# Patient Record
Sex: FEMALE | Race: WHITE | NOT HISPANIC OR LATINO
[De-identification: names, ages, dates, MRNs, and addresses within clinical notes are randomized per-mention and may not be internally consistent; named-entity substitution may affect disease eponyms.]

---

## 2017-05-22 ENCOUNTER — TRANSCRIPTION ENCOUNTER (OUTPATIENT)
Age: 47
End: 2017-05-22

## 2017-06-01 ENCOUNTER — EMERGENCY (EMERGENCY)
Facility: HOSPITAL | Age: 47
LOS: 1 days | Discharge: HOME | End: 2017-06-01
Attending: EMERGENCY MEDICINE | Admitting: EMERGENCY MEDICINE
Payer: MEDICARE

## 2017-06-01 VITALS
HEART RATE: 84 BPM | OXYGEN SATURATION: 99 % | DIASTOLIC BLOOD PRESSURE: 78 MMHG | RESPIRATION RATE: 17 BRPM | SYSTOLIC BLOOD PRESSURE: 112 MMHG

## 2017-06-01 DIAGNOSIS — Z98.51 TUBAL LIGATION STATUS: Chronic | ICD-10-CM

## 2017-06-01 DIAGNOSIS — Z98.89 OTHER SPECIFIED POSTPROCEDURAL STATES: Chronic | ICD-10-CM

## 2017-06-01 DIAGNOSIS — K56.60 UNSPECIFIED INTESTINAL OBSTRUCTION: Chronic | ICD-10-CM

## 2017-06-01 PROCEDURE — 71010: CPT | Mod: 26

## 2017-06-01 PROCEDURE — 93010 ELECTROCARDIOGRAM REPORT: CPT

## 2017-06-01 PROCEDURE — 99285 EMERGENCY DEPT VISIT HI MDM: CPT | Mod: 25,GC

## 2017-06-01 RX ORDER — KETOROLAC TROMETHAMINE 30 MG/ML
30 SYRINGE (ML) INJECTION ONCE
Qty: 0 | Refills: 0 | Status: DISCONTINUED | OUTPATIENT
Start: 2017-06-01 | End: 2017-06-01

## 2017-06-01 RX ORDER — ACETAMINOPHEN 500 MG
975 TABLET ORAL ONCE
Qty: 0 | Refills: 0 | Status: COMPLETED | OUTPATIENT
Start: 2017-06-01 | End: 2017-06-01

## 2017-06-01 RX ADMIN — Medication 30 MILLIGRAM(S): at 06:46

## 2017-06-01 RX ADMIN — Medication 975 MILLIGRAM(S): at 06:46

## 2017-06-01 NOTE — ED PROVIDER NOTE - PMH
Abnormal uterine bleeding    Anemia    Anxiety    Chronic lower back pain    Depression    Fibromyalgia    GERD (gastroesophageal reflux disease)    IBS (irritable bowel syndrome)    Post traumatic stress disorder

## 2017-06-01 NOTE — ED PROVIDER NOTE - MEDICAL DECISION MAKING DETAILS
46F with R facial pain radiating to RUE and R side of chest. No risk factors for PE. Possible sinus issue - pt scheduled to see ENT tomorrow. Do not suspect ACS. No point tenderness or injury suggestive of musculoskeletal etiology of pain. Plan: pain control, ekg, cxr, reassess. Pt requesting pain management referral - will provide. 46F with R facial pain radiating to RUE and R side of chest. No risk factors for PE. Possible sinusitis - pt scheduled to see ENT tomorrow. Do not suspect ACS. No point tenderness or injury suggestive of musculoskeletal etiology of pain. Plan: pain control, ekg, cxr, reassess. Pt requesting pain management referral - will provide.

## 2017-06-01 NOTE — ED PROVIDER NOTE - ATTENDING CONTRIBUTION TO CARE
46F h/o fibromyalgia, gastric bypass, anxiety/depression, and GERD p/w R facial pain x 5 days. Pt notes increased stress at home. She notes that her  and 3 children don't understand her condition, and sometimes it causes her to feel very bad. She notes that this morning she was dealing with her youngest child and couldn't deal and had worsening pain. She notes the pain started in her sinus and then went down her neck and right side of her chest and into her abdomen. She is supposed to see an ENT tomorrow morning. Exam:  well appearing, nad lungs: CTAB Heart: rrr no murmur Abd: soft, NTND Ext: no pedal edema,  Plan: Pt with fibromyalgia, and vague complaints on entire right side of body, not consistent with stroke, pt tearful, and depressed, without Suicidal ideation, pt has a therapist and . She used to see a pain mangement doctor but no longer does. ECG normal, cxr normal, will tx pain, pt already has f/u for her facial symptoms, will discharge home.

## 2017-06-01 NOTE — ED PROVIDER NOTE - CARE PLAN
Instructions for follow-up, activity and diet:	-- See referral list to follow up with pain management.   -- Follow up with ENT as scheduled.  -- Return to ER immediately for new or worsening symptoms, any urgent issues, or for any concerns. Principal Discharge DX:	Diffuse pain  Instructions for follow-up, activity and diet:	-- See referral list to follow up with pain management.   -- Follow up with ENT as scheduled.  -- Return to ER immediately for new or worsening symptoms, any urgent issues, or for any concerns.  Secondary Diagnosis:	Facial pain, acute

## 2017-06-01 NOTE — ED PROVIDER NOTE - PLAN OF CARE
-- See referral list to follow up with pain management.   -- Follow up with ENT as scheduled.  -- Return to ER immediately for new or worsening symptoms, any urgent issues, or for any concerns.

## 2017-06-01 NOTE — ED PROVIDER NOTE - PSH
Bowel obstruction  2010  H/O abdominoplasty    H/O  section  X3  H/O gastric bypass    H/O tubal ligation  2014

## 2017-06-01 NOTE — ED PROVIDER NOTE - OBJECTIVE STATEMENT
46F h/o fibromyalgia, gastric bypass, anxiety/depression, and GERD p/w R facial pain x 5 days. The pain started in the R maxillary area, sharp, intermittent, radiating to the R lateral neck, RUE, and R chest wall. No focal weakness/numbness, no recent injury. Pt states she has had similar pain in the past 2/2 fibromyalgia. Pt has been blowing her nose frequently, +yellow mucous and occasional nosebleeds, scheduled to see ENT tomorrow. Pt presenting now because the pain has worsened and she has SOB because her R chest hurts on deep inspiration. No hx of DVT/PE/cancer, calf pain/swelling, hemoptysis, or recent trauma/surgery/immobilization.

## 2017-06-01 NOTE — ED ADULT TRIAGE NOTE - CHIEF COMPLAINT QUOTE
Pt with hx of fibromyalgia c/o R sided body pain X3days. States pain is worse with deep inspiration and "makes me feel SOB." PMH anxiety

## 2017-06-01 NOTE — ED ADULT NURSE NOTE - OBJECTIVE STATEMENT
Pt received to room 15. Presents alert and oriented to person, place, and time w/ c/o right sided body pain with history of fibromyalgia. Pt medicated for pain as ordered. Will continue to monitor.

## 2017-06-01 NOTE — ED PROVIDER NOTE - PROGRESS NOTE DETAILS
continued pain, requesting IV pain medications, discussed that opioids are potential addicting and provider preference against them at this time, paged patients PMD  Trae Newman to discuss. D/W Adielue of Dr. Newman, who referred us to two pain management physicians for her to f/u, and she can be seen by Dr. Newman or a partner this week. Pt appears comfortable with normal vital signs, still reporting diffuse right sided body pain, worse in right chest, will help arrange f/u with pain management and discharge home. MOHAMUD with Dr. Ledesma for follow up appt, will D/c with pain managements information.

## 2017-06-02 ENCOUNTER — APPOINTMENT (OUTPATIENT)
Dept: OTOLARYNGOLOGY | Facility: CLINIC | Age: 47
End: 2017-06-02

## 2017-06-02 VITALS
HEART RATE: 72 BPM | HEIGHT: 70 IN | DIASTOLIC BLOOD PRESSURE: 85 MMHG | WEIGHT: 287 LBS | BODY MASS INDEX: 41.09 KG/M2 | SYSTOLIC BLOOD PRESSURE: 115 MMHG

## 2017-06-02 DIAGNOSIS — J01.00 ACUTE MAXILLARY SINUSITIS, UNSPECIFIED: ICD-10-CM

## 2017-06-07 ENCOUNTER — EMERGENCY (EMERGENCY)
Age: 47
LOS: 1 days | Discharge: HOME | End: 2017-06-07
Attending: EMERGENCY MEDICINE | Admitting: EMERGENCY MEDICINE
Payer: MEDICARE

## 2017-06-07 VITALS
SYSTOLIC BLOOD PRESSURE: 123 MMHG | OXYGEN SATURATION: 100 % | TEMPERATURE: 99 F | RESPIRATION RATE: 18 BRPM | HEART RATE: 74 BPM | DIASTOLIC BLOOD PRESSURE: 85 MMHG

## 2017-06-07 VITALS
SYSTOLIC BLOOD PRESSURE: 108 MMHG | OXYGEN SATURATION: 100 % | HEART RATE: 67 BPM | DIASTOLIC BLOOD PRESSURE: 58 MMHG | RESPIRATION RATE: 18 BRPM | TEMPERATURE: 98 F

## 2017-06-07 DIAGNOSIS — Z98.89 OTHER SPECIFIED POSTPROCEDURAL STATES: Chronic | ICD-10-CM

## 2017-06-07 DIAGNOSIS — K56.60 UNSPECIFIED INTESTINAL OBSTRUCTION: Chronic | ICD-10-CM

## 2017-06-07 DIAGNOSIS — Z98.51 TUBAL LIGATION STATUS: Chronic | ICD-10-CM

## 2017-06-07 LAB
ALBUMIN SERPL ELPH-MCNC: 4.1 G/DL — SIGNIFICANT CHANGE UP (ref 3.3–5)
ALP SERPL-CCNC: 77 U/L — SIGNIFICANT CHANGE UP (ref 40–120)
ALT FLD-CCNC: 12 U/L — SIGNIFICANT CHANGE UP (ref 4–33)
APPEARANCE UR: CLEAR — SIGNIFICANT CHANGE UP
AST SERPL-CCNC: 17 U/L — SIGNIFICANT CHANGE UP (ref 4–32)
BACTERIA # UR AUTO: SIGNIFICANT CHANGE UP
BASE EXCESS BLDV CALC-SCNC: 3.5 MMOL/L — SIGNIFICANT CHANGE UP
BASOPHILS # BLD AUTO: 0.03 K/UL — SIGNIFICANT CHANGE UP (ref 0–0.2)
BASOPHILS NFR BLD AUTO: 0.3 % — SIGNIFICANT CHANGE UP (ref 0–2)
BILIRUB SERPL-MCNC: 0.5 MG/DL — SIGNIFICANT CHANGE UP (ref 0.2–1.2)
BILIRUB UR-MCNC: NEGATIVE — SIGNIFICANT CHANGE UP
BLOOD GAS VENOUS - CREATININE: 0.69 MG/DL — SIGNIFICANT CHANGE UP (ref 0.5–1.3)
BLOOD UR QL VISUAL: HIGH
BUN SERPL-MCNC: 13 MG/DL — SIGNIFICANT CHANGE UP (ref 7–23)
CALCIUM SERPL-MCNC: 9 MG/DL — SIGNIFICANT CHANGE UP (ref 8.4–10.5)
CHLORIDE BLDV-SCNC: 106 MMOL/L — SIGNIFICANT CHANGE UP (ref 96–108)
CHLORIDE SERPL-SCNC: 105 MMOL/L — SIGNIFICANT CHANGE UP (ref 98–107)
CO2 SERPL-SCNC: 25 MMOL/L — SIGNIFICANT CHANGE UP (ref 22–31)
COLOR SPEC: YELLOW — SIGNIFICANT CHANGE UP
CREAT SERPL-MCNC: 0.77 MG/DL — SIGNIFICANT CHANGE UP (ref 0.5–1.3)
EOSINOPHIL # BLD AUTO: 0.03 K/UL — SIGNIFICANT CHANGE UP (ref 0–0.5)
EOSINOPHIL NFR BLD AUTO: 0.3 % — SIGNIFICANT CHANGE UP (ref 0–6)
GAS PNL BLDV: 139 MMOL/L — SIGNIFICANT CHANGE UP (ref 136–146)
GLUCOSE BLDV-MCNC: 81 — SIGNIFICANT CHANGE UP (ref 70–99)
GLUCOSE SERPL-MCNC: 82 MG/DL — SIGNIFICANT CHANGE UP (ref 70–99)
GLUCOSE UR-MCNC: NEGATIVE — SIGNIFICANT CHANGE UP
HCO3 BLDV-SCNC: 26 MMOL/L — SIGNIFICANT CHANGE UP (ref 20–27)
HCT VFR BLD CALC: 37.6 % — SIGNIFICANT CHANGE UP (ref 34.5–45)
HCT VFR BLDV CALC: 38.7 % — SIGNIFICANT CHANGE UP (ref 34.5–45)
HGB BLD-MCNC: 12.4 G/DL — SIGNIFICANT CHANGE UP (ref 11.5–15.5)
HGB BLDV-MCNC: 12.6 G/DL — SIGNIFICANT CHANGE UP (ref 11.5–15.5)
IMM GRANULOCYTES NFR BLD AUTO: 0.8 % — SIGNIFICANT CHANGE UP (ref 0–1.5)
KETONES UR-MCNC: NEGATIVE — SIGNIFICANT CHANGE UP
LACTATE BLDV-MCNC: 0.9 MMOL/L — SIGNIFICANT CHANGE UP (ref 0.5–2)
LEUKOCYTE ESTERASE UR-ACNC: NEGATIVE — SIGNIFICANT CHANGE UP
LIDOCAIN IGE QN: 20.4 U/L — SIGNIFICANT CHANGE UP (ref 7–60)
LYMPHOCYTES # BLD AUTO: 2.45 K/UL — SIGNIFICANT CHANGE UP (ref 1–3.3)
LYMPHOCYTES # BLD AUTO: 27.2 % — SIGNIFICANT CHANGE UP (ref 13–44)
MCHC RBC-ENTMCNC: 30.6 PG — SIGNIFICANT CHANGE UP (ref 27–34)
MCHC RBC-ENTMCNC: 33 % — SIGNIFICANT CHANGE UP (ref 32–36)
MCV RBC AUTO: 92.8 FL — SIGNIFICANT CHANGE UP (ref 80–100)
MONOCYTES # BLD AUTO: 0.66 K/UL — SIGNIFICANT CHANGE UP (ref 0–0.9)
MONOCYTES NFR BLD AUTO: 7.3 % — SIGNIFICANT CHANGE UP (ref 2–14)
MUCOUS THREADS # UR AUTO: SIGNIFICANT CHANGE UP
NEUTROPHILS # BLD AUTO: 5.78 K/UL — SIGNIFICANT CHANGE UP (ref 1.8–7.4)
NEUTROPHILS NFR BLD AUTO: 64.1 % — SIGNIFICANT CHANGE UP (ref 43–77)
NITRITE UR-MCNC: NEGATIVE — SIGNIFICANT CHANGE UP
PCO2 BLDV: 48 MMHG — SIGNIFICANT CHANGE UP (ref 41–51)
PH BLDV: 7.39 PH — SIGNIFICANT CHANGE UP (ref 7.32–7.43)
PH UR: 6 — SIGNIFICANT CHANGE UP (ref 4.6–8)
PLATELET # BLD AUTO: 236 K/UL — SIGNIFICANT CHANGE UP (ref 150–400)
PMV BLD: 10.6 FL — SIGNIFICANT CHANGE UP (ref 7–13)
PO2 BLDV: < 24 MMHG — LOW (ref 35–40)
POTASSIUM BLDV-SCNC: 4.5 MMOL/L — SIGNIFICANT CHANGE UP (ref 3.4–4.5)
POTASSIUM SERPL-MCNC: 4.2 MMOL/L — SIGNIFICANT CHANGE UP (ref 3.5–5.3)
POTASSIUM SERPL-SCNC: 4.2 MMOL/L — SIGNIFICANT CHANGE UP (ref 3.5–5.3)
PROT SERPL-MCNC: 7 G/DL — SIGNIFICANT CHANGE UP (ref 6–8.3)
PROT UR-MCNC: NEGATIVE — SIGNIFICANT CHANGE UP
RBC # BLD: 4.05 M/UL — SIGNIFICANT CHANGE UP (ref 3.8–5.2)
RBC # FLD: 13 % — SIGNIFICANT CHANGE UP (ref 10.3–14.5)
RBC CASTS # UR COMP ASSIST: SIGNIFICANT CHANGE UP (ref 0–?)
SAO2 % BLDV: 33.6 % — LOW (ref 60–85)
SODIUM SERPL-SCNC: 142 MMOL/L — SIGNIFICANT CHANGE UP (ref 135–145)
SP GR SPEC: 1.01 — SIGNIFICANT CHANGE UP (ref 1–1.03)
SQUAMOUS # UR AUTO: SIGNIFICANT CHANGE UP
UROBILINOGEN FLD QL: NORMAL E.U. — SIGNIFICANT CHANGE UP (ref 0.1–0.2)
WBC # BLD: 9.02 K/UL — SIGNIFICANT CHANGE UP (ref 3.8–10.5)
WBC # FLD AUTO: 9.02 K/UL — SIGNIFICANT CHANGE UP (ref 3.8–10.5)
WBC UR QL: SIGNIFICANT CHANGE UP (ref 0–?)

## 2017-06-07 PROCEDURE — 99284 EMERGENCY DEPT VISIT MOD MDM: CPT

## 2017-06-07 PROCEDURE — 74177 CT ABD & PELVIS W/CONTRAST: CPT | Mod: 26

## 2017-06-07 RX ORDER — SODIUM CHLORIDE 9 MG/ML
1000 INJECTION INTRAMUSCULAR; INTRAVENOUS; SUBCUTANEOUS ONCE
Qty: 0 | Refills: 0 | Status: COMPLETED | OUTPATIENT
Start: 2017-06-07 | End: 2017-06-07

## 2017-06-07 RX ORDER — LIDOCAINE 4 G/100G
10 CREAM TOPICAL ONCE
Qty: 0 | Refills: 0 | Status: COMPLETED | OUTPATIENT
Start: 2017-06-07 | End: 2017-06-07

## 2017-06-07 RX ORDER — MORPHINE SULFATE 50 MG/1
6 CAPSULE, EXTENDED RELEASE ORAL ONCE
Qty: 0 | Refills: 0 | Status: DISCONTINUED | OUTPATIENT
Start: 2017-06-07 | End: 2017-06-07

## 2017-06-07 RX ORDER — MORPHINE SULFATE 50 MG/1
4 CAPSULE, EXTENDED RELEASE ORAL ONCE
Qty: 0 | Refills: 0 | Status: DISCONTINUED | OUTPATIENT
Start: 2017-06-07 | End: 2017-06-07

## 2017-06-07 RX ORDER — OXYCODONE HYDROCHLORIDE 5 MG/1
1 TABLET ORAL
Qty: 9 | Refills: 0 | OUTPATIENT
Start: 2017-06-07 | End: 2017-06-10

## 2017-06-07 RX ORDER — ONDANSETRON 8 MG/1
8 TABLET, FILM COATED ORAL ONCE
Qty: 0 | Refills: 0 | Status: COMPLETED | OUTPATIENT
Start: 2017-06-07 | End: 2017-06-07

## 2017-06-07 RX ORDER — ONDANSETRON 8 MG/1
4 TABLET, FILM COATED ORAL ONCE
Qty: 0 | Refills: 0 | Status: COMPLETED | OUTPATIENT
Start: 2017-06-07 | End: 2017-06-07

## 2017-06-07 RX ADMIN — ONDANSETRON 4 MILLIGRAM(S): 8 TABLET, FILM COATED ORAL at 19:21

## 2017-06-07 RX ADMIN — MORPHINE SULFATE 6 MILLIGRAM(S): 50 CAPSULE, EXTENDED RELEASE ORAL at 21:39

## 2017-06-07 RX ADMIN — ONDANSETRON 8 MILLIGRAM(S): 8 TABLET, FILM COATED ORAL at 22:11

## 2017-06-07 RX ADMIN — MORPHINE SULFATE 4 MILLIGRAM(S): 50 CAPSULE, EXTENDED RELEASE ORAL at 19:35

## 2017-06-07 RX ADMIN — Medication 1 MILLIGRAM(S): at 23:08

## 2017-06-07 RX ADMIN — SODIUM CHLORIDE 2000 MILLILITER(S): 9 INJECTION INTRAMUSCULAR; INTRAVENOUS; SUBCUTANEOUS at 21:33

## 2017-06-07 RX ADMIN — Medication 30 MILLILITER(S): at 23:08

## 2017-06-07 RX ADMIN — SODIUM CHLORIDE 1000 MILLILITER(S): 9 INJECTION INTRAMUSCULAR; INTRAVENOUS; SUBCUTANEOUS at 19:03

## 2017-06-07 RX ADMIN — LIDOCAINE 10 MILLILITER(S): 4 CREAM TOPICAL at 23:08

## 2017-06-07 RX ADMIN — MORPHINE SULFATE 6 MILLIGRAM(S): 50 CAPSULE, EXTENDED RELEASE ORAL at 21:17

## 2017-06-07 RX ADMIN — MORPHINE SULFATE 4 MILLIGRAM(S): 50 CAPSULE, EXTENDED RELEASE ORAL at 19:21

## 2017-06-07 NOTE — ED ADULT TRIAGE NOTE - CHIEF COMPLAINT QUOTE
Pt c/o right sided abd pain x2 weeks. C/o bloody nose and SOB on Thursday. Denies SOB, CP. + nausea, denies V/D. Hx of IBS.

## 2017-06-07 NOTE — ED SUB INTERN NOTE - OBJECTIVE STATEMENT FT
46 year old woman with extensive abdominal surgery hx presents with R abdominal pain x 2 weeks. The pain has a sharp component that comes and goes with a severity of 10/10. She has a constant dull throbbing pain that occasionally radiates to the back. It is worse when she moves and has been having difficulty picking up her child. She has been able to tolerate PO intake, and there is no change in pain associated with eating. She experiences mild nausea but no emesis and has a baseline of watery bowel movements due to a history of IBS and has not noticed a change in her BMs. She has not had any dysuria or changes in urinary habits    Was in the [facility] for her son and began to have worse abdominal pain prompting her to come to the adult ED.    She was recently in the ED for a sinus infection and finished a course of abx yesterday. PMH is significant for IBS, fibromyalgia, multiple disk herniations, multiple C.diff infections, anxiety, and depression. PSH significant for cholecystectomy and gastric bypass 2005, tummy tuck 2007, strangulated hernia repair 2009, bowel obstruction removal 2012, 3 C-sections (2003,2004,2014), tubal ligation 2014. 46 year old woman with multiple abdominal surgery hx presents with R abdominal pain x 2 weeks. The pain has a sharp component that comes and goes with a severity of 10/10. She has a constant dull throbbing pain that occasionally radiates to the back. It is worse when she moves and has been having difficulty picking up her child. She has been able to tolerate PO intake, and there is no change in pain associated with eating. She experiences mild nausea but no emesis and has a baseline of watery bowel movements due to a history of IBS and has not noticed a change in her BMs. She has not had any dysuria, hematuria, or changes in urinary habits. She has noticed some yellow, malodorous vaginal discharge x 3 years.    She was recently in the ED for a sinus infection and finished a course of abx yesterday. She had seen Dr. Valencia this morning to follow up for the previous ED visit [facility] While in the pediatric ED, she began to have worse abdominal pain prompting her to come to the adult ED. PMH is significant for IBS, fibromyalgia, multiple disk herniations, multiple C.diff infections, anxiety, and depression. PSH significant for cholecystectomy and gastric bypass 2005, tummy tuck 2007, strangulated hernia repair 2009, bowel obstruction removal 2012, 3 C-sections (2003,2004,2014), tubal ligation 2014.

## 2017-06-07 NOTE — ED PROVIDER NOTE - OBJECTIVE STATEMENT
47 y/o F with h/o fibromyalgia, anxiety/depression, GERD, IBS, gastric bypass, previous cholecystectomy, SBO here with right sided abdominal pain x 2 weeks.  Pain is spasming, intermittent, worse with any movement, nonradiating.  Pt states she was seen a week ago in the ER for right sided body pain, which was more prominent in her right chest and face at the time.  She was discharged with ENT follow-up, diagnosed with sinus infection and started on antibiotics.  Pt had follow-up appointment yesterday with her PMD (Dr Fraser [phone]) as she continued to have abdominal pain and had be gabapentin dose increased and was scheduled for an outpatient CT, which she has been unable to obtain.  She denies any associated fever, chills, cough, back pain, vomiting, constipation, dysuria, hematuria.  Pt describes associated nausea today, also with diarrhea which is chronic and consistent with known IBS.

## 2017-06-07 NOTE — ED PROVIDER NOTE - PLAN OF CARE
Take all medications as directed, for pain take Ibuprofen (Motrin, Advil) or Acetaminophen (Tylenol) as directed on packaging.  Follow up with PMD as directed.  You were given copies of all labs and imaging results from this ER visit, please take them to your appointment.  If needed call [phone] to find a primary care physician or call  [phone] to schedule an appointment with the general medicine clinic.  Return to the ER for any worsening symptoms or other concerns.

## 2017-06-07 NOTE — ED PROVIDER NOTE - PROGRESS NOTE DETAILS
Kendall PN - Asked by RN to see patient for increasing abdominal pain. Patient reports continued RUQ pain same location, increasing after initial pain improvement.   Abd: soft, RUQ tenderness to light palpation, (+) BS Kendall PN - results of labs and CT discussed with patient and sister, both express relief at results of nml study however, patient reports continued spasms of severe pain. Patient with h/o fibromyalgia and IBS, reports taking gabapentin for chronic pain, has no specific break through pain regimen. Patient was referred to ED today after PMD evaluation for CTA abd/pelvis, PMD has offered no additional pain mgmt, referred to pain mgmt which patient not yet seen. Patient due for GI/bariatric sx evaluation next week. I-STOP reviewed and patient with no recent opiate prescriptions. Kendall PN - review of complete CT report reveals finding of T10 compression fracture, not initially recognized as not noted in impression. Discussed exact pain pattern again with patient. Patient reports pain starts in mid back and radiates around to front of abdomen, patient felt pain was a rib or back issue. Patient denies any recent injury or known prior compression fracture. Patient has known multiple lumbar disc herniations. Patient denies bowel or bladder incontinence.   Back: focal T10/T11 spinal tenderness without step off, pain exacerbated with movement of R rib 10  Neuro: strength b/l LE 5/5, sensation grossly intact

## 2017-06-07 NOTE — ED ADULT NURSE REASSESSMENT NOTE - NS ED NURSE REASSESS COMMENT FT1
pt. in NAD at this time, pain partially relieved, feels nauseated, MD made aware. IVF infusing as ordered. awaits CT results. will continue to provide nsg care

## 2017-06-07 NOTE — ED ADULT NURSE REASSESSMENT NOTE - NS ED NURSE REASSESS COMMENT FT1
pt. appears in NAD, has hx of anxiety, c/o epigastric pain, denies any nausea at this time. MD Argueta aware and seen pt. meds given as ordered. PO challenged. awaits dispo. sister at bedside. will continue to monitor

## 2017-06-07 NOTE — ED SUB INTERN NOTE - MEDICAL DECISION MAKING DETAILS
46 year old woman presents with R abdominal pain x 2 weeks. Based upon history of multiple abdominal surgeries, appendicitis and bowel obstruction or perforation should be ruled out. She has, however, not had any vomiting or changes in bowel movements which makes obstruction less likely. Gynecologic issues such as ectopic pregnancy (history of tubal ligation) and PID (yellow, malodorous discharge) leading to [id number] are also on the differential. Pancreatitis and bowel ischemia is possible but lower on the differential as she does not have any changes in symptoms of food intake. She also does not have risk factors for bowel ischemia (no CAD, Afib, OCPs). Acute cystitis is also lower on the differential as it would typically present with other urinary symptoms such as dysuria or increased frequency. Neurologic causes is high on the differential as a contributing cause as she also has a history of fibromyalgia and herniated discs which may result in/exacerbate abdominal pain.    CBC, CMP, lactate, lipase, UA, hCG. CT abdomen/pelvis with IV and PO contrast. 46 year old woman presents with R abdominal pain x 2 weeks. Based upon history of multiple abdominal surgeries, appendicitis and bowel obstruction or perforation should be ruled out. She has, however, not had any vomiting or changes in bowel movements which makes obstruction less likely. Gynecologic issues such as ectopic pregnancy (history of tubal ligation) and PID (yellow, malodorous discharge) leading to [id number] are also on the differential. Pancreatitis and bowel ischemia is possible but lower on the differential as she does not have any changes in symptoms of food intake. She also does not have risk factors for bowel ischemia (no CAD, Afib, OCPs). Acute cystitis is also lower on the differential as it would typically present with other urinary symptoms such as dysuria or increased frequency. PNA could also cause abdominal pain and she has a history of sinus infection, she has, however, not had any cough or worsening SOB with normal lung sounds. Neurologic causes is high on the differential as a contributing cause as she also has a history of fibromyalgia and herniated discs which may result in/exacerbate abdominal pain.    CBC, CMP, lactate, lipase, UA, hCG. CXR, CT abdomen/pelvis with IV and PO contrast. 46 year old woman presents with R abdominal pain x 2 weeks. Based upon history of multiple abdominal surgeries, appendicitis and bowel obstruction or perforation should be ruled out. She has, however, not had any vomiting or changes in bowel movements which makes obstruction less likely. Pancreatitis and bowel ischemia is possible but lower on the differential as she does not have any changes in symptoms of food intake. She also does not have risk factors for bowel ischemia (no CAD, Afib, OCPs).  Gynecologic issues such as ectopic pregnancy (history of tubal ligation) and PID (yellow, malodorous discharge) leading to [id number] are also on the differential. Acute cystitis is also lower on the differential as it would typically present with other urinary symptoms such as dysuria or increased frequency.   PNA could also cause abdominal pain and she has a history of sinus infection, she has, however, not had any cough or worsening SOB with normal lung sounds.   Neurologic causes is high on the differential as a contributing cause as she also has a history of fibromyalgia and herniated discs which may result in/exacerbate abdominal pain.  CBC, CMP, lactate, lipase, UA, hCG. CXR, CT abdomen/pelvis with IV and PO contrast.

## 2017-06-07 NOTE — ED ADULT NURSE NOTE - OBJECTIVE STATEMENT
pt. came in c/o intermittent rt-side abd'l pain radiating to rt lower back x 2 weeks and has been getting worse since last Wednesday. pt. also c/o nausea, no vomiting, has been having loose stools. pt. states she has hx of IBS. pt. also c/o hot flashes, no fever at home. denies any dysuria, had an episode of "a little blood" in her urine today. pt. was given Morphine in Intake prior transfer to ,  pain partially relieved. noted g20 saline lock on rt ac with no ss of infiltration. awaits CT. will continue to monitor

## 2017-06-07 NOTE — ED PROVIDER NOTE - CARE PLAN
Principal Discharge DX:	Vertebral compression fracture, initial encounter  Instructions for follow-up, activity and diet:	Take all medications as directed, for pain take Ibuprofen (Motrin, Advil) or Acetaminophen (Tylenol) as directed on packaging.  Follow up with PMD as directed.  You were given copies of all labs and imaging results from this ER visit, please take them to your appointment.  If needed call [phone] to find a primary care physician or call  [phone] to schedule an appointment with the general medicine clinic.  Return to the ER for any worsening symptoms or other concerns.

## 2017-06-07 NOTE — ED SUB INTERN NOTE - PSH
Bowel obstruction    H/O abdominoplasty    H/O  section  X3 (,,)  H/O gastric bypass    H/O tubal ligation  2014

## 2017-06-07 NOTE — ED PROVIDER NOTE - MEDICAL DECISION MAKING DETAILS
47 y/o F with 2 weeks of R sided abd pain.  Abd is soft but with diffuse right sided tenderness.  Given history concern for appendicitis, sbo, ibs flare/colitis, renal colic although less likely.  Plan for labs, ua/ucg, ivfs, pain control + antiemetics, ct abd/pel, reassess.

## 2017-06-09 ENCOUNTER — APPOINTMENT (OUTPATIENT)
Dept: ORTHOPEDIC SURGERY | Facility: CLINIC | Age: 47
End: 2017-06-09

## 2017-06-09 VITALS
HEIGHT: 70 IN | HEART RATE: 72 BPM | DIASTOLIC BLOOD PRESSURE: 79 MMHG | BODY MASS INDEX: 40.09 KG/M2 | WEIGHT: 280 LBS | SYSTOLIC BLOOD PRESSURE: 121 MMHG

## 2017-06-09 VITALS — BODY MASS INDEX: 35.07 KG/M2 | WEIGHT: 245 LBS | HEIGHT: 70 IN

## 2017-06-09 DIAGNOSIS — M48.50XA COLLAPSED VERTEBRA, NOT ELSEWHERE CLASSIFIED, SITE UNSPECIFIED, INITIAL ENCOUNTER FOR FRACTURE: ICD-10-CM

## 2017-06-09 LAB
BACTERIA UR CULT: SIGNIFICANT CHANGE UP
SPECIMEN SOURCE: SIGNIFICANT CHANGE UP

## 2017-06-11 ENCOUNTER — INPATIENT (INPATIENT)
Facility: HOSPITAL | Age: 47
LOS: 7 days | Discharge: HOME | End: 2017-06-19
Attending: HOSPITALIST | Admitting: HOSPITALIST
Payer: MEDICARE

## 2017-06-11 VITALS
HEART RATE: 69 BPM | SYSTOLIC BLOOD PRESSURE: 110 MMHG | TEMPERATURE: 98 F | DIASTOLIC BLOOD PRESSURE: 52 MMHG | OXYGEN SATURATION: 98 % | RESPIRATION RATE: 16 BRPM

## 2017-06-11 DIAGNOSIS — K56.60 UNSPECIFIED INTESTINAL OBSTRUCTION: Chronic | ICD-10-CM

## 2017-06-11 DIAGNOSIS — Z98.89 OTHER SPECIFIED POSTPROCEDURAL STATES: Chronic | ICD-10-CM

## 2017-06-11 DIAGNOSIS — Z98.51 TUBAL LIGATION STATUS: Chronic | ICD-10-CM

## 2017-06-11 DIAGNOSIS — M54.9 DORSALGIA, UNSPECIFIED: ICD-10-CM

## 2017-06-11 LAB
ALBUMIN SERPL ELPH-MCNC: 3.9 G/DL — SIGNIFICANT CHANGE UP (ref 3.3–5)
ALP SERPL-CCNC: 72 U/L — SIGNIFICANT CHANGE UP (ref 40–120)
ALT FLD-CCNC: 12 U/L — SIGNIFICANT CHANGE UP (ref 4–33)
AST SERPL-CCNC: 16 U/L — SIGNIFICANT CHANGE UP (ref 4–32)
BASOPHILS # BLD AUTO: 0.02 K/UL — SIGNIFICANT CHANGE UP (ref 0–0.2)
BASOPHILS NFR BLD AUTO: 0.2 % — SIGNIFICANT CHANGE UP (ref 0–2)
BILIRUB SERPL-MCNC: 0.5 MG/DL — SIGNIFICANT CHANGE UP (ref 0.2–1.2)
BUN SERPL-MCNC: 15 MG/DL — SIGNIFICANT CHANGE UP (ref 7–23)
CALCIUM SERPL-MCNC: 8.7 MG/DL — SIGNIFICANT CHANGE UP (ref 8.4–10.5)
CHLORIDE SERPL-SCNC: 105 MMOL/L — SIGNIFICANT CHANGE UP (ref 98–107)
CO2 SERPL-SCNC: 25 MMOL/L — SIGNIFICANT CHANGE UP (ref 22–31)
CREAT SERPL-MCNC: 0.67 MG/DL — SIGNIFICANT CHANGE UP (ref 0.5–1.3)
EOSINOPHIL # BLD AUTO: 0.13 K/UL — SIGNIFICANT CHANGE UP (ref 0–0.5)
EOSINOPHIL NFR BLD AUTO: 1.6 % — SIGNIFICANT CHANGE UP (ref 0–6)
GLUCOSE SERPL-MCNC: 80 MG/DL — SIGNIFICANT CHANGE UP (ref 70–99)
HCT VFR BLD CALC: 36.2 % — SIGNIFICANT CHANGE UP (ref 34.5–45)
HGB BLD-MCNC: 11.4 G/DL — LOW (ref 11.5–15.5)
IMM GRANULOCYTES NFR BLD AUTO: 0.9 % — SIGNIFICANT CHANGE UP (ref 0–1.5)
LYMPHOCYTES # BLD AUTO: 2.9 K/UL — SIGNIFICANT CHANGE UP (ref 1–3.3)
LYMPHOCYTES # BLD AUTO: 35.5 % — SIGNIFICANT CHANGE UP (ref 13–44)
MCHC RBC-ENTMCNC: 29.5 PG — SIGNIFICANT CHANGE UP (ref 27–34)
MCHC RBC-ENTMCNC: 31.5 % — LOW (ref 32–36)
MCV RBC AUTO: 93.8 FL — SIGNIFICANT CHANGE UP (ref 80–100)
MONOCYTES # BLD AUTO: 0.65 K/UL — SIGNIFICANT CHANGE UP (ref 0–0.9)
MONOCYTES NFR BLD AUTO: 8 % — SIGNIFICANT CHANGE UP (ref 2–14)
NEUTROPHILS # BLD AUTO: 4.4 K/UL — SIGNIFICANT CHANGE UP (ref 1.8–7.4)
NEUTROPHILS NFR BLD AUTO: 53.8 % — SIGNIFICANT CHANGE UP (ref 43–77)
PLATELET # BLD AUTO: 218 K/UL — SIGNIFICANT CHANGE UP (ref 150–400)
PMV BLD: 10.3 FL — SIGNIFICANT CHANGE UP (ref 7–13)
POTASSIUM SERPL-MCNC: 4.1 MMOL/L — SIGNIFICANT CHANGE UP (ref 3.5–5.3)
POTASSIUM SERPL-SCNC: 4.1 MMOL/L — SIGNIFICANT CHANGE UP (ref 3.5–5.3)
PROT SERPL-MCNC: 6.5 G/DL — SIGNIFICANT CHANGE UP (ref 6–8.3)
RBC # BLD: 3.86 M/UL — SIGNIFICANT CHANGE UP (ref 3.8–5.2)
RBC # FLD: 13.1 % — SIGNIFICANT CHANGE UP (ref 10.3–14.5)
SODIUM SERPL-SCNC: 142 MMOL/L — SIGNIFICANT CHANGE UP (ref 135–145)
WBC # BLD: 8.17 K/UL — SIGNIFICANT CHANGE UP (ref 3.8–10.5)
WBC # FLD AUTO: 8.17 K/UL — SIGNIFICANT CHANGE UP (ref 3.8–10.5)

## 2017-06-11 PROCEDURE — 72125 CT NECK SPINE W/O DYE: CPT | Mod: 26

## 2017-06-11 PROCEDURE — 72128 CT CHEST SPINE W/O DYE: CPT | Mod: 26

## 2017-06-11 PROCEDURE — 72131 CT LUMBAR SPINE W/O DYE: CPT | Mod: 26

## 2017-06-11 RX ORDER — HYDROMORPHONE HYDROCHLORIDE 2 MG/ML
0.5 INJECTION INTRAMUSCULAR; INTRAVENOUS; SUBCUTANEOUS ONCE
Qty: 0 | Refills: 0 | Status: DISCONTINUED | OUTPATIENT
Start: 2017-06-11 | End: 2017-06-11

## 2017-06-11 RX ORDER — LIDOCAINE 4 G/100G
1 CREAM TOPICAL ONCE
Qty: 0 | Refills: 0 | Status: COMPLETED | OUTPATIENT
Start: 2017-06-11 | End: 2017-06-11

## 2017-06-11 RX ORDER — HYDROMORPHONE HYDROCHLORIDE 2 MG/ML
1 INJECTION INTRAMUSCULAR; INTRAVENOUS; SUBCUTANEOUS ONCE
Qty: 0 | Refills: 0 | Status: DISCONTINUED | OUTPATIENT
Start: 2017-06-11 | End: 2017-06-11

## 2017-06-11 RX ADMIN — HYDROMORPHONE HYDROCHLORIDE 1 MILLIGRAM(S): 2 INJECTION INTRAMUSCULAR; INTRAVENOUS; SUBCUTANEOUS at 18:24

## 2017-06-11 RX ADMIN — HYDROMORPHONE HYDROCHLORIDE 1 MILLIGRAM(S): 2 INJECTION INTRAMUSCULAR; INTRAVENOUS; SUBCUTANEOUS at 16:48

## 2017-06-11 RX ADMIN — LIDOCAINE 1 PATCH: 4 CREAM TOPICAL at 16:10

## 2017-06-11 RX ADMIN — HYDROMORPHONE HYDROCHLORIDE 0.5 MILLIGRAM(S): 2 INJECTION INTRAMUSCULAR; INTRAVENOUS; SUBCUTANEOUS at 23:05

## 2017-06-11 RX ADMIN — HYDROMORPHONE HYDROCHLORIDE 1 MILLIGRAM(S): 2 INJECTION INTRAMUSCULAR; INTRAVENOUS; SUBCUTANEOUS at 18:40

## 2017-06-11 RX ADMIN — HYDROMORPHONE HYDROCHLORIDE 1 MILLIGRAM(S): 2 INJECTION INTRAMUSCULAR; INTRAVENOUS; SUBCUTANEOUS at 20:15

## 2017-06-11 NOTE — ED PROVIDER NOTE - PROGRESS NOTE DETAILS
PRINCE Chavez: NAD, VSS, symptoms have improved after medication. Pt still complaining of numbness bilaterally. Ortho consulted will see pt in the ED. PRINCE Chavez: pt still complaining of pain after 2 doses of dilaudid - admitted to Dr. Andujar for pain management

## 2017-06-11 NOTE — ED PROVIDER NOTE - OBJECTIVE STATEMENT
46 year old female with a PMhx of fibromyalgia, GERD, anxiety, depression, IBS, gastric bypass, cholecystectomy, SBO pw worsening lower back pain that radiates to the right hip and leg for the past 2 weeks. Pain is 46 year old female with a PMhx of fibromyalgia, GERD, anxiety, depression, IBS, gastric bypass, cholecystectomy, SBO pw worsening lower back pain that radiates to the right hip and leg for the past 2 weeks. Pain is sharp in nature, 8/10 in severity, located midline of thoracic spine, worse with movement, radiates down the right hip and thigh  associated with subjective numbness in the right foot. Pt was seen here on 6/7/2017 and she was diagnosed incidentally with t10 wedge - seen by orthopedist Dr. Braulio Young - given back brace and scheduled MRI for Saturday. Today patient had increased pain after sitting and standing multiple times. Pt able to ambulate with no assistance. Endorses that she has some urinary incontinence when coughing for the past 3 months. Denies recent trauma to the back, n/v/f/c, CP, SOB, abdominal pain.

## 2017-06-11 NOTE — ED ADULT TRIAGE NOTE - CHIEF COMPLAINT QUOTE
pt diagnosed with vertebral fracture 4 days ago in ED c/o mid back pain radiating down right leg with tingling to toes.  no pain relief with prescribed pain meds. states she was instructed by spinal doc to come to the ED.

## 2017-06-11 NOTE — ED PROVIDER NOTE - MEDICAL DECISION MAKING DETAILS
46 year old female with a PMhx of fibromyalgia, GERD, anxiety, depression, IBS, gastric bypass, cholecystectomy, SBO pw worsening lower back pain that radiates to the right hip and leg for the past 2 weeks.   Plan: pain management, ct spine

## 2017-06-11 NOTE — ED ADULT NURSE NOTE - OBJECTIVE STATEMENT
Patient is a 47 y/o female, awake, A&O x 3, presents to ED complaining of back pain that radiates down right leg to toes.  Patient was seen at [facility] ED on 06/07/17 and saw spine specialist on 06/08/17 and scheduled for MRI on Friday.  Patient taking home pain meds without relief.  IV placed, labs drawn and sent and will continue to monitor patient.  Patient admitted to MED and awaiting transport to the unit.

## 2017-06-11 NOTE — ED PROVIDER NOTE - ATTENDING CONTRIBUTION TO CARE
Dr. Avery:  I performed a face to face bedside interview with patient regarding history of present illness, review of symptoms and past medical history. I completed an independent physical exam.  I have discussed patient's plan of care with PA.   I agree with note as stated above, having amended the EMR as needed to reflect my findings.   This includes HISTORY OF PRESENT ILLNESS, HIV, PAST MEDICAL/SURGICAL/FAMILY/SOCIAL HISTORY, ALLERGIES AND HOME MEDICATIONS, REVIEW OF SYSTEMS, PHYSICAL EXAM, and any PROGRESS NOTES during the time I functioned as the attending physician for this patient.    46F h/o fibromyalgia, IBS, anxiety/depression, s/p gastric bypass, cholecystectomy, SOB, presents with worsening back pain that radiates to right hip and leg with numbness.  Pt also endorses stress incontinence (during coughing) over past month.  On 6/7/17, patient was seen in ED for abdominal pain, CT showed incidental finding of wedge-shaped T10 vertebrae, followed by Dr. Young (ortho spine), given back brace and scheduled for MRI.   Pt presents with increased back pain today after repeated sitting/standing in [place].      Exam:  - nad  - rrr  - ctab  - abd soft, ntnd  - pt endorses diffuse TTP along spine  - poor effort but equal strength bilateral lower extremities    A/P  - back pain, unclear etiology  - CT spine  - will attempt to touch base with orthopedics  - pain control, reassess

## 2017-06-12 DIAGNOSIS — F32.9 MAJOR DEPRESSIVE DISORDER, SINGLE EPISODE, UNSPECIFIED: ICD-10-CM

## 2017-06-12 DIAGNOSIS — M79.7 FIBROMYALGIA: ICD-10-CM

## 2017-06-12 DIAGNOSIS — F41.9 ANXIETY DISORDER, UNSPECIFIED: ICD-10-CM

## 2017-06-12 DIAGNOSIS — Z29.9 ENCOUNTER FOR PROPHYLACTIC MEASURES, UNSPECIFIED: ICD-10-CM

## 2017-06-12 DIAGNOSIS — M54.6 PAIN IN THORACIC SPINE: ICD-10-CM

## 2017-06-12 DIAGNOSIS — R20.0 ANESTHESIA OF SKIN: ICD-10-CM

## 2017-06-12 DIAGNOSIS — K21.9 GASTRO-ESOPHAGEAL REFLUX DISEASE WITHOUT ESOPHAGITIS: ICD-10-CM

## 2017-06-12 DIAGNOSIS — K58.9 IRRITABLE BOWEL SYNDROME WITHOUT DIARRHEA: ICD-10-CM

## 2017-06-12 DIAGNOSIS — N39.3 STRESS INCONTINENCE (FEMALE) (MALE): ICD-10-CM

## 2017-06-12 LAB
24R-OH-CALCIDIOL SERPL-MCNC: 19.4 NG/ML — LOW (ref 30–100)
BUN SERPL-MCNC: 16 MG/DL — SIGNIFICANT CHANGE UP (ref 7–23)
CALCIUM SERPL-MCNC: 8.7 MG/DL — SIGNIFICANT CHANGE UP (ref 8.4–10.5)
CHLORIDE SERPL-SCNC: 105 MMOL/L — SIGNIFICANT CHANGE UP (ref 98–107)
CO2 SERPL-SCNC: 25 MMOL/L — SIGNIFICANT CHANGE UP (ref 22–31)
CREAT SERPL-MCNC: 0.62 MG/DL — SIGNIFICANT CHANGE UP (ref 0.5–1.3)
GLUCOSE SERPL-MCNC: 81 MG/DL — SIGNIFICANT CHANGE UP (ref 70–99)
HCT VFR BLD CALC: 35.2 % — SIGNIFICANT CHANGE UP (ref 34.5–45)
HGB BLD-MCNC: 11.3 G/DL — LOW (ref 11.5–15.5)
MAGNESIUM SERPL-MCNC: 2.1 MG/DL — SIGNIFICANT CHANGE UP (ref 1.6–2.6)
MCHC RBC-ENTMCNC: 30.1 PG — SIGNIFICANT CHANGE UP (ref 27–34)
MCHC RBC-ENTMCNC: 32.1 % — SIGNIFICANT CHANGE UP (ref 32–36)
MCV RBC AUTO: 93.6 FL — SIGNIFICANT CHANGE UP (ref 80–100)
PLATELET # BLD AUTO: 218 K/UL — SIGNIFICANT CHANGE UP (ref 150–400)
PMV BLD: 10.4 FL — SIGNIFICANT CHANGE UP (ref 7–13)
POTASSIUM SERPL-MCNC: 3.9 MMOL/L — SIGNIFICANT CHANGE UP (ref 3.5–5.3)
POTASSIUM SERPL-SCNC: 3.9 MMOL/L — SIGNIFICANT CHANGE UP (ref 3.5–5.3)
RBC # BLD: 3.76 M/UL — LOW (ref 3.8–5.2)
RBC # FLD: 13.1 % — SIGNIFICANT CHANGE UP (ref 10.3–14.5)
SODIUM SERPL-SCNC: 141 MMOL/L — SIGNIFICANT CHANGE UP (ref 135–145)
WBC # BLD: 6.47 K/UL — SIGNIFICANT CHANGE UP (ref 3.8–10.5)
WBC # FLD AUTO: 6.47 K/UL — SIGNIFICANT CHANGE UP (ref 3.8–10.5)

## 2017-06-12 PROCEDURE — 72149 MRI LUMBAR SPINE W/DYE: CPT | Mod: 26

## 2017-06-12 PROCEDURE — 72142 MRI NECK SPINE W/DYE: CPT | Mod: 26

## 2017-06-12 PROCEDURE — 72147 MRI CHEST SPINE W/DYE: CPT | Mod: 26

## 2017-06-12 PROCEDURE — 99223 1ST HOSP IP/OBS HIGH 75: CPT

## 2017-06-12 RX ORDER — ESCITALOPRAM OXALATE 10 MG/1
20 TABLET, FILM COATED ORAL DAILY
Qty: 0 | Refills: 0 | Status: DISCONTINUED | OUTPATIENT
Start: 2017-06-12 | End: 2017-06-19

## 2017-06-12 RX ORDER — HYOSCYAMINE SULFATE 0.13 MG
0.12 TABLET ORAL THREE TIMES A DAY
Qty: 0 | Refills: 0 | Status: DISCONTINUED | OUTPATIENT
Start: 2017-06-12 | End: 2017-06-19

## 2017-06-12 RX ORDER — SENNA PLUS 8.6 MG/1
2 TABLET ORAL AT BEDTIME
Qty: 0 | Refills: 0 | Status: DISCONTINUED | OUTPATIENT
Start: 2017-06-12 | End: 2017-06-19

## 2017-06-12 RX ORDER — CLONAZEPAM 1 MG
0.5 TABLET ORAL THREE TIMES A DAY
Qty: 0 | Refills: 0 | Status: DISCONTINUED | OUTPATIENT
Start: 2017-06-12 | End: 2017-06-12

## 2017-06-12 RX ORDER — HYDROMORPHONE HYDROCHLORIDE 2 MG/ML
1 INJECTION INTRAMUSCULAR; INTRAVENOUS; SUBCUTANEOUS EVERY 4 HOURS
Qty: 0 | Refills: 0 | Status: DISCONTINUED | OUTPATIENT
Start: 2017-06-12 | End: 2017-06-15

## 2017-06-12 RX ORDER — ONDANSETRON 8 MG/1
4 TABLET, FILM COATED ORAL ONCE
Qty: 0 | Refills: 0 | Status: COMPLETED | OUTPATIENT
Start: 2017-06-12 | End: 2017-06-12

## 2017-06-12 RX ORDER — FAMOTIDINE 10 MG/ML
40 INJECTION INTRAVENOUS AT BEDTIME
Qty: 0 | Refills: 0 | Status: DISCONTINUED | OUTPATIENT
Start: 2017-06-12 | End: 2017-06-19

## 2017-06-12 RX ORDER — HYDROMORPHONE HYDROCHLORIDE 2 MG/ML
0.5 INJECTION INTRAMUSCULAR; INTRAVENOUS; SUBCUTANEOUS ONCE
Qty: 0 | Refills: 0 | Status: DISCONTINUED | OUTPATIENT
Start: 2017-06-12 | End: 2017-06-12

## 2017-06-12 RX ORDER — OXYCODONE HYDROCHLORIDE 5 MG/1
5 TABLET ORAL ONCE
Qty: 0 | Refills: 0 | Status: DISCONTINUED | OUTPATIENT
Start: 2017-06-12 | End: 2017-06-12

## 2017-06-12 RX ORDER — ERGOCALCIFEROL 1.25 MG/1
50000 CAPSULE ORAL
Qty: 0 | Refills: 0 | Status: DISCONTINUED | OUTPATIENT
Start: 2017-06-13 | End: 2017-06-19

## 2017-06-12 RX ORDER — HYDROMORPHONE HYDROCHLORIDE 2 MG/ML
0.5 INJECTION INTRAMUSCULAR; INTRAVENOUS; SUBCUTANEOUS EVERY 4 HOURS
Qty: 0 | Refills: 0 | Status: DISCONTINUED | OUTPATIENT
Start: 2017-06-12 | End: 2017-06-15

## 2017-06-12 RX ORDER — CLONAZEPAM 1 MG
0.5 TABLET ORAL THREE TIMES A DAY
Qty: 0 | Refills: 0 | Status: DISCONTINUED | OUTPATIENT
Start: 2017-06-12 | End: 2017-06-19

## 2017-06-12 RX ORDER — HEPARIN SODIUM 5000 [USP'U]/ML
5000 INJECTION INTRAVENOUS; SUBCUTANEOUS EVERY 8 HOURS
Qty: 0 | Refills: 0 | Status: DISCONTINUED | OUTPATIENT
Start: 2017-06-12 | End: 2017-06-19

## 2017-06-12 RX ORDER — DOCUSATE SODIUM 100 MG
100 CAPSULE ORAL THREE TIMES A DAY
Qty: 0 | Refills: 0 | Status: DISCONTINUED | OUTPATIENT
Start: 2017-06-12 | End: 2017-06-19

## 2017-06-12 RX ORDER — GABAPENTIN 400 MG/1
300 CAPSULE ORAL THREE TIMES A DAY
Qty: 0 | Refills: 0 | Status: DISCONTINUED | OUTPATIENT
Start: 2017-06-12 | End: 2017-06-19

## 2017-06-12 RX ORDER — ACETAMINOPHEN 500 MG
650 TABLET ORAL EVERY 6 HOURS
Qty: 0 | Refills: 0 | Status: DISCONTINUED | OUTPATIENT
Start: 2017-06-12 | End: 2017-06-19

## 2017-06-12 RX ADMIN — HYDROMORPHONE HYDROCHLORIDE 1 MILLIGRAM(S): 2 INJECTION INTRAMUSCULAR; INTRAVENOUS; SUBCUTANEOUS at 05:21

## 2017-06-12 RX ADMIN — Medication 650 MILLIGRAM(S): at 17:24

## 2017-06-12 RX ADMIN — Medication 0.5 MILLIGRAM(S): at 01:53

## 2017-06-12 RX ADMIN — HEPARIN SODIUM 5000 UNIT(S): 5000 INJECTION INTRAVENOUS; SUBCUTANEOUS at 05:06

## 2017-06-12 RX ADMIN — ESCITALOPRAM OXALATE 20 MILLIGRAM(S): 10 TABLET, FILM COATED ORAL at 13:21

## 2017-06-12 RX ADMIN — HEPARIN SODIUM 5000 UNIT(S): 5000 INJECTION INTRAVENOUS; SUBCUTANEOUS at 21:52

## 2017-06-12 RX ADMIN — HYDROMORPHONE HYDROCHLORIDE 0.5 MILLIGRAM(S): 2 INJECTION INTRAMUSCULAR; INTRAVENOUS; SUBCUTANEOUS at 00:52

## 2017-06-12 RX ADMIN — HYDROMORPHONE HYDROCHLORIDE 1 MILLIGRAM(S): 2 INJECTION INTRAMUSCULAR; INTRAVENOUS; SUBCUTANEOUS at 22:07

## 2017-06-12 RX ADMIN — ONDANSETRON 4 MILLIGRAM(S): 8 TABLET, FILM COATED ORAL at 20:32

## 2017-06-12 RX ADMIN — Medication 0.12 MILLIGRAM(S): at 21:52

## 2017-06-12 RX ADMIN — HYDROMORPHONE HYDROCHLORIDE 1 MILLIGRAM(S): 2 INJECTION INTRAMUSCULAR; INTRAVENOUS; SUBCUTANEOUS at 21:52

## 2017-06-12 RX ADMIN — Medication 0.12 MILLIGRAM(S): at 05:06

## 2017-06-12 RX ADMIN — OXYCODONE HYDROCHLORIDE 5 MILLIGRAM(S): 5 TABLET ORAL at 19:39

## 2017-06-12 RX ADMIN — Medication 100 MILLIGRAM(S): at 21:52

## 2017-06-12 RX ADMIN — GABAPENTIN 300 MILLIGRAM(S): 400 CAPSULE ORAL at 21:52

## 2017-06-12 RX ADMIN — FAMOTIDINE 40 MILLIGRAM(S): 10 INJECTION INTRAVENOUS at 21:52

## 2017-06-12 RX ADMIN — Medication 0.12 MILLIGRAM(S): at 13:21

## 2017-06-12 RX ADMIN — Medication 100 MILLIGRAM(S): at 13:21

## 2017-06-12 RX ADMIN — Medication 0.5 MILLIGRAM(S): at 14:45

## 2017-06-12 RX ADMIN — Medication 100 MILLIGRAM(S): at 05:06

## 2017-06-12 RX ADMIN — GABAPENTIN 300 MILLIGRAM(S): 400 CAPSULE ORAL at 05:06

## 2017-06-12 RX ADMIN — SENNA PLUS 2 TABLET(S): 8.6 TABLET ORAL at 21:52

## 2017-06-12 RX ADMIN — HYDROMORPHONE HYDROCHLORIDE 1 MILLIGRAM(S): 2 INJECTION INTRAMUSCULAR; INTRAVENOUS; SUBCUTANEOUS at 09:47

## 2017-06-12 RX ADMIN — HYDROMORPHONE HYDROCHLORIDE 1 MILLIGRAM(S): 2 INJECTION INTRAMUSCULAR; INTRAVENOUS; SUBCUTANEOUS at 13:24

## 2017-06-12 RX ADMIN — GABAPENTIN 300 MILLIGRAM(S): 400 CAPSULE ORAL at 13:22

## 2017-06-12 RX ADMIN — HYDROMORPHONE HYDROCHLORIDE 1 MILLIGRAM(S): 2 INJECTION INTRAMUSCULAR; INTRAVENOUS; SUBCUTANEOUS at 05:06

## 2017-06-12 RX ADMIN — OXYCODONE HYDROCHLORIDE 5 MILLIGRAM(S): 5 TABLET ORAL at 20:40

## 2017-06-12 RX ADMIN — Medication 650 MILLIGRAM(S): at 17:55

## 2017-06-12 RX ADMIN — HYDROMORPHONE HYDROCHLORIDE 1 MILLIGRAM(S): 2 INJECTION INTRAMUSCULAR; INTRAVENOUS; SUBCUTANEOUS at 09:33

## 2017-06-12 RX ADMIN — HEPARIN SODIUM 5000 UNIT(S): 5000 INJECTION INTRAVENOUS; SUBCUTANEOUS at 13:22

## 2017-06-12 NOTE — H&P ADULT - PROBLEM SELECTOR PLAN 2
- Concern for possible sciatica causing patient's R leg numbness, will obtain MRI for further evaluation

## 2017-06-12 NOTE — H&P ADULT - PSH
Bowel obstruction    H/O abdominoplasty    H/O  section  X3 (,,)  H/O gastric bypass    H/O tubal ligation  , s/p ovarian ablation

## 2017-06-12 NOTE — H&P ADULT - NSHPPHYSICALEXAM_GEN_ALL_CORE
Vital Signs Last 24 Hrs  T(C): 36.6, Max: 36.7 (06-11 @ 14:34)  T(F): 97.9, Max: 98.1 (06-11 @ 19:36)  HR: 66 (62 - 72)  BP: 116/68 (104/59 - 116/68)  BP(mean): --  RR: 18 (15 - 20)  SpO2: 100% (98% - 100%)    GENERAL: No acute distress, well-developed  HEAD:  Atraumatic, Normocephalic  ENT: EOMI, PERRLA, conjunctiva and sclera clear, Neck supple, No JVD, moist mucosa  CHEST/LUNG: Clear to auscultation bilaterally; No wheeze, equal breath sounds bilaterally   BACK: +spinal tenderness from thoracic spine down to lumbar spine, worse in the mid-thoracic region  HEART: Regular rate and rhythm; No murmurs, rubs, or gallops  ABDOMEN: Soft, Nontender, Nondistended; Bowel sounds present  EXTREMITIES:  No clubbing, cyanosis, or edema  MSK: upper extremities strength intact, 5/5 strength on LLE, 4+/5 strength of RLE at hip, knee and ankle  PSYCH: Nl behavior, nl affect  NEUROLOGY: AAOx3, +Numbness of R leg from hip to toe otherwise non-focal, cranial nerves intact  SKIN: Normal color, No rashes or lesions

## 2017-06-12 NOTE — H&P ADULT - PROBLEM SELECTOR PLAN 3
- Patient with symptoms of urinary incontinence with laughing/giggling and coughing for the past ~3 months, onset makes it less likely to be related to her back pain and more likely to be stress incontinence  - Would recommend outpatient work up

## 2017-06-12 NOTE — H&P ADULT - PROBLEM SELECTOR PLAN 1
- Unclear etiology for patient's sudden onset acute thoracic back pain, CT A/P initially showed a wedge shaped T10 concerning for possible compression fracture but it was not seen - Unclear etiology for patient's sudden onset acute thoracic back pain, CT A/P initially showed a wedge shaped T10 concerning for possible compression fracture but it was not seen on CT spine currently, did reveal abnormal lucency of L iliac bone of unclear significance  - Ortho eval appreciated  - Will obtain MRI cervical/thoracic/lumbar spine and MRI pelvic bones to better evaluate the abnormal lucency  - c/w pain control, bowel regimen while on opioid therapy  - Consider PT eval in AM

## 2017-06-12 NOTE — CONSULT NOTE ADULT - SUBJECTIVE AND OBJECTIVE BOX
46 year old female presented to the ED with worsening lower back pain. Patient was recently seen by Dr. Young on 17 for 2 weeks of lower back pain radiating down the right leg. Dr. Young recommended TLSO, MRI and pain control. Since her appointment, she reports worsening back pain and presented to the ED for pain control. No recent trauma, fevers or chills. Patient has a history of multiple disc herniations. No bowel or bladder symptoms. No perineal numbness.    PAST MEDICAL & SURGICAL HISTORY:  Chronic lower back pain  Abnormal uterine bleeding  Anxiety  GERD (gastroesophageal reflux disease)  Fibromyalgia  Anemia  IBS (irritable bowel syndrome)  Post traumatic stress disorder  Depression  Anxiety  H/O tubal ligation: , s/p ovarian ablation  Bowel obstruction:   H/O  section: X3 (,,)  H/O abdominoplasty  H/O gastric bypass  Depression  ALL: Sulfa    Vital Signs Last 24 Hrs  T(C): 36.6, Max: 36.7 (06-11 @ 14:34)  T(F): 97.9, Max: 98.1 (06-11 @ 19:36)  HR: 66 (62 - 72)  BP: 116/68 (104/59 - 116/68)  BP(mean): --  RR: 18 (15 - 20)  SpO2: 100% (98% - 100%)    CT: Degenerative changes involving the cervical thoracic and lumbar spine. Abnormal lucency involving the left iliac bone.    Exam:  Gen: NAD, skin intact, Positive SLR. TTP over entire spine, worst at lower thoracic and upper lumbar spine  Motor: 4/5 EHL/FHL/TA/Gastrocnemius/Quadriceps/Hamstrings. Limited due to pain  Sensory: SILT DP/SP/S/S/T Nerve Distributions    A/P: 46 year old female with lower back pain  - Pain Control  - PT/OT  - TLSO for comfort  - Follow Up MRI

## 2017-06-12 NOTE — PHYSICAL THERAPY INITIAL EVALUATION ADULT - MANUAL MUSCLE TESTING RESULTS, REHAB EVAL
no strength deficits were identified/Except B Shoulder Flexion and R Hip Flexion: less than or equal to 3/5

## 2017-06-12 NOTE — H&P ADULT - NSHPLABSRESULTS_GEN_ALL_CORE
LABS( 11 Jun 2017 22:45 ):                          11.4   8.17  )-----------( 218                   36.2     142  |  105  |  15  ----------------------------<  80  4.1   |  25  |  0.67    Ca    8.7         TPro  6.5  /  Alb  3.9  /  TBili  0.5  /  DBili  x   /  AST  16  /  ALT  12  /  AlkPhos  72  06-11    LIVER FUNCTIONS - ( 11 Jun 2017 22:45 )  Alb: 3.9 g/dL / Pro: 6.5 g/dL / ALK PHOS: 72 u/L / ALT: 12 u/L / AST: 16 u/L / GGT: x

## 2017-06-12 NOTE — H&P ADULT - HISTORY OF PRESENT ILLNESS
This is a 46F with history of Fibromyalgia, GERD, Anxiety, Depression, and IBS presents to the hospital with complaints of worsening lower back pain with radiation down to her R leg. Said that the pain initially started about 2 weeks ago, cannot identify any trauma or exacerbating factors to the onset of the pain. Said that the pain was radiating to her R leg and R abdomen causing her immense discomfort with movement and caused her to come into the ED for evaluation (6/7/17). Her work up there showed her to have a wedge-shaped T10 concerning for fracture and she was recommended to see ortho. She saw Dr. Young on 6/11 and was recommended to have a MRI which was scheduled. Said that she was able to control her pain at home with pain medications but today the pain worsened and she was unable to find any relief causing her to come into the hospital again. She said that she might have exacerbated her pain today while she was caring for her children. Said that currently, she is having severe, sharp mid-back pain that radiates down her R leg and R abdomen. Said that she is having associated numbness of the R leg also due to this. Initially was noted as saying she was having urinary incontinence but on questioning states that her urinary incontinence has been ongoing for ~3 months and occurs when laughing/coughing. Denies any other complaints at this time.    In the ED, her vitals were T 98, P 69, /52, R 16, O2 sat 98% RA. Her lab work did not she any significant abnormalities. She had a CT of the cervical/thoracic/lumbar spines that showed degenerative disease and an abnormal lucency of the L iliac spine. She was seen by orthopedics in ED. She was given hydromorphone 1mg IV x2 and 0.5mg IV x2, percocet 1 tab po x1, and lidocaine patch x1. She was admitted to medicine.

## 2017-06-12 NOTE — H&P ADULT - NSHPSOCIALHISTORY_GEN_ALL_CORE
Social History:    Marital Status:  ( X )    (   ) Single    (   )    (  )   Occupation: Home-maker  Lives with: (  ) alone  ( X ) children   ( X ) spouse   (  ) parents  (  ) other; has 3 children    Substance Use (street drugs): ( X ) never used  (  ) other:  Tobacco Usage:  ( X ) never smoked   (   ) former smoker   (   ) current smoker  (     ) pack year  (        ) last cigarette date  Alcohol Usage: occ social use, denies chronic use

## 2017-06-12 NOTE — H&P ADULT - NSHPREVIEWOFSYSTEMS_GEN_ALL_CORE
REVIEW OF SYSTEMS:    CONSTITUTIONAL: No generalized weakness, fevers or chills  EYES/ENT: No visual changes;  No dysphagia  NECK: No pain or stiffness  RESPIRATORY: No cough, wheezing, hemoptysis; No shortness of breath  CARDIOVASCULAR: No chest pain or palpitations; No lower extremity edema  GASTROINTESTINAL: No abdominal or epigastric pain. No nausea, vomiting, or hematemesis; No diarrhea or constipation. No melena or hematochezia.  BACK: +Mid-back pain with radiation to R leg and R abdomen  GENITOURINARY: No dysuria, frequency or hematuria; +Stress incontinence   NEUROLOGICAL: +Numbness of R leg up to R hip  SKIN: No itching, burning, rashes, or lesions   All other review of systems is negative unless indicated above.

## 2017-06-12 NOTE — CHART NOTE - NSCHARTNOTEFT_GEN_A_CORE
Pt seen and examined at bedside. Please refer to the H&P done today for details.    Have right leg numbness, been following up with Dr. Silva (ortho). Pending outpt MRI.  Also report intermittent urinary incontinence that has been going on for 1 month.   Awaiting MRI with contrast. c/w pain meds. ortho f/u appreciated.     - Dr. CONRAD Andujar ([facility])  - [phone]

## 2017-06-12 NOTE — H&P ADULT - ASSESSMENT
This is a 46F with history as above who presents to the hospital with complaints of worsening back pain.

## 2017-06-12 NOTE — PATIENT PROFILE ADULT. - NS TRANSFER PATIENT BELONGINGS
Jewelry/Money (specify)/Clothing/Cell Phone/PDA (specify)/purse, wallet- credit card,necklace, bracelet, ring, shoes, back brace

## 2017-06-13 ENCOUNTER — TRANSCRIPTION ENCOUNTER (OUTPATIENT)
Age: 47
End: 2017-06-13

## 2017-06-13 LAB
ALBUMIN SERPL ELPH-MCNC: 3.6 G/DL — SIGNIFICANT CHANGE UP (ref 3.3–5)
ALP SERPL-CCNC: 67 U/L — SIGNIFICANT CHANGE UP (ref 40–120)
ALT FLD-CCNC: 10 U/L — SIGNIFICANT CHANGE UP (ref 4–33)
AST SERPL-CCNC: 16 U/L — SIGNIFICANT CHANGE UP (ref 4–32)
BASOPHILS # BLD AUTO: 0.02 K/UL — SIGNIFICANT CHANGE UP (ref 0–0.2)
BASOPHILS NFR BLD AUTO: 0.3 % — SIGNIFICANT CHANGE UP (ref 0–2)
BILIRUB SERPL-MCNC: 0.5 MG/DL — SIGNIFICANT CHANGE UP (ref 0.2–1.2)
BUN SERPL-MCNC: 15 MG/DL — SIGNIFICANT CHANGE UP (ref 7–23)
CALCIUM SERPL-MCNC: 8.8 MG/DL — SIGNIFICANT CHANGE UP (ref 8.4–10.5)
CHLORIDE SERPL-SCNC: 104 MMOL/L — SIGNIFICANT CHANGE UP (ref 98–107)
CO2 SERPL-SCNC: 26 MMOL/L — SIGNIFICANT CHANGE UP (ref 22–31)
CREAT SERPL-MCNC: 0.74 MG/DL — SIGNIFICANT CHANGE UP (ref 0.5–1.3)
CRP SERPL-MCNC: 2.5 MG/L — SIGNIFICANT CHANGE UP (ref 0.3–5)
EOSINOPHIL # BLD AUTO: 0.11 K/UL — SIGNIFICANT CHANGE UP (ref 0–0.5)
EOSINOPHIL NFR BLD AUTO: 1.8 % — SIGNIFICANT CHANGE UP (ref 0–6)
ERYTHROCYTE [SEDIMENTATION RATE] IN BLOOD: 12 MM/HR — SIGNIFICANT CHANGE UP (ref 4–25)
GLUCOSE SERPL-MCNC: 82 MG/DL — SIGNIFICANT CHANGE UP (ref 70–99)
HCT VFR BLD CALC: 37.4 % — SIGNIFICANT CHANGE UP (ref 34.5–45)
HGB BLD-MCNC: 11.8 G/DL — SIGNIFICANT CHANGE UP (ref 11.5–15.5)
IMM GRANULOCYTES NFR BLD AUTO: 1.5 % — SIGNIFICANT CHANGE UP (ref 0–1.5)
LYMPHOCYTES # BLD AUTO: 2.55 K/UL — SIGNIFICANT CHANGE UP (ref 1–3.3)
LYMPHOCYTES # BLD AUTO: 41.1 % — SIGNIFICANT CHANGE UP (ref 13–44)
MCHC RBC-ENTMCNC: 29.8 PG — SIGNIFICANT CHANGE UP (ref 27–34)
MCHC RBC-ENTMCNC: 31.6 % — LOW (ref 32–36)
MCV RBC AUTO: 94.4 FL — SIGNIFICANT CHANGE UP (ref 80–100)
MONOCYTES # BLD AUTO: 0.53 K/UL — SIGNIFICANT CHANGE UP (ref 0–0.9)
MONOCYTES NFR BLD AUTO: 8.5 % — SIGNIFICANT CHANGE UP (ref 2–14)
NEUTROPHILS # BLD AUTO: 2.9 K/UL — SIGNIFICANT CHANGE UP (ref 1.8–7.4)
NEUTROPHILS NFR BLD AUTO: 46.8 % — SIGNIFICANT CHANGE UP (ref 43–77)
PLATELET # BLD AUTO: 199 K/UL — SIGNIFICANT CHANGE UP (ref 150–400)
PMV BLD: 10.5 FL — SIGNIFICANT CHANGE UP (ref 7–13)
POTASSIUM SERPL-MCNC: 4.2 MMOL/L — SIGNIFICANT CHANGE UP (ref 3.5–5.3)
POTASSIUM SERPL-SCNC: 4.2 MMOL/L — SIGNIFICANT CHANGE UP (ref 3.5–5.3)
PROT SERPL-MCNC: 6.1 G/DL — SIGNIFICANT CHANGE UP (ref 6–8.3)
RBC # BLD: 3.96 M/UL — SIGNIFICANT CHANGE UP (ref 3.8–5.2)
RBC # FLD: 12.8 % — SIGNIFICANT CHANGE UP (ref 10.3–14.5)
SODIUM SERPL-SCNC: 141 MMOL/L — SIGNIFICANT CHANGE UP (ref 135–145)
WBC # BLD: 6.2 K/UL — SIGNIFICANT CHANGE UP (ref 3.8–10.5)
WBC # FLD AUTO: 6.2 K/UL — SIGNIFICANT CHANGE UP (ref 3.8–10.5)

## 2017-06-13 PROCEDURE — 72196 MRI PELVIS W/DYE: CPT | Mod: 26

## 2017-06-13 RX ADMIN — Medication 0.5 MILLIGRAM(S): at 12:35

## 2017-06-13 RX ADMIN — Medication 100 MILLIGRAM(S): at 21:43

## 2017-06-13 RX ADMIN — GABAPENTIN 300 MILLIGRAM(S): 400 CAPSULE ORAL at 14:37

## 2017-06-13 RX ADMIN — HEPARIN SODIUM 5000 UNIT(S): 5000 INJECTION INTRAVENOUS; SUBCUTANEOUS at 14:37

## 2017-06-13 RX ADMIN — Medication 0.12 MILLIGRAM(S): at 05:37

## 2017-06-13 RX ADMIN — GABAPENTIN 300 MILLIGRAM(S): 400 CAPSULE ORAL at 21:44

## 2017-06-13 RX ADMIN — HYDROMORPHONE HYDROCHLORIDE 1 MILLIGRAM(S): 2 INJECTION INTRAMUSCULAR; INTRAVENOUS; SUBCUTANEOUS at 14:15

## 2017-06-13 RX ADMIN — GABAPENTIN 300 MILLIGRAM(S): 400 CAPSULE ORAL at 05:37

## 2017-06-13 RX ADMIN — HYDROMORPHONE HYDROCHLORIDE 1 MILLIGRAM(S): 2 INJECTION INTRAMUSCULAR; INTRAVENOUS; SUBCUTANEOUS at 09:56

## 2017-06-13 RX ADMIN — FAMOTIDINE 40 MILLIGRAM(S): 10 INJECTION INTRAVENOUS at 21:43

## 2017-06-13 RX ADMIN — SENNA PLUS 2 TABLET(S): 8.6 TABLET ORAL at 21:44

## 2017-06-13 RX ADMIN — HEPARIN SODIUM 5000 UNIT(S): 5000 INJECTION INTRAVENOUS; SUBCUTANEOUS at 21:44

## 2017-06-13 RX ADMIN — ESCITALOPRAM OXALATE 20 MILLIGRAM(S): 10 TABLET, FILM COATED ORAL at 12:35

## 2017-06-13 RX ADMIN — HYDROMORPHONE HYDROCHLORIDE 1 MILLIGRAM(S): 2 INJECTION INTRAMUSCULAR; INTRAVENOUS; SUBCUTANEOUS at 18:49

## 2017-06-13 RX ADMIN — HEPARIN SODIUM 5000 UNIT(S): 5000 INJECTION INTRAVENOUS; SUBCUTANEOUS at 05:37

## 2017-06-13 RX ADMIN — HYDROMORPHONE HYDROCHLORIDE 1 MILLIGRAM(S): 2 INJECTION INTRAMUSCULAR; INTRAVENOUS; SUBCUTANEOUS at 19:04

## 2017-06-13 RX ADMIN — Medication 0.12 MILLIGRAM(S): at 14:37

## 2017-06-13 RX ADMIN — ERGOCALCIFEROL 50000 UNIT(S): 1.25 CAPSULE ORAL at 12:35

## 2017-06-13 RX ADMIN — Medication 100 MILLIGRAM(S): at 05:37

## 2017-06-13 RX ADMIN — HYDROMORPHONE HYDROCHLORIDE 1 MILLIGRAM(S): 2 INJECTION INTRAMUSCULAR; INTRAVENOUS; SUBCUTANEOUS at 05:36

## 2017-06-13 RX ADMIN — Medication 100 MILLIGRAM(S): at 14:37

## 2017-06-13 RX ADMIN — HYDROMORPHONE HYDROCHLORIDE 0.5 MILLIGRAM(S): 2 INJECTION INTRAMUSCULAR; INTRAVENOUS; SUBCUTANEOUS at 21:44

## 2017-06-13 RX ADMIN — HYDROMORPHONE HYDROCHLORIDE 0.5 MILLIGRAM(S): 2 INJECTION INTRAMUSCULAR; INTRAVENOUS; SUBCUTANEOUS at 22:14

## 2017-06-13 RX ADMIN — Medication 0.12 MILLIGRAM(S): at 21:44

## 2017-06-13 RX ADMIN — HYDROMORPHONE HYDROCHLORIDE 1 MILLIGRAM(S): 2 INJECTION INTRAMUSCULAR; INTRAVENOUS; SUBCUTANEOUS at 09:41

## 2017-06-13 RX ADMIN — HYDROMORPHONE HYDROCHLORIDE 1 MILLIGRAM(S): 2 INJECTION INTRAMUSCULAR; INTRAVENOUS; SUBCUTANEOUS at 14:00

## 2017-06-13 RX ADMIN — HYDROMORPHONE HYDROCHLORIDE 1 MILLIGRAM(S): 2 INJECTION INTRAMUSCULAR; INTRAVENOUS; SUBCUTANEOUS at 05:51

## 2017-06-13 NOTE — DISCHARGE NOTE ADULT - MEDICATION SUMMARY - MEDICATIONS TO TAKE
I will START or STAY ON the medications listed below when I get home from the hospital:    Outpatient PT  -- Outpatient   PT x 3 days    -- Indication: For Physical Therapy    HYDROmorphone 4 mg oral tablet  -- 1 tab(s) by mouth every 4 hours, As needed, Severe Pain (7 - 10) MDD:6 tabs  -- Indication: For Pain    clonazePAM 0.5 mg oral tablet  -- 1 tab(s) by mouth 3 times a day  -- Indication: For Anxiety    gabapentin 300 mg oral tablet  -- 1 tab(s) by mouth 3 times a day  -- Indication: For Pain    Lexapro 20 mg oral tablet  -- 1 tab(s) by mouth once a day  -- Indication: For Depression    hyoscyamine 0.125 mg oral tablet  -- 1 tab(s) by mouth 3 times a day  -- Indication: For Irritable bowel syndrome, unspecified type    famotidine 40 mg oral tablet  -- 1 tab(s) by mouth once a day (at bedtime)  -- Indication: For GERD (gastroesophageal reflux disease)    cyclobenzaprine 10 mg oral tablet  -- 1 tab(s) by mouth 3 times a day, As needed, Muscle Spasm MDD:3 tabs  -- Indication: For Dorsalgia    multivitamin  -- 1 tab(s) by mouth once a day  -- Indication: For Vitamin    cyanocobalamin 100 mcg/mL injectable solution  -- 1 dose(s) injectable every 30 days  -- Indication: For Irritable bowel syndrome, unspecified type

## 2017-06-13 NOTE — CONSULT NOTE ADULT - SUBJECTIVE AND OBJECTIVE BOX
HPI:   Patient is a 46y female with hx IBS, fibromyalgia, chronic LBP, sinus infections, recent Zithromax for sinus infection about 3 weeks ago. Pt now presents with back pain, had episode of chills. She was seen by me first time in 3/9/2017 at [facility]. 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 As per chart, she had multiple prior visits at [facility] and [facility] for pain control. Pt now at [facility] with back pain. She had no recent procedures or surgeries on her back, afebrile.    REVIEW OF SYSTEMS:  All other review of systems negative (Comprehensive ROS)    PAST MEDICAL & SURGICAL HISTORY:  Chronic lower back pain  Abnormal uterine bleeding  Anxiety  GERD (gastroesophageal reflux disease)  Fibromyalgia  Anemia  IBS (irritable bowel syndrome)  Post traumatic stress disorder  Depression  Anxiety  H/O tubal ligation: , s/p ovarian ablation  Bowel obstruction:   H/O  section: X3 (,,)  H/O abdominoplasty  H/O gastric bypass  Depression      Allergies    sulfa drugs (Anaphylaxis)    Intolerances        Antimicrobials Day #  none    Other Medications:  heparin  Injectable 5000Unit(s) SubCutaneous every 8 hours  HYDROmorphone  Injectable 1milliGRAM(s) IV Push every 4 hours PRN  HYDROmorphone  Injectable 0.5milliGRAM(s) IV Push every 4 hours PRN  acetaminophen   Tablet. 650milliGRAM(s) Oral every 6 hours PRN  senna 2Tablet(s) Oral at bedtime  docusate sodium 100milliGRAM(s) Oral three times a day  gabapentin 300milliGRAM(s) Oral three times a day  escitalopram 20milliGRAM(s) Oral daily  hyoscyamine 0.125milliGRAM(s) Oral three times a day  famotidine    Tablet 40milliGRAM(s) Oral at bedtime  clonazePAM Tablet 0.5milliGRAM(s) Oral three times a day PRN  ergocalciferol 45357Aczi(s) Oral <User Schedule>      FAMILY HISTORY:  No pertinent family history in first degree relatives      SOCIAL HISTORY:  Smoking:ex-smoker:         T(F): 98.1, Max: 98.1 (06-12 @ 21:51)  HR: 66  BP: 117/72  RR: 18  SpO2: 100%  Wt(kg): --    PHYSICAL EXAM:  General: alert, no acute distress  Eyes:  anicteric, no conjunctival injection, no discharge  Oropharynx: no lesions or injection 	  Neck: supple, without adenopathy  Lungs: clear to auscultation  Heart: regular rate and rhythm; no murmur, rubs or gallops  Abdomen: soft, nondistended, nontender, without mass or organomegaly  Skin: no lesions  Extremities: no clubbing, cyanosis, or edema  Neurologic: alert, oriented, moves all extremities    LAB RESULTS:                        11.8   6.20  )-----------( 199      ( 2017 05:11 )             37.4     06-13    141  |  104  |  15  ----------------------------<  82  4.2   |  26  |  0.74    Ca    8.8      2017 05:11  Mg     2.1     06-12    TPro  6.1  /  Alb  3.6  /  TBili  0.5  /  DBili  x   /  AST  16  /  ALT  10  /  AlkPhos  67  06-13    LIVER FUNCTIONS - ( 2017 05:11 )  Alb: 3.6 g/dL / Pro: 6.1 g/dL / ALK PHOS: 67 u/L / ALT: 10 u/L / AST: 16 u/L / GGT: x           Sedimentation Rate, Erythrocyte: 12 mm/hr (17 @ 05:11)        MICROBIOLOGY REVIEWED:  Culture - Urine:   NGUF^Normal genitourinary alvarez  COLONY COUNT: 10,000-49,000 CFU/mL (17 @ 19:50)        RADIOLOGY REVIEWED:  MRI: Bone marrow edema and enhancement is noted about the T8-T9 levels as   described. Some considerations include osteomyelitis, underlying tumor,   or posttraumatic change (although no fracture is visualized in this   location on the prior CT).    Irregularity involves both sacroiliac joints, partially visualized.   Considerations include a sacroiliitis versus degenerative change. The   cystic lesion in the left iliac bone noted on the prior CT is not fully   covered on this lumbar spine MRI protocol. Please correlate with results   of pending pelvic bone MRI study.

## 2017-06-13 NOTE — DISCHARGE NOTE ADULT - CARE PLAN
Principal Discharge DX:	Chronic right-sided low back pain with right-sided sciatica  Secondary Diagnosis:	Fibromyalgia  Goal:	pain control  Instructions for follow-up, activity and diet:	Please continue with medications as directed. Principal Discharge DX:	Chronic right-sided low back pain with right-sided sciatica  Goal:	pain management.  Instructions for follow-up, activity and diet:	Galium scan done showed no acute findings. Please make an appointment and follow up outpatient with Neurologist Dr. Garduno [phone] and Orthopedic Surgeon Dr. Young [phone] within a week for further workup.  Secondary Diagnosis:	Fibromyalgia  Goal:	pain control  Instructions for follow-up, activity and diet:	Please continue with medications as directed.  Secondary Diagnosis:	Anxiety  Instructions for follow-up, activity and diet:	Please continue with clonazepam as needed.  Secondary Diagnosis:	GERD (gastroesophageal reflux disease)  Instructions for follow-up, activity and diet:	Continue with famotidine as prescribed. Follow up with Dr. Gutierrez [phone] for future workup.  Secondary Diagnosis:	IBS (irritable bowel syndrome)  Instructions for follow-up, activity and diet:	Continue taking hypscamine. Follow up with Dr. Gutierrez [phone] for future workup.  Secondary Diagnosis:	Depression  Instructions for follow-up, activity and diet:	Continue taking escitalopram.  Secondary Diagnosis:	Stress incontinence in female  Instructions for follow-up, activity and diet:	Please follow up with _____ for further work up. Principal Discharge DX:	Chronic right-sided low back pain with right-sided sciatica  Goal:	pain management.  Instructions for follow-up, activity and diet:	Galium scan done showed no acute findings. Please make an appointment and follow up outpatient with Neurologist Dr. Garduno [phone] and Orthopedic Surgeon Dr. Young [phone] within a week for further workup.  Secondary Diagnosis:	Fibromyalgia  Goal:	pain control  Instructions for follow-up, activity and diet:	Please continue with medications as directed.  Secondary Diagnosis:	Anxiety  Instructions for follow-up, activity and diet:	Please continue with clonazepam as needed.  Secondary Diagnosis:	GERD (gastroesophageal reflux disease)  Instructions for follow-up, activity and diet:	Continue with famotidine as prescribed. Follow up with Dr. Gutierrez [phone] for future workup.  Secondary Diagnosis:	IBS (irritable bowel syndrome)  Instructions for follow-up, activity and diet:	Continue taking Hysocyamine. Follow up with Dr. Gutierrez [phone] for future workup.  Secondary Diagnosis:	Depression  Instructions for follow-up, activity and diet:	Continue taking escitalopram.  Secondary Diagnosis:	Stress incontinence in female  Instructions for follow-up, activity and diet:	Please follow up with PCP and Urology for further work up. Principal Discharge DX:	Chronic right-sided low back pain with right-sided sciatica  Goal:	pain management.  Instructions for follow-up, activity and diet:	Galium scan done showed no acute findings. Please make an appointment and follow up outpatient with Neurologist Dr. Garduno [phone] and Orthopedic Surgeon Dr. Young [phone] within a week for further workup. Follow up with outpatient pain Dr Celeste. Take pain medication as needed. Go to outpatient PT.  Secondary Diagnosis:	Fibromyalgia  Goal:	pain control  Instructions for follow-up, activity and diet:	Please continue with medications as directed.  Secondary Diagnosis:	Anxiety  Instructions for follow-up, activity and diet:	Please continue with clonazepam as needed.  Secondary Diagnosis:	GERD (gastroesophageal reflux disease)  Instructions for follow-up, activity and diet:	Continue with famotidine as prescribed. Follow up with Dr. Gutierrez [phone] for future workup.  Secondary Diagnosis:	IBS (irritable bowel syndrome)  Instructions for follow-up, activity and diet:	Continue taking Hysocyamine. Follow up with Dr. Gutierrez [phone] for future workup.  Secondary Diagnosis:	Depression  Instructions for follow-up, activity and diet:	Continue taking escitalopram.  Secondary Diagnosis:	Stress incontinence in female  Instructions for follow-up, activity and diet:	Please follow up with PCP and Urology for further work up. Principal Discharge DX:	Chronic right-sided low back pain with right-sided sciatica  Goal:	pain management.  Instructions for follow-up, activity and diet:	Galium scan done showed no acute findings. Please make an appointment and follow up outpatient with Neurologist Dr. Garduno [phone] and Orthopedic Surgeon Dr. Young [phone] within a week for further workup. Follow up with outpatient pain Dr Celeste. Take pain medication as needed. Go to outpatient PT.  Secondary Diagnosis:	Fibromyalgia  Goal:	pain control  Instructions for follow-up, activity and diet:	Please continue with medications as directed.  Secondary Diagnosis:	Anxiety  Instructions for follow-up, activity and diet:	Please continue with clonazepam as needed.  Secondary Diagnosis:	GERD (gastroesophageal reflux disease)  Instructions for follow-up, activity and diet:	Continue with famotidine as prescribed. Follow up with Dr. Gutierrez [phone] for future workup.  Secondary Diagnosis:	IBS (irritable bowel syndrome)  Instructions for follow-up, activity and diet:	Continue taking Hysocyamine. Follow up with Dr. Gutierrez [phone] for future workup.  Secondary Diagnosis:	Depression  Instructions for follow-up, activity and diet:	Continue taking escitalopram.  Secondary Diagnosis:	Stress incontinence in female  Instructions for follow-up, activity and diet:	Please follow up with PCP and Urology for further work up. Principal Discharge DX:	Chronic right-sided low back pain with right-sided sciatica  Goal:	pain management.  Instructions for follow-up, activity and diet:	Galium scan done showed no acute findings. Please make an appointment and follow up outpatient with Neurologist Dr. Garduno [phone] and Orthopedic Surgeon Dr. Young [phone] within a week for further workup. Follow up with outpatient pain Dr Celeste. Take pain medication as needed. Go to outpatient PT.  Secondary Diagnosis:	Fibromyalgia  Goal:	pain control  Instructions for follow-up, activity and diet:	Please continue with medications as directed.  Secondary Diagnosis:	Anxiety  Instructions for follow-up, activity and diet:	Please continue with clonazepam as needed.  Secondary Diagnosis:	GERD (gastroesophageal reflux disease)  Instructions for follow-up, activity and diet:	Continue with famotidine as prescribed. Follow up with Dr. Gutierrez [phone] for future workup.  Secondary Diagnosis:	IBS (irritable bowel syndrome)  Instructions for follow-up, activity and diet:	Continue taking Hysocyamine. Follow up with Dr. Gutierrez [phone] for future workup.  Secondary Diagnosis:	Depression  Instructions for follow-up, activity and diet:	Continue taking escitalopram.  Secondary Diagnosis:	Stress incontinence in female  Instructions for follow-up, activity and diet:	Please follow up with PCP and Urology for further work up. Principal Discharge DX:	Chronic right-sided low back pain with right-sided sciatica  Goal:	pain management.  Instructions for follow-up, activity and diet:	Galium scan done showed no acute findings. Please make an appointment and follow up outpatient with Neurologist Dr. Garduno [phone] and Orthopedic Surgeon Dr. Young [phone] within a week for further workup. Follow up with outpatient pain Dr Celeste. Take pain medication as needed. Go to outpatient PT.  Secondary Diagnosis:	Fibromyalgia  Goal:	pain control  Instructions for follow-up, activity and diet:	Please continue with medications as directed.  Secondary Diagnosis:	Anxiety  Instructions for follow-up, activity and diet:	Please continue with clonazepam as needed.  Secondary Diagnosis:	GERD (gastroesophageal reflux disease)  Instructions for follow-up, activity and diet:	Continue with famotidine as prescribed. Follow up with Dr. Gutierrez [phone] for future workup.  Secondary Diagnosis:	IBS (irritable bowel syndrome)  Instructions for follow-up, activity and diet:	Continue taking Hysocyamine. Follow up with Dr. Gutierrez [phone] for future workup.  Secondary Diagnosis:	Depression  Instructions for follow-up, activity and diet:	Continue taking escitalopram.  Secondary Diagnosis:	Stress incontinence in female  Instructions for follow-up, activity and diet:	Please follow up with PCP and Urology for further work up.

## 2017-06-13 NOTE — DISCHARGE NOTE ADULT - CARE PROVIDER_API CALL
Trae Valencia (MD), Internal Medicine  [address] [facility] [address]  [place] [address]  Phone: [phone]  Fax: [phone]    Danna Garduno), Neurology  [address] [place] [address]  Phone: [phone]  Fax: [phone]    Braulio Young; DC), Orthopaedic Surgery  [address] [place] [address]  Phone: [phone]  Fax: [phone]

## 2017-06-13 NOTE — DISCHARGE NOTE ADULT - HOSPITAL COURSE
46 F w/ Fibromyalgia, GERD, IBS, PTSD, p/w worsening back pain radiating down her R leg.  Pain control.  Ortho consulted - no acute intervention at this time.  Neuro consulted  ID consulted 46 F w/ Fibromyalgia, GERD, IBS, PTSD, p/w worsening back pain radiating down her Right leg. ID, Ortho and neurology consulted and made recommendations. CT A/P initially showed a wedge shaped T10 concerning for possible compression fracture but it was not seen on CT spine currently, did reveal abnormal lucency of L iliac bone of unclear significance. No ortho intervention at this time. MRI of cervical/thoracic/lumbar spine shows Bone marrow edema and enhancement is noted about the T8-T9 levels as described. ? osteomyelitis, underlying tumor, or posttraumatic change. (have recent fall). ID recommended no abx unless tissue diagnosis available. Neurology rec Galium scan. Normal gallium scan. Patient will need outpt followup for repeat studies.    Stress incontinence in female.  Patient with symptoms of urinary incontinence with laughing/giggling and coughing for the past ~3 months, onset makes it less likely to be related to her back pain and more likely to be stress incontinence     Patient is medically stable for discharge. 46 F w/ Fibromyalgia, GERD, IBS, PTSD, p/w worsening back pain radiating down her Right leg. ID, Ortho and neurology consulted and made recommendations. CT A/P initially showed a wedge shaped T10 concerning for possible compression fracture but it was not seen on CT spine currently, did reveal abnormal lucency of L iliac bone of unclear significance. No ortho intervention at this time. MRI of cervical/thoracic/lumbar spine shows Bone marrow edema and enhancement is noted about the T8-T9 levels as described. ? osteomyelitis, underlying tumor, or posttraumatic change. (have recent fall). ID recommended no abx unless tissue diagnosis available. Neurology rec Galium scan. Normal gallium scan. Patient will need outpt followup for repeat studies.    Stress incontinence in female.  Patient with symptoms of urinary incontinence with laughing/giggling and coughing for the past ~3 months, onset makes it less likely to be related to her back pain and more likely to be stress incontinence. Instructed to follow up with outpatient PCP.     Patient is medically stable for discharge. 46 F w/ Fibromyalgia, GERD, IBS, PTSD, p/w worsening back pain radiating down her Right leg. ID, Ortho and neurology consulted and made recommendations. CT A/P initially showed a wedge shaped T10 concerning for possible compression fracture but it was not seen on CT spine currently, did reveal abnormal lucency of L iliac bone of unclear significance. No ortho intervention at this time. MRI of cervical/thoracic/lumbar spine shows Bone marrow edema and enhancement is noted about the T8-T9 levels as described. ? osteomyelitis, underlying tumor, or posttraumatic change. (have recent fall). ID recommended no abx unless tissue diagnosis available. Neurology rec Galium scan. Normal gallium scan. Patient will need outpt followup for repeat studies.    Stress incontinence in female.  Patient with symptoms of urinary incontinence with laughing/giggling and coughing for the past ~3 months, onset makes it less likely to be related to her back pain and more likely to be stress incontinence. Instructed to follow up with outpatient PCP.     [id number]  Patient is medically stable for discharge. 46 F w/ Fibromyalgia, GERD, IBS, PTSD, p/w worsening back pain radiating down her Right leg. ID, Ortho and neurology consulted and made recommendations. CT A/P initially showed a wedge shaped T10 concerning for possible compression fracture but it was not seen on CT spine currently, did reveal abnormal lucency of L iliac bone of unclear significance. No ortho intervention at this time. MRI of cervical/thoracic/lumbar spine shows Bone marrow edema and enhancement is noted about the T8-T9 levels as described. ? osteomyelitis, underlying tumor, or posttraumatic change. (have recent fall). ID recommended no abx unless tissue diagnosis available. Neurology rec Galium scan. Normal gallium scan. Patient will need outpt followup for repeat studies.    Stress incontinence in female.  Patient with symptoms of urinary incontinence with laughing/giggling and coughing for the past ~3 months, onset makes it less likely to be related to her back pain and more likely to be stress incontinence. Instructed to follow up with outpatient PCP.     [facility]- [id number]  Patient is medically stable for discharge.    Med attn addendum  spent 35 min formulating dc plan of care

## 2017-06-13 NOTE — CONSULT NOTE ADULT - ASSESSMENT
47 yo F with multiple medical problems including hx fibromyalgia, IBS, DJD  hx chronic lower back pain, now a/w increased back pain  no fevers or leukocytosis, ESR=12  MRI findings as above, T8/9 enhancement  Prior blood cult at [facility] in march 2017 was a contaminant with single isolate of Micrococcus and Staph hominis from the same bottle  copies of culture results in a chart  PLAN:   No evidence at present to suggest infectious etiology and no indications for empiric antibiotics  consider CT guided needle biopsy for culture if suspicion for infection still in differential

## 2017-06-13 NOTE — DISCHARGE NOTE ADULT - SECONDARY DIAGNOSIS.
Fibromyalgia Anxiety GERD (gastroesophageal reflux disease) IBS (irritable bowel syndrome) Depression Stress incontinence in female

## 2017-06-13 NOTE — PROGRESS NOTE ADULT - SUBJECTIVE AND OBJECTIVE BOX
Patient is a 46y old  Female who presents with a chief complaint of Back pain (13 Jun 2017 11:28)      SUBJECTIVE / OVERNIGHT EVENTS:  still having a lot of back pain.  mainly thoracic and lumbar area.  no cp, no sob.  no n/v/d. no incontinence.       Vital Signs Last 24 Hrs  T(C): 36.6, Max: 36.7 (06-12 @ 21:51)  T(F): 97.8, Max: 98.1 (06-12 @ 21:51)  HR: 74 (63 - 74)  BP: 118/70 (105/68 - 118/70)  BP(mean): --  RR: 18 (18 - 18)  SpO2: 100% (97% - 100%)  I&O's Summary      PHYSICAL EXAM:  GENERAL: NAD, well-developed, AAOx3  HEAD:  Atraumatic, Normocephalic  EYES: EOMI, PERRLA, conjunctiva and sclera clear  NECK: Supple, No JVD  CHEST/LUNG: Clear to auscultation bilaterally; No wheeze  HEART: Regular rate and rhythm; No murmurs, rubs, or gallops  ABDOMEN: Soft, Nontender, Nondistended; Bowel sounds present  EXTREMITIES:  2+ Peripheral Pulses, No clubbing, cyanosis, or edema  SKIN: No rashes or lesions  Back: tender along the spine, thorac to lumbar. no bruise. no edema.     LABS:                        11.8   6.20  )-----------( 199      ( 13 Jun 2017 05:11 )             37.4     06-13    141  |  104  |  15  ----------------------------<  82  4.2   |  26  |  0.74    Ca    8.8      13 Jun 2017 05:11  Mg     2.1     06-12    TPro  6.1  /  Alb  3.6  /  TBili  0.5  /  DBili  x   /  AST  16  /  ALT  10  /  AlkPhos  67  06-13      CAPILLARY BLOOD GLUCOSE            RADIOLOGY & ADDITIONAL TESTS:    Imaging Personally Reviewed:  [x] YES  [ ] NO    Consultant(s) Notes Reviewed:  [x] YES  [ ] NO      MEDICATIONS  (STANDING):  heparin  Injectable 5000Unit(s) SubCutaneous every 8 hours  senna 2Tablet(s) Oral at bedtime  docusate sodium 100milliGRAM(s) Oral three times a day  gabapentin 300milliGRAM(s) Oral three times a day  escitalopram 20milliGRAM(s) Oral daily  hyoscyamine 0.125milliGRAM(s) Oral three times a day  famotidine    Tablet 40milliGRAM(s) Oral at bedtime  ergocalciferol 22824Mjce(s) Oral <User Schedule>    MEDICATIONS  (PRN):  HYDROmorphone  Injectable 1milliGRAM(s) IV Push every 4 hours PRN Severe Pain (7 - 10)  HYDROmorphone  Injectable 0.5milliGRAM(s) IV Push every 4 hours PRN Moderate Pain (4 - 6)  acetaminophen   Tablet. 650milliGRAM(s) Oral every 6 hours PRN Mild Pain (1 - 3)  clonazePAM Tablet 0.5milliGRAM(s) Oral three times a day PRN Anxiety      Care Discussed with Consultants/Other Providers [x] YES  [ ] NO    HEALTH ISSUES - PROBLEM Dx:  Need for prophylactic measure: Need for prophylactic measure  Irritable bowel syndrome, unspecified type: Irritable bowel syndrome, unspecified type  Gastroesophageal reflux disease without esophagitis: Gastroesophageal reflux disease without esophagitis  Fibromyalgia: Fibromyalgia  Depression, unspecified depression type: Depression, unspecified depression type  Anxiety: Anxiety  Stress incontinence in female: Stress incontinence in female  Numbness and tingling of right leg: Numbness and tingling of right leg  Acute midline thoracic back pain: Acute midline thoracic back pain

## 2017-06-13 NOTE — PROGRESS NOTE ADULT - SUBJECTIVE AND OBJECTIVE BOX
addendum:    reviewed film with neurorads- infection not entirely excluded. Recommend gallium bone scan and ID/onc eval

## 2017-06-13 NOTE — PROGRESS NOTE ADULT - SUBJECTIVE AND OBJECTIVE BOX
Patient has no change in low back and leg pain. Still having some subjective weakness. No bowel or bladder symptoms. No perineal numbness.    vitals: ICU Vital Signs Last 24 Hrs  T(C): 36.7, Max: 36.9 (06-12 @ 09:45)  T(F): 98.1, Max: 98.5 (06-12 @ 09:45)  HR: 71 (60 - 71)  BP: 109/68 (105/68 - 110/68)  BP(mean): --  ABP: --  ABP(mean): --  RR: 18 (18 - 18)  SpO2: 100% (97% - 100%)    06-13    141  |  104  |  15  ----------------------------<  82  4.2   |  26  |  0.74    Ca    8.8      13 Jun 2017 05:11  Mg     2.1     06-12    TPro  6.1  /  Alb  3.6  /  TBili  0.5  /  DBili  x   /  AST  16  /  ALT  10  /  AlkPhos  67  06-13                          11.8   6.20  )-----------( 199      ( 13 Jun 2017 05:11 )             37.4     Exam:  Gen: NAD  TTP over entire spine, worst at lower thoracic and upper lumbar spine  Motor: 4/5 EHL/FHL/TA/GSC/Q/H/IP. exam limited 2/2 pain  Sensory: SILT L4-S1  neg babinski, neg clonus, +SLR    MRI:    Bone marrow edema and enhancement is noted about the T8-T9 levels as   described. Some considerations include osteomyelitis, underlying tumor,   or posttraumatic change (although no fracture is visualized in this   location on the prior CT).    Irregularity involves both sacroiliac joints, partially visualized.   Considerations include a sacroiliitis versus degenerative change. The   cystic lesion in the left iliac bone noted on the prior CT is not fully   covered on this lumbar spine MRI protocol. Please correlate with results   of pending pelvic bone MRI study.    A/P: 45 y/o female with LBP and radiculopathy  -no acute orthopaedic intervention at this time  -recommend infectious disease and oncology consult for MRI finding work up  -WBAT  -PT/OT  -Pain Control  -Please continue to follow up with Dr. Young as an outpatient. [phone]  -ortho stable, please call back for any questions. thank you

## 2017-06-13 NOTE — DISCHARGE NOTE ADULT - PLAN OF CARE
pain control Please continue with medications as directed. Galium scan done showed no acute findings. Please make an appointment and follow up outpatient with Neurologist Dr. Garduno [phone] and Orthopedic Surgeon Dr. Young [phone] within a week for further workup. Please continue with clonazepam as needed. Continue with famotidine as prescribed. Follow up with Dr. Gutierrez [phone] for future workup. Continue taking hypscamine. Follow up with Dr. Gutierrez [phone] for future workup. Continue taking escitalopram. pain management. Please follow up with _____ for further work up. Continue taking Hysocyamine. Follow up with Dr. Gutierrez [phone] for future workup. Please follow up with PCP and Urology for further work up. Galium scan done showed no acute findings. Please make an appointment and follow up outpatient with Neurologist Dr. Garduno [phone] and Orthopedic Surgeon Dr. Young [phone] within a week for further workup. Follow up with outpatient pain Dr Celeste. Take pain medication as needed. Go to outpatient PT.

## 2017-06-13 NOTE — DISCHARGE NOTE ADULT - PATIENT PORTAL LINK FT
“You can access the [employer] Patient Portal, offered by [facility], by registering with the following website: [email or web address]”

## 2017-06-13 NOTE — DISCHARGE NOTE ADULT - NS TRANSFER PATIENT BELONGINGS
wallet, credit cards, purse, cane, bracelet, ring, back brace, necklace/Jewelry/Money (specify)/Cell Phone/PDA (specify)/Clothing

## 2017-06-13 NOTE — CHART NOTE - NSCHARTNOTEFT_GEN_A_CORE
Asked to evaluate patient regarding back pain.  Patient is previously known to Dr. Ball's group from a recent [facility] admission.  I have left a message with Dr. Briscoe to assess the patient.  I will not follow.  Thank you.    Saqib Santiago MD  [phone]

## 2017-06-13 NOTE — PROGRESS NOTE ADULT - SUBJECTIVE AND OBJECTIVE BOX
consult to be dictated, pt with 3weeks thoracic pain, found to have lesion, ?infectious vs neoplastic  Plan.  1. ID fu  2. recommend heme-onc eval to rule out underlying malignancy  3. May need biopsy of lesion if no etiology found

## 2017-06-14 LAB
ALBUMIN SERPL ELPH-MCNC: 3.8 G/DL — SIGNIFICANT CHANGE UP (ref 3.3–5)
ALP SERPL-CCNC: 69 U/L — SIGNIFICANT CHANGE UP (ref 40–120)
ALT FLD-CCNC: 11 U/L — SIGNIFICANT CHANGE UP (ref 4–33)
AST SERPL-CCNC: 16 U/L — SIGNIFICANT CHANGE UP (ref 4–32)
BILIRUB SERPL-MCNC: 0.5 MG/DL — SIGNIFICANT CHANGE UP (ref 0.2–1.2)
BUN SERPL-MCNC: 14 MG/DL — SIGNIFICANT CHANGE UP (ref 7–23)
CALCIUM SERPL-MCNC: 8.9 MG/DL — SIGNIFICANT CHANGE UP (ref 8.4–10.5)
CHLORIDE SERPL-SCNC: 103 MMOL/L — SIGNIFICANT CHANGE UP (ref 98–107)
CO2 SERPL-SCNC: 25 MMOL/L — SIGNIFICANT CHANGE UP (ref 22–31)
CREAT SERPL-MCNC: 0.7 MG/DL — SIGNIFICANT CHANGE UP (ref 0.5–1.3)
GLUCOSE SERPL-MCNC: 83 MG/DL — SIGNIFICANT CHANGE UP (ref 70–99)
HCT VFR BLD CALC: 38.5 % — SIGNIFICANT CHANGE UP (ref 34.5–45)
HGB BLD-MCNC: 12.1 G/DL — SIGNIFICANT CHANGE UP (ref 11.5–15.5)
MCHC RBC-ENTMCNC: 29.8 PG — SIGNIFICANT CHANGE UP (ref 27–34)
MCHC RBC-ENTMCNC: 31.4 % — LOW (ref 32–36)
MCV RBC AUTO: 94.8 FL — SIGNIFICANT CHANGE UP (ref 80–100)
PLATELET # BLD AUTO: 215 K/UL — SIGNIFICANT CHANGE UP (ref 150–400)
PMV BLD: 10.8 FL — SIGNIFICANT CHANGE UP (ref 7–13)
POTASSIUM SERPL-MCNC: 4 MMOL/L — SIGNIFICANT CHANGE UP (ref 3.5–5.3)
POTASSIUM SERPL-SCNC: 4 MMOL/L — SIGNIFICANT CHANGE UP (ref 3.5–5.3)
PROT SERPL-MCNC: 6.5 G/DL — SIGNIFICANT CHANGE UP (ref 6–8.3)
RBC # BLD: 4.06 M/UL — SIGNIFICANT CHANGE UP (ref 3.8–5.2)
RBC # FLD: 12.8 % — SIGNIFICANT CHANGE UP (ref 10.3–14.5)
SODIUM SERPL-SCNC: 140 MMOL/L — SIGNIFICANT CHANGE UP (ref 135–145)
WBC # BLD: 6.7 K/UL — SIGNIFICANT CHANGE UP (ref 3.8–10.5)
WBC # FLD AUTO: 6.7 K/UL — SIGNIFICANT CHANGE UP (ref 3.8–10.5)

## 2017-06-14 PROCEDURE — 99223 1ST HOSP IP/OBS HIGH 75: CPT | Mod: GC

## 2017-06-14 PROCEDURE — 93010 ELECTROCARDIOGRAM REPORT: CPT

## 2017-06-14 RX ORDER — LACTULOSE 10 G/15ML
10 SOLUTION ORAL ONCE
Qty: 0 | Refills: 0 | Status: COMPLETED | OUTPATIENT
Start: 2017-06-14 | End: 2017-06-14

## 2017-06-14 RX ADMIN — SENNA PLUS 2 TABLET(S): 8.6 TABLET ORAL at 22:13

## 2017-06-14 RX ADMIN — Medication 0.5 MILLIGRAM(S): at 23:06

## 2017-06-14 RX ADMIN — HYDROMORPHONE HYDROCHLORIDE 1 MILLIGRAM(S): 2 INJECTION INTRAMUSCULAR; INTRAVENOUS; SUBCUTANEOUS at 20:23

## 2017-06-14 RX ADMIN — HYDROMORPHONE HYDROCHLORIDE 1 MILLIGRAM(S): 2 INJECTION INTRAMUSCULAR; INTRAVENOUS; SUBCUTANEOUS at 12:24

## 2017-06-14 RX ADMIN — HYDROMORPHONE HYDROCHLORIDE 1 MILLIGRAM(S): 2 INJECTION INTRAMUSCULAR; INTRAVENOUS; SUBCUTANEOUS at 16:23

## 2017-06-14 RX ADMIN — HEPARIN SODIUM 5000 UNIT(S): 5000 INJECTION INTRAVENOUS; SUBCUTANEOUS at 05:57

## 2017-06-14 RX ADMIN — Medication 0.12 MILLIGRAM(S): at 05:57

## 2017-06-14 RX ADMIN — GABAPENTIN 300 MILLIGRAM(S): 400 CAPSULE ORAL at 05:57

## 2017-06-14 RX ADMIN — Medication 100 MILLIGRAM(S): at 13:16

## 2017-06-14 RX ADMIN — GABAPENTIN 300 MILLIGRAM(S): 400 CAPSULE ORAL at 22:13

## 2017-06-14 RX ADMIN — Medication 0.12 MILLIGRAM(S): at 13:16

## 2017-06-14 RX ADMIN — Medication 100 MILLIGRAM(S): at 05:57

## 2017-06-14 RX ADMIN — HYDROMORPHONE HYDROCHLORIDE 1 MILLIGRAM(S): 2 INJECTION INTRAMUSCULAR; INTRAVENOUS; SUBCUTANEOUS at 20:40

## 2017-06-14 RX ADMIN — HEPARIN SODIUM 5000 UNIT(S): 5000 INJECTION INTRAVENOUS; SUBCUTANEOUS at 22:13

## 2017-06-14 RX ADMIN — HYDROMORPHONE HYDROCHLORIDE 0.5 MILLIGRAM(S): 2 INJECTION INTRAMUSCULAR; INTRAVENOUS; SUBCUTANEOUS at 07:00

## 2017-06-14 RX ADMIN — Medication 100 MILLIGRAM(S): at 22:14

## 2017-06-14 RX ADMIN — HEPARIN SODIUM 5000 UNIT(S): 5000 INJECTION INTRAVENOUS; SUBCUTANEOUS at 13:16

## 2017-06-14 RX ADMIN — HYDROMORPHONE HYDROCHLORIDE 1 MILLIGRAM(S): 2 INJECTION INTRAMUSCULAR; INTRAVENOUS; SUBCUTANEOUS at 16:55

## 2017-06-14 RX ADMIN — HYDROMORPHONE HYDROCHLORIDE 0.5 MILLIGRAM(S): 2 INJECTION INTRAMUSCULAR; INTRAVENOUS; SUBCUTANEOUS at 06:10

## 2017-06-14 RX ADMIN — LACTULOSE 10 GRAM(S): 10 SOLUTION ORAL at 23:05

## 2017-06-14 RX ADMIN — ESCITALOPRAM OXALATE 20 MILLIGRAM(S): 10 TABLET, FILM COATED ORAL at 12:03

## 2017-06-14 RX ADMIN — Medication 0.12 MILLIGRAM(S): at 22:13

## 2017-06-14 RX ADMIN — HYDROMORPHONE HYDROCHLORIDE 1 MILLIGRAM(S): 2 INJECTION INTRAMUSCULAR; INTRAVENOUS; SUBCUTANEOUS at 12:02

## 2017-06-14 RX ADMIN — GABAPENTIN 300 MILLIGRAM(S): 400 CAPSULE ORAL at 13:16

## 2017-06-14 RX ADMIN — FAMOTIDINE 40 MILLIGRAM(S): 10 INJECTION INTRAVENOUS at 22:14

## 2017-06-14 NOTE — PROGRESS NOTE ADULT - SUBJECTIVE AND OBJECTIVE BOX
Patient is a 46y old  Female who presents with a chief complaint of Back pain (13 Jun 2017 11:28)      HPI:  pt seen, complains of LBP, no new wesakness, numbness. She does admit to fall 1 mois ago, though thoracic pain started 2 weeks ago    Vital Signs Last 24 Hrs  T(C): 36.7, Max: 36.9 (06-13 @ 21:49)  T(F): 98.1, Max: 98.4 (06-13 @ 21:49)  HR: 64 (60 - 66)  BP: 105/68 (100/50 - 117/72)  BP(mean): --  RR: 18 (16 - 19)  SpO2: 99% (97% - 100%)    Physical Exam    Mental Status- AAO x 3, speech fluent without dysarthria, memory and fund of knowledge intact  CN- 2-12 intact  Motor- 5-/5 nl LE, pain limited  Sensory- intact Lt/PP/propriception  Coordination- No dysmetria UE/LE  Gait- deferred

## 2017-06-14 NOTE — CONSULT NOTE ADULT - SUBJECTIVE AND OBJECTIVE BOX
HPI:  This is a 46F with history of Fibromyalgia, GERD, Anxiety, Depression, and IBS presents to the hospital with complaints of worsening lower back pain with radiation down to her R leg. Said that the pain initially started about 2 weeks ago, cannot identify any trauma or exacerbating factors to the onset of the pain. Said that the pain was radiating to her R leg and R abdomen causing her immense discomfort with movement and caused her to come into the ED for evaluation (17). Her work up there showed her to have a wedge-shaped T10 concerning for fracture and she was recommended to see ortho. She saw Dr. Young on  and was recommended to have a MRI which was scheduled. Said that she was able to control her pain at home with pain medications but today the pain worsened and she was unable to find any relief causing her to come into the hospital again. She said that she might have exacerbated her pain today while she was caring for her children. Said that currently, she is having severe, sharp mid-back pain that radiates down her R leg and R abdomen. Said that she is having associated numbness of the R leg also due to this. Initially was noted as saying she was having urinary incontinence but on questioning states that her urinary incontinence has been ongoing for ~3 months and occurs when laughing/coughing. Denies any other complaints at this time. She had a CT of the cervical/thoracic/lumbar spines that showed degenerative disease and an abnormal lucency of the L iliac spine. She was seen by orthopedics and MRI was obtained which showed T8-9 bone marrow edema and enhancement, with some diagnostic considerations include osteomyelitis, underlying tumor, or posttraumatic change. Consulted for consideration of underlying malignancy.      PAST MEDICAL & SURGICAL HISTORY:  Chronic lower back pain  Abnormal uterine bleeding  Anxiety  GERD (gastroesophageal reflux disease)  Fibromyalgia  Anemia  IBS (irritable bowel syndrome)  Post traumatic stress disorder  Depression  Anxiety  H/O tubal ligation: 2014, s/p ovarian ablation  Bowel obstruction:   H/O  section: X3 (,,)  H/O abdominoplasty  H/O gastric bypass  Depression      Allergies    sulfa drugs (Anaphylaxis)    Intolerances        MEDICATIONS  (STANDING):  heparin  Injectable 5000Unit(s) SubCutaneous every 8 hours  senna 2Tablet(s) Oral at bedtime  docusate sodium 100milliGRAM(s) Oral three times a day  gabapentin 300milliGRAM(s) Oral three times a day  escitalopram 20milliGRAM(s) Oral daily  hyoscyamine 0.125milliGRAM(s) Oral three times a day  famotidine    Tablet 40milliGRAM(s) Oral at bedtime  ergocalciferol [id number](s) Oral <User Schedule>    MEDICATIONS  (PRN):  HYDROmorphone  Injectable 1milliGRAM(s) IV Push every 4 hours PRN Severe Pain (7 - 10)  HYDROmorphone  Injectable 0.5milliGRAM(s) IV Push every 4 hours PRN Moderate Pain (4 - 6)  acetaminophen   Tablet. 650milliGRAM(s) Oral every 6 hours PRN Mild Pain (1 - 3)  clonazePAM Tablet 0.5milliGRAM(s) Oral three times a day PRN Anxiety      FAMILY HISTORY:  No pertinent family history in first degree relatives      SOCIAL HISTORY: No EtOH, no tobacco    REVIEW OF SYSTEMS:    CONSTITUTIONAL: No weakness, fevers or chills  EYES/ENT: No visual changes;  No vertigo or throat pain   NECK: No pain or stiffness  RESPIRATORY: No cough, wheezing, hemoptysis; No shortness of breath  CARDIOVASCULAR: No chest pain or palpitations  GASTROINTESTINAL: No abdominal or epigastric pain. No nausea, vomiting, or hematemesis; No diarrhea or constipation. No melena or hematochezia.  GENITOURINARY: No dysuria, frequency or hematuria  NEUROLOGICAL: No numbness or weakness  SKIN: No itching, burning, rashes, or lesions   All other review of systems is negative unless indicated above.        T(F): 98, Max: 98.4 (06-13 @ 21:49)  HR: 60  BP: 100/56  RR: 17  SpO2: 98%  Wt(kg): --    GENERAL: NAD, well-developed  HEAD:  Atraumatic, Normocephalic  EYES: EOMI, PERRLA, conjunctiva and sclera clear  NECK: Supple, No JVD  CHEST/LUNG: Clear to auscultation bilaterally; No wheeze  HEART: Regular rate and rhythm; No murmurs, rubs, or gallops  ABDOMEN: Soft, Nontender, Nondistended; Bowel sounds present  EXTREMITIES:  2+ Peripheral Pulses, No clubbing, cyanosis, or edema  NEUROLOGY: non-focal  SKIN: No rashes or lesions                          12.1   6.70  )-----------( 215      ( 2017 05:10 )             38.5       06-14    140  |  103  |  14  ----------------------------<  83  4.0   |  25  |  0.70    Ca    8.9      2017 05:10    TPro  6.5  /  Alb  3.8  /  TBili  0.5  /  DBili  x   /  AST  16  /  ALT  11  /  AlkPhos  69  -      EXAM:  CT LUMBAR SPINE    EXAM:  CT CERVICAL SPINE    EXAM:  CT THORACIC SPINE    PROCEDURE DATE:  2017     Impression: Degenerative changes involving the cervical thoracic and   lumbar spine as described above.  Abnormal lucency involving the left iliac bone as described above.  If symptoms continue MRI of the cervical thoracic and lumbar spine can be   done as clinically indicated.        EXAM:  MRI LUMBAR SPINE W C    EXAM:  MRI CERVICAL SPINE W C    EXAM:  MRI THORACIC SPINE W C    PROCEDURE DATE:  2017     IMPRESSION: Bone marrow edema and enhancement is noted about the T8-T9 levels as   described. Some considerations include osteomyelitis, underlying tumor,   or posttraumatic change (although no fracture is visualized in this   location on the prior CT).    Irregularity involves both sacroiliac joints, partially visualized.   Considerations include a sacroiliitis versus degenerative change. The   cystic lesion in the left iliac bone noted on the prior CT is not fully   covered on this lumbar spine MRI protocol. Please correlate with results   of pending pelvic bone MRI study. HPI:  This is a 46F with history of Fibromyalgia, GERD, Anxiety, Depression, and IBS presents to the hospital with complaints of worsening lower back pain with radiation down to her R leg. Said that the pain initially started about 2 weeks ago, cannot identify any trauma or exacerbating factors to the onset of the pain. Said that the pain was radiating to her R leg and R abdomen causing her immense discomfort with movement and caused her to come into the ED for evaluation (17). Her work up there showed her to have a wedge-shaped T10 concerning for fracture and she was recommended to see ortho. She saw Dr. Young on  and was recommended to have a MRI which was scheduled. Said that she was able to control her pain at home with pain medications but today the pain worsened and she was unable to find any relief causing her to come into the hospital again. She said that she might have exacerbated her pain today while she was caring for her children. Said that currently, she is having severe, sharp mid-back pain that radiates down her R leg and R abdomen. Said that she is having associated numbness of the R leg also due to this. Initially was noted as saying she was having urinary incontinence but on questioning states that her urinary incontinence has been ongoing for ~3 months and occurs when laughing/coughing. Denies any other complaints at this time. She had a CT of the cervical/thoracic/lumbar spines that showed degenerative disease and an abnormal lucency of the L iliac spine. She was seen by orthopedics and MRI was obtained which showed T8-9 bone marrow edema and enhancement, with some diagnostic considerations include osteomyelitis, underlying tumor, or posttraumatic change. Consulted for consideration of underlying malignancy.      PAST MEDICAL & SURGICAL HISTORY:  Chronic lower back pain  Abnormal uterine bleeding  Anxiety  GERD (gastroesophageal reflux disease)  Fibromyalgia  Anemia  IBS (irritable bowel syndrome)  Post traumatic stress disorder  Depression  Anxiety  H/O tubal ligation: 2014, s/p ovarian ablation  Bowel obstruction:   H/O  section: X3 (,,)  H/O abdominoplasty  H/O gastric bypass  Depression      Allergies    sulfa drugs (Anaphylaxis)    Intolerances        MEDICATIONS  (STANDING):  heparin  Injectable 5000Unit(s) SubCutaneous every 8 hours  senna 2Tablet(s) Oral at bedtime  docusate sodium 100milliGRAM(s) Oral three times a day  gabapentin 300milliGRAM(s) Oral three times a day  escitalopram 20milliGRAM(s) Oral daily  hyoscyamine 0.125milliGRAM(s) Oral three times a day  famotidine    Tablet 40milliGRAM(s) Oral at bedtime  ergocalciferol [id number](s) Oral <User Schedule>    MEDICATIONS  (PRN):  HYDROmorphone  Injectable 1milliGRAM(s) IV Push every 4 hours PRN Severe Pain (7 - 10)  HYDROmorphone  Injectable 0.5milliGRAM(s) IV Push every 4 hours PRN Moderate Pain (4 - 6)  acetaminophen   Tablet. 650milliGRAM(s) Oral every 6 hours PRN Mild Pain (1 - 3)  clonazePAM Tablet 0.5milliGRAM(s) Oral three times a day PRN Anxiety      FAMILY HISTORY:  No pertinent family history in first degree relatives      SOCIAL HISTORY: No EtOH, no tobacco    REVIEW OF SYSTEMS:    CONSTITUTIONAL: No weakness, fevers or chills  EYES/ENT: No visual changes;  No vertigo or throat pain   NECK: No pain or stiffness  RESPIRATORY: No cough, wheezing, hemoptysis; No shortness of breath  CARDIOVASCULAR: No chest pain or palpitations  GASTROINTESTINAL: No abdominal or epigastric pain. No nausea, vomiting, or hematemesis; No diarrhea or constipation. No melena or hematochezia.  BACK: +Back pain  GENITOURINARY: No dysuria, frequency or hematuria  NEUROLOGICAL: Numbness/tingling in RLE  SKIN: No itching, burning, rashes, or lesions   All other review of systems is negative unless indicated above.        T(F): 98, Max: 98.4 (06-13 @ 21:49)  HR: 60  BP: 100/56  RR: 17  SpO2: 98%  Wt(kg): --    GENERAL: NAD, well-developed  HEAD:  Atraumatic, Normocephalic  EYES: EOMI, PERRLA, conjunctiva and sclera clear  NECK: Supple, No JVD  CHEST/LUNG: Clear to auscultation bilaterally; No wheeze  HEART: Regular rate and rhythm; No murmurs, rubs, or gallops  ABDOMEN: Soft, Nontender, Nondistended; Bowel sounds present  EXTREMITIES:  2+ Peripheral Pulses, No clubbing, cyanosis, or edema  NEUROLOGY: full strength b/l lower extremities, sensation intact  SKIN: No rashes or lesions                          12.1   6.70  )-----------( 215      ( 2017 05:10 )             38.5       06-14    140  |  103  |  14  ----------------------------<  83  4.0   |  25  |  0.70    Ca    8.9      2017 05:10    TPro  6.5  /  Alb  3.8  /  TBili  0.5  /  DBili  x   /  AST  16  /  ALT  11  /  AlkPhos  69  -      EXAM:  CT LUMBAR SPINE    EXAM:  CT CERVICAL SPINE    EXAM:  CT THORACIC SPINE    PROCEDURE DATE:  2017     Impression: Degenerative changes involving the cervical thoracic and   lumbar spine as described above.  Abnormal lucency involving the left iliac bone as described above.  If symptoms continue MRI of the cervical thoracic and lumbar spine can be   done as clinically indicated.        EXAM:  MRI LUMBAR SPINE W C    EXAM:  MRI CERVICAL SPINE W C    EXAM:  MRI THORACIC SPINE W C    PROCEDURE DATE:  2017     IMPRESSION: Bone marrow edema and enhancement is noted about the T8-T9 levels as   described. Some considerations include osteomyelitis, underlying tumor,   or posttraumatic change (although no fracture is visualized in this   location on the prior CT).    Irregularity involves both sacroiliac joints, partially visualized.   Considerations include a sacroiliitis versus degenerative change. The   cystic lesion in the left iliac bone noted on the prior CT is not fully   covered on this lumbar spine MRI protocol. Please correlate with results   of pending pelvic bone MRI study.

## 2017-06-14 NOTE — PROVIDER CONTACT NOTE (OTHER) - ASSESSMENT
patient complaining of 7 out of 10 pain after pain medication given.  Vital signs stable, pt now complaining of tightness of chest and legs.

## 2017-06-14 NOTE — CONSULT NOTE ADULT - ATTENDING COMMENTS
imaging reviewed with radiology and seems atypical for malignancy.  no evidence of primary site on chest/abd/pelvis imaging.  if ID workup is negative for infection then would consider biopsy vs short interval follow up

## 2017-06-14 NOTE — CONSULT NOTE ADULT - PROBLEM SELECTOR RECOMMENDATION 9
MRI finding concerning for inflammatory vs. infectious vs. trauma vs. malignancy.   Given the patient's extensive history of disc trauma and severe obesity, would suspect a mechanical trauma/inflammatory etiology to current problem rather than malignant.   Follow up ortho-spine recommendations for follow up.   A needle biopsy of vertebra may be informative, however unclear at this point exactly what they would biopsy. Perhaps at this point at least an LP with cytology would be a good step.

## 2017-06-14 NOTE — PROGRESS NOTE ADULT - SUBJECTIVE AND OBJECTIVE BOX
CC: f/u for    Patient reports    REVIEW OF SYSTEMS: no fevers, no chills, no nausea, no vomiting, no abd pain, no resp symptoms  All other review of systems negative (Comprehensive ROS)    Antimicrobials Day #      Other Medications Reviewed    T(F): 98.8, Max: 98.8 (06-14 @ 13:31)  HR: 68  BP: 128/78  RR: 17  SpO2: 96%  Wt(kg): --    PHYSICAL EXAM:  General: alert, no acute distress  Eyes:  anicteric, no conjunctival injection, no discharge  Oropharynx: no lesions or injection 	  Neck: supple, without adenopathy  Lungs: clear to auscultation  Heart: regular rate and rhythm; no murmur, rubs or gallops  Abdomen: soft, nondistended, nontender, without mass or organomegaly  Skin: no lesions  Extremities: no clubbing, cyanosis, or edema  Neurologic: alert, oriented, moves all extremities    LAB RESULTS:                        12.1   6.70  )-----------( 215      ( 14 Jun 2017 05:10 )             38.5     06-14    140  |  103  |  14  ----------------------------<  83  4.0   |  25  |  0.70    Ca    8.9      14 Jun 2017 05:10    TPro  6.5  /  Alb  3.8  /  TBili  0.5  /  DBili  x   /  AST  16  /  ALT  11  /  AlkPhos  69  06-14    LIVER FUNCTIONS - ( 14 Jun 2017 05:10 )  Alb: 3.8 g/dL / Pro: 6.5 g/dL / ALK PHOS: 69 u/L / ALT: 11 u/L / AST: 16 u/L / GGT: x               MICROBIOLOGY REVIEWED:      RADIOLOGY REVIEWED:  MRI:Bone marrow edema and enhancement is noted about the T8-T9 levels CC: f/u for possible discitis    Patient reports no new c/o, still with back pain, numbness arms and legs    REVIEW OF SYSTEMS: no fevers, no chills, no nausea, no vomiting, no abd pain, no resp symptoms  All other review of systems negative (Comprehensive ROS)    Antimicrobials Day #  none    Other Medications Reviewed    T(F): 98.8, Max: 98.8 (06-14 @ 13:31)  HR: 68  BP: 128/78  RR: 17  SpO2: 96%  Wt(kg): --    PHYSICAL EXAM:  General: alert, no acute distress  Eyes:  anicteric, no conjunctival injection, no discharge  Oropharynx: no lesions or injection 	  Neck: supple, without adenopathy  Lungs: clear to auscultation  Heart: regular rate and rhythm; no murmur, rubs or gallops  Abdomen: soft, nondistended, nontender, without mass or organomegaly  Skin: no lesions  Extremities: no clubbing, cyanosis, or edema  Neurologic: alert, oriented, moves all extremities    LAB RESULTS:                        12.1   6.70  )-----------( 215      ( 14 Jun 2017 05:10 )             38.5     06-14    140  |  103  |  14  ----------------------------<  83  4.0   |  25  |  0.70    Ca    8.9      14 Jun 2017 05:10    TPro  6.5  /  Alb  3.8  /  TBili  0.5  /  DBili  x   /  AST  16  /  ALT  11  /  AlkPhos  69  06-14    LIVER FUNCTIONS - ( 14 Jun 2017 05:10 )  Alb: 3.8 g/dL / Pro: 6.5 g/dL / ALK PHOS: 69 u/L / ALT: 11 u/L / AST: 16 u/L / GGT: x               MICROBIOLOGY REVIEWED:      RADIOLOGY REVIEWED:  MRI:Bone marrow edema and enhancement is noted about the T8-T9 levels

## 2017-06-14 NOTE — PROGRESS NOTE ADULT - SUBJECTIVE AND OBJECTIVE BOX
Patient is a 46y old  Female who presents with a chief complaint of Back pain (13 Jun 2017 11:28)      SUBJECTIVE / OVERNIGHT EVENTS:no events/complaints    MEDICATIONS  (STANDING):  heparin  Injectable 5000Unit(s) SubCutaneous every 8 hours  senna 2Tablet(s) Oral at bedtime  docusate sodium 100milliGRAM(s) Oral three times a day  gabapentin 300milliGRAM(s) Oral three times a day  escitalopram 20milliGRAM(s) Oral daily  hyoscyamine 0.125milliGRAM(s) Oral three times a day  famotidine    Tablet 40milliGRAM(s) Oral at bedtime  ergocalciferol [id number](s) Oral <User Schedule>    MEDICATIONS  (PRN):  HYDROmorphone  Injectable 1milliGRAM(s) IV Push every 4 hours PRN Severe Pain (7 - 10)  HYDROmorphone  Injectable 0.5milliGRAM(s) IV Push every 4 hours PRN Moderate Pain (4 - 6)  acetaminophen   Tablet. 650milliGRAM(s) Oral every 6 hours PRN Mild Pain (1 - 3)  clonazePAM Tablet 0.5milliGRAM(s) Oral three times a day PRN Anxiety      Vital Signs Last 24 Hrs  T(C): 37.1, Max: 37.1 (06-14 @ 13:31)  HR: 68 (60 - 68)  BP: 128/78 (100/50 - 128/78)  RR: 17 (16 - 19)  SpO2: 96% (96% - 100%)  Wt(kg): --  CAPILLARY BLOOD GLUCOSE    I&O's Summary      PHYSICAL EXAM:  GENERAL: NAD, well-developed  HEAD:  Atraumatic, Normocephalic  EYES: EOMI, PERRLA, conjunctiva and sclera clear  NECK: Supple, No JVD  CHEST/LUNG: Clear to auscultation bilaterally; No wheeze  HEART: Regular rate and rhythm; No murmurs, rubs, or gallops  ABDOMEN: Soft, Nontender, Nondistended; Bowel sounds present  EXTREMITIES:  2+ Peripheral Pulses, No clubbing, cyanosis, or edema  PSYCH: AAOx3  NEUROLOGY: non-focal  SKIN: No rashes or lesions    LABS:                        12.1   6.70  )-----------( 215      ( 14 Jun 2017 05:10 )             38.5     06-14    140  |  103  |  14  ----------------------------<  83  4.0   |  25  |  0.70    Ca    8.9      14 Jun 2017 05:10    TPro  6.5  /  Alb  3.8  /  TBili  0.5  /  DBili  x   /  AST  16  /  ALT  11  /  AlkPhos  69  06-14              RADIOLOGY & ADDITIONAL TESTS:    Imaging Personally Reviewed:y    Consultant(s) Notes Reviewed:  y    Care Discussed with Consultants/Other Providers:joseph

## 2017-06-15 DIAGNOSIS — M54.9 DORSALGIA, UNSPECIFIED: ICD-10-CM

## 2017-06-15 LAB — GASTRIN SERPL-MCNC: < 10 PG/ML — SIGNIFICANT CHANGE UP

## 2017-06-15 RX ORDER — HYDROMORPHONE HYDROCHLORIDE 2 MG/ML
1 INJECTION INTRAMUSCULAR; INTRAVENOUS; SUBCUTANEOUS ONCE
Qty: 0 | Refills: 0 | Status: DISCONTINUED | OUTPATIENT
Start: 2017-06-15 | End: 2017-06-15

## 2017-06-15 RX ORDER — POLYETHYLENE GLYCOL 3350 17 G/17G
17 POWDER, FOR SOLUTION ORAL ONCE
Qty: 0 | Refills: 0 | Status: COMPLETED | OUTPATIENT
Start: 2017-06-15 | End: 2017-06-15

## 2017-06-15 RX ORDER — LANOLIN ALCOHOL/MO/W.PET/CERES
3 CREAM (GRAM) TOPICAL ONCE
Qty: 0 | Refills: 0 | Status: COMPLETED | OUTPATIENT
Start: 2017-06-15 | End: 2017-06-15

## 2017-06-15 RX ORDER — CYCLOBENZAPRINE HYDROCHLORIDE 10 MG/1
10 TABLET, FILM COATED ORAL THREE TIMES A DAY
Qty: 0 | Refills: 0 | Status: DISCONTINUED | OUTPATIENT
Start: 2017-06-15 | End: 2017-06-19

## 2017-06-15 RX ORDER — HYDROMORPHONE HYDROCHLORIDE 2 MG/ML
1 INJECTION INTRAMUSCULAR; INTRAVENOUS; SUBCUTANEOUS EVERY 4 HOURS
Qty: 0 | Refills: 0 | Status: DISCONTINUED | OUTPATIENT
Start: 2017-06-15 | End: 2017-06-18

## 2017-06-15 RX ORDER — HYDROMORPHONE HYDROCHLORIDE 2 MG/ML
0.5 INJECTION INTRAMUSCULAR; INTRAVENOUS; SUBCUTANEOUS EVERY 4 HOURS
Qty: 0 | Refills: 0 | Status: DISCONTINUED | OUTPATIENT
Start: 2017-06-15 | End: 2017-06-18

## 2017-06-15 RX ADMIN — HYDROMORPHONE HYDROCHLORIDE 1 MILLIGRAM(S): 2 INJECTION INTRAMUSCULAR; INTRAVENOUS; SUBCUTANEOUS at 22:01

## 2017-06-15 RX ADMIN — Medication 0.12 MILLIGRAM(S): at 21:45

## 2017-06-15 RX ADMIN — Medication 0.12 MILLIGRAM(S): at 15:55

## 2017-06-15 RX ADMIN — HYDROMORPHONE HYDROCHLORIDE 1 MILLIGRAM(S): 2 INJECTION INTRAMUSCULAR; INTRAVENOUS; SUBCUTANEOUS at 15:55

## 2017-06-15 RX ADMIN — GABAPENTIN 300 MILLIGRAM(S): 400 CAPSULE ORAL at 21:45

## 2017-06-15 RX ADMIN — SENNA PLUS 2 TABLET(S): 8.6 TABLET ORAL at 21:45

## 2017-06-15 RX ADMIN — GABAPENTIN 300 MILLIGRAM(S): 400 CAPSULE ORAL at 05:06

## 2017-06-15 RX ADMIN — HYDROMORPHONE HYDROCHLORIDE 1 MILLIGRAM(S): 2 INJECTION INTRAMUSCULAR; INTRAVENOUS; SUBCUTANEOUS at 08:42

## 2017-06-15 RX ADMIN — HYDROMORPHONE HYDROCHLORIDE 1 MILLIGRAM(S): 2 INJECTION INTRAMUSCULAR; INTRAVENOUS; SUBCUTANEOUS at 01:19

## 2017-06-15 RX ADMIN — HYDROMORPHONE HYDROCHLORIDE 1 MILLIGRAM(S): 2 INJECTION INTRAMUSCULAR; INTRAVENOUS; SUBCUTANEOUS at 16:41

## 2017-06-15 RX ADMIN — Medication 3 MILLIGRAM(S): at 01:54

## 2017-06-15 RX ADMIN — POLYETHYLENE GLYCOL 3350 17 GRAM(S): 17 POWDER, FOR SOLUTION ORAL at 21:45

## 2017-06-15 RX ADMIN — HYDROMORPHONE HYDROCHLORIDE 1 MILLIGRAM(S): 2 INJECTION INTRAMUSCULAR; INTRAVENOUS; SUBCUTANEOUS at 14:15

## 2017-06-15 RX ADMIN — HYDROMORPHONE HYDROCHLORIDE 1 MILLIGRAM(S): 2 INJECTION INTRAMUSCULAR; INTRAVENOUS; SUBCUTANEOUS at 11:12

## 2017-06-15 RX ADMIN — Medication 100 MILLIGRAM(S): at 21:45

## 2017-06-15 RX ADMIN — Medication 100 MILLIGRAM(S): at 05:06

## 2017-06-15 RX ADMIN — FAMOTIDINE 40 MILLIGRAM(S): 10 INJECTION INTRAVENOUS at 21:45

## 2017-06-15 RX ADMIN — HYDROMORPHONE HYDROCHLORIDE 1 MILLIGRAM(S): 2 INJECTION INTRAMUSCULAR; INTRAVENOUS; SUBCUTANEOUS at 23:40

## 2017-06-15 RX ADMIN — HYDROMORPHONE HYDROCHLORIDE 1 MILLIGRAM(S): 2 INJECTION INTRAMUSCULAR; INTRAVENOUS; SUBCUTANEOUS at 00:49

## 2017-06-15 RX ADMIN — Medication 0.12 MILLIGRAM(S): at 05:06

## 2017-06-15 RX ADMIN — HYDROMORPHONE HYDROCHLORIDE 1 MILLIGRAM(S): 2 INJECTION INTRAMUSCULAR; INTRAVENOUS; SUBCUTANEOUS at 21:46

## 2017-06-15 RX ADMIN — Medication 100 MILLIGRAM(S): at 15:55

## 2017-06-15 RX ADMIN — HYDROMORPHONE HYDROCHLORIDE 1 MILLIGRAM(S): 2 INJECTION INTRAMUSCULAR; INTRAVENOUS; SUBCUTANEOUS at 23:25

## 2017-06-15 RX ADMIN — HEPARIN SODIUM 5000 UNIT(S): 5000 INJECTION INTRAVENOUS; SUBCUTANEOUS at 15:56

## 2017-06-15 RX ADMIN — HYDROMORPHONE HYDROCHLORIDE 1 MILLIGRAM(S): 2 INJECTION INTRAMUSCULAR; INTRAVENOUS; SUBCUTANEOUS at 12:18

## 2017-06-15 RX ADMIN — GABAPENTIN 300 MILLIGRAM(S): 400 CAPSULE ORAL at 15:55

## 2017-06-15 RX ADMIN — HYDROMORPHONE HYDROCHLORIDE 0.5 MILLIGRAM(S): 2 INJECTION INTRAMUSCULAR; INTRAVENOUS; SUBCUTANEOUS at 04:26

## 2017-06-15 RX ADMIN — Medication 0.5 MILLIGRAM(S): at 20:48

## 2017-06-15 RX ADMIN — HEPARIN SODIUM 5000 UNIT(S): 5000 INJECTION INTRAVENOUS; SUBCUTANEOUS at 21:45

## 2017-06-15 RX ADMIN — HEPARIN SODIUM 5000 UNIT(S): 5000 INJECTION INTRAVENOUS; SUBCUTANEOUS at 05:06

## 2017-06-15 RX ADMIN — ESCITALOPRAM OXALATE 20 MILLIGRAM(S): 10 TABLET, FILM COATED ORAL at 12:18

## 2017-06-15 RX ADMIN — HYDROMORPHONE HYDROCHLORIDE 0.5 MILLIGRAM(S): 2 INJECTION INTRAMUSCULAR; INTRAVENOUS; SUBCUTANEOUS at 04:56

## 2017-06-15 NOTE — CHART NOTE - NSCHARTNOTEFT_GEN_A_CORE
Notified by RN that pt is c/o "chest tightness" and leg pain 7/10. VSS. EKG ordered. Pt seen and examined at bedside.     Patient is a 46y old  Female who presents with a chief complaint of Back pain (2017 11:28)    Vital Signs Last 24 Hrs  T(C): 36.8, Max: 37.1 ( @ 13:31)  T(F): 98.2, Max: 98.8 ( @ 13:31)  HR: 62 (58 - 68)  BP: 98/58 (98/58 - 128/78)  BP(mean): --  RR: 17 (15 - 18)  SpO2: 97% (96% - 99%)                              12.1   6.70  )-----------( 215      ( 2017 05:10 )             38.5     06-14    140  |  103  |  14  ----------------------------<  83  4.0   |  25  |  0.70    Ca    8.9      2017 05:10    TPro  6.5  /  Alb  3.8  /  TBili  0.5  /  DBili  x   /  AST  16  /  ALT  11  /  AlkPhos  69  -    LIVER FUNCTIONS - ( 2017 05:10 )  Alb: 3.8 g/dL / Pro: 6.5 g/dL / ALK PHOS: 69 u/L / ALT: 11 u/L / AST: 16 u/L / GGT: x                                       REVIEW OF SYSTEMS:    RESPIRATORY: No cough, wheezing, chills or hemoptysis; No shortness of breath  CARDIOVASCULAR: + "chest tightness", No palpitations, dizziness, or leg swelling  GASTROINTESTINAL: + constipation. No abdominal or epigastric pain. No nausea, vomiting, or hematemesis; No diarrhea. No melena or hematochezia.  GENITOURINARY: No dysuria, frequency, hematuria, or incontinence  NEUROLOGICAL: No headaches, memory loss, loss of strength, numbness, or tremors  EXTREMITIES: + LE pain 2/2 fibromyalgia      PHYSICAL EXAM:    GENERAL: NAD, well-groomed, well-developed. Slightly anxious (concerned about her children at home, misses them)  NERVOUS SYSTEM:  Alert & Oriented X3, Good concentration; Motor Strength 5/5 B/L upper extremities, 5/5 nl LE (pain limited)  CHEST/LUNG: Clear to percussion bilaterally; No rales, rhonchi, wheezing, or rubs. Chest pain is reproducible.  HEART: Regular rate and rhythm; No murmurs, rubs, or gallops  ABDOMEN: Soft, Nontender, Nondistended; Bowel sounds present  EXTREMITIES:  2+ Peripheral Pulses, No clubbing, cyanosis, or edema    Assessment: Patient 46y with Dorsalgia  Chronic lower back pain  Abnormal uterine bleeding  Anxiety  GERD (gastroesophageal reflux disease)  Fibromyalgia  Anemia  IBS (irritable bowel syndrome)  Post traumatic stress disorder  Depression  Anxiety  H/O tubal ligation  Bowel obstruction  H/O  section  H/O abdominoplasty  H/O gastric bypass  Depression      Plan:  - EKG: normal sinus rhythm.  - VSS (chest tightness is reproducible, likely 2/2 fibromyalgia ?)  - Constipation: lactulose  - Continue current treatment (pt accepted her clonazepam prn order, continue standing pain meds)  - Will continue to follow up

## 2017-06-15 NOTE — PROGRESS NOTE ADULT - SUBJECTIVE AND OBJECTIVE BOX
Patient is a 46y old  Female who presents with a chief complaint of Back pain (13 Jun 2017 11:28)      SUBJECTIVE / OVERNIGHT EVENTS: no events    MEDICATIONS  (STANDING):  heparin  Injectable 5000Unit(s) SubCutaneous every 8 hours  senna 2Tablet(s) Oral at bedtime  docusate sodium 100milliGRAM(s) Oral three times a day  gabapentin 300milliGRAM(s) Oral three times a day  escitalopram 20milliGRAM(s) Oral daily  hyoscyamine 0.125milliGRAM(s) Oral three times a day  famotidine    Tablet 40milliGRAM(s) Oral at bedtime  ergocalciferol [id number](s) Oral <User Schedule>    MEDICATIONS  (PRN):  acetaminophen   Tablet. 650milliGRAM(s) Oral every 6 hours PRN Mild Pain (1 - 3)  clonazePAM Tablet 0.5milliGRAM(s) Oral three times a day PRN Anxiety  HYDROmorphone  Injectable 1milliGRAM(s) IV Push every 4 hours PRN Severe Pain (7 - 10)  HYDROmorphone  Injectable 0.5milliGRAM(s) IV Push every 4 hours PRN Moderate Pain (4 - 6)      Vital Signs Last 24 Hrs  T(C): 36.6, Max: 36.8 (06-15 @ 00:48)  HR: 65 (58 - 65)  BP: 98/57 (93/50 - 129/74)  RR: 17 (15 - 17)  SpO2: 96% (95% - 99%)  Wt(kg): --  CAPILLARY BLOOD GLUCOSE  166 (15 Marco 2017 12:08)    I&O's Summary      PHYSICAL EXAM:  GENERAL: NAD, well-developed  HEAD:  Atraumatic, Normocephalic  EYES: EOMI, PERRLA, conjunctiva and sclera clear  NECK: Supple, No JVD  CHEST/LUNG: Clear to auscultation bilaterally; No wheeze  HEART: Regular rate and rhythm; No murmurs, rubs, or gallops  ABDOMEN: Soft, Nontender, Nondistended; Bowel sounds present  EXTREMITIES:  2+ Peripheral Pulses, No clubbing, cyanosis, or edema  PSYCH: AAOx3  NEUROLOGY: non-focal  SKIN: No rashes or lesions    LABS:                        12.1   6.70  )-----------( 215      ( 14 Jun 2017 05:10 )             38.5     06-14    140  |  103  |  14  ----------------------------<  83  4.0   |  25  |  0.70    Ca    8.9      14 Jun 2017 05:10    TPro  6.5  /  Alb  3.8  /  TBili  0.5  /  DBili  x   /  AST  16  /  ALT  11  /  AlkPhos  69  06-14              RADIOLOGY & ADDITIONAL TESTS:    Imaging Personally Reviewed:y    Consultant(s) Notes Reviewed:  y    Care Discussed with Consultants/Other Providers:joseph

## 2017-06-15 NOTE — PROGRESS NOTE ADULT - SUBJECTIVE AND OBJECTIVE BOX
Patient is a 46y old  Female who presents with a chief complaint of Back pain (13 Jun 2017 11:28)      NEUROLOGIC EXAM  Mental Status:     Oriented to time/place/person; Good eye contact ; follow simple commands ;  Age appropriate language  and fund of  knowledge.  Cranial Nerves:   PERRL, EOMI, no facial asymmetry , V1-V3 intact , symmetric palate, tongue midline.   Muscle Strength:	5/5 in all extremities  Muscle Tone:	Normal tone, tenderness in mid back  Deep Tendon Reflexes:         2+/4  : Biceps, Brachioradialis, Triceps Bilateral;  2+/4 : Pattelar, Ankle bilateral. No clonus.  Plantar Response:	Plantar reflexes flexion bilaterally  Sensation:		Intact to pain, light touch, temperature and vibration throughout.  Coordination/	No dysmetria in finger to nose test bilaterally  Cerebellum	  Tandem Gait/Romberg	slightly antalgic, walks with a cane.      Vital Signs Last 24 Hrs  T(C): 36.6, Max: 36.8 (06-15 @ 00:48)  T(F): 97.9, Max: 98.2 (06-15 @ 00:48)  HR: 66 (58 - 66)  BP: 138/76 (93/50 - 138/76)  BP(mean): --  RR: 17 (15 - 17)  SpO2: 98% (95% - 99%)  MEDICATIONS  (STANDING):  heparin  Injectable 5000Unit(s) SubCutaneous every 8 hours  senna 2Tablet(s) Oral at bedtime  docusate sodium 100milliGRAM(s) Oral three times a day  gabapentin 300milliGRAM(s) Oral three times a day  escitalopram 20milliGRAM(s) Oral daily  hyoscyamine 0.125milliGRAM(s) Oral three times a day  famotidine    Tablet 40milliGRAM(s) Oral at bedtime  ergocalciferol [id number](s) Oral <User Schedule>    MEDICATIONS  (PRN):  acetaminophen   Tablet. 650milliGRAM(s) Oral every 6 hours PRN Mild Pain (1 - 3)  clonazePAM Tablet 0.5milliGRAM(s) Oral three times a day PRN Anxiety  HYDROmorphone  Injectable 1milliGRAM(s) IV Push every 4 hours PRN Severe Pain (7 - 10)  HYDROmorphone  Injectable 0.5milliGRAM(s) IV Push every 4 hours PRN Moderate Pain (4 - 6)  cyclobenzaprine 10milliGRAM(s) Oral three times a day PRN Muscle Spasm

## 2017-06-16 RX ORDER — POLYETHYLENE GLYCOL 3350 17 G/17G
17 POWDER, FOR SOLUTION ORAL ONCE
Qty: 0 | Refills: 0 | Status: COMPLETED | OUTPATIENT
Start: 2017-06-16 | End: 2017-06-16

## 2017-06-16 RX ORDER — DIPHENHYDRAMINE HCL 50 MG
25 CAPSULE ORAL ONCE
Qty: 0 | Refills: 0 | Status: DISCONTINUED | OUTPATIENT
Start: 2017-06-16 | End: 2017-06-19

## 2017-06-16 RX ADMIN — CYCLOBENZAPRINE HYDROCHLORIDE 10 MILLIGRAM(S): 10 TABLET, FILM COATED ORAL at 14:43

## 2017-06-16 RX ADMIN — HYDROMORPHONE HYDROCHLORIDE 1 MILLIGRAM(S): 2 INJECTION INTRAMUSCULAR; INTRAVENOUS; SUBCUTANEOUS at 11:49

## 2017-06-16 RX ADMIN — HYDROMORPHONE HYDROCHLORIDE 1 MILLIGRAM(S): 2 INJECTION INTRAMUSCULAR; INTRAVENOUS; SUBCUTANEOUS at 23:05

## 2017-06-16 RX ADMIN — Medication 100 MILLIGRAM(S): at 14:37

## 2017-06-16 RX ADMIN — HEPARIN SODIUM 5000 UNIT(S): 5000 INJECTION INTRAVENOUS; SUBCUTANEOUS at 21:17

## 2017-06-16 RX ADMIN — GABAPENTIN 300 MILLIGRAM(S): 400 CAPSULE ORAL at 14:37

## 2017-06-16 RX ADMIN — CYCLOBENZAPRINE HYDROCHLORIDE 10 MILLIGRAM(S): 10 TABLET, FILM COATED ORAL at 21:17

## 2017-06-16 RX ADMIN — HEPARIN SODIUM 5000 UNIT(S): 5000 INJECTION INTRAVENOUS; SUBCUTANEOUS at 05:53

## 2017-06-16 RX ADMIN — HYDROMORPHONE HYDROCHLORIDE 1 MILLIGRAM(S): 2 INJECTION INTRAMUSCULAR; INTRAVENOUS; SUBCUTANEOUS at 07:07

## 2017-06-16 RX ADMIN — Medication 0.12 MILLIGRAM(S): at 14:37

## 2017-06-16 RX ADMIN — Medication 100 MILLIGRAM(S): at 05:53

## 2017-06-16 RX ADMIN — HYDROMORPHONE HYDROCHLORIDE 1 MILLIGRAM(S): 2 INJECTION INTRAMUSCULAR; INTRAVENOUS; SUBCUTANEOUS at 22:43

## 2017-06-16 RX ADMIN — Medication 0.12 MILLIGRAM(S): at 05:53

## 2017-06-16 RX ADMIN — HYDROMORPHONE HYDROCHLORIDE 1 MILLIGRAM(S): 2 INJECTION INTRAMUSCULAR; INTRAVENOUS; SUBCUTANEOUS at 17:55

## 2017-06-16 RX ADMIN — HYDROMORPHONE HYDROCHLORIDE 1 MILLIGRAM(S): 2 INJECTION INTRAMUSCULAR; INTRAVENOUS; SUBCUTANEOUS at 15:58

## 2017-06-16 RX ADMIN — HEPARIN SODIUM 5000 UNIT(S): 5000 INJECTION INTRAVENOUS; SUBCUTANEOUS at 14:37

## 2017-06-16 RX ADMIN — FAMOTIDINE 40 MILLIGRAM(S): 10 INJECTION INTRAVENOUS at 21:17

## 2017-06-16 RX ADMIN — GABAPENTIN 300 MILLIGRAM(S): 400 CAPSULE ORAL at 21:17

## 2017-06-16 RX ADMIN — Medication 0.12 MILLIGRAM(S): at 21:17

## 2017-06-16 RX ADMIN — POLYETHYLENE GLYCOL 3350 17 GRAM(S): 17 POWDER, FOR SOLUTION ORAL at 22:49

## 2017-06-16 RX ADMIN — Medication 0.5 MILLIGRAM(S): at 21:17

## 2017-06-16 RX ADMIN — HYDROMORPHONE HYDROCHLORIDE 1 MILLIGRAM(S): 2 INJECTION INTRAMUSCULAR; INTRAVENOUS; SUBCUTANEOUS at 18:55

## 2017-06-16 RX ADMIN — HYDROMORPHONE HYDROCHLORIDE 1 MILLIGRAM(S): 2 INJECTION INTRAMUSCULAR; INTRAVENOUS; SUBCUTANEOUS at 08:46

## 2017-06-16 RX ADMIN — GABAPENTIN 300 MILLIGRAM(S): 400 CAPSULE ORAL at 05:53

## 2017-06-16 RX ADMIN — ESCITALOPRAM OXALATE 20 MILLIGRAM(S): 10 TABLET, FILM COATED ORAL at 11:17

## 2017-06-16 RX ADMIN — Medication 100 MILLIGRAM(S): at 21:17

## 2017-06-16 RX ADMIN — HYDROMORPHONE HYDROCHLORIDE 1 MILLIGRAM(S): 2 INJECTION INTRAMUSCULAR; INTRAVENOUS; SUBCUTANEOUS at 11:16

## 2017-06-16 RX ADMIN — HYDROMORPHONE HYDROCHLORIDE 1 MILLIGRAM(S): 2 INJECTION INTRAMUSCULAR; INTRAVENOUS; SUBCUTANEOUS at 14:37

## 2017-06-16 RX ADMIN — Medication 0.5 MILLIGRAM(S): at 06:31

## 2017-06-16 NOTE — PROGRESS NOTE ADULT - SUBJECTIVE AND OBJECTIVE BOX
Patient is a 46y old  Female who presents with a chief complaint of Back pain (13 Jun 2017 11:28)      HPI:  pt seen, no new events, complains of LBP/thoracic pain    Vital Signs Last 24 Hrs  T(C): 37.1, Max: 37.1 (06-16 @ 13:56)  T(F): 98.7, Max: 98.7 (06-16 @ 13:56)  HR: 65 (62 - 68)  BP: 104/57 (97/64 - 138/76)  BP(mean): --  RR: 16 (15 - 17)  SpO2: 96% (96% - 100%)    Physical Exam    Mental Status- AAO x 3, speech fluent without dysarthria, memory and fund of knowledge intact  CN- 2-12 intact  Motor- 5/5 x 4 ext, nl bulk and tone, mild limitation to pain  Sensory- intact Lt/PP/propriception  Coordination- No dysmetria UE/LE  Gait- deferred

## 2017-06-16 NOTE — PROGRESS NOTE ADULT - SUBJECTIVE AND OBJECTIVE BOX
Patient is a 46y old  Female who presents with a chief complaint of Back pain (13 Jun 2017 11:28)      SUBJECTIVE / OVERNIGHT EVENTS: no events, no complaints    MEDICATIONS  (STANDING):  heparin  Injectable 5000Unit(s) SubCutaneous every 8 hours  senna 2Tablet(s) Oral at bedtime  docusate sodium 100milliGRAM(s) Oral three times a day  gabapentin 300milliGRAM(s) Oral three times a day  escitalopram 20milliGRAM(s) Oral daily  hyoscyamine 0.125milliGRAM(s) Oral three times a day  famotidine    Tablet 40milliGRAM(s) Oral at bedtime  ergocalciferol 47416Xosl(s) Oral <User Schedule>  diphenhydrAMINE   Capsule 25milliGRAM(s) Oral once    MEDICATIONS  (PRN):  acetaminophen   Tablet. 650milliGRAM(s) Oral every 6 hours PRN Mild Pain (1 - 3)  clonazePAM Tablet 0.5milliGRAM(s) Oral three times a day PRN Anxiety  HYDROmorphone  Injectable 1milliGRAM(s) IV Push every 4 hours PRN Severe Pain (7 - 10)  HYDROmorphone  Injectable 0.5milliGRAM(s) IV Push every 4 hours PRN Moderate Pain (4 - 6)  cyclobenzaprine 10milliGRAM(s) Oral three times a day PRN Muscle Spasm      Vital Signs Last 24 Hrs  T(C): 37.1, Max: 37.1 (06-16 @ 13:56)  HR: 65 (62 - 68)  BP: 104/57 (97/64 - 138/76)  RR: 16 (15 - 17)  SpO2: 96% (96% - 100%)  Wt(kg): --  CAPILLARY BLOOD GLUCOSE    I&O's Summary      PHYSICAL EXAM:  GENERAL: NAD, well-developed  HEAD:  Atraumatic, Normocephalic  EYES: EOMI, PERRLA, conjunctiva and sclera clear  NECK: Supple, No JVD  CHEST/LUNG: Clear to auscultation bilaterally; No wheeze  HEART: Regular rate and rhythm; No murmurs, rubs, or gallops  ABDOMEN: Soft, Nontender, Nondistended; Bowel sounds present  EXTREMITIES:  2+ Peripheral Pulses, No clubbing, cyanosis, or edema  PSYCH: AAOx3  NEUROLOGY: non-focal  SKIN: No rashes or lesions    LABS:none                    RADIOLOGY & ADDITIONAL TESTS:    Imaging Personally Reviewed:y    Consultant(s) Notes Reviewed:  y    Care Discussed with Consultants/Other Providers:joseph
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
## 2017-06-17 ENCOUNTER — APPOINTMENT (OUTPATIENT)
Dept: MRI IMAGING | Facility: CLINIC | Age: 47
End: 2017-06-17

## 2017-06-17 LAB — SPECIMEN SOURCE: SIGNIFICANT CHANGE UP

## 2017-06-17 PROCEDURE — 78806: CPT | Mod: 26

## 2017-06-17 PROCEDURE — 78807: CPT | Mod: 26

## 2017-06-17 RX ADMIN — HEPARIN SODIUM 5000 UNIT(S): 5000 INJECTION INTRAVENOUS; SUBCUTANEOUS at 15:32

## 2017-06-17 RX ADMIN — GABAPENTIN 300 MILLIGRAM(S): 400 CAPSULE ORAL at 15:32

## 2017-06-17 RX ADMIN — HYDROMORPHONE HYDROCHLORIDE 1 MILLIGRAM(S): 2 INJECTION INTRAMUSCULAR; INTRAVENOUS; SUBCUTANEOUS at 19:54

## 2017-06-17 RX ADMIN — HYDROMORPHONE HYDROCHLORIDE 0.5 MILLIGRAM(S): 2 INJECTION INTRAMUSCULAR; INTRAVENOUS; SUBCUTANEOUS at 12:00

## 2017-06-17 RX ADMIN — Medication 0.5 MILLIGRAM(S): at 21:23

## 2017-06-17 RX ADMIN — CYCLOBENZAPRINE HYDROCHLORIDE 10 MILLIGRAM(S): 10 TABLET, FILM COATED ORAL at 21:23

## 2017-06-17 RX ADMIN — ESCITALOPRAM OXALATE 20 MILLIGRAM(S): 10 TABLET, FILM COATED ORAL at 15:32

## 2017-06-17 RX ADMIN — HYDROMORPHONE HYDROCHLORIDE 0.5 MILLIGRAM(S): 2 INJECTION INTRAMUSCULAR; INTRAVENOUS; SUBCUTANEOUS at 15:28

## 2017-06-17 RX ADMIN — Medication 100 MILLIGRAM(S): at 05:56

## 2017-06-17 RX ADMIN — HYDROMORPHONE HYDROCHLORIDE 1 MILLIGRAM(S): 2 INJECTION INTRAMUSCULAR; INTRAVENOUS; SUBCUTANEOUS at 20:10

## 2017-06-17 RX ADMIN — HEPARIN SODIUM 5000 UNIT(S): 5000 INJECTION INTRAVENOUS; SUBCUTANEOUS at 21:23

## 2017-06-17 RX ADMIN — HYDROMORPHONE HYDROCHLORIDE 1 MILLIGRAM(S): 2 INJECTION INTRAMUSCULAR; INTRAVENOUS; SUBCUTANEOUS at 09:52

## 2017-06-17 RX ADMIN — GABAPENTIN 300 MILLIGRAM(S): 400 CAPSULE ORAL at 21:23

## 2017-06-17 RX ADMIN — Medication 100 MILLIGRAM(S): at 15:33

## 2017-06-17 RX ADMIN — Medication 0.12 MILLIGRAM(S): at 21:23

## 2017-06-17 RX ADMIN — HYDROMORPHONE HYDROCHLORIDE 1 MILLIGRAM(S): 2 INJECTION INTRAMUSCULAR; INTRAVENOUS; SUBCUTANEOUS at 15:40

## 2017-06-17 RX ADMIN — Medication 0.12 MILLIGRAM(S): at 15:32

## 2017-06-17 RX ADMIN — FAMOTIDINE 40 MILLIGRAM(S): 10 INJECTION INTRAVENOUS at 21:22

## 2017-06-17 RX ADMIN — Medication 100 MILLIGRAM(S): at 21:22

## 2017-06-17 RX ADMIN — HYDROMORPHONE HYDROCHLORIDE 1 MILLIGRAM(S): 2 INJECTION INTRAMUSCULAR; INTRAVENOUS; SUBCUTANEOUS at 10:05

## 2017-06-17 RX ADMIN — HYDROMORPHONE HYDROCHLORIDE 1 MILLIGRAM(S): 2 INJECTION INTRAMUSCULAR; INTRAVENOUS; SUBCUTANEOUS at 15:55

## 2017-06-17 RX ADMIN — Medication 0.5 MILLIGRAM(S): at 10:09

## 2017-06-17 RX ADMIN — SENNA PLUS 2 TABLET(S): 8.6 TABLET ORAL at 21:22

## 2017-06-17 NOTE — PROGRESS NOTE ADULT - SUBJECTIVE AND OBJECTIVE BOX
Patient is a 46y old  Female who presents with a chief complaint of Back pain (13 Jun 2017 11:28)      SUBJECTIVE / OVERNIGHT EVENTS:no events/complaints    MEDICATIONS  (STANDING):  heparin  Injectable 5000Unit(s) SubCutaneous every 8 hours  senna 2Tablet(s) Oral at bedtime  docusate sodium 100milliGRAM(s) Oral three times a day  gabapentin 300milliGRAM(s) Oral three times a day  escitalopram 20milliGRAM(s) Oral daily  hyoscyamine 0.125milliGRAM(s) Oral three times a day  famotidine    Tablet 40milliGRAM(s) Oral at bedtime  ergocalciferol [id number](s) Oral <User Schedule>  diphenhydrAMINE   Capsule 25milliGRAM(s) Oral once    MEDICATIONS  (PRN):  acetaminophen   Tablet. 650milliGRAM(s) Oral every 6 hours PRN Mild Pain (1 - 3)  clonazePAM Tablet 0.5milliGRAM(s) Oral three times a day PRN Anxiety  HYDROmorphone  Injectable 1milliGRAM(s) IV Push every 4 hours PRN Severe Pain (7 - 10)  HYDROmorphone  Injectable 0.5milliGRAM(s) IV Push every 4 hours PRN Moderate Pain (4 - 6)  cyclobenzaprine 10milliGRAM(s) Oral three times a day PRN Muscle Spasm      Vital Signs Last 24 Hrs  T(C): 36.7, Max: 36.9 (06-16 @ 21:16)  HR: 71 (62 - 90)  BP: 124/70 (97/58 - 124/70)  RR: 17 (16 - 17)  SpO2: 99% (99% - 99%)  Wt(kg): --  CAPILLARY BLOOD GLUCOSE    I&O's Summary      PHYSICAL EXAM:  GENERAL: NAD, well-developed  HEAD:  Atraumatic, Normocephalic  EYES: EOMI, PERRLA, conjunctiva and sclera clear  NECK: Supple, No JVD  CHEST/LUNG: Clear to auscultation bilaterally; No wheeze  HEART: Regular rate and rhythm; No murmurs, rubs, or gallops  ABDOMEN: Soft, Nontender, Nondistended; Bowel sounds present  EXTREMITIES:  2+ Peripheral Pulses, No clubbing, cyanosis, or edema  PSYCH: AAOx3  NEUROLOGY: non-focal  SKIN: No rashes or lesions    LABS:none                    RADIOLOGY & ADDITIONAL TESTS:    Imaging Personally Reviewed:y    Consultant(s) Notes Reviewed:  y    Care Discussed with Consultants/Other Providers:joseph

## 2017-06-18 LAB
BUN SERPL-MCNC: 18 MG/DL — SIGNIFICANT CHANGE UP (ref 7–23)
CALCIUM SERPL-MCNC: 9.3 MG/DL — SIGNIFICANT CHANGE UP (ref 8.4–10.5)
CHLORIDE SERPL-SCNC: 103 MMOL/L — SIGNIFICANT CHANGE UP (ref 98–107)
CO2 SERPL-SCNC: 25 MMOL/L — SIGNIFICANT CHANGE UP (ref 22–31)
CREAT SERPL-MCNC: 0.8 MG/DL — SIGNIFICANT CHANGE UP (ref 0.5–1.3)
GLUCOSE SERPL-MCNC: 83 MG/DL — SIGNIFICANT CHANGE UP (ref 70–99)
HCT VFR BLD CALC: 39.4 % — SIGNIFICANT CHANGE UP (ref 34.5–45)
HGB BLD-MCNC: 12.4 G/DL — SIGNIFICANT CHANGE UP (ref 11.5–15.5)
MCHC RBC-ENTMCNC: 29.8 PG — SIGNIFICANT CHANGE UP (ref 27–34)
MCHC RBC-ENTMCNC: 31.5 % — LOW (ref 32–36)
MCV RBC AUTO: 94.7 FL — SIGNIFICANT CHANGE UP (ref 80–100)
PLATELET # BLD AUTO: 191 K/UL — SIGNIFICANT CHANGE UP (ref 150–400)
PMV BLD: 10.4 FL — SIGNIFICANT CHANGE UP (ref 7–13)
POTASSIUM SERPL-MCNC: 4.4 MMOL/L — SIGNIFICANT CHANGE UP (ref 3.5–5.3)
POTASSIUM SERPL-SCNC: 4.4 MMOL/L — SIGNIFICANT CHANGE UP (ref 3.5–5.3)
RBC # BLD: 4.16 M/UL — SIGNIFICANT CHANGE UP (ref 3.8–5.2)
RBC # FLD: 12.9 % — SIGNIFICANT CHANGE UP (ref 10.3–14.5)
SODIUM SERPL-SCNC: 142 MMOL/L — SIGNIFICANT CHANGE UP (ref 135–145)
SPECIMEN SOURCE: SIGNIFICANT CHANGE UP
WBC # BLD: 6.98 K/UL — SIGNIFICANT CHANGE UP (ref 3.8–10.5)
WBC # FLD AUTO: 6.98 K/UL — SIGNIFICANT CHANGE UP (ref 3.8–10.5)

## 2017-06-18 RX ORDER — LACTULOSE 10 G/15ML
10 SOLUTION ORAL
Qty: 0 | Refills: 0 | Status: DISCONTINUED | OUTPATIENT
Start: 2017-06-18 | End: 2017-06-19

## 2017-06-18 RX ORDER — HYDROMORPHONE HYDROCHLORIDE 2 MG/ML
4 INJECTION INTRAMUSCULAR; INTRAVENOUS; SUBCUTANEOUS EVERY 4 HOURS
Qty: 0 | Refills: 0 | Status: DISCONTINUED | OUTPATIENT
Start: 2017-06-18 | End: 2017-06-19

## 2017-06-18 RX ADMIN — Medication 0.5 MILLIGRAM(S): at 21:08

## 2017-06-18 RX ADMIN — HEPARIN SODIUM 5000 UNIT(S): 5000 INJECTION INTRAVENOUS; SUBCUTANEOUS at 05:19

## 2017-06-18 RX ADMIN — HYDROMORPHONE HYDROCHLORIDE 1 MILLIGRAM(S): 2 INJECTION INTRAMUSCULAR; INTRAVENOUS; SUBCUTANEOUS at 11:54

## 2017-06-18 RX ADMIN — ESCITALOPRAM OXALATE 20 MILLIGRAM(S): 10 TABLET, FILM COATED ORAL at 11:39

## 2017-06-18 RX ADMIN — Medication 0.12 MILLIGRAM(S): at 21:08

## 2017-06-18 RX ADMIN — HYDROMORPHONE HYDROCHLORIDE 1 MILLIGRAM(S): 2 INJECTION INTRAMUSCULAR; INTRAVENOUS; SUBCUTANEOUS at 00:20

## 2017-06-18 RX ADMIN — GABAPENTIN 300 MILLIGRAM(S): 400 CAPSULE ORAL at 05:19

## 2017-06-18 RX ADMIN — GABAPENTIN 300 MILLIGRAM(S): 400 CAPSULE ORAL at 14:33

## 2017-06-18 RX ADMIN — Medication 0.12 MILLIGRAM(S): at 16:26

## 2017-06-18 RX ADMIN — HYDROMORPHONE HYDROCHLORIDE 1 MILLIGRAM(S): 2 INJECTION INTRAMUSCULAR; INTRAVENOUS; SUBCUTANEOUS at 00:04

## 2017-06-18 RX ADMIN — HYDROMORPHONE HYDROCHLORIDE 4 MILLIGRAM(S): 2 INJECTION INTRAMUSCULAR; INTRAVENOUS; SUBCUTANEOUS at 19:17

## 2017-06-18 RX ADMIN — Medication 0.12 MILLIGRAM(S): at 05:19

## 2017-06-18 RX ADMIN — HYDROMORPHONE HYDROCHLORIDE 4 MILLIGRAM(S): 2 INJECTION INTRAMUSCULAR; INTRAVENOUS; SUBCUTANEOUS at 22:37

## 2017-06-18 RX ADMIN — HYDROMORPHONE HYDROCHLORIDE 4 MILLIGRAM(S): 2 INJECTION INTRAMUSCULAR; INTRAVENOUS; SUBCUTANEOUS at 14:33

## 2017-06-18 RX ADMIN — GABAPENTIN 300 MILLIGRAM(S): 400 CAPSULE ORAL at 21:08

## 2017-06-18 RX ADMIN — HYDROMORPHONE HYDROCHLORIDE 0.5 MILLIGRAM(S): 2 INJECTION INTRAMUSCULAR; INTRAVENOUS; SUBCUTANEOUS at 04:20

## 2017-06-18 RX ADMIN — LACTULOSE 10 GRAM(S): 10 SOLUTION ORAL at 17:30

## 2017-06-18 RX ADMIN — Medication 100 MILLIGRAM(S): at 05:19

## 2017-06-18 RX ADMIN — HYDROMORPHONE HYDROCHLORIDE 4 MILLIGRAM(S): 2 INJECTION INTRAMUSCULAR; INTRAVENOUS; SUBCUTANEOUS at 15:13

## 2017-06-18 RX ADMIN — HYDROMORPHONE HYDROCHLORIDE 1 MILLIGRAM(S): 2 INJECTION INTRAMUSCULAR; INTRAVENOUS; SUBCUTANEOUS at 11:39

## 2017-06-18 RX ADMIN — CYCLOBENZAPRINE HYDROCHLORIDE 10 MILLIGRAM(S): 10 TABLET, FILM COATED ORAL at 20:01

## 2017-06-18 RX ADMIN — HYDROMORPHONE HYDROCHLORIDE 4 MILLIGRAM(S): 2 INJECTION INTRAMUSCULAR; INTRAVENOUS; SUBCUTANEOUS at 18:37

## 2017-06-18 RX ADMIN — FAMOTIDINE 40 MILLIGRAM(S): 10 INJECTION INTRAVENOUS at 21:08

## 2017-06-18 RX ADMIN — Medication 100 MILLIGRAM(S): at 21:08

## 2017-06-18 RX ADMIN — Medication 100 MILLIGRAM(S): at 14:33

## 2017-06-18 RX ADMIN — HYDROMORPHONE HYDROCHLORIDE 4 MILLIGRAM(S): 2 INJECTION INTRAMUSCULAR; INTRAVENOUS; SUBCUTANEOUS at 23:30

## 2017-06-18 RX ADMIN — HYDROMORPHONE HYDROCHLORIDE 0.5 MILLIGRAM(S): 2 INJECTION INTRAMUSCULAR; INTRAVENOUS; SUBCUTANEOUS at 04:35

## 2017-06-18 NOTE — PROGRESS NOTE ADULT - PROBLEM SELECTOR PLAN 1
- Unclear etiology for patient's sudden onset acute thoracic back pain,   CT A/P initially showed a wedge shaped T10 concerning for possible compression fracture but it was not seen on CT spine currently, did reveal abnormal lucency of L iliac bone of unclear significance  - Ortho eval appreciated  - MRI of cervical/thoracic/lumbar spine shows Bone marrow edema and enhancement is noted about the T8-T9 levels as described. ? osteomyelitis, underlying tumor,   or posttraumatic change. (have recent fall)  - neurology Dr. Troy.    - c/w pain control, bowel regimen while on opioid therapy  - physical therapy.
- Unclear etiology for patient's sudden onset acute thoracic back pain,   CT A/P initially showed a wedge shaped T10 concerning for possible compression fracture but it was not seen on CT spine currently, did reveal abnormal lucency of L iliac bone of unclear significance  - Ortho eval appreciated  - MRI of cervical/thoracic/lumbar spine shows Bone marrow edema and enhancement is noted about the T8-T9 levels as described. ? osteomyelitis, underlying tumor,   or posttraumatic change. (have recent fall)  - neurology Dr. Troy.c/s appreciated: Galium scan f/u results  ID c/s appreciated: no abx unless tissue diagnosis avialable  - c/w pain control, bowel regimen while on opioid therapy  - physical therapy.  dc planing home with outpt neuro/pcp f/u if no acute findings on galium scan
- Unclear etiology for patient's sudden onset acute thoracic back pain,   CT A/P initially showed a wedge shaped T10 concerning for possible compression fracture but it was not seen on CT spine currently, did reveal abnormal lucency of L iliac bone of unclear significance  - Ortho eval appreciated  - MRI of cervical/thoracic/lumbar spine shows Bone marrow edema and enhancement is noted about the T8-T9 levels as described. ? osteomyelitis, underlying tumor,   or posttraumatic change. (have recent fall)  - neurology Dr. Troy.c/s appreciated: Galium scan f/u, may need biopsy  ID c/s appreciated: no abx unless tissue diagnosis avialable  - c/w pain control, bowel regimen while on opioid therapy  - physical therapy.
- Unclear etiology for patient's sudden onset acute thoracic back pain,   CT A/P initially showed a wedge shaped T10 concerning for possible compression fracture but it was not seen on CT spine currently, did reveal abnormal lucency of L iliac bone of unclear significance, ortho-no intervention  MRI of cervical/thoracic/lumbar spine shows Bone marrow edema and enhancement is noted about the T8-T9 levels as described. ? osteomyelitis, underlying tumor,   or posttraumatic change. (have recent fall)  -neurology Dr. Troy.c/s appreciated: Galium scan performed to f/u results  ID c/s appreciated: no abx unless tissue diagnosis available  pain control -change dilaudid iv to po prn pain   bowel regimen while on opioid therapy,add lactulose for constipation  - physical therapy.  dc planing home with outpt neuro/pcp f/u if no acute findings on galium scan
Abnormal signal changed in T spine.  Unclear etiology.  ESR is normal.  Recommend Gallium scan.  If abnormal , may need bone biopsy.  If normal, may consider f/u MRI in 2 weeks.  Pain control, may add Flexiril as needed only.  PT.

## 2017-06-18 NOTE — PROGRESS NOTE ADULT - PROBLEM SELECTOR PLAN 5
- c/w escitalopram

## 2017-06-18 NOTE — PROGRESS NOTE ADULT - SUBJECTIVE AND OBJECTIVE BOX
Patient is a 46y old  Female who presents with a chief complaint of Back pain (13 Jun 2017 11:28)      SUBJECTIVE / OVERNIGHT EVENTS: able to ambulate but cannot bend d/2 mid back pain    MEDICATIONS  (STANDING):  heparin  Injectable 5000Unit(s) SubCutaneous every 8 hours  senna 2Tablet(s) Oral at bedtime  docusate sodium 100milliGRAM(s) Oral three times a day  gabapentin 300milliGRAM(s) Oral three times a day  escitalopram 20milliGRAM(s) Oral daily  hyoscyamine 0.125milliGRAM(s) Oral three times a day  famotidine    Tablet 40milliGRAM(s) Oral at bedtime  ergocalciferol [id number](s) Oral <User Schedule>  diphenhydrAMINE   Capsule 25milliGRAM(s) Oral once    MEDICATIONS  (PRN):  acetaminophen   Tablet. 650milliGRAM(s) Oral every 6 hours PRN Mild Pain (1 - 3)  clonazePAM Tablet 0.5milliGRAM(s) Oral three times a day PRN Anxiety  HYDROmorphone  Injectable 1milliGRAM(s) IV Push every 4 hours PRN Severe Pain (7 - 10)  HYDROmorphone  Injectable 0.5milliGRAM(s) IV Push every 4 hours PRN Moderate Pain (4 - 6)  cyclobenzaprine 10milliGRAM(s) Oral three times a day PRN Muscle Spasm      Vital Signs Last 24 Hrs  T(C): 36.6, Max: 36.9 (06-17 @ 21:21)  HR: 61 (61 - 71)  BP: 101/51 (101/51 - 124/70)  RR: 16 (16 - 17)  SpO2: 100% (99% - 100%)  Wt(kg): --  CAPILLARY BLOOD GLUCOSE    I&O's Summary      PHYSICAL EXAM:  GENERAL: NAD, well-developed  HEAD:  Atraumatic, Normocephalic  EYES: EOMI, PERRLA, conjunctiva and sclera clear  NECK: Supple, No JVD  CHEST/LUNG: Clear to auscultation bilaterally; No wheeze  HEART: Regular rate and rhythm; No murmurs, rubs, or gallops  ABDOMEN: Soft, Nontender, Nondistended; Bowel sounds present  EXTREMITIES:  2+ Peripheral Pulses, No clubbing, cyanosis, or edema  PSYCH: AAOx3  NEUROLOGY: non-focal  SKIN: No rashes or lesions    LABS:                        12.4   6.98  )-----------( 191      ( 18 Jun 2017 05:21 )             39.4     06-18    142  |  103  |  18  ----------------------------<  83  4.4   |  25  |  0.80    Ca    9.3      18 Jun 2017 05:21                RADIOLOGY & ADDITIONAL TESTS:y    Imaging Personally Reviewed:y    Consultant(s) Notes Reviewed:  y    Care Discussed with Consultants/Other Providers:

## 2017-06-18 NOTE — PROGRESS NOTE ADULT - ASSESSMENT
This is a 46F with history as above who presents to the hospital with complaints of worsening back pain.
47 yo F with multiple medical problems including hx fibromyalgia, IBS, DJD  hx chronic lower back pain, now a/w increased back pain  no fevers or leukocytosis, ESR=12  MRI findings as above, T8/9 enhancement  Prior blood cult at [facility] in march 2017 was a contaminant with single isolate of Micrococcus and Staph hominis from the same bottle  copies of culture results in a chart  PLAN:     heme-onc and neuro f/u appreciated  awaiting further w/u  would not start empiric antibiotics unless we have tissue diagnosis  consider CT guided needle biopsy for culture and path
47 yo F with thoracic lesion, ?post traumatic vs [id number] vs neoplastic  1. ID eval noted  2. recommend gallium scan, may also need bx
47 yo female with back pain, thoracic lesion  1. Gallium scan ordered  2. If neg, will need outpt fu for repeat studies, if positive may need biopsy  3. Recommend pain mgt eval
This is a 46F with history as above who presents to the hospital with complaints of worsening back pain.

## 2017-06-18 NOTE — PROGRESS NOTE ADULT - PROBLEM SELECTOR PROBLEM 3
Stress incontinence in female
Anxiety

## 2017-06-18 NOTE — PROGRESS NOTE ADULT - PROBLEM SELECTOR PROBLEM 2
Numbness and tingling of right leg
Fibromyalgia

## 2017-06-18 NOTE — PROGRESS NOTE ADULT - PROBLEM SELECTOR PROBLEM 5
Depression, unspecified depression type

## 2017-06-18 NOTE — PROGRESS NOTE ADULT - PROBLEM SELECTOR PLAN 9
- HSQ for DVT ppx

## 2017-06-18 NOTE — PROGRESS NOTE ADULT - PROBLEM SELECTOR PROBLEM 8
Irritable bowel syndrome, unspecified type

## 2017-06-18 NOTE — PROGRESS NOTE ADULT - PROBLEM SELECTOR PLAN 2
as above. s/p MRI. no focal deficit.
on neurontin

## 2017-06-18 NOTE — PROGRESS NOTE ADULT - PROBLEM SELECTOR PLAN 4
- c/w clonazepam prn

## 2017-06-18 NOTE — PROGRESS NOTE ADULT - PROBLEM SELECTOR PLAN 7
- c/w famotidine

## 2017-06-18 NOTE — PROGRESS NOTE ADULT - PROBLEM SELECTOR PLAN 3
- Patient with symptoms of urinary incontinence with laughing/giggling and coughing for the past ~3 months, onset makes it less likely to be related to her back pain and more likely to be stress incontinence  - Would recommend outpatient work up
on Klonipin prn.

## 2017-06-18 NOTE — PROGRESS NOTE ADULT - PROBLEM SELECTOR PROBLEM 1
Acute midline thoracic back pain
Back pain

## 2017-06-18 NOTE — PROGRESS NOTE ADULT - PROBLEM SELECTOR PLAN 6
- Will hold off on naproxen for now, c/w gabapentin

## 2017-06-18 NOTE — PROGRESS NOTE ADULT - PROBLEM SELECTOR PLAN 8
- c/w hyoscamine

## 2017-06-18 NOTE — PROGRESS NOTE ADULT - PROBLEM SELECTOR PROBLEM 7
Gastroesophageal reflux disease without esophagitis

## 2017-06-19 VITALS — HEART RATE: 63 BPM | SYSTOLIC BLOOD PRESSURE: 105 MMHG | DIASTOLIC BLOOD PRESSURE: 58 MMHG

## 2017-06-19 RX ORDER — HYDROMORPHONE HYDROCHLORIDE 2 MG/ML
1 INJECTION INTRAMUSCULAR; INTRAVENOUS; SUBCUTANEOUS
Qty: 30 | Refills: 0 | OUTPATIENT
Start: 2017-06-19 | End: 2017-06-24

## 2017-06-19 RX ORDER — HYDROMORPHONE HYDROCHLORIDE 2 MG/ML
1 INJECTION INTRAMUSCULAR; INTRAVENOUS; SUBCUTANEOUS
Qty: 0 | Refills: 0 | COMMUNITY
Start: 2017-06-19

## 2017-06-19 RX ORDER — CYCLOBENZAPRINE HYDROCHLORIDE 10 MG/1
1 TABLET, FILM COATED ORAL
Qty: 0 | Refills: 0 | COMMUNITY
Start: 2017-06-19

## 2017-06-19 RX ORDER — CYCLOBENZAPRINE HYDROCHLORIDE 10 MG/1
1 TABLET, FILM COATED ORAL
Qty: 15 | Refills: 0
Start: 2017-06-19 | End: 2017-06-24

## 2017-06-19 RX ORDER — CYCLOBENZAPRINE HYDROCHLORIDE 10 MG/1
1 TABLET, FILM COATED ORAL
Qty: 15 | Refills: 0 | OUTPATIENT
Start: 2017-06-19 | End: 2017-06-24

## 2017-06-19 RX ADMIN — GABAPENTIN 300 MILLIGRAM(S): 400 CAPSULE ORAL at 05:04

## 2017-06-19 RX ADMIN — LACTULOSE 10 GRAM(S): 10 SOLUTION ORAL at 05:04

## 2017-06-19 RX ADMIN — Medication 100 MILLIGRAM(S): at 05:04

## 2017-06-19 RX ADMIN — CYCLOBENZAPRINE HYDROCHLORIDE 10 MILLIGRAM(S): 10 TABLET, FILM COATED ORAL at 07:18

## 2017-06-19 RX ADMIN — HYDROMORPHONE HYDROCHLORIDE 4 MILLIGRAM(S): 2 INJECTION INTRAMUSCULAR; INTRAVENOUS; SUBCUTANEOUS at 10:14

## 2017-06-19 RX ADMIN — HYDROMORPHONE HYDROCHLORIDE 4 MILLIGRAM(S): 2 INJECTION INTRAMUSCULAR; INTRAVENOUS; SUBCUTANEOUS at 05:04

## 2017-06-19 RX ADMIN — ESCITALOPRAM OXALATE 20 MILLIGRAM(S): 10 TABLET, FILM COATED ORAL at 11:17

## 2017-06-19 RX ADMIN — HEPARIN SODIUM 5000 UNIT(S): 5000 INJECTION INTRAVENOUS; SUBCUTANEOUS at 05:10

## 2017-06-19 RX ADMIN — HYDROMORPHONE HYDROCHLORIDE 4 MILLIGRAM(S): 2 INJECTION INTRAMUSCULAR; INTRAVENOUS; SUBCUTANEOUS at 09:34

## 2017-06-19 RX ADMIN — Medication 0.12 MILLIGRAM(S): at 05:04

## 2017-06-19 RX ADMIN — HYDROMORPHONE HYDROCHLORIDE 4 MILLIGRAM(S): 2 INJECTION INTRAMUSCULAR; INTRAVENOUS; SUBCUTANEOUS at 06:00

## 2017-06-19 NOTE — CHART NOTE - NSCHARTNOTEFT_GEN_A_CORE
Patient is requesting discharge home. Spoke with Dr. Galvez. Patient is safe for discharge home with outpatient PT. She is instructed to follow up with outpatient neurologist and pain management doctor. Rx for Dilaudid and flexeril given.

## 2017-06-21 LAB — BACTERIA BLD CULT: SIGNIFICANT CHANGE UP

## 2017-06-22 LAB — BACTERIA BLD CULT: SIGNIFICANT CHANGE UP

## 2017-07-17 ENCOUNTER — APPOINTMENT (OUTPATIENT)
Dept: ORTHOPEDIC SURGERY | Facility: CLINIC | Age: 47
End: 2017-07-17

## 2017-08-04 ENCOUNTER — APPOINTMENT (OUTPATIENT)
Dept: OBGYN | Facility: CLINIC | Age: 47
End: 2017-08-04

## 2017-08-31 ENCOUNTER — TRANSCRIPTION ENCOUNTER (OUTPATIENT)
Age: 47
End: 2017-08-31

## 2017-09-06 ENCOUNTER — MOBILE ON CALL (OUTPATIENT)
Age: 47
End: 2017-09-06

## 2017-09-26 ENCOUNTER — APPOINTMENT (OUTPATIENT)
Dept: OTOLARYNGOLOGY | Facility: CLINIC | Age: 47
End: 2017-09-26
Payer: MEDICARE

## 2017-09-26 VITALS
BODY MASS INDEX: 35.07 KG/M2 | HEIGHT: 70 IN | SYSTOLIC BLOOD PRESSURE: 100 MMHG | HEART RATE: 71 BPM | WEIGHT: 245 LBS | DIASTOLIC BLOOD PRESSURE: 71 MMHG

## 2017-09-26 PROCEDURE — 31238 NSL/SINS NDSC SRG NSL HEMRRG: CPT | Mod: RT

## 2017-09-26 PROCEDURE — 69210 REMOVE IMPACTED EAR WAX UNI: CPT

## 2017-09-26 PROCEDURE — 99214 OFFICE O/P EST MOD 30 MIN: CPT | Mod: 25

## 2017-10-03 NOTE — ED ADULT NURSE NOTE - NS ED NURSE REPORT GIVEN DT
MRN:[id number]                      After Visit Summary   10/3/2017    Mery Doyle    MRN: [id number]           Visit Information        Provider Department      10/3/2017 2:45 PM Natalya Maxwell LMFT; [facility] RODRIGUEZ [employer] [facility] Generic      Your next 10 appointments already scheduled     Oct 13, 2017  2:00 PM CDT   Return Visit with NICOLASA Guzman   [facility])    [address]  [facility] [address] [phone]            Oct 17, 2017  3:00 PM CDT   Return Visit with NICOLASA Guzman   [facility])    [address]  [facility] [address] [phone]            Oct 31, 2017  3:00 PM CDT   Return Visit with NICOLASA Guzman   [facility])    [address] [facility] [address] [phone]              MyChart Information     [employer] lets you send messages to your doctor, view your test results, renew your prescriptions, schedule appointments and more. To sign up, go to [email or web address], contact your [facility] clinic or call [phone] during business hours.            Care EveryWhere ID     This is your Care EveryWhere ID. This could be used by other organizations to access your [facility] medical records  Opted out of Care Everywhere exchange        Equal Access to Services     VITALIY GARZA AH: Hadii tereza ku hadasho Soomaali, waaxda luqadaha, qaybta kaalmada adeegyada, waxnatan danette gentile shanikakriss sherwood . So [facility] [phone].    ATENCIÓN: Si habla español, tiene a cam disposición servicios gratuitos de asistencia lingüística. Llame al [phone].    We comply with applicable federal civil rights laws and [place] laws. We do not discriminate on the basis of race, color, national origin, age, disability, sex, sexual  orientation, or gender identity.             12-Jun-2017 00:35

## 2017-10-17 ENCOUNTER — APPOINTMENT (OUTPATIENT)
Dept: OTOLARYNGOLOGY | Facility: CLINIC | Age: 47
End: 2017-10-17
Payer: MEDICARE

## 2017-10-17 VITALS
HEIGHT: 70 IN | DIASTOLIC BLOOD PRESSURE: 69 MMHG | HEART RATE: 68 BPM | SYSTOLIC BLOOD PRESSURE: 114 MMHG | BODY MASS INDEX: 35.07 KG/M2 | WEIGHT: 245 LBS

## 2017-10-17 PROCEDURE — 92567 TYMPANOMETRY: CPT

## 2017-10-17 PROCEDURE — 99214 OFFICE O/P EST MOD 30 MIN: CPT | Mod: 25

## 2017-10-17 PROCEDURE — 92557 COMPREHENSIVE HEARING TEST: CPT

## 2017-10-17 PROCEDURE — G0268 REMOVAL OF IMPACTED WAX MD: CPT

## 2017-10-17 PROCEDURE — 31231 NASAL ENDOSCOPY DX: CPT

## 2017-12-06 ENCOUNTER — APPOINTMENT (OUTPATIENT)
Dept: OTOLARYNGOLOGY | Facility: CLINIC | Age: 47
End: 2017-12-06
Payer: MEDICARE

## 2017-12-06 PROCEDURE — 92540 BASIC VESTIBULAR EVALUATION: CPT

## 2017-12-06 PROCEDURE — 92537 CALORIC VSTBLR TEST W/REC: CPT

## 2017-12-06 PROCEDURE — ZZZZZ: CPT

## 2017-12-06 PROCEDURE — 92584 ELECTROCOCHLEOGRAPHY: CPT

## 2017-12-29 ENCOUNTER — TRANSCRIPTION ENCOUNTER (OUTPATIENT)
Age: 47
End: 2017-12-29

## 2018-01-16 ENCOUNTER — APPOINTMENT (OUTPATIENT)
Dept: OTOLARYNGOLOGY | Facility: CLINIC | Age: 48
End: 2018-01-16

## 2018-01-23 ENCOUNTER — APPOINTMENT (OUTPATIENT)
Dept: OTOLARYNGOLOGY | Facility: CLINIC | Age: 48
End: 2018-01-23
Payer: MEDICARE

## 2018-01-23 VITALS
BODY MASS INDEX: 40.8 KG/M2 | HEIGHT: 70 IN | DIASTOLIC BLOOD PRESSURE: 72 MMHG | WEIGHT: 285 LBS | HEART RATE: 71 BPM | SYSTOLIC BLOOD PRESSURE: 110 MMHG

## 2018-01-23 DIAGNOSIS — J34.89 OTHER SPECIFIED DISORDERS OF NOSE AND NASAL SINUSES: ICD-10-CM

## 2018-01-23 DIAGNOSIS — R42 DIZZINESS AND GIDDINESS: ICD-10-CM

## 2018-01-23 PROCEDURE — 31231 NASAL ENDOSCOPY DX: CPT

## 2018-01-23 PROCEDURE — 99214 OFFICE O/P EST MOD 30 MIN: CPT | Mod: 25

## 2018-01-26 ENCOUNTER — APPOINTMENT (OUTPATIENT)
Dept: OBGYN | Facility: CLINIC | Age: 48
End: 2018-01-26
Payer: MEDICARE

## 2018-01-26 ENCOUNTER — EMERGENCY (EMERGENCY)
Facility: HOSPITAL | Age: 48
LOS: 1 days | Discharge: HOME | End: 2018-01-26
Attending: EMERGENCY MEDICINE | Admitting: EMERGENCY MEDICINE
Payer: MEDICARE

## 2018-01-26 VITALS
HEART RATE: 60 BPM | OXYGEN SATURATION: 100 % | DIASTOLIC BLOOD PRESSURE: 72 MMHG | RESPIRATION RATE: 18 BRPM | SYSTOLIC BLOOD PRESSURE: 115 MMHG

## 2018-01-26 VITALS
OXYGEN SATURATION: 100 % | HEART RATE: 70 BPM | DIASTOLIC BLOOD PRESSURE: 82 MMHG | TEMPERATURE: 98 F | SYSTOLIC BLOOD PRESSURE: 120 MMHG | RESPIRATION RATE: 16 BRPM

## 2018-01-26 VITALS
HEART RATE: 80 BPM | WEIGHT: 285 LBS | TEMPERATURE: 98 F | BODY MASS INDEX: 40.8 KG/M2 | SYSTOLIC BLOOD PRESSURE: 117 MMHG | HEIGHT: 70 IN | DIASTOLIC BLOOD PRESSURE: 77 MMHG

## 2018-01-26 DIAGNOSIS — R82.90 UNSPECIFIED ABNORMAL FINDINGS IN URINE: ICD-10-CM

## 2018-01-26 DIAGNOSIS — Z98.51 TUBAL LIGATION STATUS: Chronic | ICD-10-CM

## 2018-01-26 DIAGNOSIS — K56.60 UNSPECIFIED INTESTINAL OBSTRUCTION: Chronic | ICD-10-CM

## 2018-01-26 DIAGNOSIS — Z98.89 OTHER SPECIFIED POSTPROCEDURAL STATES: Chronic | ICD-10-CM

## 2018-01-26 LAB
ALBUMIN SERPL ELPH-MCNC: 4.3 G/DL — SIGNIFICANT CHANGE UP (ref 3.3–5)
ALP SERPL-CCNC: 84 U/L — SIGNIFICANT CHANGE UP (ref 40–120)
ALT FLD-CCNC: 10 U/L — SIGNIFICANT CHANGE UP (ref 4–33)
APPEARANCE UR: CLEAR — SIGNIFICANT CHANGE UP
APTT BLD: 27.5 SEC — SIGNIFICANT CHANGE UP (ref 27.5–37.4)
AST SERPL-CCNC: 17 U/L — SIGNIFICANT CHANGE UP (ref 4–32)
BASE EXCESS BLDV CALC-SCNC: 1.1 MMOL/L — SIGNIFICANT CHANGE UP
BASOPHILS # BLD AUTO: 0.04 K/UL — SIGNIFICANT CHANGE UP (ref 0–0.2)
BASOPHILS NFR BLD AUTO: 0.4 % — SIGNIFICANT CHANGE UP (ref 0–2)
BILIRUB SERPL-MCNC: 0.4 MG/DL — SIGNIFICANT CHANGE UP (ref 0.2–1.2)
BILIRUB UR-MCNC: NEGATIVE — SIGNIFICANT CHANGE UP
BLOOD GAS VENOUS - CREATININE: SIGNIFICANT CHANGE UP MG/DL (ref 0.5–1.3)
BLOOD UR QL VISUAL: HIGH
BUN SERPL-MCNC: 16 MG/DL — SIGNIFICANT CHANGE UP (ref 7–23)
CALCIUM SERPL-MCNC: 8.8 MG/DL — SIGNIFICANT CHANGE UP (ref 8.4–10.5)
CHLORIDE BLDV-SCNC: 107 MMOL/L — SIGNIFICANT CHANGE UP (ref 96–108)
CHLORIDE SERPL-SCNC: 104 MMOL/L — SIGNIFICANT CHANGE UP (ref 98–107)
CO2 SERPL-SCNC: 25 MMOL/L — SIGNIFICANT CHANGE UP (ref 22–31)
COLOR SPEC: SIGNIFICANT CHANGE UP
CREAT SERPL-MCNC: 0.63 MG/DL — SIGNIFICANT CHANGE UP (ref 0.5–1.3)
EOSINOPHIL # BLD AUTO: 0.08 K/UL — SIGNIFICANT CHANGE UP (ref 0–0.5)
EOSINOPHIL NFR BLD AUTO: 0.8 % — SIGNIFICANT CHANGE UP (ref 0–6)
GAS PNL BLDV: 139 MMOL/L — SIGNIFICANT CHANGE UP (ref 136–146)
GLUCOSE BLDV-MCNC: 89 — SIGNIFICANT CHANGE UP (ref 70–99)
GLUCOSE SERPL-MCNC: 85 MG/DL — SIGNIFICANT CHANGE UP (ref 70–99)
GLUCOSE UR-MCNC: NEGATIVE — SIGNIFICANT CHANGE UP
HCO3 BLDV-SCNC: 25 MMOL/L — SIGNIFICANT CHANGE UP (ref 20–27)
HCT VFR BLD CALC: 38.2 % — SIGNIFICANT CHANGE UP (ref 34.5–45)
HCT VFR BLDV CALC: 38.8 % — SIGNIFICANT CHANGE UP (ref 34.5–45)
HGB BLD-MCNC: 12.8 G/DL — SIGNIFICANT CHANGE UP (ref 11.5–15.5)
HGB BLDV-MCNC: 12.6 G/DL — SIGNIFICANT CHANGE UP (ref 11.5–15.5)
IMM GRANULOCYTES # BLD AUTO: 0.15 # — SIGNIFICANT CHANGE UP
IMM GRANULOCYTES NFR BLD AUTO: 1.5 % — SIGNIFICANT CHANGE UP (ref 0–1.5)
INR BLD: 0.89 — SIGNIFICANT CHANGE UP (ref 0.88–1.17)
KETONES UR-MCNC: NEGATIVE — SIGNIFICANT CHANGE UP
LACTATE BLDV-MCNC: 1 MMOL/L — SIGNIFICANT CHANGE UP (ref 0.5–2)
LEUKOCYTE ESTERASE UR-ACNC: NEGATIVE — SIGNIFICANT CHANGE UP
LIDOCAIN IGE QN: 23.7 U/L — SIGNIFICANT CHANGE UP (ref 7–60)
LYMPHOCYTES # BLD AUTO: 2.97 K/UL — SIGNIFICANT CHANGE UP (ref 1–3.3)
LYMPHOCYTES # BLD AUTO: 30.1 % — SIGNIFICANT CHANGE UP (ref 13–44)
MAGNESIUM SERPL-MCNC: 2 MG/DL — SIGNIFICANT CHANGE UP (ref 1.6–2.6)
MCHC RBC-ENTMCNC: 30.8 PG — SIGNIFICANT CHANGE UP (ref 27–34)
MCHC RBC-ENTMCNC: 33.5 % — SIGNIFICANT CHANGE UP (ref 32–36)
MCV RBC AUTO: 92 FL — SIGNIFICANT CHANGE UP (ref 80–100)
MONOCYTES # BLD AUTO: 0.57 K/UL — SIGNIFICANT CHANGE UP (ref 0–0.9)
MONOCYTES NFR BLD AUTO: 5.8 % — SIGNIFICANT CHANGE UP (ref 2–14)
MUCOUS THREADS # UR AUTO: SIGNIFICANT CHANGE UP
NEUTROPHILS # BLD AUTO: 6.07 K/UL — SIGNIFICANT CHANGE UP (ref 1.8–7.4)
NEUTROPHILS NFR BLD AUTO: 61.4 % — SIGNIFICANT CHANGE UP (ref 43–77)
NITRITE UR-MCNC: NEGATIVE — SIGNIFICANT CHANGE UP
NRBC # FLD: 0 — SIGNIFICANT CHANGE UP
PCO2 BLDV: 47 MMHG — SIGNIFICANT CHANGE UP (ref 41–51)
PH BLDV: 7.36 PH — SIGNIFICANT CHANGE UP (ref 7.32–7.43)
PH UR: 6 — SIGNIFICANT CHANGE UP (ref 4.6–8)
PHOSPHATE SERPL-MCNC: 2.5 MG/DL — SIGNIFICANT CHANGE UP (ref 2.5–4.5)
PLATELET # BLD AUTO: 215 K/UL — SIGNIFICANT CHANGE UP (ref 150–400)
PMV BLD: 10.6 FL — SIGNIFICANT CHANGE UP (ref 7–13)
PO2 BLDV: 39 MMHG — SIGNIFICANT CHANGE UP (ref 35–40)
POTASSIUM BLDV-SCNC: 4 MMOL/L — SIGNIFICANT CHANGE UP (ref 3.4–4.5)
POTASSIUM SERPL-MCNC: 4.2 MMOL/L — SIGNIFICANT CHANGE UP (ref 3.5–5.3)
POTASSIUM SERPL-SCNC: 4.2 MMOL/L — SIGNIFICANT CHANGE UP (ref 3.5–5.3)
PROT SERPL-MCNC: 7.2 G/DL — SIGNIFICANT CHANGE UP (ref 6–8.3)
PROT UR-MCNC: NEGATIVE MG/DL — SIGNIFICANT CHANGE UP
PROTHROM AB SERPL-ACNC: 10.2 SEC — SIGNIFICANT CHANGE UP (ref 9.8–13.1)
RBC # BLD: 4.15 M/UL — SIGNIFICANT CHANGE UP (ref 3.8–5.2)
RBC # FLD: 12.9 % — SIGNIFICANT CHANGE UP (ref 10.3–14.5)
RBC CASTS # UR COMP ASSIST: HIGH (ref 0–?)
SAO2 % BLDV: 75 % — SIGNIFICANT CHANGE UP (ref 60–85)
SODIUM SERPL-SCNC: 140 MMOL/L — SIGNIFICANT CHANGE UP (ref 135–145)
SP GR SPEC: 1.01 — SIGNIFICANT CHANGE UP (ref 1–1.04)
SQUAMOUS # UR AUTO: SIGNIFICANT CHANGE UP
UROBILINOGEN FLD QL: NORMAL MG/DL — SIGNIFICANT CHANGE UP
WBC # BLD: 9.88 K/UL — SIGNIFICANT CHANGE UP (ref 3.8–10.5)
WBC # FLD AUTO: 9.88 K/UL — SIGNIFICANT CHANGE UP (ref 3.8–10.5)
WBC UR QL: SIGNIFICANT CHANGE UP (ref 0–?)

## 2018-01-26 PROCEDURE — 99214 OFFICE O/P EST MOD 30 MIN: CPT | Mod: 25

## 2018-01-26 PROCEDURE — 56420 I&D BARTHOLINS GLAND ABSCESS: CPT

## 2018-01-26 PROCEDURE — 74177 CT ABD & PELVIS W/CONTRAST: CPT | Mod: 26

## 2018-01-26 PROCEDURE — 99284 EMERGENCY DEPT VISIT MOD MDM: CPT

## 2018-01-26 RX ORDER — KETOROLAC TROMETHAMINE 30 MG/ML
15 SYRINGE (ML) INJECTION ONCE
Qty: 0 | Refills: 0 | Status: DISCONTINUED | OUTPATIENT
Start: 2018-01-26 | End: 2018-01-26

## 2018-01-26 RX ORDER — FAMOTIDINE 10 MG/ML
20 INJECTION INTRAVENOUS ONCE
Qty: 0 | Refills: 0 | Status: COMPLETED | OUTPATIENT
Start: 2018-01-26 | End: 2018-01-26

## 2018-01-26 RX ORDER — HYDROMORPHONE HYDROCHLORIDE 2 MG/ML
1 INJECTION INTRAMUSCULAR; INTRAVENOUS; SUBCUTANEOUS
Qty: 30 | Refills: 0
Start: 2018-01-26 | End: 2018-01-30

## 2018-01-26 RX ORDER — SODIUM CHLORIDE 9 MG/ML
1000 INJECTION INTRAMUSCULAR; INTRAVENOUS; SUBCUTANEOUS ONCE
Qty: 0 | Refills: 0 | Status: COMPLETED | OUTPATIENT
Start: 2018-01-26 | End: 2018-01-26

## 2018-01-26 RX ORDER — ACETAMINOPHEN 500 MG
1000 TABLET ORAL ONCE
Qty: 0 | Refills: 0 | Status: COMPLETED | OUTPATIENT
Start: 2018-01-26 | End: 2018-01-26

## 2018-01-26 RX ADMIN — Medication 30 MILLILITER(S): at 15:25

## 2018-01-26 RX ADMIN — SODIUM CHLORIDE 1000 MILLILITER(S): 9 INJECTION INTRAMUSCULAR; INTRAVENOUS; SUBCUTANEOUS at 15:25

## 2018-01-26 RX ADMIN — FAMOTIDINE 20 MILLIGRAM(S): 10 INJECTION INTRAVENOUS at 15:25

## 2018-01-26 RX ADMIN — Medication 400 MILLIGRAM(S): at 15:25

## 2018-01-26 RX ADMIN — Medication 15 MILLIGRAM(S): at 20:01

## 2018-01-26 RX ADMIN — Medication 1000 MILLIGRAM(S): at 16:53

## 2018-01-26 NOTE — ED PROVIDER NOTE - ATTENDING CONTRIBUTION TO CARE
AJM: Pt seen with resident and agree with above note. Pt is a 48 y/o F w/ a PMHx of Fibromyalgia, cholecystectomy, gastric bypass, abdominoplasty c/p seroma, h/o adhesions w/ multiple SBOs w/ multiple resections who presents from her PMDs office for abdominal pain. She notes 10-15 episodes of diarrhea daily, no blood. LLQ pain and tightness. She notes this feels like her prior SBOs. + nausea but no vomiting. No trauma. Pt also has had c.diff multiple times. On exam +BS but + TTP to lower abd, soft, nondistended. Will obtain labs, c.diff toxin, ct a/p, ivf. transfer to main ER for contact isolation. Signed out to Dr. Nails and Dr. Muller.

## 2018-01-26 NOTE — ED PROVIDER NOTE - MEDICAL DECISION MAKING DETAILS
Pt is a 46 y/o F w/ a PMHx of Fibromyalgia, cholecystectomy, gastric bypass, abdominoplasty c/p seroma, h/o adhesions w/ multiple SBOs w/ multiple resections who p/w abdominal pain concerning for SBO vs gastroenteritis vs dumping syndrome. low suspicion for ovarian torsion given history. Will get CBC, CMP, vbg, lipase, PT/INR, aPTT, IVFs, pepcid, maalox, IV tylenol, CT A/P w/ oral and IV contrast. Pending Ct results if negative and lab work normal will likely go home with f/u but if SBO will consult surgery.

## 2018-01-26 NOTE — ED PROVIDER NOTE - PLAN OF CARE
Please follow-up with your primary care doctor in the next 24-48 hours   Please follow-up with a gastroenterologist within the next week  If  you have worsening abdominal pain, are unable to tolerate food or liquids, have nausea and vomiting or are unable to have a bowel movement or pass gas please return to the emergency department

## 2018-01-26 NOTE — ED PROVIDER NOTE - OBJECTIVE STATEMENT
Pt is a 48 y/o F w/ a PMHx of Fibromyalgia, cholecystectomy, gastric bypass, abdominoplasty c/p seroma, h/o adhesions w/ multiple SBOs w/ multiple resections who presents from her PMDs office for abdominal pain. Pt states that she was at PMDs office to have a bartholin cyst drained and she appeared weak and unwell and was complaining of abdominal pain and was sent to ER. Pt states abdominal pain for one month that has increased in frequency and intensity over last weak and was more intense today. States LLQ pain, intermittent, radiates to umbilicus and RLQ and RUQ, +chills, +nausea and no vomiting, diarrhea for one week that has been frequent but nonbloody. No etoh use, no dyrusia, no vaginal discharge prior, no cough, CP, headaches, SOB, new visual changes, +generalized weakness, +dry mouth.    Patient did also State has had episodes of dumping syndrome for past 1.5 years because she stated her gastric pouch has increased in size and is being scheduled for a revision.

## 2018-01-26 NOTE — CONSULT NOTE ADULT - ASSESSMENT
48yo F with complex surgical history now presents with abd pain a/w nausea and diarrhea. No acute pathology seen on CT and labs are all within normal limits.  - No acute surgical intervention at this time  - Recommend f/u with PMD and/or GI for IBS management  - Dispo per ED  - D/w bariatric fellow on call

## 2018-01-26 NOTE — ED ADULT NURSE NOTE - OBJECTIVE STATEMENT
received pt to intake room 9 for evaluation of left sided abdominal pain with nausea, diarrhea x 18 episodes per patient received pt to intake room 9 for evaluation of left sided abdominal pain with nausea, non-bloody diarrhea x 18 episodes per patient with generalized weakness gradually worsening x 2 months and worse this week and today. pt presents awake a&ox4, denies dizziness or ha. skin warm, dry, appropriate for race. respirations even unlabored. lungs cta. denies cp or sob. abdomen soft, tender to palpation, nondistended. bs+x4, endorses nausea, denies vomiting, + diarrhea. denies fevers, endorses chills. IVL placed. bloods drawn and sent. pt medicated as ordered, NS 0.9% bolus infusing. pt drinking for ct scan.

## 2018-01-26 NOTE — CONSULT NOTE ADULT - SUBJECTIVE AND OBJECTIVE BOX
48yo F with complex surgical history presents now with abd pain a/w nausea and diarrhea x 2 months. Briefly, she has a history of a jero-en-y gastric bypass in  for obesity, panniculectomy in  c/b strangulated umbilical hernia (?infected hematoma per pt) s/p ex-lap, repair by Dr. Yu (), recurrent SBOs s/p LOAs (; last in  by Dr. CHUCK Dimas),  in 2014. Per pt, she has had chronic on and off abdominal pain for "years." She saw a GI doctor (Dr. Mak Wilder) in ; EGD and colonoscopy at that time were unremarkable and the patient was diagnosed with IBS. In the last two months, pt complains of worsening abd pain a/w nausea and diarrhea (non-bloody). She reports from chills but no objective fever. No chest pain/SOB.    PMH  Chronic lower back pain  Abnormal uterine bleeding  Anxiety  GERD (gastroesophageal reflux disease)  Fibromyalgia  Anemia  IBS (irritable bowel syndrome)  Post traumatic stress disorder  Depression  Anxiety    PSH  H/O tubal ligation  Bowel obstruction  H/O  section  H/O abdominoplasty  H/O gastric bypass  Depression    Allergies  sulfa drugs (Anaphylaxis)      Physical Exam  T(C): 36.7 (18 @ 16:41), Max: 36.7 (18 @ 13:23)  HR: 60 (18 @ 18:49) (60 - 70)  BP: 115/72 (18 @ 18:49) (115/72 - 127/76)  RR: 18 (18 @ 18:49) (16 - 18)  SpO2: 100% (18 @ 18:49) (100% - 100%)  Wt(kg): --  Tmax: T(C): , Max: 36.7 (18 @ 13:23)  Wt(kg): --    Gen: NAD  HEENT: normocephalic, atraumatic, no scleral icterus  Abd: Soft, ND, tender to palpation L>R with some guarding, non-focal exam, no palpable organomegaly/masses  Ext: warm, no edema    Labs:                        12.8   9.88  )-----------( 215      ( 2018 15:15 )             38.2         140  |  104  |  16  ----------------------------<  85  4.2   |  25  |  0.63    Ca    8.8      2018 15:15  Phos  2.5       Mg     2.0         TPro  7.2  /  Alb  4.3  /  TBili  0.4  /  DBili  x   /  AST  17  /  ALT  10  /  AlkPhos  84      PT/INR - ( 2018 15:15 )   PT: 10.2 SEC;   INR: 0.89          PTT - ( 2018 15:15 )  PTT:27.5 SEC  Urinalysis Basic - ( 2018 15:00 )    Color: PLYEL / Appearance: CLEAR / S.009 / pH: 6.0  Gluc: NEGATIVE / Ketone: NEGATIVE  / Bili: NEGATIVE / Urobili: NORMAL mg/dL   Blood: MODERATE / Protein: NEGATIVE mg/dL / Nitrite: NEGATIVE   Leuk Esterase: NEGATIVE / RBC: 5-10 / WBC 0-2   Sq Epi: OCC / Non Sq Epi: x / Bacteria: x      Imaging    < from: CT Abdomen and Pelvis w/ Oral Cont and w/ IV Cont (18 @ 17:50) >  LOWER CHEST: Within normal limits.    LIVER: Small right hepatic cyst unchanged.  BILE DUCTS: Normal caliber.  GALLBLADDER: Cholecystectomy.  SPLEEN: Within normal limits.  PANCREAS: Within normal limits.  ADRENALS: Within normal limits.  KIDNEYS/URETERS: Unchanged small low-attenuation right renal lesion, too   small to characterize.    BLADDER: Within normal limits.  REPRODUCTIVE ORGANS: Uterus and adnexa are normal. Small right vaginal   Bartholin's duct cyst.    BOWEL: No bowelobstruction. Status post Jero-en-Y gastric bypass.   Nonvisualized appendix.  PERITONEUM: Scattered subcentimeter mesenteric lymph nodes. No ascites.  VESSELS:  Within normal limits. No swirling of mesenteric vessels to   suggest internal hernia.  RETROPERITONEUM: No lymphadenopathy.    ABDOMINAL WALL: Within normal limits.  BONES: Within normal limits.    IMPRESSION:     No bowel obstruction.    < end of copied text >

## 2018-01-26 NOTE — ED PROVIDER NOTE - PROGRESS NOTE DETAILS
patient able to tolerate PO (crackers and water). patient has follow-up with new bariatric surgeon. will provide GI referral list and discharge. - resident Neymar Chaidez

## 2018-01-26 NOTE — ED PROVIDER NOTE - CARE PLAN
Assessment and plan of treatment:	Please follow-up with your primary care doctor in the next 24-48 hours   Please follow-up with a gastroenterologist within the next week  If  you have worsening abdominal pain, are unable to tolerate food or liquids, have nausea and vomiting or are unable to have a bowel movement or pass gas please return to the emergency department Principal Discharge DX:	Generalized abdominal pain  Assessment and plan of treatment:	Please follow-up with your primary care doctor in the next 24-48 hours   Please follow-up with a gastroenterologist within the next week  If  you have worsening abdominal pain, are unable to tolerate food or liquids, have nausea and vomiting or are unable to have a bowel movement or pass gas please return to the emergency department

## 2018-01-27 LAB — SPECIMEN SOURCE: SIGNIFICANT CHANGE UP

## 2018-01-28 LAB
-  AMIKACIN: SIGNIFICANT CHANGE UP
-  AMPICILLIN/SULBACTAM: SIGNIFICANT CHANGE UP
-  AMPICILLIN: SIGNIFICANT CHANGE UP
-  AZTREONAM: SIGNIFICANT CHANGE UP
-  CEFAZOLIN: SIGNIFICANT CHANGE UP
-  CEFEPIME: SIGNIFICANT CHANGE UP
-  CEFOXITIN: SIGNIFICANT CHANGE UP
-  CEFTAZIDIME: SIGNIFICANT CHANGE UP
-  CEFTRIAXONE: SIGNIFICANT CHANGE UP
-  CIPROFLOXACIN: SIGNIFICANT CHANGE UP
-  ERTAPENEM: SIGNIFICANT CHANGE UP
-  GENTAMICIN: SIGNIFICANT CHANGE UP
-  IMIPENEM: SIGNIFICANT CHANGE UP
-  LEVOFLOXACIN: SIGNIFICANT CHANGE UP
-  MEROPENEM: SIGNIFICANT CHANGE UP
-  NITROFURANTOIN: SIGNIFICANT CHANGE UP
-  PIPERACILLIN/TAZOBACTAM: SIGNIFICANT CHANGE UP
-  TIGECYCLINE: SIGNIFICANT CHANGE UP
-  TOBRAMYCIN: SIGNIFICANT CHANGE UP
-  TRIMETHOPRIM/SULFAMETHOXAZOLE: SIGNIFICANT CHANGE UP
BACTERIA UR CULT: SIGNIFICANT CHANGE UP
METHOD TYPE: SIGNIFICANT CHANGE UP
ORGANISM # SPEC MICROSCOPIC CNT: SIGNIFICANT CHANGE UP
ORGANISM # SPEC MICROSCOPIC CNT: SIGNIFICANT CHANGE UP

## 2018-01-29 RX ORDER — CEPHALEXIN 500 MG
1 CAPSULE ORAL
Qty: 14 | Refills: 0
Start: 2018-01-29 | End: 2018-02-04

## 2018-02-01 LAB — BACTERIA UR CULT: ABNORMAL

## 2018-02-08 ENCOUNTER — EMERGENCY (EMERGENCY)
Facility: HOSPITAL | Age: 48
LOS: 1 days | Discharge: HOME | End: 2018-02-08
Attending: EMERGENCY MEDICINE | Admitting: EMERGENCY MEDICINE
Payer: COMMERCIAL

## 2018-02-08 ENCOUNTER — LABORATORY RESULT (OUTPATIENT)
Age: 48
End: 2018-02-08

## 2018-02-08 VITALS
TEMPERATURE: 98 F | DIASTOLIC BLOOD PRESSURE: 77 MMHG | WEIGHT: 287.04 LBS | RESPIRATION RATE: 18 BRPM | HEART RATE: 79 BPM | SYSTOLIC BLOOD PRESSURE: 116 MMHG

## 2018-02-08 VITALS
TEMPERATURE: 98 F | DIASTOLIC BLOOD PRESSURE: 78 MMHG | RESPIRATION RATE: 18 BRPM | OXYGEN SATURATION: 97 % | SYSTOLIC BLOOD PRESSURE: 111 MMHG | HEART RATE: 70 BPM

## 2018-02-08 DIAGNOSIS — Z98.51 TUBAL LIGATION STATUS: Chronic | ICD-10-CM

## 2018-02-08 DIAGNOSIS — Z98.89 OTHER SPECIFIED POSTPROCEDURAL STATES: Chronic | ICD-10-CM

## 2018-02-08 DIAGNOSIS — K56.60 UNSPECIFIED INTESTINAL OBSTRUCTION: Chronic | ICD-10-CM

## 2018-02-08 LAB
ALBUMIN SERPL ELPH-MCNC: 4 G/DL — SIGNIFICANT CHANGE UP (ref 3.3–5)
ALP SERPL-CCNC: 93 U/L — SIGNIFICANT CHANGE UP (ref 40–120)
ALT FLD-CCNC: 32 U/L RC — SIGNIFICANT CHANGE UP (ref 10–45)
ANION GAP SERPL CALC-SCNC: 8 MMOL/L — SIGNIFICANT CHANGE UP (ref 5–17)
APPEARANCE UR: CLEAR — SIGNIFICANT CHANGE UP
APTT BLD: 29.5 SEC — SIGNIFICANT CHANGE UP (ref 27.5–37.4)
AST SERPL-CCNC: 37 U/L — SIGNIFICANT CHANGE UP (ref 10–40)
BASOPHILS # BLD AUTO: 0 K/UL — SIGNIFICANT CHANGE UP (ref 0–0.2)
BASOPHILS NFR BLD AUTO: 1 % — SIGNIFICANT CHANGE UP (ref 0–2)
BILIRUB SERPL-MCNC: 0.5 MG/DL — SIGNIFICANT CHANGE UP (ref 0.2–1.2)
BILIRUB UR-MCNC: NEGATIVE — SIGNIFICANT CHANGE UP
BUN SERPL-MCNC: 14 MG/DL — SIGNIFICANT CHANGE UP (ref 7–23)
CALCIUM SERPL-MCNC: 9.2 MG/DL — SIGNIFICANT CHANGE UP (ref 8.4–10.5)
CHLORIDE SERPL-SCNC: 105 MMOL/L — SIGNIFICANT CHANGE UP (ref 96–108)
CO2 SERPL-SCNC: 26 MMOL/L — SIGNIFICANT CHANGE UP (ref 22–31)
COLOR SPEC: YELLOW — SIGNIFICANT CHANGE UP
CREAT SERPL-MCNC: 0.76 MG/DL — SIGNIFICANT CHANGE UP (ref 0.5–1.3)
DIFF PNL FLD: ABNORMAL
EOSINOPHIL # BLD AUTO: 0 K/UL — SIGNIFICANT CHANGE UP (ref 0–0.5)
EOSINOPHIL NFR BLD AUTO: 0.9 % — SIGNIFICANT CHANGE UP (ref 0–6)
GAS PNL BLDV: SIGNIFICANT CHANGE UP
GLUCOSE SERPL-MCNC: 83 MG/DL — SIGNIFICANT CHANGE UP (ref 70–99)
GLUCOSE UR QL: NEGATIVE — SIGNIFICANT CHANGE UP
HCG SERPL-ACNC: <2 MIU/ML — LOW (ref 5–24)
HCT VFR BLD CALC: 36.4 % — SIGNIFICANT CHANGE UP (ref 34.5–45)
HGB BLD-MCNC: 12.6 G/DL — SIGNIFICANT CHANGE UP (ref 11.5–15.5)
INR BLD: 0.98 RATIO — SIGNIFICANT CHANGE UP (ref 0.88–1.16)
KETONES UR-MCNC: NEGATIVE — SIGNIFICANT CHANGE UP
LEUKOCYTE ESTERASE UR-ACNC: NEGATIVE — SIGNIFICANT CHANGE UP
LIDOCAIN IGE QN: 19 U/L — SIGNIFICANT CHANGE UP (ref 7–60)
LYMPHOCYTES # BLD AUTO: 1.3 K/UL — SIGNIFICANT CHANGE UP (ref 1–3.3)
LYMPHOCYTES # BLD AUTO: 27.5 % — SIGNIFICANT CHANGE UP (ref 13–44)
MCHC RBC-ENTMCNC: 31.9 PG — SIGNIFICANT CHANGE UP (ref 27–34)
MCHC RBC-ENTMCNC: 34.6 GM/DL — SIGNIFICANT CHANGE UP (ref 32–36)
MCV RBC AUTO: 92.2 FL — SIGNIFICANT CHANGE UP (ref 80–100)
MONOCYTES # BLD AUTO: 0.6 K/UL — SIGNIFICANT CHANGE UP (ref 0–0.9)
MONOCYTES NFR BLD AUTO: 13.5 % — SIGNIFICANT CHANGE UP (ref 2–14)
NEUTROPHILS # BLD AUTO: 2.7 K/UL — SIGNIFICANT CHANGE UP (ref 1.8–7.4)
NEUTROPHILS NFR BLD AUTO: 57 % — SIGNIFICANT CHANGE UP (ref 43–77)
NITRITE UR-MCNC: NEGATIVE — SIGNIFICANT CHANGE UP
PH UR: 6.5 — SIGNIFICANT CHANGE UP (ref 5–8)
PLATELET # BLD AUTO: 186 K/UL — SIGNIFICANT CHANGE UP (ref 150–400)
POTASSIUM SERPL-MCNC: 4.3 MMOL/L — SIGNIFICANT CHANGE UP (ref 3.5–5.3)
POTASSIUM SERPL-SCNC: 4.3 MMOL/L — SIGNIFICANT CHANGE UP (ref 3.5–5.3)
PROT SERPL-MCNC: 7 G/DL — SIGNIFICANT CHANGE UP (ref 6–8.3)
PROT UR-MCNC: NEGATIVE — SIGNIFICANT CHANGE UP
PROTHROM AB SERPL-ACNC: 10.7 SEC — SIGNIFICANT CHANGE UP (ref 9.8–12.7)
RBC # BLD: 3.95 M/UL — SIGNIFICANT CHANGE UP (ref 3.8–5.2)
RBC # FLD: 12.3 % — SIGNIFICANT CHANGE UP (ref 10.3–14.5)
SODIUM SERPL-SCNC: 139 MMOL/L — SIGNIFICANT CHANGE UP (ref 135–145)
SP GR SPEC: 1.01 — SIGNIFICANT CHANGE UP (ref 1.01–1.02)
UROBILINOGEN FLD QL: NEGATIVE — SIGNIFICANT CHANGE UP
WBC # BLD: 4.7 K/UL — SIGNIFICANT CHANGE UP (ref 3.8–10.5)
WBC # FLD AUTO: 4.7 K/UL — SIGNIFICANT CHANGE UP (ref 3.8–10.5)

## 2018-02-08 PROCEDURE — 82330 ASSAY OF CALCIUM: CPT

## 2018-02-08 PROCEDURE — 93005 ELECTROCARDIOGRAM TRACING: CPT

## 2018-02-08 PROCEDURE — 84702 CHORIONIC GONADOTROPIN TEST: CPT

## 2018-02-08 PROCEDURE — 85730 THROMBOPLASTIN TIME PARTIAL: CPT

## 2018-02-08 PROCEDURE — 74177 CT ABD & PELVIS W/CONTRAST: CPT | Mod: 26

## 2018-02-08 PROCEDURE — 82947 ASSAY GLUCOSE BLOOD QUANT: CPT

## 2018-02-08 PROCEDURE — 82435 ASSAY OF BLOOD CHLORIDE: CPT

## 2018-02-08 PROCEDURE — 96374 THER/PROPH/DIAG INJ IV PUSH: CPT | Mod: XU

## 2018-02-08 PROCEDURE — 81001 URINALYSIS AUTO W/SCOPE: CPT

## 2018-02-08 PROCEDURE — 84132 ASSAY OF SERUM POTASSIUM: CPT

## 2018-02-08 PROCEDURE — 85027 COMPLETE CBC AUTOMATED: CPT

## 2018-02-08 PROCEDURE — 74177 CT ABD & PELVIS W/CONTRAST: CPT

## 2018-02-08 PROCEDURE — 93010 ELECTROCARDIOGRAM REPORT: CPT

## 2018-02-08 PROCEDURE — 85610 PROTHROMBIN TIME: CPT

## 2018-02-08 PROCEDURE — 99285 EMERGENCY DEPT VISIT HI MDM: CPT | Mod: 25

## 2018-02-08 PROCEDURE — 83605 ASSAY OF LACTIC ACID: CPT

## 2018-02-08 PROCEDURE — 83690 ASSAY OF LIPASE: CPT

## 2018-02-08 PROCEDURE — 80053 COMPREHEN METABOLIC PANEL: CPT

## 2018-02-08 PROCEDURE — 82803 BLOOD GASES ANY COMBINATION: CPT

## 2018-02-08 PROCEDURE — 99284 EMERGENCY DEPT VISIT MOD MDM: CPT | Mod: 25

## 2018-02-08 PROCEDURE — 85014 HEMATOCRIT: CPT

## 2018-02-08 PROCEDURE — 96375 TX/PRO/DX INJ NEW DRUG ADDON: CPT

## 2018-02-08 PROCEDURE — 84295 ASSAY OF SERUM SODIUM: CPT

## 2018-02-08 RX ORDER — FAMOTIDINE 10 MG/ML
20 INJECTION INTRAVENOUS ONCE
Qty: 0 | Refills: 0 | Status: COMPLETED | OUTPATIENT
Start: 2018-02-08 | End: 2018-02-08

## 2018-02-08 RX ORDER — SODIUM CHLORIDE 9 MG/ML
1000 INJECTION INTRAMUSCULAR; INTRAVENOUS; SUBCUTANEOUS ONCE
Qty: 0 | Refills: 0 | Status: COMPLETED | OUTPATIENT
Start: 2018-02-08 | End: 2018-02-08

## 2018-02-08 RX ORDER — ACETAMINOPHEN 500 MG
1000 TABLET ORAL ONCE
Qty: 0 | Refills: 0 | Status: COMPLETED | OUTPATIENT
Start: 2018-02-08 | End: 2018-02-08

## 2018-02-08 RX ORDER — SODIUM CHLORIDE 9 MG/ML
3 INJECTION INTRAMUSCULAR; INTRAVENOUS; SUBCUTANEOUS ONCE
Qty: 0 | Refills: 0 | Status: COMPLETED | OUTPATIENT
Start: 2018-02-08 | End: 2018-02-08

## 2018-02-08 RX ORDER — OXYCODONE HYDROCHLORIDE 5 MG/1
5 TABLET ORAL ONCE
Qty: 0 | Refills: 0 | Status: DISCONTINUED | OUTPATIENT
Start: 2018-02-08 | End: 2018-02-08

## 2018-02-08 RX ORDER — THIAMINE MONONITRATE (VIT B1) 100 MG
100 TABLET ORAL ONCE
Qty: 0 | Refills: 0 | Status: COMPLETED | OUTPATIENT
Start: 2018-02-08 | End: 2018-02-08

## 2018-02-08 RX ORDER — KETOROLAC TROMETHAMINE 30 MG/ML
15 SYRINGE (ML) INJECTION ONCE
Qty: 0 | Refills: 0 | Status: DISCONTINUED | OUTPATIENT
Start: 2018-02-08 | End: 2018-02-08

## 2018-02-08 RX ADMIN — Medication 100 MILLIGRAM(S): at 15:20

## 2018-02-08 RX ADMIN — OXYCODONE HYDROCHLORIDE 5 MILLIGRAM(S): 5 TABLET ORAL at 17:07

## 2018-02-08 RX ADMIN — Medication 1000 MILLIGRAM(S): at 11:15

## 2018-02-08 RX ADMIN — SODIUM CHLORIDE 3 MILLILITER(S): 9 INJECTION INTRAMUSCULAR; INTRAVENOUS; SUBCUTANEOUS at 10:49

## 2018-02-08 RX ADMIN — SODIUM CHLORIDE 1000 MILLILITER(S): 9 INJECTION INTRAMUSCULAR; INTRAVENOUS; SUBCUTANEOUS at 10:49

## 2018-02-08 RX ADMIN — OXYCODONE HYDROCHLORIDE 5 MILLIGRAM(S): 5 TABLET ORAL at 17:20

## 2018-02-08 RX ADMIN — FAMOTIDINE 20 MILLIGRAM(S): 10 INJECTION INTRAVENOUS at 10:49

## 2018-02-08 RX ADMIN — Medication 15 MILLIGRAM(S): at 13:20

## 2018-02-08 RX ADMIN — Medication 15 MILLIGRAM(S): at 12:49

## 2018-02-08 RX ADMIN — Medication 400 MILLIGRAM(S): at 10:49

## 2018-02-08 NOTE — ED PROVIDER NOTE - PHYSICAL EXAMINATION
vasu aaox3 nad ncatperrl eomi no sceral ict s1s2 rrr ctabl abd ttp pos vol guarding no rebound pain worse r> left

## 2018-02-08 NOTE — ED ADULT NURSE NOTE - OBJECTIVE STATEMENT
46 y/o female c/o abd pain and loose stool x 2 weeks.  pt with recent  visit to ER for generalized abdominal pain, found to have bacteria and discharged on Cipro.   Pt still with generalized abdominal pain and loose stool.  Pt  also c/o fever last night.  No vomiting.  No chest pain.  No acute respiratory distress noted.  Abd soft.

## 2018-02-08 NOTE — CONSULT NOTE ADULT - SUBJECTIVE AND OBJECTIVE BOX
General Surgery Consult  Consulting surgical team: Bariatrics  Consulting attending: Abdulaziz    HPI: 46yo F with surgical history presents now with abd pain a/w nausea and diarrhea for two weeks. She was previously seen in ED on  for same issue. Briefly, she has a history of a jero-en-y gastric bypass in  for obesity, panniculectomy in  c/b strangulated umbilical hernia (?infected hematoma per pt) s/p ex-lap, repair by Dr. Yu (), recurrent SBOs s/p LOAs (; last in  by Dr. CHUCK Dimas),  in 2014. Per pt, she has had chronic on and off abdominal pain for "years." She saw a GI doctor (Dr. Mak Wilder) in ; EGD and colonoscopy at that time were unremarkable and the patient was diagnosed with IBS.    Currently she complains of pain associated with nausea and chills but without emesis. Her last bowel movement was yesterday afternoon and it was loose. She is currently passing flatus      PAST MEDICAL HISTORY:  Chronic lower back pain  Abnormal uterine bleeding  Anxiety  GERD (gastroesophageal reflux disease)  Fibromyalgia  Anemia  IBS (irritable bowel syndrome)  Post traumatic stress disorder  Depression  Anxiety      PAST SURGICAL HISTORY:  H/O tubal ligation  Bowel obstruction  H/O  section  H/O abdominoplasty  H/O gastric bypass  Depression      MEDICATIONS:  thiamine Injectable 100 milliGRAM(s) IV Push Once      ALLERGIES:  sulfa drugs (Anaphylaxis)      VITALS & I/Os:  Vital Signs Last 24 Hrs  T(C): 36.7 (2018 10:25), Max: 36.8 (2018 09:51)  T(F): 98.1 (2018 10:25), Max: 98.2 (2018 09:51)  HR: 72 (2018 10:25) (72 - 79)  BP: 107/81 (2018 10:25) (107/81 - 116/77)  BP(mean): --  RR: 18 (2018 10:25) (18 - 18)  SpO2: 98% (2018 10:25) (98% - 98%)    I&O's Summary      PHYSICAL EXAM:  General: No acute distress  Respiratory: Nonlabored  Cardiovascular: RRR  Abdominal: Soft, nondistended, tender in epigastrium and b/l upper quadrants  Extremities: Warm    LABS:                        12.6   4.7   )-----------( 186      ( 2018 10:49 )             36.4         139  |  105  |  14  ----------------------------<  83  4.3   |  26  |  0.76    Ca    9.2      2018 10:49    TPro  7.0  /  Alb  4.0  /  TBili  0.5  /  DBili  x   /  AST  37  /  ALT  32  /  AlkPhos  93      Lactate:   @ 10:48  0.9    PT/INR - ( 2018 10:49 )   PT: 10.7 sec;   INR: 0.98 ratio         PTT - ( 2018 10:49 )  PTT:29.5 sec          Urinalysis Basic - ( 2018 12:04 )    Color: Yellow / Appearance: Clear / S.011 / pH: x  Gluc: x / Ketone: Negative  / Bili: Negative / Urobili: Negative   Blood: x / Protein: Negative / Nitrite: Negative   Leuk Esterase: Negative / RBC: 3-5 /HPF / WBC x   Sq Epi: x / Non Sq Epi: Occasional /HPF / Bacteria: Few /HPF        IMAGING: < from: CT Abdomen and Pelvis w/ Oral Cont and w/ IV Cont (18 @ 12:31) >  NTERPRETATION:  CLINICAL INFORMATION: Abdominal pain    COMPARISON: 2018 and CT dated 2012    PROCEDURE:   CT of the Abdomen and Pelvis was performed with intravenous contrast.   Intravenous contrast: 90 ml Omnipaque 350. 10 ml discarded.  Oral contrast: positive contrast was administered.  Sagittal and coronal reformats were performed.    FINDINGS:    LOWER CHEST: 3 mm right middle lobe nodule, stable dating back to .   Clear lung bases.    LIVER: 1.1 cm hepatic cyst.  BILE DUCTS: Minimal dilatation.  GALLBLADDER: Not visualized.  SPLEEN: Within normal limits. 1.9 cm splenule.  PANCREAS: Mild fatty replacement.  ADRENALS: Within normal limits.  KIDNEYS/URETERS: 1.3 cm right renal cyst.    BLADDER: Within normal limits.  REPRODUCTIVE ORGANS: The uterus and adnexa are within normal limits.    BOWEL: Status post gastric bypass. No bowel obstruction. Appendix within   normal limits. Several colonic diverticula.  PERITONEUM: No ascites. Several prominent mesenteric lymph nodes.  VESSELS:  Within normal limits.  RETROPERITONEUM: No lymphadenopathy.    ABDOMINAL WALL: Midline anterior abdominal wall postsurgical scar.  BONES:Mild spinal degenerative changes.    IMPRESSION:     No bowel obstruction.  Etiology of abdominal pain is not elucidated.    < end of copied text >

## 2018-02-08 NOTE — ED PROVIDER NOTE - CARE PLAN
Principal Discharge DX:	Periumbilical abdominal pain Principal Discharge DX:	Periumbilical abdominal pain  Assessment and plan of treatment:	Please follow-up with your Gastroenterology this week. Continue activity and diet as tolerated. If you becomes unable to tolerate liquids by mouth, uncontrollable pain, have new or worsening symptoms, or any other concern please return to the ED.

## 2018-02-08 NOTE — ED PROVIDER NOTE - PLAN OF CARE
Please follow-up with your Gastroenterology this week. Continue activity and diet as tolerated. If you becomes unable to tolerate liquids by mouth, uncontrollable pain, have new or worsening symptoms, or any other concern please return to the ED.

## 2018-02-08 NOTE — ED PROVIDER NOTE - PROGRESS NOTE DETAILS
Dr. Manrique Note: s/o from Dr. Barron pending bariatric surgeon gaston rg for dc after po challenge

## 2018-02-08 NOTE — CONSULT NOTE ADULT - ATTENDING COMMENTS
Pt seen and examined  Agree with note which was reviewed and edited where appropriate.  D/W patient, RN, residents and Fellow  CT reviewed with Rad attending no Internal hernia seen.

## 2018-02-08 NOTE — CONSULT NOTE ADULT - ASSESSMENT
46yo F with complex surgical history now presents with abd pain a/w nausea and diarrhea. No acute pathology seen on CT and labs are all within normal limits.  - No surgical intervention  - Outpatient follow up with GI  - Dispo per ED  - D/w Bariatric fellow    ONESIMO Modi MD PGY 2   [id number]

## 2018-02-08 NOTE — ED PROVIDER NOTE - OBJECTIVE STATEMENT
47 F w/ Fibromyalgia, GERD, IBS, PTSD, gastric bypass, recent ed visit for generalized abdominal pain found to have bacteria and dc on cipro presenting with unchanged generalized abdominal pain aw loose stool.

## 2018-02-09 ENCOUNTER — APPOINTMENT (OUTPATIENT)
Dept: GASTROENTEROLOGY | Facility: CLINIC | Age: 48
End: 2018-02-09
Payer: MEDICARE

## 2018-02-09 VITALS
HEIGHT: 70 IN | DIASTOLIC BLOOD PRESSURE: 70 MMHG | BODY MASS INDEX: 41.09 KG/M2 | WEIGHT: 287 LBS | TEMPERATURE: 99.2 F | SYSTOLIC BLOOD PRESSURE: 100 MMHG

## 2018-02-09 DIAGNOSIS — R19.7 DIARRHEA, UNSPECIFIED: ICD-10-CM

## 2018-02-09 PROCEDURE — 99204 OFFICE O/P NEW MOD 45 MIN: CPT

## 2018-02-14 ENCOUNTER — APPOINTMENT (OUTPATIENT)
Dept: OTOLARYNGOLOGY | Facility: CLINIC | Age: 48
End: 2018-02-14

## 2018-02-15 LAB
BACTERIA STL CULT: ABNORMAL
C DIFF TOX B STL QL CT TISS CULT: NORMAL
DEPRECATED O AND P PREP STL: NORMAL
G LAMBLIA AG STL QL: NORMAL
LACTOFERRIN STL-MCNC: 7.56

## 2018-02-19 ENCOUNTER — APPOINTMENT (OUTPATIENT)
Dept: GASTROENTEROLOGY | Facility: CLINIC | Age: 48
End: 2018-02-19
Payer: MEDICARE

## 2018-02-19 VITALS
HEIGHT: 70 IN | TEMPERATURE: 99.3 F | SYSTOLIC BLOOD PRESSURE: 110 MMHG | DIASTOLIC BLOOD PRESSURE: 70 MMHG | BODY MASS INDEX: 40.09 KG/M2 | WEIGHT: 280 LBS

## 2018-02-19 DIAGNOSIS — K58.9 IRRITABLE BOWEL SYNDROME W/OUT DIARRHEA: ICD-10-CM

## 2018-02-19 DIAGNOSIS — R10.9 UNSPECIFIED ABDOMINAL PAIN: ICD-10-CM

## 2018-02-19 DIAGNOSIS — F41.8 OTHER SPECIFIED ANXIETY DISORDERS: ICD-10-CM

## 2018-02-19 PROCEDURE — 99214 OFFICE O/P EST MOD 30 MIN: CPT

## 2018-02-19 RX ORDER — PREDNISONE 20 MG/1
20 TABLET ORAL
Qty: 10 | Refills: 0 | Status: DISCONTINUED | COMMUNITY
Start: 2017-12-22

## 2018-02-19 RX ORDER — CEPHALEXIN 500 MG/1
500 CAPSULE ORAL
Qty: 14 | Refills: 0 | Status: COMPLETED | COMMUNITY
Start: 2018-01-29

## 2018-03-21 ENCOUNTER — APPOINTMENT (OUTPATIENT)
Dept: ORTHOPEDIC SURGERY | Facility: CLINIC | Age: 48
End: 2018-03-21

## 2018-03-28 ENCOUNTER — APPOINTMENT (OUTPATIENT)
Dept: GASTROENTEROLOGY | Facility: CLINIC | Age: 48
End: 2018-03-28

## 2018-05-06 NOTE — ED PROVIDER NOTE - HEAD, MLM
Internal Medicine Daily Progress Note  Glenis Chapman   [id number]  85 year old female    Date: 5/6/2018     Chief complaint: Hypotension    S: Dizziness has resolved . patient does not have any cough or shortness of breath nausea or vomiting        Problem list:     Dizziness, lightheadedness  Hypotension, probably secondary to dehydration  Non-small cell Lung cancer, left upper lobe  Possible pneumonia  Recent influenza  COPD  Hypothyroidism  Diabetes mellitus type 2  Anxiety  Hypoalbuminemia    Vital Last Value 24 Hour Range   Temperature 98.3 °F (36.8 °C) Temp  Min: 98.3 °F (36.8 °C)  Max: 98.6 °F (37 °C)   Pulse 71 Pulse  Min: 71  Max: 82   Respiratory 20 Resp  Min: 20  Max: 20   Blood Pressure 144/68 BP  Min: 128/58  Max: 144/68   Pulse Oximetry 95 % SpO2  Min: 95 %  Max: 95 %   CVP   No Data Recorded     Vital Today Admit   Weight 73.5 kg Weight: 71.4 kg   Height N/A Height: 5' 2\" (157.5 cm)   BMI N/A BMI (Calculated): 28.85       Weight over the past 48 Hours:  Patient Vitals for the past 48 hrs:   Weight   05/05/18 0500 73.5 kg   05/06/18 0519 73.5 kg        Intake/Output:  Last Stool Occurrence: 0 (05/04/18 0543)  No intake/output data recorded.  I/O last 3 completed shifts:  In: -   Out: 2500 [Urine:2500]  Weight change: 0 kg        Medications/Infusions:  Scheduled:   • furosemide  20 mg Oral Daily   • valsartan  40 mg Oral Daily   • sodium chloride (PF)  2 mL Injection Once   • sodium chloride  1,000 mL Intravenous Once   • aspirin  81 mg Oral Daily   • levothyroxine  100 mcg Oral Daily   • simvastatin  20 mg Oral Nightly   • fluticasone-salmeterol  1 puff Inhalation BID Resp   • albuterol-ipratropium 2.5 mg/0.5 mg  3 mL Nebulization 4x Daily Resp   • gabapentin  100 mg Oral BID   • insulin glargine  10 Units Subcutaneous 2 times per day   • enoxaparin (LOVENOX) injection  30 mg Subcutaneous Q Evening   • insulin lispro   Subcutaneous 4x Daily AC & HS       Review of systems: 10 systems  were reviewed by me,  pertinent positives mentioned above, other systems are negative    Physical Exam  GENERAL : Alert. No Respiratory Distress  HEENT: Sclerae is anicteric. Oral mucosa is moist. Pupils are equally round and reactive to light. Extraocular movements are intact, oropharynx clear .   NECK: No palpable nodes or mass. No bruits heard. No thyromegaly  LYMPHATICS: No cervical or inguinal lymphadenopathy  CARDIAC: S1, S2, regular. No S3, S4, pedal pulses well felt    LUNGS: CTA bilaterally No wheezes, no accessory muscles active .   ABDOMEN: soft, non-tender, non-distended, bowel sounds present, no hepatosplenomegaly    SKIN: No evidence of rash or excoriations.   MUSCULOSKELETAL: No peda edema, clubbing or cyanosis. Range of motion normal in all four extremities   NEURO: The patient is alert, oriented, cranial nerves intact , no motor deficits  sensations intact to touch,deep tendon  Reflexes present   PSYCH: mood and affect are normal       Recent Labs  Lab 05/06/18  0530 05/05/18  0531 05/04/18  0554 05/03/18  1054   WBC  --   --  10.6 11.3*   HCT  --   --  29.4* 32.0*   HGB  --   --  9.2* 10.1*   PLT  --   --  206 212   SODIUM 142 139 141 138   POTASSIUM 4.2 4.4 4.4 4.7   CHLORIDE 105 104 106 101   CO2 30 31 29 32   CALCIUM 9.7 9.8 9.3 9.1   GLUCOSE 116* 108* 97 106*   BUN 15 17 20 29*   CREATININE 1.00* 1.00* 1.06* 1.29*   AST  --   --  11 13   GPT  --   --  17 16   ALKPT  --   --  71 82   BILIRUBIN  --   --  0.4 0.5   ALBUMIN  --   --  2.6* 2.9*   GFRNA 52 52 49 38         Imaging: No results found.        DVT/VTE Prophylaxis:  VTE Pharmacologic Prophylaxis:yes  VTE Mechanical Prophylaxis: yes    Assessment:   Dizziness, lightheadedness  Hypotension, probably secondary to dehydration  Non-small cell Lung cancer, left upper lobe  Possible pneumonia  Recent influenza  COPD  Hypothyroidism  Diabetes mellitus type 2  Anxiety  Hypoalbuminemia      Plan:   Patient symptomatically better  Pro calcitonin normal, antibiotics  discontinued  Dizziness has resolved, blood pressure stable  Resume Lasix and ARB ,  blood pressure and renal function are stable  Continue levothyroxin  PT, OT as tolerated     discharge home today  Discharge summary dictated     Discharge    VIDAL  5/7/2018  Barriers:none  Destination: home        Felipe iFelds MD  5/6/2018     Head is atraumatic. Head shape is symmetrical.

## 2018-05-25 ENCOUNTER — TRANSCRIPTION ENCOUNTER (OUTPATIENT)
Age: 48
End: 2018-05-25

## 2018-05-29 ENCOUNTER — EMERGENCY (EMERGENCY)
Facility: HOSPITAL | Age: 48
LOS: 1 days | Discharge: HOME | End: 2018-05-29
Attending: PERSONAL EMERGENCY RESPONSE ATTENDANT
Payer: COMMERCIAL

## 2018-05-29 ENCOUNTER — APPOINTMENT (OUTPATIENT)
Dept: OTOLARYNGOLOGY | Facility: CLINIC | Age: 48
End: 2018-05-29

## 2018-05-29 VITALS
TEMPERATURE: 99 F | RESPIRATION RATE: 17 BRPM | HEART RATE: 80 BPM | DIASTOLIC BLOOD PRESSURE: 72 MMHG | OXYGEN SATURATION: 97 % | WEIGHT: 289.91 LBS | HEIGHT: 70 IN | SYSTOLIC BLOOD PRESSURE: 162 MMHG

## 2018-05-29 DIAGNOSIS — H81.22 VESTIBULAR NEURONITIS, LEFT EAR: ICD-10-CM

## 2018-05-29 DIAGNOSIS — Z98.51 TUBAL LIGATION STATUS: Chronic | ICD-10-CM

## 2018-05-29 DIAGNOSIS — Z98.89 OTHER SPECIFIED POSTPROCEDURAL STATES: Chronic | ICD-10-CM

## 2018-05-29 DIAGNOSIS — R42 DIZZINESS AND GIDDINESS: ICD-10-CM

## 2018-05-29 DIAGNOSIS — K56.60 UNSPECIFIED INTESTINAL OBSTRUCTION: Chronic | ICD-10-CM

## 2018-05-29 LAB
ALBUMIN SERPL ELPH-MCNC: 4.2 G/DL — SIGNIFICANT CHANGE UP (ref 3.3–5)
ALP SERPL-CCNC: 81 U/L — SIGNIFICANT CHANGE UP (ref 40–120)
ALT FLD-CCNC: 14 U/L — SIGNIFICANT CHANGE UP (ref 10–45)
ANION GAP SERPL CALC-SCNC: 11 MMOL/L — SIGNIFICANT CHANGE UP (ref 5–17)
AST SERPL-CCNC: 19 U/L — SIGNIFICANT CHANGE UP (ref 10–40)
BASE EXCESS BLDV CALC-SCNC: 2.9 MMOL/L — HIGH (ref -2–2)
BASOPHILS # BLD AUTO: 0 K/UL — SIGNIFICANT CHANGE UP (ref 0–0.2)
BASOPHILS NFR BLD AUTO: 0.7 % — SIGNIFICANT CHANGE UP (ref 0–2)
BILIRUB SERPL-MCNC: 0.4 MG/DL — SIGNIFICANT CHANGE UP (ref 0.2–1.2)
BUN SERPL-MCNC: 16 MG/DL — SIGNIFICANT CHANGE UP (ref 7–23)
CA-I SERPL-SCNC: 1.2 MMOL/L — SIGNIFICANT CHANGE UP (ref 1.12–1.3)
CALCIUM SERPL-MCNC: 9.7 MG/DL — SIGNIFICANT CHANGE UP (ref 8.4–10.5)
CHLORIDE BLDV-SCNC: 108 MMOL/L — SIGNIFICANT CHANGE UP (ref 96–108)
CHLORIDE SERPL-SCNC: 102 MMOL/L — SIGNIFICANT CHANGE UP (ref 96–108)
CO2 BLDV-SCNC: 30 MMOL/L — SIGNIFICANT CHANGE UP (ref 22–30)
CO2 SERPL-SCNC: 27 MMOL/L — SIGNIFICANT CHANGE UP (ref 22–31)
CREAT SERPL-MCNC: 0.77 MG/DL — SIGNIFICANT CHANGE UP (ref 0.5–1.3)
EOSINOPHIL # BLD AUTO: 0.1 K/UL — SIGNIFICANT CHANGE UP (ref 0–0.5)
EOSINOPHIL NFR BLD AUTO: 1.2 % — SIGNIFICANT CHANGE UP (ref 0–6)
GAS PNL BLDV: 137 MMOL/L — SIGNIFICANT CHANGE UP (ref 136–145)
GAS PNL BLDV: SIGNIFICANT CHANGE UP
GLUCOSE BLDV-MCNC: 95 MG/DL — SIGNIFICANT CHANGE UP (ref 70–99)
GLUCOSE SERPL-MCNC: 94 MG/DL — SIGNIFICANT CHANGE UP (ref 70–99)
HCO3 BLDV-SCNC: 28 MMOL/L — SIGNIFICANT CHANGE UP (ref 21–29)
HCT VFR BLD CALC: 37.4 % — SIGNIFICANT CHANGE UP (ref 34.5–45)
HCT VFR BLDA CALC: 39 % — SIGNIFICANT CHANGE UP (ref 39–50)
HGB BLD CALC-MCNC: 12.6 G/DL — SIGNIFICANT CHANGE UP (ref 11.5–15.5)
HGB BLD-MCNC: 12.8 G/DL — SIGNIFICANT CHANGE UP (ref 11.5–15.5)
LACTATE BLDV-MCNC: 1.3 MMOL/L — SIGNIFICANT CHANGE UP (ref 0.7–2)
LYMPHOCYTES # BLD AUTO: 2.8 K/UL — SIGNIFICANT CHANGE UP (ref 1–3.3)
LYMPHOCYTES # BLD AUTO: 44.2 % — HIGH (ref 13–44)
MCHC RBC-ENTMCNC: 31.1 PG — SIGNIFICANT CHANGE UP (ref 27–34)
MCHC RBC-ENTMCNC: 34.1 GM/DL — SIGNIFICANT CHANGE UP (ref 32–36)
MCV RBC AUTO: 91.3 FL — SIGNIFICANT CHANGE UP (ref 80–100)
MONOCYTES # BLD AUTO: 0.4 K/UL — SIGNIFICANT CHANGE UP (ref 0–0.9)
MONOCYTES NFR BLD AUTO: 6.8 % — SIGNIFICANT CHANGE UP (ref 2–14)
NEUTROPHILS # BLD AUTO: 3 K/UL — SIGNIFICANT CHANGE UP (ref 1.8–7.4)
NEUTROPHILS NFR BLD AUTO: 47 % — SIGNIFICANT CHANGE UP (ref 43–77)
OTHER CELLS CSF MANUAL: 12 ML/DL — LOW (ref 18–22)
PCO2 BLDV: 48 MMHG — SIGNIFICANT CHANGE UP (ref 35–50)
PH BLDV: 7.39 — SIGNIFICANT CHANGE UP (ref 7.35–7.45)
PLATELET # BLD AUTO: 210 K/UL — SIGNIFICANT CHANGE UP (ref 150–400)
PO2 BLDV: 36 MMHG — SIGNIFICANT CHANGE UP (ref 25–45)
POTASSIUM BLDV-SCNC: 3.9 MMOL/L — SIGNIFICANT CHANGE UP (ref 3.5–5)
POTASSIUM SERPL-MCNC: 4.2 MMOL/L — SIGNIFICANT CHANGE UP (ref 3.5–5.3)
POTASSIUM SERPL-SCNC: 4.2 MMOL/L — SIGNIFICANT CHANGE UP (ref 3.5–5.3)
PROT SERPL-MCNC: 7.2 G/DL — SIGNIFICANT CHANGE UP (ref 6–8.3)
RBC # BLD: 4.1 M/UL — SIGNIFICANT CHANGE UP (ref 3.8–5.2)
RBC # FLD: 11.9 % — SIGNIFICANT CHANGE UP (ref 10.3–14.5)
SAO2 % BLDV: 69 % — SIGNIFICANT CHANGE UP (ref 67–88)
SODIUM SERPL-SCNC: 140 MMOL/L — SIGNIFICANT CHANGE UP (ref 135–145)
WBC # BLD: 6.4 K/UL — SIGNIFICANT CHANGE UP (ref 3.8–10.5)
WBC # FLD AUTO: 6.4 K/UL — SIGNIFICANT CHANGE UP (ref 3.8–10.5)

## 2018-05-29 PROCEDURE — 99284 EMERGENCY DEPT VISIT MOD MDM: CPT | Mod: 25

## 2018-05-29 PROCEDURE — 83605 ASSAY OF LACTIC ACID: CPT

## 2018-05-29 PROCEDURE — 84295 ASSAY OF SERUM SODIUM: CPT

## 2018-05-29 PROCEDURE — 82330 ASSAY OF CALCIUM: CPT

## 2018-05-29 PROCEDURE — 84132 ASSAY OF SERUM POTASSIUM: CPT

## 2018-05-29 PROCEDURE — 96374 THER/PROPH/DIAG INJ IV PUSH: CPT

## 2018-05-29 PROCEDURE — 99284 EMERGENCY DEPT VISIT MOD MDM: CPT

## 2018-05-29 PROCEDURE — 85027 COMPLETE CBC AUTOMATED: CPT

## 2018-05-29 PROCEDURE — 80053 COMPREHEN METABOLIC PANEL: CPT

## 2018-05-29 PROCEDURE — 82803 BLOOD GASES ANY COMBINATION: CPT

## 2018-05-29 PROCEDURE — 85014 HEMATOCRIT: CPT

## 2018-05-29 PROCEDURE — 82435 ASSAY OF BLOOD CHLORIDE: CPT

## 2018-05-29 PROCEDURE — 82947 ASSAY GLUCOSE BLOOD QUANT: CPT

## 2018-05-29 RX ORDER — MECLIZINE HCL 12.5 MG
1 TABLET ORAL
Qty: 9 | Refills: 0
Start: 2018-05-29 | End: 2018-05-31

## 2018-05-29 RX ORDER — ONDANSETRON 8 MG/1
1 TABLET, FILM COATED ORAL
Qty: 9 | Refills: 0
Start: 2018-05-29 | End: 2018-05-31

## 2018-05-29 RX ORDER — CIPROFLOXACIN AND DEXAMETHASONE 3; 1 MG/ML; MG/ML
4 SUSPENSION/ DROPS AURICULAR (OTIC) ONCE
Qty: 0 | Refills: 0 | Status: COMPLETED | OUTPATIENT
Start: 2018-05-29 | End: 2018-05-29

## 2018-05-29 RX ORDER — MECLIZINE HCL 12.5 MG
25 TABLET ORAL ONCE
Qty: 0 | Refills: 0 | Status: COMPLETED | OUTPATIENT
Start: 2018-05-29 | End: 2018-05-29

## 2018-05-29 RX ORDER — METOCLOPRAMIDE HCL 10 MG
10 TABLET ORAL ONCE
Qty: 0 | Refills: 0 | Status: COMPLETED | OUTPATIENT
Start: 2018-05-29 | End: 2018-05-29

## 2018-05-29 RX ORDER — SODIUM CHLORIDE 9 MG/ML
1000 INJECTION INTRAMUSCULAR; INTRAVENOUS; SUBCUTANEOUS ONCE
Qty: 0 | Refills: 0 | Status: COMPLETED | OUTPATIENT
Start: 2018-05-29 | End: 2018-05-29

## 2018-05-29 RX ADMIN — Medication 60 MILLIGRAM(S): at 13:15

## 2018-05-29 RX ADMIN — Medication 10 MILLIGRAM(S): at 13:06

## 2018-05-29 RX ADMIN — Medication 25 MILLIGRAM(S): at 13:11

## 2018-05-29 RX ADMIN — CIPROFLOXACIN AND DEXAMETHASONE 4 DROP(S): 3; 1 SUSPENSION/ DROPS AURICULAR (OTIC) at 14:10

## 2018-05-29 RX ADMIN — SODIUM CHLORIDE 2000 MILLILITER(S): 9 INJECTION INTRAMUSCULAR; INTRAVENOUS; SUBCUTANEOUS at 12:40

## 2018-05-29 NOTE — ED PROVIDER NOTE - OBJECTIVE STATEMENT
Attending MD Mosquera.  Pt is a 47 yr old female with pmhx of anxiety, depression, fibromyalgia, chronic back pain knee pain who presents with development of cough with CP, ear ache and rhinorrhea that began last week.  She progressed to L ear pain and now R ear pain as well.  L ear pain remains worse than R ear.  Debris in bilateral canals with some impacted sputum.  L ear with blood in canal but TM visible beyond canal and appears pearly. Pt seen in urgent care last week and started on amoxicillin but has not improved. She went to Dr. Alber Keita' office today for vertiginous sxs and 'severe ear pain'.  She was told to come 'straight to ED'.  Pt endorses chills but denies objective fevers.  Neuro intact.  Pt deneis chronic opiate use/ASA.  Endorses some loss of hearing in L ear.  No neck pain/stiffness but avoids turning head because this makes her dizzy.

## 2018-05-29 NOTE — ED PROVIDER NOTE - CARE PLAN
Principal Discharge DX:	Vertigo Principal Discharge DX:	Vertigo  Assessment and plan of treatment:	1.  Take zofran 4 mg ODT for nausea preventing the taking of other medication.  You do not need to take this medication if you are not nauseas.  2.  Take prednisone 20 mg orally twice daily to reduce inflammation that is causing your symptoms.  3.  Take meclizine 25 mg orally every 8 hours as needed for dizziness/room spinning sensation.  4.  Administer 4 drops of ciprodex to both ears 2 times daily for 7 days.  Secondary Diagnosis:	Vestibular neuronitis of left ear

## 2018-05-29 NOTE — ED ADULT NURSE NOTE - CHPI ED SYMPTOMS NEG
no dizziness/no nausea/no numbness/no decreased eating/drinking/no weakness/no chills/no fever/no vomiting/no tingling

## 2018-05-29 NOTE — ED ADULT NURSE NOTE - OBJECTIVE STATEMENT
Pt is a 46 yo F who came to the ED amb c/o beth earache x 1 week. States left is more painful and she had blood coming out of left ear today. A/O x3, ENT at bedside shortly after arrival in ED. Pt A/O x3, NAD, denies dizziness/lightheadedness, no ha/vision changes.

## 2018-05-29 NOTE — ED PROVIDER NOTE - PROGRESS NOTE DETAILS
Attending MD Mosquera.  Dr. Mane in house presently and has seen pt and feels URI with floxin/ciprodex to clear out debris, steroids, meclizine, Zofran for home. Attending MD Mosquera.  Dr. Mane in house presently and has seen pt and feels URI with floxin/ciprodex to clear out debris, steroids, meclizine, Zofran for home.  Pt ambulating in ED without difficulty. Attending MD Mosquera.  Pt feeling improved.  Able to ambulate.  Remains neurologically afocal.  Pt counseled to call ENT for follow-up in their clinic in 2-3 days.  Rx for prednisone, meclizine, zofran and pt dosed ciprodex and given bottle for additional use.  She feels comfortable with discharge plan for peripheral vertigo.  Return to ED for any acutely new/worsened sxs including worsened symptoms despite medication, development of severe headache, focal neuro deficits.

## 2018-05-29 NOTE — ED PROVIDER NOTE - PHYSICAL EXAMINATION
** R ear with impacted cerumen, L ear with dried blood in canal, TM visible beyond cerumen and appears pearly.  Limited hearing from L ear.  No erythema/swelling surrounding either ear.

## 2018-05-29 NOTE — ED PROVIDER NOTE - PLAN OF CARE
1.  Take zofran 4 mg ODT for nausea preventing the taking of other medication.  You do not need to take this medication if you are not nauseas.  2.  Take prednisone 20 mg orally twice daily to reduce inflammation that is causing your symptoms.  3.  Take meclizine 25 mg orally every 8 hours as needed for dizziness/room spinning sensation.  4.  Administer 4 drops of ciprodex to both ears 2 times daily for 7 days.

## 2018-05-29 NOTE — CONSULT NOTE ADULT - ATTENDING COMMENTS
suspect URI with left ear effusion and possible vestibular neuronitis although her symptoms are not as severe as would expect with vestibular neuronitis and no nystagmus appreciated on exam. recommend f/u with me in office tomorrow for audiogram to confirm that hearing loss is in fact conductive from the effusion noted on exam. agree with steroids and supportive meds for symptoms.

## 2018-05-29 NOTE — CONSULT NOTE ADULT - PROBLEM SELECTOR RECOMMENDATION 9
Ciprodex ear drops 4 drops twice a day  Prednisone to decrease inflammation  Hearing test as outpt.  F/U with Dr. Mane as outpt. this week (appt. set for tomorrow Wednesday at 11:00 AM)

## 2018-05-29 NOTE — CONSULT NOTE ADULT - SUBJECTIVE AND OBJECTIVE BOX
CC:Vertigo , Left ear pain and decreased hearing    HPI:   · HPI Objective Statement: .  Pt is a 47 yr old female with pmhx of anxiety, depression, fibromyalgia, chronic back pain knee pain who presents with development of cough with CP, ear ache and rhinorrhea that began last week.  She progressed to L ear pain and now R ear pain as well.Pt. states  L ear pain remains worse than R ear with decreased hearing x 1 week..  . Pt seen in urgent care last week and started on amoxicillin but has not improved. She went to Dr. Alber Keita' office today for vertiginous sxs and 'severe ear pain'.  Pt. also complained of maked dizziness with room spinning.  She was told to come 'straight to ED'.  Pt endorses chills but denies objective fevers.Pt. c/o cough, sore throat and has symptoms suggestive of upper respiratory infection.  Neuro intact.  Pt deneis chronic opiate use/ASA.  Endorses some loss of hearing in L ear.  No neck pain/stiffness but avoids turning head because this makes her dizzy.	      PAST MEDICAL & SURGICAL HISTORY:  Chronic lower back pain  Abnormal uterine bleeding  Anxiety  GERD (gastroesophageal reflux disease)  Fibromyalgia  Anemia  IBS (irritable bowel syndrome)  Post traumatic stress disorder  Depression  H/O tubal ligation: 2014, s/p ovarian ablation  Bowel obstruction:   H/O  section: X3 (,,)  H/O abdominoplasty  H/O gastric bypass    Allergies    sulfa drugs (Anaphylaxis)    Intolerances      MEDICATIONS  (STANDING):    MEDICATIONS  (PRN):    Social History: denies hx of smoking    ROS: ENT, GI, , CV, Pulm, Neuro, Psych, MS, Heme, Endo, Constitional; all negative except as noted in HPI    Vital Signs Last 24 Hrs  T(C): 37.1 (29 May 2018 10:55), Max: 37.1 (29 May 2018 10:55)  T(F): 98.7 (29 May 2018 10:55), Max: 98.7 (29 May 2018 10:55)  HR: 80 (29 May 2018 10:55) (80 - 80)  BP: 162/72 (29 May 2018 10:55) (162/72 - 162/72)  BP(mean): --  RR: 17 (29 May 2018 10:55) (17 - 17)  SpO2: 97% (29 May 2018 10:55) (97% - 97%)                          12.8   6.4   )-----------( 210      ( 29 May 2018 12:58 )             37.4        140  |  102  |  16  ----------------------------<  94  4.2   |  27  |  0.77    Ca    9.7      29 May 2018 12:58    TPro  7.2  /  Alb  4.2  /  TBili  0.4  /  DBili  x   /  AST  19  /  ALT  14  /  AlkPhos  81         PHYSICAL EXAM:  Gen: NAD, well-developed  Head: Normocephalic, Atraumatic  Face: no edema/erythema/fluctuance, parotid glands soft without mass  Eyes: PERRL,, no scleral injection  Ears: Right - ear canal clear with small amt. of cerumen, TM intact without effusion, no erythema or swelling around mastoid, no tenderness            Left - dry blood noted in ear canal with left sided effusion , no erythema or swelling around mastoid area, no tenderness  Nose: Nares bilaterally patent, no discharge  Mouth: Mucosa moist, tongue/uvula midline, oropharynx clear  Neck: Flat, supple, no lymphadenopathy, trachea midline, no masses  Resp: breathing easily, no stridor  CV: no peripheral edema/cyanosis CC:Vertigo , Left ear pain and decreased hearing    HPI:   Pt is a 47 yr old female with pmhx of anxiety, depression, fibromyalgia, chronic back pain knee pain who presents with development of cough with CP, ear ache and rhinorrhea that began last week.  She progressed to L ear pain and now R ear pain as well.Pt. states  L ear pain remains worse than R ear with decreased hearing x 1 week..  . Pt seen in urgent care last week and started on amoxicillin but has not improved. She called Dr. Alber Keita' office today for vertiginous sxs and 'severe ear pain' and was told to come to ED.  Pt. also complained of maked dizziness with room spinning.  Pt endorses chills but denies objective fevers.Pt. c/o cough, sore throat and has symptoms suggestive of upper respiratory infection.  Neuro intact.  Pt deneis chronic opiate use/ASA.  Endorses some loss of hearing in L ear.  No neck pain/stiffness but avoids turning head because this makes her dizzy. Said she noticed blood from left ear but denies using cotton swabs or inserting anything into canal. Not using any drops. Was unable to take amoxicillin today bc was making her tired and stomach upset.	      PAST MEDICAL & SURGICAL HISTORY:  Chronic lower back pain  Abnormal uterine bleeding  Anxiety  GERD (gastroesophageal reflux disease)  Fibromyalgia  Anemia  IBS (irritable bowel syndrome)  Post traumatic stress disorder  Depression  H/O tubal ligation: , s/p ovarian ablation  Bowel obstruction:   H/O  section: X3 (,,)  H/O abdominoplasty  H/O gastric bypass    Allergies    sulfa drugs (Anaphylaxis)    Intolerances      MEDICATIONS  (STANDING):    MEDICATIONS  (PRN):    Social History: denies hx of smoking    ROS: ENT, GI, , CV, Pulm, Neuro, Psych, MS, Heme, Endo, Constitional; all negative except as noted in HPI    Vital Signs Last 24 Hrs  T(C): 37.1 (29 May 2018 10:55), Max: 37.1 (29 May 2018 10:55)  T(F): 98.7 (29 May 2018 10:55), Max: 98.7 (29 May 2018 10:55)  HR: 80 (29 May 2018 10:55) (80 - 80)  BP: 162/72 (29 May 2018 10:55) (162/72 - 162/72)  BP(mean): --  RR: 17 (29 May 2018 10:55) (17 - 17)  SpO2: 97% (29 May 2018 10:55) (97% - 97%)                          12.8   6.4   )-----------( 210      ( 29 May 2018 12:58 )             37.4        140  |  102  |  16  ----------------------------<  94  4.2   |  27  |  0.77    Ca    9.7      29 May 2018 12:58    TPro  7.2  /  Alb  4.2  /  TBili  0.4  /  DBili  x   /  AST  19  /  ALT  14  /  AlkPhos  81         PHYSICAL EXAM:  Gen: NAD, well-developed  Head: Normocephalic, Atraumatic  Face: no edema/erythema/fluctuance, parotid glands soft without mass  Eyes: PERRL,, no scleral injection  Ears: Right - ear canal clear with small amt. of cerumen, TM intact without effusion, no erythema or swelling around mastoid, no tenderness            Left - dry blood/cerumen noted in ear canal with left sided effusion , no erythema or swelling around mastoid area, no tenderness  Nose: Nares bilaterally patent, no discharge  Mouth: Mucosa moist, tongue/uvula midline, oropharynx clear  Neck: Flat, supple, no lymphadenopathy, trachea midline, no masses  Resp: breathing easily, no stridor  CV: no peripheral edema/cyanosis

## 2018-05-29 NOTE — CONSULT NOTE ADULT - ASSESSMENT
47 year old woman with extensive medical history came to ER with c/o left ear pain,dizziness, decreased hearing loss and symptoms of upper respiratory infection times one week. Pt. was seen by urgent care one week ago and given amoxiciillin for questionnable ear infection, Pt. states symptoms have not improved and the hearing loss and pain has increased in left ear.

## 2018-05-31 ENCOUNTER — APPOINTMENT (OUTPATIENT)
Dept: OTOLARYNGOLOGY | Facility: CLINIC | Age: 48
End: 2018-05-31
Payer: MEDICARE

## 2018-05-31 VITALS
WEIGHT: 280 LBS | BODY MASS INDEX: 40.09 KG/M2 | HEIGHT: 70 IN | HEART RATE: 88 BPM | SYSTOLIC BLOOD PRESSURE: 111 MMHG | DIASTOLIC BLOOD PRESSURE: 73 MMHG

## 2018-05-31 DIAGNOSIS — S00.412A ABRASION OF LEFT EAR, INITIAL ENCOUNTER: ICD-10-CM

## 2018-05-31 DIAGNOSIS — H93.13 TINNITUS, BILATERAL: ICD-10-CM

## 2018-05-31 PROCEDURE — 99214 OFFICE O/P EST MOD 30 MIN: CPT | Mod: 25

## 2018-05-31 PROCEDURE — 92557 COMPREHENSIVE HEARING TEST: CPT

## 2018-05-31 PROCEDURE — 92567 TYMPANOMETRY: CPT

## 2018-05-31 PROCEDURE — G0268 REMOVAL OF IMPACTED WAX MD: CPT

## 2018-05-31 RX ORDER — TRAMADOL HYDROCHLORIDE AND ACETAMINOPHEN 37.5; 325 MG/1; MG/1
37.5-325 TABLET, FILM COATED ORAL
Qty: 60 | Refills: 0 | Status: COMPLETED | COMMUNITY
Start: 2018-03-09

## 2018-06-04 ENCOUNTER — MOBILE ON CALL (OUTPATIENT)
Age: 48
End: 2018-06-04

## 2018-07-10 ENCOUNTER — APPOINTMENT (OUTPATIENT)
Dept: OTOLARYNGOLOGY | Facility: CLINIC | Age: 48
End: 2018-07-10

## 2018-09-04 ENCOUNTER — APPOINTMENT (OUTPATIENT)
Dept: OBGYN | Facility: CLINIC | Age: 48
End: 2018-09-04
Payer: MEDICARE

## 2018-09-04 VITALS — DIASTOLIC BLOOD PRESSURE: 62 MMHG | SYSTOLIC BLOOD PRESSURE: 101 MMHG | HEART RATE: 78 BPM | HEIGHT: 70 IN

## 2018-09-04 DIAGNOSIS — Z87.42 PERSONAL HISTORY OF OTHER DISEASES OF THE FEMALE GENITAL TRACT: ICD-10-CM

## 2018-09-04 PROCEDURE — 99396 PREV VISIT EST AGE 40-64: CPT

## 2018-09-04 RX ORDER — CIPROFLOXACIN HYDROCHLORIDE 500 MG/1
500 TABLET, FILM COATED ORAL
Qty: 10 | Refills: 0 | Status: COMPLETED | COMMUNITY
Start: 2018-02-15 | End: 2018-09-04

## 2018-09-05 LAB — HPV HIGH+LOW RISK DNA PNL CVX: NOT DETECTED

## 2018-09-07 LAB — CYTOLOGY CVX/VAG DOC THIN PREP: NORMAL

## 2018-09-12 ENCOUNTER — APPOINTMENT (OUTPATIENT)
Dept: BARIATRICS | Facility: CLINIC | Age: 48
End: 2018-09-12

## 2018-11-08 ENCOUNTER — APPOINTMENT (OUTPATIENT)
Dept: OTOLARYNGOLOGY | Facility: CLINIC | Age: 48
End: 2018-11-08

## 2019-02-22 ENCOUNTER — APPOINTMENT (OUTPATIENT)
Dept: OTOLARYNGOLOGY | Facility: CLINIC | Age: 49
End: 2019-02-22
Payer: MEDICARE

## 2019-02-22 VITALS
BODY MASS INDEX: 40.09 KG/M2 | SYSTOLIC BLOOD PRESSURE: 117 MMHG | DIASTOLIC BLOOD PRESSURE: 81 MMHG | HEIGHT: 70 IN | WEIGHT: 280 LBS | HEART RATE: 85 BPM

## 2019-02-22 DIAGNOSIS — H74.8X2 OTHER SPECIFIED DISORDERS OF LEFT MIDDLE EAR AND MASTOID: ICD-10-CM

## 2019-02-22 DIAGNOSIS — H65.92 UNSPECIFIED NONSUPPURATIVE OTITIS MEDIA, LEFT EAR: ICD-10-CM

## 2019-02-22 DIAGNOSIS — H90.0 CONDUCTIVE HEARING LOSS, BILATERAL: ICD-10-CM

## 2019-02-22 DIAGNOSIS — H61.21 IMPACTED CERUMEN, RIGHT EAR: ICD-10-CM

## 2019-02-22 PROCEDURE — 99214 OFFICE O/P EST MOD 30 MIN: CPT | Mod: 25

## 2019-02-22 PROCEDURE — 31231 NASAL ENDOSCOPY DX: CPT

## 2019-02-22 PROCEDURE — 92557 COMPREHENSIVE HEARING TEST: CPT

## 2019-02-22 PROCEDURE — 92567 TYMPANOMETRY: CPT

## 2019-02-22 NOTE — HISTORY OF PRESENT ILLNESS
[de-identified] : S/p Rt. Mid septum cauterization with Silver nitrate on 9/26/17 for epistaxis.\par Notes having some blood on tissue when blowing her nose.  NO active bleeding.  Not using moisturization.  No nasal congestion.\par Notes 4 weeks ago had the Flu, since then has b/l ear fullness and pressure, L > R, with increased discomfort with nose blowing.  Pos decreased hearing, intermittent tinnitus.  No D/C.\par Hx of Vertigo that has resolved.  \par Vestibular testing - 21/6/17 - non-significant.

## 2019-02-22 NOTE — CONSULT LETTER
[Josh Mclaughlin MD,FACS] : Josh Mclaughlin MD,FACS [Director of Otolaryngology at [facility]] : Director of Otolaryngology at [facility] [] :

## 2019-02-22 NOTE — PHYSICAL EXAM
[Normal] : mucosa is normal [Midline] : trachea located in midline position [de-identified] : right with cerumen.  Left clear. [de-identified] : Right clear.  Left with smudge of blood on TM and tyrone effusion

## 2019-02-22 NOTE — PHYSICAL EXAM
[Normal] : mucosa is normal [Midline] : trachea located in midline position [de-identified] : right with cerumen.  Left clear. [de-identified] : Right clear.  Left with smudge of blood on TM and tyrone effusion

## 2019-02-22 NOTE — DATA REVIEWED
[No studies available for review at this time] : No studies available for review at this time [de-identified] : audio with left CHL, type B tymp on left

## 2019-02-22 NOTE — PROCEDURE
[FreeTextEntry6] : Afrin and lidocaine were topically sprayed. Flexible scope #2 was used. Right nasal passage with normal inferior, middle and superior turbinates. Nasal passage patent with clear middle meatus and sphenoethmoid recess. Left nasal passage with normal inferior, middle and superior turbinates. Nasal passage was patent with clear middle meatus and sphenoethmoid recess. No mucopurulence or polyps appreciated. Nasopharynx clear. Bilateral ET orifices clear.

## 2019-02-22 NOTE — REVIEW OF SYSTEMS
[As Noted in HPI] : as noted in HPI [Nasal Congestion] : nasal congestion [Sinus Pain] : sinus pain [Sinus Pressure] : sinus pressure [Negative] : Heme/Lymph

## 2019-02-22 NOTE — HISTORY OF PRESENT ILLNESS
[de-identified] : S/p Rt. Mid septum cauterization with Silver nitrate on 9/26/17 for epistaxis.\par Notes having some blood on tissue when blowing her nose.  NO active bleeding.  Not using moisturization.  No nasal congestion.\par Notes 4 weeks ago had the Flu, since then has b/l ear fullness and pressure, L > R, with increased discomfort with nose blowing.  Pos decreased hearing, intermittent tinnitus.  No D/C.\par Hx of Vertigo that has resolved.  \par Vestibular testing - 21/6/17 - non-significant.

## 2019-02-22 NOTE — CONSULT LETTER
[Josh Mclaughlin MD,FACS] : Josh Mclaughlin MD,FACS [Director of Otolaryngology at [facility]] : Director of Otolaryngology at [facility] [] :

## 2019-03-22 ENCOUNTER — APPOINTMENT (OUTPATIENT)
Dept: OTOLARYNGOLOGY | Facility: CLINIC | Age: 49
End: 2019-03-22

## 2019-03-30 ENCOUNTER — EMERGENCY (EMERGENCY)
Facility: HOSPITAL | Age: 49
LOS: 0 days | Discharge: HOME | End: 2019-03-30
Payer: COMMERCIAL

## 2019-03-30 VITALS
WEIGHT: 279.99 LBS | SYSTOLIC BLOOD PRESSURE: 137 MMHG | OXYGEN SATURATION: 98 % | TEMPERATURE: 98 F | HEART RATE: 94 BPM | DIASTOLIC BLOOD PRESSURE: 87 MMHG | RESPIRATION RATE: 18 BRPM

## 2019-03-30 DIAGNOSIS — Z98.89 OTHER SPECIFIED POSTPROCEDURAL STATES: Chronic | ICD-10-CM

## 2019-03-30 DIAGNOSIS — F32.9 MAJOR DEPRESSIVE DISORDER, SINGLE EPISODE, UNSPECIFIED: ICD-10-CM

## 2019-03-30 DIAGNOSIS — Z98.51 TUBAL LIGATION STATUS: Chronic | ICD-10-CM

## 2019-03-30 DIAGNOSIS — M54.9 DORSALGIA, UNSPECIFIED: ICD-10-CM

## 2019-03-30 DIAGNOSIS — K56.60 UNSPECIFIED INTESTINAL OBSTRUCTION: Chronic | ICD-10-CM

## 2019-03-30 DIAGNOSIS — F41.9 ANXIETY DISORDER, UNSPECIFIED: ICD-10-CM

## 2019-03-30 DIAGNOSIS — D64.9 ANEMIA, UNSPECIFIED: ICD-10-CM

## 2019-03-30 PROCEDURE — 99283 EMERGENCY DEPT VISIT LOW MDM: CPT

## 2019-03-30 RX ORDER — AMPICILLIN SODIUM AND SULBACTAM SODIUM 250; 125 MG/ML; MG/ML
3 INJECTION, POWDER, FOR SUSPENSION INTRAMUSCULAR; INTRAVENOUS ONCE
Qty: 0 | Refills: 0 | Status: DISCONTINUED | OUTPATIENT
Start: 2019-03-30 | End: 2019-03-30

## 2019-03-30 RX ORDER — KETOROLAC TROMETHAMINE 30 MG/ML
60 SYRINGE (ML) INJECTION ONCE
Qty: 0 | Refills: 0 | Status: DISCONTINUED | OUTPATIENT
Start: 2019-03-30 | End: 2019-03-30

## 2019-03-30 RX ORDER — TETANUS TOXOID, REDUCED DIPHTHERIA TOXOID AND ACELLULAR PERTUSSIS VACCINE, ADSORBED 5; 2.5; 8; 8; 2.5 [IU]/.5ML; [IU]/.5ML; UG/.5ML; UG/.5ML; UG/.5ML
0.5 SUSPENSION INTRAMUSCULAR ONCE
Qty: 0 | Refills: 0 | Status: DISCONTINUED | OUTPATIENT
Start: 2019-03-30 | End: 2019-03-30

## 2019-03-30 RX ORDER — CYCLOBENZAPRINE HYDROCHLORIDE 10 MG/1
1 TABLET, FILM COATED ORAL
Qty: 20 | Refills: 0
Start: 2019-03-30 | End: 2019-04-08

## 2019-03-30 RX ORDER — DIAZEPAM 5 MG
5 TABLET ORAL ONCE
Qty: 0 | Refills: 0 | Status: DISCONTINUED | OUTPATIENT
Start: 2019-03-30 | End: 2019-03-30

## 2019-03-30 RX ADMIN — Medication 5 MILLIGRAM(S): at 12:37

## 2019-03-30 RX ADMIN — Medication 60 MILLIGRAM(S): at 12:36

## 2019-03-30 NOTE — ED ADULT NURSE NOTE - OBJECTIVE STATEMENT
Patient complains of back pain that started the beginning of the week, felt a pop last night. patient complains of chills, no frequency, no cough, Patient complains of back pain that started the beginning of the week, felt a pop last night. patient complains of chills, no frequency, no cough. Pt states she "has no other symptoms". Pt is ambulatory.

## 2019-03-30 NOTE — ED PROVIDER NOTE - OBJECTIVE STATEMENT
this is a 49 yo F w a pmhx depression, this is a 49 yo F w a pmhx depression, anxiety, chronic back pain on gabapentin c/o of typical back pain x 1 day. Denies nausea,  vomiting, fever, sob, chest pain, flank pain, dysuria, hematuria, trauma,  incontinence, rash, neuro deficit

## 2019-03-30 NOTE — ED ADULT NURSE NOTE - NSIMPLEMENTINTERV_GEN_ALL_ED
Implemented All Universal Safety Interventions:  [place] to call system. Call bell, personal items and telephone within reach. Instruct patient to call for assistance. Room bathroom lighting operational. Non-slip footwear when patient is off stretcher. Physically safe environment: no spills, clutter or unnecessary equipment. Stretcher in lowest position, wheels locked, appropriate side rails in place.

## 2019-03-30 NOTE — ED PROVIDER NOTE - PROGRESS NOTE DETAILS
Pt has relief from pain medication given in ed and walked out of ed and  pick her up Dr jenkins ( patient's neurologist) said he will follow up with patient on Monday

## 2019-03-31 ENCOUNTER — EMERGENCY (EMERGENCY)
Facility: HOSPITAL | Age: 49
LOS: 1 days | Discharge: HOME | End: 2019-03-31
Attending: EMERGENCY MEDICINE | Admitting: EMERGENCY MEDICINE
Payer: MEDICARE

## 2019-03-31 VITALS
DIASTOLIC BLOOD PRESSURE: 82 MMHG | HEART RATE: 80 BPM | TEMPERATURE: 98 F | SYSTOLIC BLOOD PRESSURE: 129 MMHG | RESPIRATION RATE: 16 BRPM | OXYGEN SATURATION: 100 %

## 2019-03-31 DIAGNOSIS — Z98.51 TUBAL LIGATION STATUS: Chronic | ICD-10-CM

## 2019-03-31 DIAGNOSIS — K56.60 UNSPECIFIED INTESTINAL OBSTRUCTION: Chronic | ICD-10-CM

## 2019-03-31 DIAGNOSIS — Z98.89 OTHER SPECIFIED POSTPROCEDURAL STATES: Chronic | ICD-10-CM

## 2019-03-31 LAB
ALBUMIN SERPL ELPH-MCNC: 4.1 G/DL — SIGNIFICANT CHANGE UP (ref 3.3–5)
ALP SERPL-CCNC: 86 U/L — SIGNIFICANT CHANGE UP (ref 40–120)
ALT FLD-CCNC: 10 U/L — SIGNIFICANT CHANGE UP (ref 4–33)
ANION GAP SERPL CALC-SCNC: 10 MMO/L — SIGNIFICANT CHANGE UP (ref 7–14)
AST SERPL-CCNC: 19 U/L — SIGNIFICANT CHANGE UP (ref 4–32)
BASOPHILS # BLD AUTO: 0.05 K/UL — SIGNIFICANT CHANGE UP (ref 0–0.2)
BASOPHILS NFR BLD AUTO: 0.6 % — SIGNIFICANT CHANGE UP (ref 0–2)
BILIRUB SERPL-MCNC: 0.4 MG/DL — SIGNIFICANT CHANGE UP (ref 0.2–1.2)
BUN SERPL-MCNC: 18 MG/DL — SIGNIFICANT CHANGE UP (ref 7–23)
CALCIUM SERPL-MCNC: 9.4 MG/DL — SIGNIFICANT CHANGE UP (ref 8.4–10.5)
CHLORIDE SERPL-SCNC: 106 MMOL/L — SIGNIFICANT CHANGE UP (ref 98–107)
CO2 SERPL-SCNC: 24 MMOL/L — SIGNIFICANT CHANGE UP (ref 22–31)
CREAT SERPL-MCNC: 0.93 MG/DL — SIGNIFICANT CHANGE UP (ref 0.5–1.3)
CRP SERPL-MCNC: 4.5 MG/L — SIGNIFICANT CHANGE UP
EOSINOPHIL # BLD AUTO: 0.13 K/UL — SIGNIFICANT CHANGE UP (ref 0–0.5)
EOSINOPHIL NFR BLD AUTO: 1.5 % — SIGNIFICANT CHANGE UP (ref 0–6)
ERYTHROCYTE [SEDIMENTATION RATE] IN BLOOD: 13 MM/HR — SIGNIFICANT CHANGE UP (ref 4–25)
GLUCOSE SERPL-MCNC: 92 MG/DL — SIGNIFICANT CHANGE UP (ref 70–99)
HCG SERPL-ACNC: < 5 MIU/ML — SIGNIFICANT CHANGE UP
HCT VFR BLD CALC: 39.3 % — SIGNIFICANT CHANGE UP (ref 34.5–45)
HGB BLD-MCNC: 12.6 G/DL — SIGNIFICANT CHANGE UP (ref 11.5–15.5)
IMM GRANULOCYTES NFR BLD AUTO: 1 % — SIGNIFICANT CHANGE UP (ref 0–1.5)
LYMPHOCYTES # BLD AUTO: 3.2 K/UL — SIGNIFICANT CHANGE UP (ref 1–3.3)
LYMPHOCYTES # BLD AUTO: 36.3 % — SIGNIFICANT CHANGE UP (ref 13–44)
MCHC RBC-ENTMCNC: 29.9 PG — SIGNIFICANT CHANGE UP (ref 27–34)
MCHC RBC-ENTMCNC: 32.1 % — SIGNIFICANT CHANGE UP (ref 32–36)
MCV RBC AUTO: 93.1 FL — SIGNIFICANT CHANGE UP (ref 80–100)
MONOCYTES # BLD AUTO: 0.54 K/UL — SIGNIFICANT CHANGE UP (ref 0–0.9)
MONOCYTES NFR BLD AUTO: 6.1 % — SIGNIFICANT CHANGE UP (ref 2–14)
NEUTROPHILS # BLD AUTO: 4.8 K/UL — SIGNIFICANT CHANGE UP (ref 1.8–7.4)
NEUTROPHILS NFR BLD AUTO: 54.5 % — SIGNIFICANT CHANGE UP (ref 43–77)
NRBC # FLD: 0 K/UL — SIGNIFICANT CHANGE UP (ref 0–0)
PLATELET # BLD AUTO: 222 K/UL — SIGNIFICANT CHANGE UP (ref 150–400)
PMV BLD: 10.7 FL — SIGNIFICANT CHANGE UP (ref 7–13)
POTASSIUM SERPL-MCNC: 4.3 MMOL/L — SIGNIFICANT CHANGE UP (ref 3.5–5.3)
POTASSIUM SERPL-SCNC: 4.3 MMOL/L — SIGNIFICANT CHANGE UP (ref 3.5–5.3)
PROT SERPL-MCNC: 6.9 G/DL — SIGNIFICANT CHANGE UP (ref 6–8.3)
RBC # BLD: 4.22 M/UL — SIGNIFICANT CHANGE UP (ref 3.8–5.2)
RBC # FLD: 13.2 % — SIGNIFICANT CHANGE UP (ref 10.3–14.5)
SODIUM SERPL-SCNC: 140 MMOL/L — SIGNIFICANT CHANGE UP (ref 135–145)
WBC # BLD: 8.81 K/UL — SIGNIFICANT CHANGE UP (ref 3.8–10.5)
WBC # FLD AUTO: 8.81 K/UL — SIGNIFICANT CHANGE UP (ref 3.8–10.5)

## 2019-03-31 PROCEDURE — 99284 EMERGENCY DEPT VISIT MOD MDM: CPT

## 2019-03-31 PROCEDURE — 72074 X-RAY EXAM THORAC SPINE4/>VW: CPT | Mod: 26

## 2019-03-31 RX ORDER — ACETAMINOPHEN 500 MG
975 TABLET ORAL ONCE
Qty: 0 | Refills: 0 | Status: COMPLETED | OUTPATIENT
Start: 2019-03-31 | End: 2019-03-31

## 2019-03-31 RX ORDER — LIDOCAINE 4 G/100G
1 CREAM TOPICAL ONCE
Qty: 0 | Refills: 0 | Status: COMPLETED | OUTPATIENT
Start: 2019-03-31 | End: 2019-03-31

## 2019-03-31 RX ORDER — DIAZEPAM 5 MG
5 TABLET ORAL ONCE
Qty: 0 | Refills: 0 | Status: DISCONTINUED | OUTPATIENT
Start: 2019-03-31 | End: 2019-03-31

## 2019-03-31 RX ORDER — KETOROLAC TROMETHAMINE 30 MG/ML
30 SYRINGE (ML) INJECTION ONCE
Qty: 0 | Refills: 0 | Status: DISCONTINUED | OUTPATIENT
Start: 2019-03-31 | End: 2019-03-31

## 2019-03-31 RX ADMIN — Medication 30 MILLIGRAM(S): at 16:41

## 2019-03-31 RX ADMIN — Medication 975 MILLIGRAM(S): at 16:38

## 2019-03-31 RX ADMIN — LIDOCAINE 1 PATCH: 4 CREAM TOPICAL at 16:38

## 2019-03-31 RX ADMIN — Medication 5 MILLIGRAM(S): at 16:39

## 2019-03-31 RX ADMIN — LIDOCAINE 1 PATCH: 4 CREAM TOPICAL at 16:40

## 2019-03-31 NOTE — ED ADULT TRIAGE NOTE - CHIEF COMPLAINT QUOTE
Pt c/o lower back pain radiating up into ribs, and down right leg.  Pt ambulatory to triage, was seen at [facility] yesterday.

## 2019-03-31 NOTE — ED PROVIDER NOTE - CLINICAL SUMMARY MEDICAL DECISION MAKING FREE TEXT BOX
Pt is a 47 y/o F with hx of fibromyalgia, anxiety, depression, and chronic lower back pain who presents to the ED c/o mid back pain x2 days.   Likely not as worse as she states due to her fibromyalgia/anxiety. Will try Toradol, Valium, Lidoderm, Tylenol and contact her neurologist. No concern for central cord syndrome/discitis/epidural abscess or compression fx with no remote hx of any constitutional symptoms or trauma.

## 2019-03-31 NOTE — ED PROVIDER NOTE - CONSTITUTIONAL, MLM
normal... Well appearing, well nourished, awake, alert, oriented to person, place, time/situation and in no apparent distress. Non toxic appearance.

## 2019-03-31 NOTE — ED PROVIDER NOTE - OBJECTIVE STATEMENT
Pt is a 49 y/o F with hx of fibromyalgia, anxiety, depression, and chronic lower back pain who presents to the ED c/o mid back pain x2 days. She reports mid back pain of gradual onset that radiates to her ribs, abd region, and down her legs. Pt takes Gabapentin, Flexeril, and Naproxen with no improvement. Hx of lumbar herniated discs. Of note, pt reports she went to the hospital 2 years ago and was dx with a cyst.

## 2019-03-31 NOTE — ED PROVIDER NOTE - PROGRESS NOTE DETAILS
Ahuja: pt seen sleeping in stretcher.  reviewed chart and shows benign cyst at iliac region and C7-T1 without sign of cord compression on MRI done -7/2017.  Nuclear scan done which showed no osteo or acute path Ahuja: pt still c/o pain.  labs unremarkable. normal esr/crp.  xr mild spondylosis. spoke with private neurologist Dr rip Cisneros [phone] and states pt has been c/o of chronic back pain and had multiple work up and told pt to go ed for eval since unable to control her pain and emotions.  Dr cisneros doesn't feel anything acute.    dx chronic back pain.  f/u with neurologist. continue with pain meds. NO NEUROLOGICAL DEFICIT

## 2019-03-31 NOTE — ED ADULT NURSE REASSESSMENT NOTE - SYMPTOMS
c.o. mid lower back pain x 2 years. states she fell 2 days ago and pain is worse. pt also states she has been told she has a cyst in her spine. sl placed labs sent.

## 2019-03-31 NOTE — ED PROVIDER NOTE - NEUROLOGICAL, MLM
Alert and oriented, no focal deficits, no motor or sensory deficits. Normal finger to nose.  strength equal but slightly diminished possibly due to pain. Motor strength 5/5 upper extremity.

## 2019-05-08 ENCOUNTER — EMERGENCY (EMERGENCY)
Facility: HOSPITAL | Age: 49
LOS: 1 days | Discharge: HOME | End: 2019-05-08
Attending: EMERGENCY MEDICINE | Admitting: EMERGENCY MEDICINE
Payer: MEDICARE

## 2019-05-08 VITALS
TEMPERATURE: 98 F | OXYGEN SATURATION: 100 % | SYSTOLIC BLOOD PRESSURE: 143 MMHG | HEART RATE: 90 BPM | RESPIRATION RATE: 18 BRPM | DIASTOLIC BLOOD PRESSURE: 84 MMHG

## 2019-05-08 DIAGNOSIS — Z98.89 OTHER SPECIFIED POSTPROCEDURAL STATES: Chronic | ICD-10-CM

## 2019-05-08 DIAGNOSIS — Z98.51 TUBAL LIGATION STATUS: Chronic | ICD-10-CM

## 2019-05-08 DIAGNOSIS — K56.60 UNSPECIFIED INTESTINAL OBSTRUCTION: Chronic | ICD-10-CM

## 2019-05-08 PROCEDURE — 99285 EMERGENCY DEPT VISIT HI MDM: CPT | Mod: GC,25

## 2019-05-08 NOTE — ED PROVIDER NOTE - CLINICAL SUMMARY MEDICAL DECISION MAKING FREE TEXT BOX
Amrik PGY2: 48F complex medical hx fibromyalgia c/b multiple herniated discs, anxiety, IBS s/p tummy tuck, s/p brendan c/b sbos bariatric surgery? presents with multiple medical complaints, focusing on R sided diffuse abdominal pain and b/l LE numbness and pain, pain has been going on for months, unclear if abdominal pain c/w prior episodes of SBO, did have spinal injections today since then notes worsening pain diffusely is passing gas today able to urinary freely. exam vss non toxic appearing ddx is broad will r/o surgical path, sbo labs ctap ua vs chronic pain reassess

## 2019-05-08 NOTE — ED ADULT TRIAGE NOTE - CHIEF COMPLAINT QUOTE
pt ambulatory to triage, c/o right sided abdominal pain with nausea since 4/1/19, also c/o back pain and now states she has ble numbness since 7:30 pm, pmhx fibromyalgia, abdominoplasty, sbo, hernias, and "fluid in my bowel," md ruvalcaba called for stroke eval. patient comfortable in triage. pt ambulatory to triage, c/o right sided abdominal pain with nausea since 4/1/19, also c/o back pain and now states she has ble numbness since 7:30 pm, pmhx fibromyalgia, abdominoplasty, sbo, hernias, and "fluid in my bowel," md ruvalcaba called for stroke eval. patient comfortable in triage. no code stroke called.

## 2019-05-08 NOTE — ED PROVIDER NOTE - OBJECTIVE STATEMENT
48F complex medical hx fibromyalgia c/b multiple herniated discs, anxiety, IBS s/p tummy tuck, s/p brendan c/b sbos bariatric surgery? presents with multiple medical complaints, focusing on R sided diffuse abdominal pain and b/l LE numbness and pain, pain has been going on for months, unclear if abdominal pain c/w prior episodes of SBO, did have spinal injections today since then notes worsening pain diffusely is passing gas today able to urinary freely. Denies n/v/f/c/cp/sob. Denies headache, syncope, lightheadedness, dizziness. Denies chest palpitations, Denies dysuria, hematuria, hematochezia, BRBPR, tarry stools, diarrhea, constipation.

## 2019-05-08 NOTE — ED PROVIDER NOTE - PHYSICAL EXAMINATION
GEN APPEARANCE: WDWN, alert and cooperative, non-toxic appearing and in NAD  HEAD: Atraumatic, normocephalic   EYES: PERRLa, EOMI, vision grossly intact.   EARS: Gross hearing intact.   NOSE: No nasal discharge, no external evidence of epistaxis.   NECK: Supple  CV: RRR, S1S2, no c/r/m/g. No cyanosis or pallor. Extremities warm, well perfused. Cap refill <2 seconds. No bruits.   LUNGS: CTAB. No wheezing. No rales. No rhonchi. No diminished breath sounds.   ABDOMEN: RUQ RLQ ttp No guarding or rebound. No masses.   MSK: Spine appears normal, no spine point tenderness. No CVA ttp. No joint erythema or tenderness. Normal muscular development. Pelvis stable.  EXTREMITIES: No peripheral edema. No obvious joint or bony deformity.  NEURO: Alert, follows commands. Weight bearing normal. Speech normal. Sensation and motor normal x4 extremities.   SKIN: Normal color for race, warm, dry and intact. No evidence of rash.  PSYCH: Normal mood and affect. GEN APPEARANCE: WDWN, alert and cooperative, non-toxic appearing and in NAD, uncomfortable appearing   HEAD: Atraumatic, normocephalic   EYES: PERRLa, EOMI, vision grossly intact.   EARS: Gross hearing intact.   NOSE: No nasal discharge, no external evidence of epistaxis.   NECK: Supple  CV: RRR, S1S2, no c/r/m/g. No cyanosis or pallor. Extremities warm, well perfused. Cap refill <2 seconds. No bruits.   LUNGS: CTAB. No wheezing. No rales. No rhonchi. No diminished breath sounds.   ABDOMEN: RUQ RLQ ttp No guarding or rebound. No masses.   MSK: Spine appears normal, no spine point tenderness. No CVA ttp. No joint erythema or tenderness. Normal muscular development. Pelvis stable. diffuse LE ttp, no focal signs, no erythema, no swelling   EXTREMITIES: No peripheral edema. No obvious joint or bony deformity.  NEURO: Alert, follows commands. Weight bearing normal. Speech normal. Sensation and motor normal x4 extremities.   SKIN: Normal color for race, warm, dry and intact. No evidence of rash.  PSYCH: Normal mood and affect.

## 2019-05-08 NOTE — ED PROVIDER NOTE - PROGRESS NOTE DETAILS
Called to quick look for stroke eval. Patient with b/l LE numbness and weakness that started at 630-730 this evening. Patient states had injection to her LB for chronic pain. Ambulatory into triage. 3/5 dorsiflexion of b/l feet and complains of hypesthesia of b/l feet on light tough. No other focal deficit on neuro exam. give bilaterality and outside window no code stroke called.   -Tristen Zapien PGY4 [facility]#[id number] Jaja: signed out, 49 y/o F w/ Hx as noted pw R sided abdominal pain.  pending CT and reeval. updated patient on results. patient requesting to have her appendix taken out despite being told the CT is normal. left a message for patient's pcp Dr. Valencia. awaiting call back. - resident Neymar Chaidez patient now wishes to be discharged to follow-up with her primary care doctor. will give referral for GI as requested and discharge. - resident Neymar Chaidez

## 2019-05-08 NOTE — ED PROVIDER NOTE - ATTENDING CONTRIBUTION TO CARE
47 yo with multiple complaints the two most significant of which is pain and numbness in the BLE.  This has been present for months but got worse today after getting spinal injections for herniated discs.  secondly is having R sided abd pains in the setting of mult abd surgeries and SBO in the past.  No BM today but is passing gas    Gen: Well appearing in NAD  Head: NC/AT  Neck: trachea midline  Resp:  No distress  Abd: soft TTP in the RUQ and RLQ no rebound or guarding  Ext: no deformities  Neuro:  A&O appears non focal  Skin:  Warm and dry as visualized  Psych:  Normal affect and mood    Pt with two concerns  The paresthesia and leg pains are more chronic in nature - with normal neuro exam low concern for a spinal epidural hematoma.  SBO needs to be considered with the previous surgeries and history of same. 47 yo with multiple complaints the two most significant of which is pain and numbness in the BLE.  This has been present for months but got worse today after getting spinal injections for herniated discs.  secondly is having R sided abd pains in the setting of mult abd surgeries and SBO in the past.  No BM today but is passing gas    Gen: Well appearing in NAD  Head: NC/AT  Neck: trachea midline  Resp:  No distress  Abd: soft TTP in the RUQ and RLQ no rebound or guarding  Ext: no deformities  Neuro:  A&O appears non focal  Skin:  Warm and dry as visualized  Psych:  Normal affect and mood    Pt with two concerns  The paresthesia and leg pains are more chronic in nature - with normal neuro exam low concern for a spinal epidural hematoma.  SBO needs to be considered with the previous surgeries and history of same..

## 2019-05-08 NOTE — ED PROVIDER NOTE - NSFOLLOWUPINSTRUCTIONS_ED_ALL_ED_FT
Please follow-up with your primary care doctor in the next 24-48 hours   If you have any worsening symptoms, are unable to have a bowel movement, pass gas or tolerate food or fluids please return to the emergency department

## 2019-05-09 VITALS
OXYGEN SATURATION: 100 % | TEMPERATURE: 98 F | DIASTOLIC BLOOD PRESSURE: 71 MMHG | HEART RATE: 63 BPM | SYSTOLIC BLOOD PRESSURE: 139 MMHG | RESPIRATION RATE: 19 BRPM

## 2019-05-09 LAB
ALBUMIN SERPL ELPH-MCNC: 4.8 G/DL — SIGNIFICANT CHANGE UP (ref 3.3–5)
ALP SERPL-CCNC: 80 U/L — SIGNIFICANT CHANGE UP (ref 40–120)
ALT FLD-CCNC: 17 U/L — SIGNIFICANT CHANGE UP (ref 4–33)
ANION GAP SERPL CALC-SCNC: 14 MMO/L — SIGNIFICANT CHANGE UP (ref 7–14)
APPEARANCE UR: CLEAR — SIGNIFICANT CHANGE UP
AST SERPL-CCNC: 21 U/L — SIGNIFICANT CHANGE UP (ref 4–32)
BACTERIA # UR AUTO: NEGATIVE — SIGNIFICANT CHANGE UP
BASE EXCESS BLDV CALC-SCNC: -2 MMOL/L — SIGNIFICANT CHANGE UP
BASOPHILS # BLD AUTO: 0.01 K/UL — SIGNIFICANT CHANGE UP (ref 0–0.2)
BASOPHILS NFR BLD AUTO: 0.1 % — SIGNIFICANT CHANGE UP (ref 0–2)
BILIRUB SERPL-MCNC: 0.3 MG/DL — SIGNIFICANT CHANGE UP (ref 0.2–1.2)
BILIRUB UR-MCNC: NEGATIVE — SIGNIFICANT CHANGE UP
BLOOD GAS VENOUS - CREATININE: SIGNIFICANT CHANGE UP MG/DL (ref 0.5–1.3)
BLOOD GAS VENOUS - FIO2: 21 — SIGNIFICANT CHANGE UP
BLOOD UR QL VISUAL: SIGNIFICANT CHANGE UP
BUN SERPL-MCNC: 22 MG/DL — SIGNIFICANT CHANGE UP (ref 7–23)
CALCIUM SERPL-MCNC: 9.9 MG/DL — SIGNIFICANT CHANGE UP (ref 8.4–10.5)
CHLORIDE BLDV-SCNC: 109 MMOL/L — HIGH (ref 96–108)
CHLORIDE SERPL-SCNC: 109 MMOL/L — HIGH (ref 98–107)
CO2 SERPL-SCNC: 19 MMOL/L — LOW (ref 22–31)
COLOR SPEC: YELLOW — SIGNIFICANT CHANGE UP
CREAT SERPL-MCNC: 0.67 MG/DL — SIGNIFICANT CHANGE UP (ref 0.5–1.3)
EOSINOPHIL # BLD AUTO: 0 K/UL — SIGNIFICANT CHANGE UP (ref 0–0.5)
EOSINOPHIL NFR BLD AUTO: 0 % — SIGNIFICANT CHANGE UP (ref 0–6)
GAS PNL BLDV: 139 MMOL/L — SIGNIFICANT CHANGE UP (ref 136–146)
GLUCOSE BLDV-MCNC: 124 MG/DL — HIGH (ref 70–99)
GLUCOSE SERPL-MCNC: 126 MG/DL — HIGH (ref 70–99)
GLUCOSE UR-MCNC: 150 — HIGH
HCO3 BLDV-SCNC: 22 MMOL/L — SIGNIFICANT CHANGE UP (ref 20–27)
HCT VFR BLD CALC: 39 % — SIGNIFICANT CHANGE UP (ref 34.5–45)
HCT VFR BLDV CALC: 38.6 % — SIGNIFICANT CHANGE UP (ref 34.5–45)
HGB BLD-MCNC: 12.7 G/DL — SIGNIFICANT CHANGE UP (ref 11.5–15.5)
HGB BLDV-MCNC: 12.6 G/DL — SIGNIFICANT CHANGE UP (ref 11.5–15.5)
HYALINE CASTS # UR AUTO: NEGATIVE — SIGNIFICANT CHANGE UP
IMM GRANULOCYTES NFR BLD AUTO: 0.9 % — SIGNIFICANT CHANGE UP (ref 0–1.5)
KETONES UR-MCNC: NEGATIVE — SIGNIFICANT CHANGE UP
LACTATE BLDV-MCNC: 1.6 MMOL/L — SIGNIFICANT CHANGE UP (ref 0.5–2)
LACTATE BLDV-MCNC: 2.8 MMOL/L — HIGH (ref 0.5–2)
LEUKOCYTE ESTERASE UR-ACNC: NEGATIVE — SIGNIFICANT CHANGE UP
LIDOCAIN IGE QN: 12.1 U/L — SIGNIFICANT CHANGE UP (ref 7–60)
LYMPHOCYTES # BLD AUTO: 1.09 K/UL — SIGNIFICANT CHANGE UP (ref 1–3.3)
LYMPHOCYTES # BLD AUTO: 15.7 % — SIGNIFICANT CHANGE UP (ref 13–44)
MCHC RBC-ENTMCNC: 29.8 PG — SIGNIFICANT CHANGE UP (ref 27–34)
MCHC RBC-ENTMCNC: 32.6 % — SIGNIFICANT CHANGE UP (ref 32–36)
MCV RBC AUTO: 91.5 FL — SIGNIFICANT CHANGE UP (ref 80–100)
MONOCYTES # BLD AUTO: 0.15 K/UL — SIGNIFICANT CHANGE UP (ref 0–0.9)
MONOCYTES NFR BLD AUTO: 2.2 % — SIGNIFICANT CHANGE UP (ref 2–14)
NEUTROPHILS # BLD AUTO: 5.65 K/UL — SIGNIFICANT CHANGE UP (ref 1.8–7.4)
NEUTROPHILS NFR BLD AUTO: 81.1 % — HIGH (ref 43–77)
NITRITE UR-MCNC: NEGATIVE — SIGNIFICANT CHANGE UP
NRBC # FLD: 0 K/UL — SIGNIFICANT CHANGE UP (ref 0–0)
PCO2 BLDV: 42 MMHG — SIGNIFICANT CHANGE UP (ref 41–51)
PH BLDV: 7.35 PH — SIGNIFICANT CHANGE UP (ref 7.32–7.43)
PH UR: 6 — SIGNIFICANT CHANGE UP (ref 5–8)
PLATELET # BLD AUTO: 200 K/UL — SIGNIFICANT CHANGE UP (ref 150–400)
PMV BLD: 11.3 FL — SIGNIFICANT CHANGE UP (ref 7–13)
PO2 BLDV: 41 MMHG — HIGH (ref 35–40)
POTASSIUM BLDV-SCNC: 4.4 MMOL/L — SIGNIFICANT CHANGE UP (ref 3.4–4.5)
POTASSIUM SERPL-MCNC: 4.7 MMOL/L — SIGNIFICANT CHANGE UP (ref 3.5–5.3)
POTASSIUM SERPL-SCNC: 4.7 MMOL/L — SIGNIFICANT CHANGE UP (ref 3.5–5.3)
PROT SERPL-MCNC: 7.9 G/DL — SIGNIFICANT CHANGE UP (ref 6–8.3)
PROT UR-MCNC: 10 — SIGNIFICANT CHANGE UP
RBC # BLD: 4.26 M/UL — SIGNIFICANT CHANGE UP (ref 3.8–5.2)
RBC # FLD: 13.3 % — SIGNIFICANT CHANGE UP (ref 10.3–14.5)
RBC CASTS # UR COMP ASSIST: SIGNIFICANT CHANGE UP (ref 0–?)
SAO2 % BLDV: 71.7 % — SIGNIFICANT CHANGE UP (ref 60–85)
SODIUM SERPL-SCNC: 142 MMOL/L — SIGNIFICANT CHANGE UP (ref 135–145)
SP GR SPEC: 1.03 — SIGNIFICANT CHANGE UP (ref 1–1.04)
SQUAMOUS # UR AUTO: SIGNIFICANT CHANGE UP
UROBILINOGEN FLD QL: NORMAL — SIGNIFICANT CHANGE UP
WBC # BLD: 6.96 K/UL — SIGNIFICANT CHANGE UP (ref 3.8–10.5)
WBC # FLD AUTO: 6.96 K/UL — SIGNIFICANT CHANGE UP (ref 3.8–10.5)
WBC UR QL: SIGNIFICANT CHANGE UP (ref 0–?)

## 2019-05-09 PROCEDURE — 74177 CT ABD & PELVIS W/CONTRAST: CPT | Mod: 26

## 2019-05-09 RX ORDER — ACETAMINOPHEN 500 MG
975 TABLET ORAL ONCE
Refills: 0 | Status: COMPLETED | OUTPATIENT
Start: 2019-05-09 | End: 2019-05-09

## 2019-05-09 RX ORDER — LIDOCAINE 4 G/100G
1 CREAM TOPICAL ONCE
Refills: 0 | Status: COMPLETED | OUTPATIENT
Start: 2019-05-09 | End: 2019-05-09

## 2019-05-09 RX ORDER — SODIUM CHLORIDE 9 MG/ML
1000 INJECTION INTRAMUSCULAR; INTRAVENOUS; SUBCUTANEOUS ONCE
Refills: 0 | Status: COMPLETED | OUTPATIENT
Start: 2019-05-09 | End: 2019-05-09

## 2019-05-09 RX ORDER — MORPHINE SULFATE 50 MG/1
4 CAPSULE, EXTENDED RELEASE ORAL ONCE
Refills: 0 | Status: DISCONTINUED | OUTPATIENT
Start: 2019-05-09 | End: 2019-05-09

## 2019-05-09 RX ORDER — KETOROLAC TROMETHAMINE 30 MG/ML
15 SYRINGE (ML) INJECTION ONCE
Refills: 0 | Status: DISCONTINUED | OUTPATIENT
Start: 2019-05-09 | End: 2019-05-09

## 2019-05-09 RX ORDER — FAMOTIDINE 10 MG/ML
20 INJECTION INTRAVENOUS ONCE
Refills: 0 | Status: COMPLETED | OUTPATIENT
Start: 2019-05-09 | End: 2019-05-09

## 2019-05-09 RX ADMIN — SODIUM CHLORIDE 1000 MILLILITER(S): 9 INJECTION INTRAMUSCULAR; INTRAVENOUS; SUBCUTANEOUS at 01:54

## 2019-05-09 RX ADMIN — Medication 15 MILLIGRAM(S): at 01:54

## 2019-05-09 RX ADMIN — LIDOCAINE 1 PATCH: 4 CREAM TOPICAL at 01:54

## 2019-05-09 RX ADMIN — FAMOTIDINE 20 MILLIGRAM(S): 10 INJECTION INTRAVENOUS at 08:53

## 2019-05-09 RX ADMIN — SODIUM CHLORIDE 1000 MILLILITER(S): 9 INJECTION INTRAMUSCULAR; INTRAVENOUS; SUBCUTANEOUS at 03:36

## 2019-05-09 RX ADMIN — MORPHINE SULFATE 4 MILLIGRAM(S): 50 CAPSULE, EXTENDED RELEASE ORAL at 03:54

## 2019-05-09 RX ADMIN — Medication 15 MILLIGRAM(S): at 03:36

## 2019-05-09 RX ADMIN — MORPHINE SULFATE 4 MILLIGRAM(S): 50 CAPSULE, EXTENDED RELEASE ORAL at 04:21

## 2019-05-09 NOTE — ED ADULT NURSE NOTE - NSIMPLEMENTINTERV_GEN_ALL_ED
Implemented All Fall Risk Interventions:  [place] to call system. Call bell, personal items and telephone within reach. Instruct patient to call for assistance. Room bathroom lighting operational. Non-slip footwear when patient is off stretcher. Physically safe environment: no spills, clutter or unnecessary equipment. Stretcher in lowest position, wheels locked, appropriate side rails in place. Provide visual cue, wrist band, yellow gown, etc. Monitor gait and stability. Monitor for mental status changes and reorient to person, place, and time. Review medications for side effects contributing to fall risk. Reinforce activity limits and safety measures with patient and family.

## 2019-05-09 NOTE — ED ADULT NURSE REASSESSMENT NOTE - NS ED NURSE REASSESS COMMENT FT1
Pt. is alert and oriented x 4 vss. C/o upper abdominal, no nausea or vomiting. Refuse Maalox po, and toradol iv Dr. Rachel Chaidez made aware, will continue to monitor.
Report given to day RAMÓN Armstrong.
Report received back from break RAMÓN Montez. Pt reports right ABD pain is gradually increasing again. repeat lactate resulted and WNL.   Pt appears in NAD at this time, VSS. Pt reports no acute changes.
break RN: report received from nagi MELGAR, pt a&Ox3, breathing even & unlabored, VSS, c/o R lower abd pain, administered morphine as per MD order, repeat vbg sent post fluids, will continue to monitor.
Pt back from CT, pt refusing pain medications, however requesting narcotics. pt appears in NAD at this time.
Pt appears in NAD at this time, however reports no relief from pain. blankets provided for comfort. MD made aware of pt's unimproved status.

## 2019-05-09 NOTE — ED ADULT NURSE NOTE - CHIEF COMPLAINT QUOTE
pt ambulatory to triage, c/o right sided abdominal pain with nausea since 4/1/19, also c/o back pain and now states she has ble numbness since 7:30 pm, pmhx fibromyalgia, abdominoplasty, sbo, hernias, and "fluid in my bowel," md ruvalcaba called for stroke eval. patient comfortable in triage. no code stroke called.

## 2019-05-09 NOTE — ED ADULT NURSE NOTE - OBJECTIVE STATEMENT
Pt brought to  30 with history of hepatic steatosis, SBO, gall bladder removal, fibromyalgia, IBS, gastric bypass, tummy tuck. pt coming in for b/l feet numbness and pain x2 months to the point where she states she needed to use cane to ambulate. Pt states she has had chronic ABD pain for 2 years and has been seeking medical attention without relief. Pt reports she is coming to ED because her b/l feet numbness and ABD pain has increased. Pt reports nausea with PO intake, but denies emesis, fevers/chills. Pt also endorses intermittent constipation in which she has PRN laxatives, pt states last BM was Tuesday; Pt states she had very large BM and saw small amounts of "not bright-red and not black" red stools. (pt states she takes oxycodone)    MD Valverde at bedside. A&Ox3, pt appears anxious Pt has severe tenderness when palpating right side of ABD. Pt has good/normal sensation in b/l LE and feet, cap refill strong equal b/l LE.

## 2019-05-10 LAB
BACTERIA UR CULT: SIGNIFICANT CHANGE UP
SPECIMEN SOURCE: SIGNIFICANT CHANGE UP

## 2019-06-01 NOTE — ED ADULT NURSE REASSESSMENT NOTE - TEMPLATE LIST FOR HEAD TO TOE ASSESSMENT
Abdominal Pain, N/V/D EMERGENCY DEPARTMENT HISTORY AND PHYSICAL EXAM    10:49 PM  Date: 2019  Patient Name: Sharee Shi    History of Presenting Illness     Chief Complaint   Patient presents with    Shoulder Pain        History Provided By: Patient    HPI: Sharee Shi is a 46 y.o. female with no relevant history, here for left shoulder pain. Patient started to have slowly developing pain since she had it tattooed on May 2. There is no prior trauma, and there is no other preceding factors. Patient tried ibuprofen without any relief so comes here for further evaluation and treatment. 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 She denies any fevers, or any other joint pain or rashes. Location: Left shoulder  Severity: Severe  Timing/course: Progressive  Onset/Duration: Weeks     PCP: Arlene Zamudio NP    Past History     Past Medical History:  Past Medical History:   Diagnosis Date    Asthma     Chronic pain 6 months    painful menses    Fibroids     Heart palpitations        Past Surgical History:  Past Surgical History:   Procedure Laterality Date    HX  SECTION  9595,9603    with BTL    HX HYSTEROSCOPY WITH ENDOMETRIAL ABLATION      No period since       Family History:  Family History   Problem Relation Age of Onset    Hypertension Mother     Hypertension Father     Diabetes Father     Parkinson's Disease Father     Cancer Paternal Grandmother        Social History:  Social History     Tobacco Use    Smoking status: Former Smoker     Types: Cigarettes    Smokeless tobacco: Current User   Substance Use Topics    Alcohol use: Yes     Comment: socially, 1 beer/weekend    Drug use: No       Allergies: Allergies   Allergen Reactions    Azo [Phenazopyridine] Hives    Iodinated Contrast- Oral And Iv Dye Shortness of Breath       Review of Systems     Constitutional: No fevers.   Cardiovascular: No chest pain, palpitations, or heaviness. Gastrointestinal: No nausea, vomiting, diarrhea. Genitourinary: No dysuria, frequency, or urgency. Musculoskeletal: Left shoulder pain  Integumentary: No rashes. Neurological: No headaches, sensory or motor symptoms. All other systems negative. Physical Exam     Patient Vitals for the past 12 hrs:   Temp Pulse Resp BP SpO2   05/31/19 2354     100 %   05/31/19 2353    129/85    05/31/19 2226 98.6 °F (37 °C) 65 16 147/86 97 %       Physical Exam   Constitutional: Appears well-developed. Is not diaphoretic. Eyes: Conjunctiva clear, EOMI  Head: Normocephalic and atraumatic. Neck: Normal range of motion. No stridor or tracheal deviation. Cardiovascular: Intact distal pulses. Pulmonary/Chest: Effort normal and no respiratory distress. Abdominal: Non distended. Musculoskeletal: Left shoulder with limited range of motion and cannot reach the contralateral shoulder. There is diffuse tenderness without any obvious joint effusions. She is distally neurovascularly intact, and there is no external rashes or bruising. Neurological: Conversant, alert. Skin: Skin is warm and dry. Psychiatric: Has a normal mood and affect. Behavior is normal.   Nursing note and vitals reviewed. Diagnostic Study Results     Labs -  Recent Results (from the past 12 hour(s))   CBC WITH AUTOMATED DIFF    Collection Time: 05/31/19 10:53 PM   Result Value Ref Range    WBC 11.3 4.6 - 13.2 K/uL    RBC 5.02 4.20 - 5.30 M/uL    HGB 13.5 12.0 - 16.0 g/dL    HCT 41.3 35.0 - 45.0 %    MCV 82.3 74.0 - 97.0 FL    MCH 26.9 24.0 - 34.0 PG    MCHC 32.7 31.0 - 37.0 g/dL    RDW 14.2 11.6 - 14.5 %    PLATELET 039 197 - 165 K/uL    MPV 10.1 9.2 - 11.8 FL    NEUTROPHILS 69 40 - 73 %    LYMPHOCYTES 23 21 - 52 %    MONOCYTES 6 3 - 10 %    EOSINOPHILS 2 0 - 5 %    BASOPHILS 0 0 - 2 %    ABS. NEUTROPHILS 7.8 1.8 - 8.0 K/UL    ABS. LYMPHOCYTES 2.6 0.9 - 3.6 K/UL    ABS.  MONOCYTES 0.6 0.05 - 1.2 K/UL ABS. EOSINOPHILS 0.2 0.0 - 0.4 K/UL    ABS. BASOPHILS 0.0 0.0 - 0.1 K/UL    DF AUTOMATED     METABOLIC PANEL, BASIC    Collection Time: 05/31/19 10:53 PM   Result Value Ref Range    Sodium 141 136 - 145 mmol/L    Potassium 3.5 3.5 - 5.5 mmol/L    Chloride 105 100 - 108 mmol/L    CO2 32 21 - 32 mmol/L    Anion gap 4 3.0 - 18 mmol/L    Glucose 89 74 - 99 mg/dL    BUN 8 7.0 - 18 MG/DL    Creatinine 0.88 0.6 - 1.3 MG/DL    BUN/Creatinine ratio 9 (L) 12 - 20      GFR est AA >60 >60 ml/min/1.73m2    GFR est non-AA >60 >60 ml/min/1.73m2    Calcium 9.3 8.5 - 10.1 MG/DL   SED RATE (ESR)    Collection Time: 05/31/19 10:53 PM   Result Value Ref Range    Sed rate, automated 14 0 - 20 mm/hr       Radiologic Studies -   Xr Shoulder Lt Ap/lat Min 2 V    Result Date: 5/31/2019  IMPRESSION: Negative shoulder. Medical Decision Making     ED Course: Progress Notes, Reevaluation, and Consults:    10:49 PM Initial assessment performed. The patients presenting problems have been discussed, and they/their family are in agreement with the care plan formulated and outlined with them. I have encouraged them to ask questions as they arise throughout their visit. 11:35 PMon reassessment patient is improved. I went over her lab work and x-rays which were reassuring so far. I explained that we still have not ruled out septic arthritis but the patient declined a diagnostic tap currently. She stated she will follow with her primary care doctor as well as orthopedics for reassessment, in case she needs additional MRI or maybe a tap then. Provider Notes (Medical Decision Making): Patient presents with atraumatic left shoulder pain. On exam.  Patient has nonspecific generalized tenderness to her left shoulder without any palpable effusion available for drainage. Patient initially declined a tap to evaluate for septic arthritis so we obtain lab work as well as x-ray.   Those were reassuring so on reassessment plus she was improved so she again declined a tap. After shared decision-making discussion, the plan will be for her to follow-up with her primary care doctor as well as orthopedics for further reassessment. She had questions answered and knew return precautions and will return if her symptoms did not change, worsen, do not improve, or any other concerns. Vital Signs-Reviewed the patient's vital signs. Reviewed pt's pulse ox reading. Records Reviewed: Nursing Notes and Old Medical Records (Time of Review: 10:49 PM)  -I am the first provider for this patient.  -I reviewed the vital signs, available nursing notes, past medical history, past surgical history, family history and social history. Current Outpatient Medications   Medication Sig Dispense Refill    HYDROcodone-acetaminophen (NORCO) 5-325 mg per tablet Take 1 Tab by mouth every eight (8) hours as needed for Pain for up to 3 days. Max Daily Amount: 3 Tabs. 10 Tab 0    budesonide-formoterol (SYMBICORT) 160-4.5 mcg/actuation HFAA TAKE 2 PUFFS BY INHALATION TWO (2) TIMES A DAY. INDICATIONS: MAINTENANCE THERAPY FOR ASTHMA 10.2 Inhaler 3    ibuprofen (MOTRIN) 800 mg tablet 1 tab q 6-8 hours PRN pain 15 Tab 0    omeprazole (PRILOSEC) 20 mg capsule Take one tab po daily  Indications: gastroesophageal reflux disease 30 Cap 2    Nebulizer & Compressor machine Use as directed 1 Each 0    Nebulizer Accessories kit Use as directed 1 Kit 1    albuterol (PROVENTIL) 5 mg/mL nebulizer solution 0.5 mL by Nebulization route every four (4) hours as needed for Wheezing or Shortness of Breath. Indications: Acute Asthma Attack, BRONCHOSPASM PREVENTION, EXERCISE-INDUCED BRONCHOSPASM PREVENTION 90 mL 3        Clinical Impression     Clinical Impression:   1.  Pain in joint of left shoulder        Disposition: [place] home

## 2019-09-17 ENCOUNTER — APPOINTMENT (OUTPATIENT)
Dept: OBGYN | Facility: CLINIC | Age: 49
End: 2019-09-17

## 2020-06-08 ENCOUNTER — APPOINTMENT (OUTPATIENT)
Dept: OBGYN | Facility: CLINIC | Age: 50
End: 2020-06-08
Payer: COMMERCIAL

## 2020-06-08 VITALS — DIASTOLIC BLOOD PRESSURE: 73 MMHG | SYSTOLIC BLOOD PRESSURE: 110 MMHG | HEIGHT: 70 IN | HEART RATE: 80 BPM

## 2020-06-08 DIAGNOSIS — N89.8 OTHER SPECIFIED NONINFLAMMATORY DISORDERS OF VAGINA: ICD-10-CM

## 2020-06-08 PROCEDURE — 99214 OFFICE O/P EST MOD 30 MIN: CPT

## 2020-06-08 NOTE — CHIEF COMPLAINT
[Follow Up] : follow up GYN visit [FreeTextEntry1] : pt with vag dryness and noted several painful vulvar lesions. No GI/ sxs,no blisteres. Recentlly dxed BPD

## 2020-06-08 NOTE — PHYSICAL EXAM
[Normal] : uterus [Atrophy] : atrophy [Uterine Adnexae] : were not tender and not enlarged [No Bleeding] : there was no active vaginal bleeding [de-identified] : along left labia several seb cysts--not infected

## 2020-06-08 NOTE — COUNSELING
[Breast Self Exam] : breast self exam [Nutrition] : nutrition [Exercise] : exercise [Vitamins/Supplements] : vitamins/supplements [Vulvar Hygiene] : vulvar hygiene [Pre/Post Op Instructions] : pre/post op instructions

## 2020-06-26 ENCOUNTER — APPOINTMENT (OUTPATIENT)
Dept: OBGYN | Facility: CLINIC | Age: 50
End: 2020-06-26
Payer: COMMERCIAL

## 2020-06-26 VITALS
HEIGHT: 70 IN | SYSTOLIC BLOOD PRESSURE: 130 MMHG | HEART RATE: 87 BPM | TEMPERATURE: 97.9 F | BODY MASS INDEX: 37.51 KG/M2 | WEIGHT: 262 LBS | DIASTOLIC BLOOD PRESSURE: 70 MMHG

## 2020-06-26 DIAGNOSIS — Z87.440 PERSONAL HISTORY OF URINARY (TRACT) INFECTIONS: ICD-10-CM

## 2020-06-26 DIAGNOSIS — R68.82 DECREASED LIBIDO: ICD-10-CM

## 2020-06-26 DIAGNOSIS — N94.818 OTHER VULVODYNIA: ICD-10-CM

## 2020-06-26 DIAGNOSIS — N90.7 VULVAR CYST: ICD-10-CM

## 2020-06-26 PROCEDURE — 56515 DESTROY VULVA LESION/S COMPL: CPT

## 2020-06-26 PROCEDURE — 99214 OFFICE O/P EST MOD 30 MIN: CPT | Mod: 25

## 2020-06-26 RX ORDER — AMOXICILLIN AND CLAVULANATE POTASSIUM 875; 125 MG/1; MG/1
875-125 TABLET, COATED ORAL
Qty: 20 | Refills: 0 | Status: DISCONTINUED | COMMUNITY
Start: 2018-05-24 | End: 2020-06-26

## 2020-06-26 RX ORDER — HYOSCYAMINE SULFATE 0.12 MG/1
0.12 TABLET ORAL
Qty: 180 | Refills: 0 | Status: DISCONTINUED | COMMUNITY
Start: 2017-05-16 | End: 2020-06-26

## 2020-06-26 RX ORDER — FLUTICASONE PROPIONATE 50 UG/1
50 SPRAY, METERED NASAL TWICE DAILY
Qty: 1 | Refills: 5 | Status: DISCONTINUED | COMMUNITY
Start: 2019-02-22 | End: 2020-06-26

## 2020-06-26 RX ORDER — HYOSCYAMINE SULFATE 0.12 MG/1
0.12 TABLET, ORALLY DISINTEGRATING ORAL
Refills: 0 | Status: DISCONTINUED | COMMUNITY
End: 2020-06-26

## 2020-06-26 RX ORDER — HYOSCYAMINE SULFATE 0.38 MG/1
0.38 TABLET, EXTENDED RELEASE ORAL
Qty: 120 | Refills: 1 | Status: DISCONTINUED | COMMUNITY
Start: 2018-02-09 | End: 2020-06-26

## 2020-06-26 RX ORDER — DULOXETINE HYDROCHLORIDE 30 MG/1
30 CAPSULE, DELAYED RELEASE PELLETS ORAL
Qty: 60 | Refills: 0 | Status: DISCONTINUED | COMMUNITY
Start: 2018-05-14 | End: 2020-06-26

## 2020-06-26 RX ORDER — CYCLOBENZAPRINE HYDROCHLORIDE 10 MG/1
10 TABLET, FILM COATED ORAL
Qty: 15 | Refills: 0 | Status: DISCONTINUED | COMMUNITY
Start: 2017-06-19 | End: 2020-06-26

## 2020-06-26 RX ORDER — CLONAZEPAM 0.5 MG/1
0.5 TABLET ORAL
Refills: 0 | Status: DISCONTINUED | COMMUNITY
End: 2020-06-26

## 2020-06-26 RX ORDER — BENZONATATE 200 MG/1
200 CAPSULE ORAL
Qty: 10 | Refills: 0 | Status: DISCONTINUED | COMMUNITY
Start: 2017-08-24 | End: 2020-06-26

## 2020-06-26 RX ORDER — ESCITALOPRAM OXALATE 20 MG/1
20 TABLET ORAL
Refills: 0 | Status: DISCONTINUED | COMMUNITY
End: 2020-06-26

## 2020-06-26 RX ORDER — HYDROXYZINE HYDROCHLORIDE 50 MG/1
50 TABLET ORAL
Refills: 0 | Status: DISCONTINUED | COMMUNITY
End: 2020-06-26

## 2020-06-26 RX ORDER — LURASIDONE HYDROCHLORIDE 60 MG/1
60 TABLET, FILM COATED ORAL
Refills: 0 | Status: DISCONTINUED | COMMUNITY
End: 2020-06-26

## 2020-06-26 RX ORDER — ESCITALOPRAM OXALATE 5 MG/1
TABLET, FILM COATED ORAL
Refills: 0 | Status: DISCONTINUED | COMMUNITY
End: 2020-06-26

## 2020-06-26 RX ORDER — NAPROXEN 500 MG/1
500 TABLET ORAL
Qty: 20 | Refills: 0 | Status: DISCONTINUED | COMMUNITY
Start: 2017-06-07 | End: 2020-06-26

## 2020-06-26 RX ORDER — BACLOFEN 10 MG/1
10 TABLET ORAL
Qty: 60 | Refills: 0 | Status: DISCONTINUED | COMMUNITY
Start: 2017-07-17 | End: 2020-06-26

## 2020-06-26 RX ORDER — MECLIZINE HYDROCHLORIDE 25 MG/1
25 TABLET ORAL
Refills: 0 | Status: DISCONTINUED | COMMUNITY
End: 2020-06-26

## 2020-06-26 RX ORDER — DOCUSATE SODIUM 100 MG/1
100 CAPSULE, LIQUID FILLED ORAL
Refills: 0 | Status: DISCONTINUED | COMMUNITY
End: 2020-06-26

## 2020-06-26 RX ORDER — KETOCONAZOLE 20 MG/G
2 CREAM TOPICAL
Qty: 60 | Refills: 0 | Status: DISCONTINUED | COMMUNITY
Start: 2017-12-11 | End: 2020-06-26

## 2020-06-26 RX ORDER — GABAPENTIN 300 MG/1
300 CAPSULE ORAL
Refills: 0 | Status: DISCONTINUED | COMMUNITY
End: 2020-06-26

## 2020-06-26 RX ORDER — CIPROFLOXACIN AND DEXAMETHASONE 3; 1 MG/ML; MG/ML
SUSPENSION/ DROPS AURICULAR (OTIC)
Refills: 0 | Status: DISCONTINUED | COMMUNITY
End: 2020-06-26

## 2020-06-26 RX ORDER — BUSPIRONE HCL 5 MG
5 TABLET ORAL
Refills: 0 | Status: DISCONTINUED | COMMUNITY
End: 2020-06-26

## 2020-06-26 RX ORDER — HYDROMORPHONE HYDROCHLORIDE 4 MG/1
4 TABLET ORAL
Qty: 30 | Refills: 0 | Status: DISCONTINUED | COMMUNITY
Start: 2017-06-19 | End: 2020-06-26

## 2020-06-26 RX ORDER — ALBUTEROL SULFATE 90 UG/1
108 (90 BASE) POWDER, METERED RESPIRATORY (INHALATION)
Qty: 1 | Refills: 0 | Status: DISCONTINUED | COMMUNITY
Start: 2017-08-24 | End: 2020-06-26

## 2020-06-26 RX ORDER — SUMATRIPTAN 50 MG/1
50 TABLET, FILM COATED ORAL
Qty: 9 | Refills: 0 | Status: DISCONTINUED | COMMUNITY
Start: 2017-09-08 | End: 2020-06-26

## 2020-06-26 RX ORDER — ONDANSETRON 4 MG/1
4 TABLET, ORALLY DISINTEGRATING ORAL
Refills: 0 | Status: DISCONTINUED | COMMUNITY
End: 2020-06-26

## 2020-06-26 RX ORDER — GABAPENTIN 300 MG/1
300 CAPSULE ORAL
Qty: 180 | Refills: 0 | Status: DISCONTINUED | COMMUNITY
Start: 2017-08-23 | End: 2020-06-26

## 2020-06-26 RX ORDER — NYSTATIN AND TRIAMCINOLONE ACETONIDE 100000; 1 [USP'U]/G; MG/G
100000-0.1 OINTMENT TOPICAL
Qty: 15 | Refills: 0 | Status: DISCONTINUED | COMMUNITY
Start: 2017-12-20 | End: 2020-06-26

## 2020-06-26 RX ORDER — DULOXETINE HYDROCHLORIDE 30 MG/1
30 CAPSULE, DELAYED RELEASE PELLETS ORAL
Refills: 0 | Status: DISCONTINUED | COMMUNITY
End: 2020-06-26

## 2020-06-26 RX ORDER — DICLOFENAC SODIUM 75 MG/1
75 TABLET, DELAYED RELEASE ORAL
Qty: 60 | Refills: 1 | Status: DISCONTINUED | COMMUNITY
Start: 2017-06-09 | End: 2020-06-26

## 2020-06-26 RX ORDER — CLONAZEPAM 2 MG/1
TABLET ORAL
Refills: 0 | Status: DISCONTINUED | COMMUNITY
End: 2020-06-26

## 2020-06-26 RX ORDER — PREDNISONE 20 MG/1
20 TABLET ORAL
Refills: 0 | Status: DISCONTINUED | COMMUNITY
End: 2020-06-26

## 2020-06-26 RX ORDER — OXYCODONE 5 MG/1
5 TABLET ORAL
Qty: 9 | Refills: 0 | Status: DISCONTINUED | COMMUNITY
Start: 2017-06-07 | End: 2020-06-26

## 2020-06-26 RX ORDER — OFLOXACIN OTIC 3 MG/ML
0.3 SOLUTION AURICULAR (OTIC)
Qty: 2 | Refills: 0 | Status: DISCONTINUED | COMMUNITY
Start: 2017-09-26 | End: 2020-06-26

## 2020-06-26 RX ORDER — AZITHROMYCIN 250 MG/1
250 TABLET, FILM COATED ORAL
Qty: 1 | Refills: 0 | Status: DISCONTINUED | COMMUNITY
Start: 2017-06-02 | End: 2020-06-26

## 2020-06-27 PROBLEM — R68.82 LOW LIBIDO: Status: ACTIVE | Noted: 2020-06-27
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
## 2020-06-27 NOTE — PHYSICAL EXAM
[Normal] : uterus [de-identified] : Approx 10 seb cysts on left labium majus-various sizes,Rt labium majus  3 seb cysts

## 2020-06-27 NOTE — REVIEW OF SYSTEMS
[Feeling Tired] : feeling tired [Anxiety] : anxiety [Nl] : Hematologic/Lymphatic [FreeTextEntry1] : vulvar pain

## 2020-06-27 NOTE — COUNSELING
[Breast Self Exam] : breast self exam [Vitamins/Supplements] : vitamins/supplements [Nutrition] : nutrition [Exercise] : exercise [Vulvar Hygiene] : vulvar hygiene [Pain Management] : pain management

## 2020-06-28 LAB — BACTERIA UR CULT: NORMAL

## 2020-06-29 ENCOUNTER — EMERGENCY (EMERGENCY)
Facility: HOSPITAL | Age: 50
LOS: 0 days | Discharge: PSYCHIATRIC OR SUBSTANCE USE FACILITY | End: 2020-06-30
Attending: EMERGENCY MEDICINE
Payer: COMMERCIAL

## 2020-06-29 VITALS
HEIGHT: 70 IN | DIASTOLIC BLOOD PRESSURE: 77 MMHG | RESPIRATION RATE: 19 BRPM | HEART RATE: 76 BPM | SYSTOLIC BLOOD PRESSURE: 140 MMHG | TEMPERATURE: 98 F | OXYGEN SATURATION: 98 % | WEIGHT: 261.91 LBS

## 2020-06-29 DIAGNOSIS — Z98.51 TUBAL LIGATION STATUS: Chronic | ICD-10-CM

## 2020-06-29 DIAGNOSIS — F31.4 BIPOLAR DISORDER, CURRENT EPISODE DEPRESSED, SEVERE, WITHOUT PSYCHOTIC FEATURES: ICD-10-CM

## 2020-06-29 DIAGNOSIS — Z98.89 OTHER SPECIFIED POSTPROCEDURAL STATES: Chronic | ICD-10-CM

## 2020-06-29 DIAGNOSIS — K56.60 UNSPECIFIED INTESTINAL OBSTRUCTION: Chronic | ICD-10-CM

## 2020-06-29 LAB
ANION GAP SERPL CALC-SCNC: 4 MMOL/L — LOW (ref 5–17)
APAP SERPL-MCNC: <2 UG/ML — LOW (ref 10–30)
APPEARANCE UR: CLEAR — SIGNIFICANT CHANGE UP
BASOPHILS # BLD AUTO: 0.04 K/UL — SIGNIFICANT CHANGE UP (ref 0–0.2)
BASOPHILS NFR BLD AUTO: 0.5 % — SIGNIFICANT CHANGE UP (ref 0–2)
BILIRUB UR-MCNC: NEGATIVE — SIGNIFICANT CHANGE UP
BUN SERPL-MCNC: 19 MG/DL — SIGNIFICANT CHANGE UP (ref 7–23)
CALCIUM SERPL-MCNC: 8.6 MG/DL — SIGNIFICANT CHANGE UP (ref 8.5–10.1)
CHLORIDE SERPL-SCNC: 110 MMOL/L — HIGH (ref 96–108)
CO2 SERPL-SCNC: 26 MMOL/L — SIGNIFICANT CHANGE UP (ref 22–31)
COLOR SPEC: YELLOW — SIGNIFICANT CHANGE UP
CREAT SERPL-MCNC: 0.84 MG/DL — SIGNIFICANT CHANGE UP (ref 0.5–1.3)
DIFF PNL FLD: NEGATIVE — SIGNIFICANT CHANGE UP
EOSINOPHIL # BLD AUTO: 0.1 K/UL — SIGNIFICANT CHANGE UP (ref 0–0.5)
EOSINOPHIL NFR BLD AUTO: 1.3 % — SIGNIFICANT CHANGE UP (ref 0–6)
EPI CELLS # UR: SIGNIFICANT CHANGE UP
ETHANOL SERPL-MCNC: <10 MG/DL — SIGNIFICANT CHANGE UP (ref 0–10)
GLUCOSE SERPL-MCNC: 86 MG/DL — SIGNIFICANT CHANGE UP (ref 70–99)
GLUCOSE UR QL: NEGATIVE MG/DL — SIGNIFICANT CHANGE UP
HCT VFR BLD CALC: 35.9 % — SIGNIFICANT CHANGE UP (ref 34.5–45)
HGB BLD-MCNC: 12.1 G/DL — SIGNIFICANT CHANGE UP (ref 11.5–15.5)
IMM GRANULOCYTES NFR BLD AUTO: 0.9 % — SIGNIFICANT CHANGE UP (ref 0–1.5)
KETONES UR-MCNC: NEGATIVE — SIGNIFICANT CHANGE UP
LEUKOCYTE ESTERASE UR-ACNC: ABNORMAL
LYMPHOCYTES # BLD AUTO: 2.96 K/UL — SIGNIFICANT CHANGE UP (ref 1–3.3)
LYMPHOCYTES # BLD AUTO: 39.1 % — SIGNIFICANT CHANGE UP (ref 13–44)
MCHC RBC-ENTMCNC: 30.6 PG — SIGNIFICANT CHANGE UP (ref 27–34)
MCHC RBC-ENTMCNC: 33.7 GM/DL — SIGNIFICANT CHANGE UP (ref 32–36)
MCV RBC AUTO: 90.9 FL — SIGNIFICANT CHANGE UP (ref 80–100)
MONOCYTES # BLD AUTO: 0.53 K/UL — SIGNIFICANT CHANGE UP (ref 0–0.9)
MONOCYTES NFR BLD AUTO: 7 % — SIGNIFICANT CHANGE UP (ref 2–14)
NEUTROPHILS # BLD AUTO: 3.87 K/UL — SIGNIFICANT CHANGE UP (ref 1.8–7.4)
NEUTROPHILS NFR BLD AUTO: 51.2 % — SIGNIFICANT CHANGE UP (ref 43–77)
NITRITE UR-MCNC: NEGATIVE — SIGNIFICANT CHANGE UP
NRBC # BLD: 0 /100 WBCS — SIGNIFICANT CHANGE UP (ref 0–0)
PH UR: 5 — SIGNIFICANT CHANGE UP (ref 5–8)
PLATELET # BLD AUTO: 213 K/UL — SIGNIFICANT CHANGE UP (ref 150–400)
POTASSIUM SERPL-MCNC: 3.7 MMOL/L — SIGNIFICANT CHANGE UP (ref 3.5–5.3)
POTASSIUM SERPL-SCNC: 3.7 MMOL/L — SIGNIFICANT CHANGE UP (ref 3.5–5.3)
PROT UR-MCNC: NEGATIVE MG/DL — SIGNIFICANT CHANGE UP
RBC # BLD: 3.95 M/UL — SIGNIFICANT CHANGE UP (ref 3.8–5.2)
RBC # FLD: 13.2 % — SIGNIFICANT CHANGE UP (ref 10.3–14.5)
SALICYLATES SERPL-MCNC: <1.7 MG/DL — LOW (ref 2.8–20)
SODIUM SERPL-SCNC: 140 MMOL/L — SIGNIFICANT CHANGE UP (ref 135–145)
SP GR SPEC: 1.01 — SIGNIFICANT CHANGE UP (ref 1.01–1.02)
TSH SERPL-MCNC: 0.55 UU/ML — SIGNIFICANT CHANGE UP (ref 0.36–3.74)
UROBILINOGEN FLD QL: NEGATIVE MG/DL — SIGNIFICANT CHANGE UP
WBC # BLD: 7.57 K/UL — SIGNIFICANT CHANGE UP (ref 3.8–10.5)
WBC # FLD AUTO: 7.57 K/UL — SIGNIFICANT CHANGE UP (ref 3.8–10.5)
WBC UR QL: SIGNIFICANT CHANGE UP

## 2020-06-29 PROCEDURE — 93010 ELECTROCARDIOGRAM REPORT: CPT

## 2020-06-29 PROCEDURE — 99285 EMERGENCY DEPT VISIT HI MDM: CPT

## 2020-06-29 PROCEDURE — 90792 PSYCH DIAG EVAL W/MED SRVCS: CPT | Mod: 95

## 2020-06-29 RX ORDER — KETOROLAC TROMETHAMINE 30 MG/ML
30 SYRINGE (ML) INJECTION ONCE
Refills: 0 | Status: DISCONTINUED | OUTPATIENT
Start: 2020-06-29 | End: 2020-06-29

## 2020-06-29 RX ORDER — CLONAZEPAM 1 MG
0.5 TABLET ORAL ONCE
Refills: 0 | Status: DISCONTINUED | OUTPATIENT
Start: 2020-06-29 | End: 2020-06-29

## 2020-06-29 RX ORDER — METHOCARBAMOL 500 MG/1
1500 TABLET, FILM COATED ORAL ONCE
Refills: 0 | Status: COMPLETED | OUTPATIENT
Start: 2020-06-29 | End: 2020-06-29

## 2020-06-29 RX ORDER — OXYCODONE HYDROCHLORIDE 5 MG/1
5 TABLET ORAL ONCE
Refills: 0 | Status: DISCONTINUED | OUTPATIENT
Start: 2020-06-29 | End: 2020-06-29

## 2020-06-29 RX ADMIN — Medication 30 MILLIGRAM(S): at 17:09

## 2020-06-29 RX ADMIN — Medication 30 MILLIGRAM(S): at 14:35

## 2020-06-29 RX ADMIN — Medication 0.5 MILLIGRAM(S): at 20:50

## 2020-06-29 RX ADMIN — OXYCODONE HYDROCHLORIDE 5 MILLIGRAM(S): 5 TABLET ORAL at 19:46

## 2020-06-29 RX ADMIN — METHOCARBAMOL 1500 MILLIGRAM(S): 500 TABLET, FILM COATED ORAL at 16:48

## 2020-06-29 NOTE — ED BEHAVIORAL HEALTH ASSESSMENT NOTE - PSYCHIATRIC ISSUES AND PLAN (INCLUDE STANDING AND PRN MEDICATION)
txfer when bed available. ativan prn . if in ED for several more hours may give pm doses of topamax, klonopin, latuda

## 2020-06-29 NOTE — ED ADULT TRIAGE NOTE - CHIEF COMPLAINT QUOTE
patient c/o of R leg numbness  and pain for 2 weeks, patient also stated " I have SI on and off " patient was discharge from psych unit 1 month ago , denied HI   at this time , c/o of anxiety , patient  stated " they change my medication and I have all this feelings " , denied chest pain denied difficulty breathing denied cough at the time of triage

## 2020-06-29 NOTE — ED ADULT NURSE NOTE - CAS DISCH ACCOMP BY
Group Therapy Documentation    PATIENT'S NAME: Luisana Osborn  MRN:   [id number]  :   2000  ACCT. NUMBER: [id number]  DATE OF SERVICE: 20  START TIME:  5:00 PM  END TIME:  6:00 PM  FACILITATOR(S): Ifrah Matthews, [facility]; [facility]ala [facility]  TOPIC: BEH Group Therapy  Number of patients attending the group:  3  Group Length:  1 Hours    Dimensions addressed 3, 4, 5, and 6    Summary of Group / Topics Discussed:    Group Therapy/Process Group:  VERENICE Process Group      Group Attendance:  Attended group session    Patient's response to the group topic/interactions:  cooperative with task and discussed personal experience with topic    Patient appeared to be Engaged.       Client specific details: client's engaged in a group discussion about relationships and communication. Client talked about her relationship with her father and how this has impacted her life up until now. She also acknowledged that it is difficult for her to trust people based on past relationships.     Self

## 2020-06-29 NOTE — ED BEHAVIORAL HEALTH ASSESSMENT NOTE - DESCRIPTION
see BH note chronic back pain, hx of gastric bypass with complications, fibromyalgia, IBS, GERD on disability

## 2020-06-29 NOTE — ED PROVIDER NOTE - CLINICAL SUMMARY MEDICAL DECISION MAKING FREE TEXT BOX
patient with labrum tear; wants cortisone injection; follows with [facility]; will speak to their pa  patient with anxiety, c/o 'strange feeling's, will check labs and telepsych eval

## 2020-06-29 NOTE — ED ADULT NURSE NOTE - NS_BH TRG QUESTION7_ED_ALL_ED
Erika (includes Bipolar Disorder)/Anxiety (includes Panic, OCD)/Depression (without Suicidality or Psychosis)

## 2020-06-29 NOTE — ED BEHAVIORAL HEALTH ASSESSMENT NOTE - OTHER PAST PSYCHIATRIC HISTORY (INCLUDE DETAILS REGARDING ONSET, COURSE OF ILLNESS, INPATIENT/OUTPATIENT TREATMENT)
diagnosis of bipolar disorder.  two prior hospitalizations - [facility] in May 2020; [facility] in 2009  three reported prior aborted/partial suicide attempts

## 2020-06-29 NOTE — ED BEHAVIORAL HEALTH NOTE - BEHAVIORAL HEALTH NOTE
===================  PRE-HOSPITAL COURSE  ===================  SOURCE:  RN and triage documentation.   DETAILS:  Patient presented self to ED; chief complaint of depression/SI and back pain.      ============  ED COURSE   ============  SOURCE:  RN and triage documentation.   ARRIVAL:  Patient was cooperative with triage process and allowed for gowning/wanding without incident. Patient presents with good hygiene/grooming.   BELONGINGS: No notable belongings; items stowed with security.   BEHAVIOR: Patient has been cooperative while in ED and has provided blood for routine labs without incident; urine sample has not been provided yet. Patient presently endorsing SI with plan to crash her care into an electrical pole; patient has been tearful and sad while in ED. Patient presently denying HI/AH/VH. Patient's speech is of normal rate/volume; patient is AOx4 however seems lethargic at times. Patient has been resting in hospital bed and has not eaten or drank.   TREATMENT:  Toradol 30mg IM for back pain.   VISITORS:  Patient is unaccompanied by social supports while in ED.
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
## 2020-06-29 NOTE — ED ADULT NURSE NOTE - ED STAT RN HANDOFF DETAILS
Report received from RAMÓN Patrick at 7pm. Assessment available on KB. will continue to monitor. 1:1 constant observation continues. pt is awake and calm at this time. will continue to monitor

## 2020-06-29 NOTE — ED PROVIDER NOTE - PROGRESS NOTE DETAILS
case d/w Sage MORRISON who states patient needs to follow at [facility] for cortisone injection  patient requesting percocet for pain; explained that toradol was given for pain at this time, and patient waiting psych eval d/w telepsych who will evaluate patient  -md marva patient doesn't take any daily medications, other than her psych meds  she reports a stool softener PRN and something for sleep, doesn't know name patient will need psych transfer  awaiting covid results  -md marva pt. awaiting psych transfer this AM  covid resulted, negative  no events over night pt. awaiting psych transfer this AM  covid swab pending  no events over night covid neg, psych for transfer to [facility]

## 2020-06-29 NOTE — ED PROVIDER NOTE - CARE PLAN
Principal Discharge DX:	Anxiety  Secondary Diagnosis:	Arthralgia Principal Discharge DX:	Depression  Secondary Diagnosis:	Arthralgia

## 2020-06-29 NOTE — ED ADULT NURSE NOTE - ED STAT RN HANDOFF DETAILS 2
Report endorsed to oncoming RN Darian. Safety checks completed this shift. Safety rounds completed hourly. Medications administered as ordered with no signs/symptoms of adverse reactions. Fall & skin precautions in place. Any issues endorsed to oncoming RN for follow up. awaiting transfer. 1:1 constant observation continues
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
## 2020-06-29 NOTE — ED PROVIDER NOTE - OBJECTIVE STATEMENT
50 yo female with hx fibromyalgia, chronic back pain, anxiety, c/o feeling anxious and 'strange feelings in my head'. Patient states she follows with a psychiatrist at [facility], and her medications were recently changed. Patient states she spoke to her psychiatrist x 2 days ago, and was told if feelings continue, to go to the ER  patient c/o feeling depressed, and when asked if suicidal, patient states " I have these feelings"  denies drug or alcohol use  patient also requesting cortisone injection to right hip  patient states she follows with [facility] and was told to go to [facility] for a cortisone injection. Patient states she didn't want to wait there so came to ED

## 2020-06-29 NOTE — ED ADULT TRIAGE NOTE - NS_BH TRG QUESTION7_ED_ALL_ED
Depression (without Suicidality or Psychosis)/Erika (includes Bipolar Disorder)/Anxiety (includes Panic, OCD)

## 2020-06-29 NOTE — ED ADULT NURSE NOTE - OBJECTIVE STATEMENT
Received pt lying on stretcher with 1:1, alert and oriented x4 , tearful states" pt my brain has been racing with suicidal toughs for 2 weeks I feel  like driving my car to an electrical pole  my  and my kids blame for everything , I feel overly sensitive and I don't know how to control my emotion

## 2020-06-30 VITALS
RESPIRATION RATE: 16 BRPM | HEART RATE: 59 BPM | SYSTOLIC BLOOD PRESSURE: 108 MMHG | TEMPERATURE: 98 F | OXYGEN SATURATION: 100 % | DIASTOLIC BLOOD PRESSURE: 58 MMHG

## 2020-06-30 DIAGNOSIS — K21.9 GASTRO-ESOPHAGEAL REFLUX DISEASE WITHOUT ESOPHAGITIS: ICD-10-CM

## 2020-06-30 DIAGNOSIS — F43.10 POST-TRAUMATIC STRESS DISORDER, UNSPECIFIED: ICD-10-CM

## 2020-06-30 DIAGNOSIS — F31.4 BIPOLAR DISORDER, CURRENT EPISODE DEPRESSED, SEVERE, WITHOUT PSYCHOTIC FEATURES: ICD-10-CM

## 2020-06-30 DIAGNOSIS — M25.50 PAIN IN UNSPECIFIED JOINT: ICD-10-CM

## 2020-06-30 DIAGNOSIS — Z79.1 LONG TERM (CURRENT) USE OF NON-STEROIDAL ANTI-INFLAMMATORIES (NSAID): ICD-10-CM

## 2020-06-30 DIAGNOSIS — M79.7 FIBROMYALGIA: ICD-10-CM

## 2020-06-30 DIAGNOSIS — G89.29 OTHER CHRONIC PAIN: ICD-10-CM

## 2020-06-30 DIAGNOSIS — F32.9 MAJOR DEPRESSIVE DISORDER, SINGLE EPISODE, UNSPECIFIED: ICD-10-CM

## 2020-06-30 DIAGNOSIS — Z98.84 BARIATRIC SURGERY STATUS: ICD-10-CM

## 2020-06-30 DIAGNOSIS — Z88.2 ALLERGY STATUS TO SULFONAMIDES: ICD-10-CM

## 2020-06-30 DIAGNOSIS — M54.5 LOW BACK PAIN: ICD-10-CM

## 2020-06-30 DIAGNOSIS — F41.9 ANXIETY DISORDER, UNSPECIFIED: ICD-10-CM

## 2020-06-30 DIAGNOSIS — Z79.52 LONG TERM (CURRENT) USE OF SYSTEMIC STEROIDS: ICD-10-CM

## 2020-06-30 DIAGNOSIS — D64.9 ANEMIA, UNSPECIFIED: ICD-10-CM

## 2020-06-30 LAB
SARS-COV-2 IGG SERPL QL IA: NEGATIVE — SIGNIFICANT CHANGE UP
SARS-COV-2 IGM SERPL IA-ACNC: 0.01 INDEX — SIGNIFICANT CHANGE UP
SARS-COV-2 RNA SPEC QL NAA+PROBE: SIGNIFICANT CHANGE UP
SARS-COV-2 RNA SPEC QL NAA+PROBE: SIGNIFICANT CHANGE UP

## 2020-06-30 RX ORDER — KETOROLAC TROMETHAMINE 30 MG/ML
60 SYRINGE (ML) INJECTION ONCE
Refills: 0 | Status: DISCONTINUED | OUTPATIENT
Start: 2020-06-30 | End: 2020-06-30

## 2020-06-30 RX ORDER — CLONAZEPAM 1 MG
0.5 TABLET ORAL ONCE
Refills: 0 | Status: DISCONTINUED | OUTPATIENT
Start: 2020-06-30 | End: 2020-06-30

## 2020-06-30 RX ADMIN — Medication 0.5 MILLIGRAM(S): at 06:58

## 2020-06-30 RX ADMIN — Medication 60 MILLIGRAM(S): at 10:23

## 2020-06-30 RX ADMIN — Medication 1 MILLIGRAM(S): at 11:28

## 2020-06-30 NOTE — ED ADULT NURSE REASSESSMENT NOTE - NS ED NURSE REASSESS COMMENT FT1
Assuming care from previous RN Cathy, pt is A&OX4, pt safety maintained, denies SOB, resp even and unlabored, skin warm and dry, color is normal for race, denies pain/n/v, pt aware of plan of care and proficiency determined by successful teach back demonstration. Pt does not appear to be in any distress at this time. Pt had second covid swab performed and is awaiting placement to a facility, pt remains on a 1:1 for safety.

## 2020-06-30 NOTE — ED BEHAVIORAL HEALTH NOTE - BEHAVIORAL HEALTH NOTE
Patient reassessed by telepsychiatry. She reports poor sleep last night 2/2 feeling anxious/restless and having hip and spine pain. She reports ongoing sense of hopelessness and suicidal ideation, states that she cannot care for herself or her kids in this state and "I'm making them sick too" referring to her 17 y/o daughter recently being diagnosed with depression. States that her  "doesn't want me anymore" and has no supports.     A/P: At this time pt would benefit from inpt hospitalization for safety and stabilization. Will admit to [facility] on 9.13 legal status. Handoff provided to PRINCE Conner. Can give Ativan 1mg now prior to transfer for anxiety. Further plan as per inpt team.

## 2020-06-30 NOTE — ED ADULT NURSE REASSESSMENT NOTE - NS ED NURSE REASSESS COMMENT FT1
Pt discharged and transferred to other facility, report given to Patsy MELGAR from [facility], pts history was reviewed and reason for admission discussed, pt agrees to admission and is willful in process, pt is groggy, but not in any pain or distress at this time, pt state relief from medication administered, Pt placed on the stretcher for transport, paperwork for transport was completed and vital signs was recorded in the EMR and the transfer paperwork, pt transported by Claude from EMS.

## 2020-06-30 NOTE — ED ADULT NURSE REASSESSMENT NOTE - NS ED NURSE REASSESS COMMENT FT1
Report given to EMS for patient transport, report given to Willi in EMS central and EMS crew at time of transport, pts current status as well as a detailed medical history, VS recorded and placed in the EMR. Education for patient deemed successful with teach back demonstration. Paperwork completed, transfer form filled out and copy provided to EMS crew.
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
## 2020-07-28 ENCOUNTER — APPOINTMENT (OUTPATIENT)
Dept: OBGYN | Facility: CLINIC | Age: 50
End: 2020-07-28
Payer: COMMERCIAL

## 2020-07-28 DIAGNOSIS — N95.9 UNSPECIFIED MENOPAUSAL AND PERIMENOPAUSAL DISORDER: ICD-10-CM

## 2020-07-28 PROCEDURE — 99213 OFFICE O/P EST LOW 20 MIN: CPT | Mod: 95

## 2020-07-28 NOTE — REVIEW OF SYSTEMS
[Feeling Tired] : feeling tired [Anxiety] : anxiety [Hot Flashes] : hot flashes [Nl] : Hematologic/Lymphatic [FreeTextEntry1] : vulvar pain

## 2020-07-29 ENCOUNTER — EMERGENCY (EMERGENCY)
Facility: HOSPITAL | Age: 50
LOS: 1 days | Discharge: HOME | End: 2020-07-29
Admitting: EMERGENCY MEDICINE
Payer: COMMERCIAL

## 2020-07-29 VITALS
HEART RATE: 85 BPM | RESPIRATION RATE: 18 BRPM | DIASTOLIC BLOOD PRESSURE: 75 MMHG | OXYGEN SATURATION: 98 % | SYSTOLIC BLOOD PRESSURE: 134 MMHG | TEMPERATURE: 98 F

## 2020-07-29 DIAGNOSIS — Z98.51 TUBAL LIGATION STATUS: Chronic | ICD-10-CM

## 2020-07-29 DIAGNOSIS — Z98.89 OTHER SPECIFIED POSTPROCEDURAL STATES: Chronic | ICD-10-CM

## 2020-07-29 DIAGNOSIS — F39 UNSPECIFIED MOOD [AFFECTIVE] DISORDER: ICD-10-CM

## 2020-07-29 DIAGNOSIS — F48.9 NONPSYCHOTIC MENTAL DISORDER, UNSPECIFIED: ICD-10-CM

## 2020-07-29 DIAGNOSIS — K56.60 UNSPECIFIED INTESTINAL OBSTRUCTION: Chronic | ICD-10-CM

## 2020-07-29 PROCEDURE — 99285 EMERGENCY DEPT VISIT HI MDM: CPT

## 2020-07-29 PROCEDURE — 90792 PSYCH DIAG EVAL W/MED SRVCS: CPT

## 2020-07-29 NOTE — ED BEHAVIORAL HEALTH NOTE - BEHAVIORAL HEALTH NOTE
Worker called patient’s spouse Gabino Alvarado ([phone]) for collateral information. All information is as per Mr. Alvarado:    Patient is a 49 year old female, domiciled with spouse and three children (ages 17, 15, and 6 year old), connected to a psychiatrist and neurologist, BIB as a walk in for increased depression and SI. Mr. Alvarado states that the patient was recently admitted to [facility] almost 2 weeks ago for a 2 week admission. He states that when the patient was discharged she was doing well and was calm. He states that recently the patient has been saying that she does not feel right and she is not right in the head. He states that when the patient was admitted and discharged her medications changed as well. He states that the patient sees Dr. Gaitan at [facility]. He states that the patient spoke to her neurologist via video call yesterday and the patient reported that she did say the s word (suicidal) but did not have plan or intent. He states that the patient did mentioned having suicidal thoughts but did not have plan or intent. He denies any safety concerns for their children and states that the children are currently safe. He states that the patient is not on any drugs or alcohol. He states that when the patient was admitted to [facility] she squeezed her neck but denies that she has had any other SA. He states that the patient has been eating, showering, and sleeping at her baseline. Case discussed with Dr. Schafer. Worker called patient’s spouse Gabino Alvarado ([phone]) for collateral information. All information is as per Mr. Alvraado:    Patient is a 49 year old female, domiciled with spouse and three children (ages 17, 15, and 6 year old), connected to a psychiatrist and neurologist, BIB as a walk in for increased depression and SI. Mr. Alvarado states that the patient was recently admitted to [facility] almost 2 weeks ago for a 2 week admission. He states that when the patient was discharged she was doing well and was calm. He states that recently the patient has been saying that she does not feel right and she is not right in the head. He states that when the patient was admitted and discharged her medications changed as well. He states that the patient sees Dr. Gaitan at [facility]. He states that the patient spoke to her neurologist via video call yesterday and the patient reported that she did say the s word (suicidal) but did not have plan or intent. He states that the patient did mentioned having suicidal thoughts but did not have plan or intent. He denies any safety concerns for their children and states that the children are currently safe. He states that the patient is not on any drugs or alcohol. He states that when the patient was admitted to [facility] she squeezed her neck but denies that she has had any other SA. He states that the patient has been eating, showering, and sleeping at her baseline. Case discussed with Dr. Schafer. Patient is cleared psychiatrically. Spouse will  patient in 30 minutes.

## 2020-07-29 NOTE — ED PROVIDER NOTE - CLINICAL SUMMARY MEDICAL DECISION MAKING FREE TEXT BOX
This is  a49 yr old F, pmh Anxity, PTSD with c/o sever anxiety and self injuries tendency. Pt admits she was hospitalized recently at [facility]. She reports medication changes, discontinue her lexapro and started on abilify. Pt states since then she is jittery and has SI and self injuries thoughts, wants to cut her wrist. She states has a loving family 3 children (17 1/2 yr, 16 and 6 yr old) and , but they don't communicate very well. Pt sad and tearful. Psych consult requested. This is  a49 yr old F, pmh Anxity, PTSD with c/o sever anxiety and self injuries tendency. Pt admits she was hospitalized recently at [facility]. She reports medication changes, discontinue her lexapro and started on abilify. Pt states since then she is jittery and has SI and self injuries thoughts, wants to cut her wrist. She states has a loving family 3 children (17 1/2 yr, 16 and 6 yr old) and , but they don't communicate very well. Pt sad and tearful. Psych consult requested.- recommendation out patient follow up. There is no clinical evidence of intoxication, or any acute medical problem requiring immediate intervention.   will  patient.

## 2020-07-29 NOTE — ED BEHAVIORAL HEALTH ASSESSMENT NOTE - VIOLENCE PROTECTIVE FACTORS:
Engagement in treatment/Insight into violence risk and need for management/treatment/Relationship stability/Residential stability

## 2020-07-29 NOTE — ED BEHAVIORAL HEALTH ASSESSMENT NOTE - SUICIDE PROTECTIVE FACTORS
Has future plans/Positive therapeutic relationships/Supportive social network of family or friends/Identifies reasons for living/Responsibility to family and others

## 2020-07-29 NOTE — ED BEHAVIORAL HEALTH ASSESSMENT NOTE - RISK ASSESSMENT
Risk factors include history of aborted suicide attempts, h/o psychiatric admissions, symptoms of depression and anxiety, borderline traits.   Protective factors include no suicide attempts, no violence history, medication compliance, no access to guns, no global insomnia, no substance abuse, supportive family, willingness to seek help, no suicidal ideation or homicidal ideation, hopefulness for future.  Pt is not at acutely elevated risk of harm to self or others. Low Acute Suicide Risk

## 2020-07-29 NOTE — ED BEHAVIORAL HEALTH ASSESSMENT NOTE - DETAILS
6/29-mid July 2020 at [facility] see hpi daughter and son have depression self-referred;  informed of dispo unavailable at this time ages 17, 16, 6

## 2020-07-29 NOTE — ED PROVIDER NOTE - OBJECTIVE STATEMENT
This is  a49 yr old F, pmh Anxity, PTSD with c/o sever anxiety and self injuries tendency. Pt admits she was hospitalized recently at [facility]. She reports medication changes, discontinue her lexapro and started on abilify. Pt states since then she is jittery and has SI and self injuries thoughts, wants to cut her wrist. She states has a loving family 3 children (17 1/2 yr, 16 and 6 yr old) and , but they don't communicate very well. Pt sad and tearful.

## 2020-07-29 NOTE — ED ADULT NURSE REASSESSMENT NOTE - NS ED NURSE REASSESS COMMENT FT1
Pt discharged to home by provider (VIRY Caldera). Discharge instructions and resources provided. Pt verbalized understanding. Family will pick him up. Alert, oriented, and ambulatory prior to departure. Pt discharged to home by provider (VIRY Caldera). Discharge instructions and resources provided. Pt verbalized understanding. Family will  pt. Alert, oriented, and ambulatory prior to departure.

## 2020-07-29 NOTE — ED BEHAVIORAL HEALTH ASSESSMENT NOTE - DESCRIPTION
calm, cooperative, in good behavioral control    Vital Signs Last 24 Hrs  T(C): 36.7 (29 Jul 2020 13:36), Max: 36.7 (29 Jul 2020 13:36)  T(F): 98 (29 Jul 2020 13:36), Max: 98 (29 Jul 2020 13:36)  HR: 85 (29 Jul 2020 13:36) (85 - 85)  BP: 134/75 (29 Jul 2020 13:36) (134/75 - 134/75)  BP(mean): --  RR: 18 (29 Jul 2020 13:36) (18 - 18)  SpO2: 98% (29 Jul 2020 13:36) (98% - 98%) chronic back pain, hx of gastric bypass with complications, fibromyalgia, IBS, GERD on disability

## 2020-07-29 NOTE — ED BEHAVIORAL HEALTH ASSESSMENT NOTE - HPI (INCLUDE ILLNESS QUALITY, SEVERITY, DURATION, TIMING, CONTEXT, MODIFYING FACTORS, ASSOCIATED SIGNS AND SYMPTOMS)
49 y o  female, , domiciled with  and three children, on disability, hx of bipolar d/o, 3 prior psychiatric hospitalizations, most recently 6/29-mid July 2020 at [facility], three lifetime aborted suicide attempts (put knife to her wrists; tied electrical cord around neck; almost turned wheel and drove car into a pole), followed by Dr. Gaitan at [facility], no h/o violence or legal issues, multiple medical problems including chronic back pain, hx of gastric bypass with complications, fibromyalgia, IBS, GERD, no h/o substance abuse, self-presents reporting thoughts of self-harm.   Patient states she decided to come to the ED today because "my medications aren't working right." States she is taking Abilify and Klonopin but continues to experience intermittent anxiety and depression. She reports medication compliance. States her anxiety and depression has been more frequent since COVID-19 started in March 2020. She cites being in close quarters with family as primary stressor. States they have been having more disagreements/family issues (ie pt and  are trying to get teenage daughter to stop using marijuana). She reports restlessness, difficulty concentrating, and lower energy. For the past 2 weeks, she reports intermittent thoughts of cutting her wrist to distract herself from her emotions. States she most recently had these thoughts this morning. She currently denies thoughts of cutting/self-harm. She denies h/o cutting. She adamantly denies wanting to kill herself. She denies any suicidal ideation, intent, or plan. She cites her 3 children as her primary protective factors. She denies persistently depressed mood. She denies significant changes in sleep or appetite. She denies anhedonia or hopelessness. She denies manic or psychotic symptoms. She denies homicidal or violent ideation, intent, or plan. She reports consuming 1-2 alcoholic drinks once or twice per month. States she last drank a few days ago (drank 1 corinne). She denies drug use.   See  note for collateral.

## 2020-07-29 NOTE — ED PROVIDER NOTE - PATIENT PORTAL LINK FT
You can access the FollowMyHealth Patient Portal offered by [facility] by registering at the following website: [email or web address]. By joining [employer]’s FollowMyHealth portal, you will also be able to view your health information using other applications (apps) compatible with our system.

## 2020-07-29 NOTE — ED BEHAVIORAL HEALTH ASSESSMENT NOTE - SUMMARY
49 y o  female, , domiciled with  and three children, on disability, hx of bipolar d/o, 3 prior psychiatric hospitalizations, most recently 6/29-mid July 2020 at [facility], three lifetime aborted suicide attempts (put knife to her wrists; tied electrical cord around neck; almost turned wheel and drove car into a pole), followed by Dr. Gaitan at [facility], no h/o violence or legal issues, multiple medical problems including chronic back pain, hx of gastric bypass with complications, fibromyalgia, IBS, GERD, no h/o substance abuse, self-presents reporting thoughts of self-harm.  Pt currently denies thoughts of cutting/self-harm, and she adamantly denies any suicidal ideation, intent, or plan. She identifies reasons for living, is future-oriented, and engages in safety planning. She is not manic, psychotic, or acutely depressed. Pt's  denies acute safety concerns. Pt does not present an acute danger to self or others and does not meet criteria for involuntary psychiatric admission at this time. Pt declines voluntary psychiatric admission at this time.

## 2020-07-29 NOTE — ED ADULT TRIAGE NOTE - CHIEF COMPLAINT QUOTE
p/t with hx depression, c/o of increased depression and SI with plan, p/t appears calm and cooperative @ present

## 2020-07-30 NOTE — ED BEHAVIORAL HEALTH NOTE - BEHAVIORAL HEALTH NOTE
High Risk Log: Writer called pt  continuous phone ring, no answer, no voicemail.  Writer called alternate number  left a voicemail requesting a callback to social work phone.

## 2020-07-31 NOTE — ED BEHAVIORAL HEALTH NOTE - BEHAVIORAL HEALTH NOTE
High Risk Log:  Worker  Called pt  continuous phone ring, no answer, no voicemail.  Writer called alternate number  left a voicemail requesting a callback to social work phone.

## 2020-08-01 NOTE — ED BEHAVIORAL HEALTH NOTE - BEHAVIORAL HEALTH NOTE
High Risk Log:    Writer called pt at . Phone continues to ring with no answer or VM available.  Additional number () going straight to .  left with a return call requested to  phone #[phone].

## 2020-08-31 NOTE — ED ADULT NURSE NOTE - NS ED NURSE IV DC DT
Referred To Asc For Closure Text (Leave Blank If You Do Not Want): After obtaining clear surgical margins the patient was sent to an ASC for surgical repair.  The patient understands they will receive post-surgical care and follow-up from the ASC physician. 07-Jun-2017 23:54

## 2020-10-02 ENCOUNTER — APPOINTMENT (OUTPATIENT)
Dept: OBGYN | Facility: CLINIC | Age: 50
End: 2020-10-02

## 2020-12-23 PROBLEM — Z87.440 HISTORY OF URINARY TRACT INFECTION: Status: RESOLVED | Noted: 2020-06-26 | Resolved: 2020-12-23

## 2021-01-21 DIAGNOSIS — R31.29 OTHER MICROSCOPIC HEMATURIA: ICD-10-CM

## 2021-01-22 ENCOUNTER — NON-APPOINTMENT (OUTPATIENT)
Age: 51
End: 2021-01-22

## 2021-01-22 ENCOUNTER — APPOINTMENT (OUTPATIENT)
Dept: OBGYN | Facility: CLINIC | Age: 51
End: 2021-01-22

## 2021-02-05 ENCOUNTER — APPOINTMENT (OUTPATIENT)
Dept: OTOLARYNGOLOGY | Facility: CLINIC | Age: 51
End: 2021-02-05

## 2021-02-16 ENCOUNTER — NON-APPOINTMENT (OUTPATIENT)
Age: 51
End: 2021-02-16

## 2021-02-17 ENCOUNTER — NON-APPOINTMENT (OUTPATIENT)
Age: 51
End: 2021-02-17

## 2021-03-29 ENCOUNTER — APPOINTMENT (OUTPATIENT)
Dept: OTOLARYNGOLOGY | Facility: CLINIC | Age: 51
End: 2021-03-29
Payer: COMMERCIAL

## 2021-03-29 VITALS
HEART RATE: 74 BPM | BODY MASS INDEX: 37.51 KG/M2 | DIASTOLIC BLOOD PRESSURE: 89 MMHG | TEMPERATURE: 97.5 F | SYSTOLIC BLOOD PRESSURE: 127 MMHG | WEIGHT: 262 LBS | HEIGHT: 70 IN

## 2021-03-29 DIAGNOSIS — R35.0 FREQUENCY OF MICTURITION: ICD-10-CM

## 2021-03-29 PROCEDURE — 31238 NSL/SINS NDSC SRG NSL HEMRRG: CPT | Mod: RT

## 2021-03-29 PROCEDURE — 99214 OFFICE O/P EST MOD 30 MIN: CPT | Mod: 25

## 2021-03-29 PROCEDURE — 99072 ADDL SUPL MATRL&STAF TM PHE: CPT

## 2021-03-29 NOTE — HISTORY OF PRESENT ILLNESS
[de-identified] : 51 y/o F with Hx of Epistaxis, cauterized on Right 9/26/17.\par Now with 3 weeks of B/L epistaxis, worse on Right, occurring most days.  Bleeding lasts 10 - 30 minutes.  Last bleed was last night.   Using Saline gel and has a humidifier. \par Does admit to picking at nose for dryness. \par No hx of bleeding disorders or anticoagulation. \par At last visit had left JOHN after URI, notes her ears are fine now.

## 2021-03-29 NOTE — ASSESSMENT
[FreeTextEntry1] : Epistaxis with Hx fo Cauterization on right in 2017.  New Nasal septal perforation noted:\par - no septal perforation noted after her cauterization at multiple visits and also last visit in 2019 with nasal endoscopy that was normal; so this is a new septal perforation and unclear etiology\par - Will hold off on cauterization due to new perforation with acute general inflammation \par - Aggressive nasal moisturization with saline spray and gel/vaseline; NO PICKING!\par - Stop digital manipulation as repetitive nasal trauma/picking at scab in nose can cause perforations\par - F/U with Rheumatology to ensure no underlying autoimmune cause of perforation (Dr. Lila Valerio [phone])\par - Will consider Bx after seeing Rheumatologist, although these are often low yield for vasculitis\par - F/U after seeing Rheumatologist to consider biopsy if indicated\par

## 2021-03-29 NOTE — PHYSICAL EXAM
[Normal] : mucosa is normal [Midline] : trachea located in midline position [de-identified] : anterior 1cm Septal perforation noted with surrounding inflammation on right septum with exudate, left side with less inflammation

## 2021-03-29 NOTE — PROCEDURE
[FreeTextEntry6] : Flexible scope #227 was used. Right nasal passage with normal inferior, middle and superior turbinates. Nasal passage patent with clear middle meatus and sphenoethmoid recess. Left nasal passage with normal inferior, middle and superior turbinates. Nasal passage was patent with clear middle meatus and sphenoethmoid recess. No mucopurulence or polyps appreciated. Nasopharynx clear.  Nasal septal perforation with surrounding inflammation.  Posterior left septal spur  \par Small amount of Surgicel placed along right anterior septal perforation/surrounding inflammatory exudate.\par \par

## 2021-04-20 ENCOUNTER — APPOINTMENT (OUTPATIENT)
Dept: RHEUMATOLOGY | Facility: CLINIC | Age: 51
End: 2021-04-20

## 2021-04-30 ENCOUNTER — APPOINTMENT (OUTPATIENT)
Dept: OTOLARYNGOLOGY | Facility: CLINIC | Age: 51
End: 2021-04-30
Payer: COMMERCIAL

## 2021-04-30 VITALS
WEIGHT: 262 LBS | BODY MASS INDEX: 37.51 KG/M2 | SYSTOLIC BLOOD PRESSURE: 123 MMHG | HEART RATE: 77 BPM | TEMPERATURE: 97.3 F | DIASTOLIC BLOOD PRESSURE: 77 MMHG | HEIGHT: 70 IN

## 2021-04-30 PROCEDURE — 99072 ADDL SUPL MATRL&STAF TM PHE: CPT

## 2021-04-30 PROCEDURE — 99213 OFFICE O/P EST LOW 20 MIN: CPT | Mod: 25

## 2021-04-30 PROCEDURE — 31231 NASAL ENDOSCOPY DX: CPT

## 2021-04-30 RX ORDER — AMOXICILLIN 500 MG/1
500 TABLET, FILM COATED ORAL
Qty: 21 | Refills: 0 | Status: COMPLETED | COMMUNITY
Start: 2021-02-24

## 2021-04-30 RX ORDER — AMOXICILLIN 500 MG/1
500 CAPSULE ORAL
Qty: 21 | Refills: 0 | Status: COMPLETED | COMMUNITY
Start: 2021-04-10

## 2021-04-30 RX ORDER — CLARITHROMYCIN 500 MG/1
500 TABLET, FILM COATED ORAL
Qty: 20 | Refills: 0 | Status: COMPLETED | COMMUNITY
Start: 2020-05-04 | End: 2021-04-30

## 2021-04-30 NOTE — ASSESSMENT
[FreeTextEntry1] : Epistaxis with Hx fo Cauterization on right in 2017.  New Nasal septal perforation noted:\par - no septal perforation noted after her cauterization at multiple visits and also last visit in 2019 with nasal endoscopy that was normal; so this is a new septal perforation and unclear etiology\par - since all the ulceration is on the right and she admits to picking the right side, suspect this may be a result of digital trauma and not underlying autoimmune but await rheum eval first\par - Will hold off on cauterization due to new perforation with acute general inflammation \par - Aggressive nasal moisturization with saline spray and gel/vaseline; NO PICKING!\par - F/U with Rheumatology to ensure no underlying autoimmune cause of perforation (Dr. Lila Valerio [phone])  - has apt 5/21/21 (can consider biopsy if she recommends although these are often low yield)\par - F/U after seeing Rheumatologist to consider biopsy if indicated\par \par \par I personally saw and examined Ms. VI PETERSON in detail this visit today. I personally reviewed the HPI, PMH, FH, SH, ROS and medications/allergies. I have spoken to PRINCE Nesbitt regarding the history and have personally determined the assessment and plan of care, and documented this myself. I was present and participated in all key portions of the encounter and all procedures noted above. I have made changes in the body of the note where appropriate.\par \par Attesting Faculty: See Attending Signature Below

## 2021-04-30 NOTE — PHYSICAL EXAM
[Normal] : mucosa is normal [Midline] : trachea located in midline position [de-identified] : anterior 1cm Septal perforation noted with surrounding inflammation on right septum with bloody crusts and ulceration with minimal exudate, left side with less inflammation

## 2021-04-30 NOTE — PROCEDURE
[FreeTextEntry6] : Flexible scope #227 was used. Right nasal passage with normal inferior, middle and superior turbinates. Nasal passage patent with clear middle meatus and sphenoethmoid recess. Left nasal passage with normal inferior, middle and superior turbinates. Nasal passage was patent with clear middle meatus and sphenoethmoid recess. No mucopurulence or polyps appreciated. Nasopharynx clear. Anterior nasal septal perforation with surrounding inflammation and bloody exudate on right, left is pretty much smooth quiescent mucosa.  Posterior left septal spur  \par \par Pictures taken \par \par

## 2021-04-30 NOTE — HISTORY OF PRESENT ILLNESS
[de-identified] : 51 y/o F with Hx of Epistaxis, cauterized on Right 9/26/17.\par At last visit 3/29/21 - was having some bleeding with new septal perforation.  Was advised to F/U with Rheumatologist, she has not yet been able to do so. \par Now notes having bleeding at night when sleeping.  Also getting blood on tissue with nose blowing b/l.\par Continues to pick at scabs in the nose, mostly on the right side.\par No hx of bleeding disorders or anticoagulation. \par

## 2021-05-08 ENCOUNTER — APPOINTMENT (OUTPATIENT)
Dept: RHEUMATOLOGY | Facility: CLINIC | Age: 51
End: 2021-05-08
Payer: COMMERCIAL

## 2021-05-08 VITALS — BODY MASS INDEX: 38.45 KG/M2 | WEIGHT: 268 LBS | DIASTOLIC BLOOD PRESSURE: 88 MMHG | SYSTOLIC BLOOD PRESSURE: 122 MMHG

## 2021-05-08 DIAGNOSIS — R04.0 EPISTAXIS: ICD-10-CM

## 2021-05-08 PROCEDURE — 99072 ADDL SUPL MATRL&STAF TM PHE: CPT

## 2021-05-08 PROCEDURE — 99244 OFF/OP CNSLTJ NEW/EST MOD 40: CPT

## 2021-05-10 ENCOUNTER — NON-APPOINTMENT (OUTPATIENT)
Age: 51
End: 2021-05-10

## 2021-05-10 NOTE — HISTORY OF PRESENT ILLNESS
[FreeTextEntry1] : 51 yo W, referred for evaluation of septal perforation\par \par -Diagnosed with FM by pain management 2008\par was treated with pain medications \par -anxiety/ depression \par -Nose bleeds\par started 7 years ago\par initially resolved then recurred over the past 2 years\par occurs 2-3 times/month \par triggered by headaches\par \par -no history of asthma\par -no sinus or ear infections \par -inflammatory eye disease\par -no history of rashes\par -no history of drop foot or drop wrist\par -no hearing loss \par -never had a CT sinuses \par -UA from January without blood

## 2021-05-10 NOTE — PHYSICAL EXAM
[General Appearance - Alert] : alert [General Appearance - In No Acute Distress] : in no acute distress [Sclera] : the sclera and conjunctiva were normal [Auscultation Breath Sounds / Voice Sounds] : lungs were clear to auscultation bilaterally [Musculoskeletal - Swelling] : no joint swelling seen [Heart Sounds] : normal S1 and S2 [Oriented To Time, Place, And Person] : oriented to person, place, and time [FreeTextEntry1] : septal perforation observed

## 2021-05-10 NOTE — CONSULT LETTER
[Dear  ___] : Dear  [unfilled], [Consult Letter:] : I had the pleasure of evaluating your patient, [unfilled]. [Please see my note below.] : Please see my note below. [Sincerely,] : Sincerely, [FreeTextEntry3] : Lila Valerio MD\par , [facility]\par Division of Rheumatology\par

## 2021-05-10 NOTE — DATA REVIEWED
[FreeTextEntry1] : Notes for ENT office visits reviewed today \par previous blood work and imaging in [facility] reviewed today \par

## 2021-05-10 NOTE — ASSESSMENT
[FreeTextEntry1] : # Recurrent epistaxis \par # New onset of nasal perforation \par \par -no history of asthma\par -no history of recurrent sinus or ear infections \par -no history of inflammatory eye disease\par -no history of rashes\par -no history of drop foot or drop wrist\par -no known hearing loss \par -UA from January without blood \par \par ~Overall, low suspicion for an underlying inflammatory process (such as vasculitis) as an etiology\par would not recommend further work up (blood work or biopsy) at this time\par Above discussed with patient at length, all questions answered\par \par

## 2021-08-25 NOTE — ED ADULT NURSE NOTE - NS ED NOTE ABUSE SUSPICION NEGLECT YN
Anesthesia Post-op Note      Patient: Doris Rivera    Anesthesia Type: General    Procedure:    KNEE ARTHROSCOPY with partial medial and lateral meniscectomy, LEFT  CPT(R) Code:  61764 - KNEE SCOPE,DIAGNOSTIC              Vital Last Value   Temperature 36.3 °C (97.3 °F) (08/25/21 0901)   Pulse 76 (08/25/21 0901)   Respiratory 20 (08/25/21 0855)   Non-Invasive  Blood Pressure 134/61 (08/25/21 0901)   Arterial   Blood Pressure     Pulse Oximetry 97 % (08/25/21 0901)     Mental status recovered. Patient participates in evaluation.  VS noted and are stable.  Respiratory function satisfactory; airway patent.  Cardiovascular function and hydration status satisfactory.  Adequate pain control.  Nausea and vomiting control satisfactory.    No apparent anesthetic complications.       No

## 2021-09-02 NOTE — ED ADULT NURSE NOTE - CARDIO WDL
Called patient regarding labs  I will discuss with her in person regarding recent M spike finding  We will see her sooner, within next 1-2 weeks  Likely will proceed with BMBx  She appreciated call  Normal rate, regular rhythm, normal S1, S2 heart sounds heard.

## 2021-09-10 ENCOUNTER — EMERGENCY (EMERGENCY)
Facility: HOSPITAL | Age: 51
LOS: 1 days | Discharge: HOME | End: 2021-09-10
Attending: STUDENT IN AN ORGANIZED HEALTH CARE EDUCATION/TRAINING PROGRAM | Admitting: STUDENT IN AN ORGANIZED HEALTH CARE EDUCATION/TRAINING PROGRAM
Payer: COMMERCIAL

## 2021-09-10 VITALS
RESPIRATION RATE: 17 BRPM | HEIGHT: 70 IN | DIASTOLIC BLOOD PRESSURE: 82 MMHG | OXYGEN SATURATION: 99 % | SYSTOLIC BLOOD PRESSURE: 126 MMHG | TEMPERATURE: 99 F | HEART RATE: 83 BPM

## 2021-09-10 DIAGNOSIS — Z98.51 TUBAL LIGATION STATUS: Chronic | ICD-10-CM

## 2021-09-10 DIAGNOSIS — Z98.89 OTHER SPECIFIED POSTPROCEDURAL STATES: Chronic | ICD-10-CM

## 2021-09-10 DIAGNOSIS — K56.60 UNSPECIFIED INTESTINAL OBSTRUCTION: Chronic | ICD-10-CM

## 2021-09-10 LAB
ALBUMIN SERPL ELPH-MCNC: 4.3 G/DL — SIGNIFICANT CHANGE UP (ref 3.3–5)
ALP SERPL-CCNC: 89 U/L — SIGNIFICANT CHANGE UP (ref 40–120)
ALT FLD-CCNC: 15 U/L — SIGNIFICANT CHANGE UP (ref 4–33)
ANION GAP SERPL CALC-SCNC: 11 MMOL/L — SIGNIFICANT CHANGE UP (ref 7–14)
AST SERPL-CCNC: 19 U/L — SIGNIFICANT CHANGE UP (ref 4–32)
BASE EXCESS BLDV CALC-SCNC: 4.9 MMOL/L — HIGH (ref -2–3)
BASOPHILS # BLD AUTO: 0.05 K/UL — SIGNIFICANT CHANGE UP (ref 0–0.2)
BASOPHILS NFR BLD AUTO: 0.6 % — SIGNIFICANT CHANGE UP (ref 0–2)
BILIRUB SERPL-MCNC: 0.2 MG/DL — SIGNIFICANT CHANGE UP (ref 0.2–1.2)
BLOOD GAS VENOUS COMPREHENSIVE RESULT: SIGNIFICANT CHANGE UP
BUN SERPL-MCNC: 15 MG/DL — SIGNIFICANT CHANGE UP (ref 7–23)
CALCIUM SERPL-MCNC: 9.3 MG/DL — SIGNIFICANT CHANGE UP (ref 8.4–10.5)
CHLORIDE BLDV-SCNC: 106 MMOL/L — SIGNIFICANT CHANGE UP (ref 96–108)
CHLORIDE SERPL-SCNC: 103 MMOL/L — SIGNIFICANT CHANGE UP (ref 98–107)
CO2 BLDV-SCNC: 33.3 MMOL/L — HIGH (ref 22–26)
CO2 SERPL-SCNC: 26 MMOL/L — SIGNIFICANT CHANGE UP (ref 22–31)
CREAT SERPL-MCNC: 0.79 MG/DL — SIGNIFICANT CHANGE UP (ref 0.5–1.3)
EOSINOPHIL # BLD AUTO: 0.15 K/UL — SIGNIFICANT CHANGE UP (ref 0–0.5)
EOSINOPHIL NFR BLD AUTO: 1.9 % — SIGNIFICANT CHANGE UP (ref 0–6)
GAS PNL BLDV: 138 MMOL/L — SIGNIFICANT CHANGE UP (ref 136–145)
GAS PNL BLDV: SIGNIFICANT CHANGE UP
GLUCOSE BLDV-MCNC: 89 MG/DL — SIGNIFICANT CHANGE UP (ref 70–99)
GLUCOSE SERPL-MCNC: 97 MG/DL — SIGNIFICANT CHANGE UP (ref 70–99)
HCO3 BLDV-SCNC: 32 MMOL/L — HIGH (ref 22–29)
HCT VFR BLD CALC: 33.9 % — LOW (ref 34.5–45)
HCT VFR BLDA CALC: 33 % — LOW (ref 34.5–46.5)
HGB BLD CALC-MCNC: 11.1 G/DL — LOW (ref 11.5–15.5)
HGB BLD-MCNC: 11 G/DL — LOW (ref 11.5–15.5)
IANC: 4.36 K/UL — SIGNIFICANT CHANGE UP (ref 1.5–8.5)
IMM GRANULOCYTES NFR BLD AUTO: 0.8 % — SIGNIFICANT CHANGE UP (ref 0–1.5)
LACTATE BLDV-MCNC: 0.7 MMOL/L — SIGNIFICANT CHANGE UP (ref 0.5–2)
LYMPHOCYTES # BLD AUTO: 2.76 K/UL — SIGNIFICANT CHANGE UP (ref 1–3.3)
LYMPHOCYTES # BLD AUTO: 34.7 % — SIGNIFICANT CHANGE UP (ref 13–44)
MAGNESIUM SERPL-MCNC: 2.1 MG/DL — SIGNIFICANT CHANGE UP (ref 1.6–2.6)
MCHC RBC-ENTMCNC: 29.2 PG — SIGNIFICANT CHANGE UP (ref 27–34)
MCHC RBC-ENTMCNC: 32.4 GM/DL — SIGNIFICANT CHANGE UP (ref 32–36)
MCV RBC AUTO: 89.9 FL — SIGNIFICANT CHANGE UP (ref 80–100)
MONOCYTES # BLD AUTO: 0.58 K/UL — SIGNIFICANT CHANGE UP (ref 0–0.9)
MONOCYTES NFR BLD AUTO: 7.3 % — SIGNIFICANT CHANGE UP (ref 2–14)
NEUTROPHILS # BLD AUTO: 4.36 K/UL — SIGNIFICANT CHANGE UP (ref 1.8–7.4)
NEUTROPHILS NFR BLD AUTO: 54.7 % — SIGNIFICANT CHANGE UP (ref 43–77)
NRBC # BLD: 0 /100 WBCS — SIGNIFICANT CHANGE UP
NRBC # FLD: 0 K/UL — SIGNIFICANT CHANGE UP
PCO2 BLDV: 56 MMHG — HIGH (ref 39–42)
PH BLDV: 7.36 — SIGNIFICANT CHANGE UP (ref 7.32–7.43)
PLATELET # BLD AUTO: 233 K/UL — SIGNIFICANT CHANGE UP (ref 150–400)
PO2 BLDV: 24 MMHG — SIGNIFICANT CHANGE UP
POTASSIUM BLDV-SCNC: 4.3 MMOL/L — SIGNIFICANT CHANGE UP (ref 3.5–5.1)
POTASSIUM SERPL-MCNC: 4.4 MMOL/L — SIGNIFICANT CHANGE UP (ref 3.5–5.3)
POTASSIUM SERPL-SCNC: 4.4 MMOL/L — SIGNIFICANT CHANGE UP (ref 3.5–5.3)
PROT SERPL-MCNC: 6.8 G/DL — SIGNIFICANT CHANGE UP (ref 6–8.3)
RBC # BLD: 3.77 M/UL — LOW (ref 3.8–5.2)
RBC # FLD: 12.6 % — SIGNIFICANT CHANGE UP (ref 10.3–14.5)
SAO2 % BLDV: 33.6 % — SIGNIFICANT CHANGE UP
SODIUM SERPL-SCNC: 140 MMOL/L — SIGNIFICANT CHANGE UP (ref 135–145)
TROPONIN T, HIGH SENSITIVITY RESULT: <6 NG/L — SIGNIFICANT CHANGE UP
WBC # BLD: 7.96 K/UL — SIGNIFICANT CHANGE UP (ref 3.8–10.5)
WBC # FLD AUTO: 7.96 K/UL — SIGNIFICANT CHANGE UP (ref 3.8–10.5)

## 2021-09-10 PROCEDURE — 93010 ELECTROCARDIOGRAM REPORT: CPT

## 2021-09-10 PROCEDURE — 99291 CRITICAL CARE FIRST HOUR: CPT | Mod: 25

## 2021-09-10 PROCEDURE — 71046 X-RAY EXAM CHEST 2 VIEWS: CPT | Mod: 26

## 2021-09-10 RX ORDER — DIPHENHYDRAMINE HCL 50 MG
25 CAPSULE ORAL ONCE
Refills: 0 | Status: COMPLETED | OUTPATIENT
Start: 2021-09-10 | End: 2021-09-10

## 2021-09-10 RX ORDER — ASPIRIN/CALCIUM CARB/MAGNESIUM 324 MG
162 TABLET ORAL ONCE
Refills: 0 | Status: COMPLETED | OUTPATIENT
Start: 2021-09-10 | End: 2021-09-10

## 2021-09-10 RX ORDER — EPINEPHRINE 0.3 MG/.3ML
0.3 INJECTION INTRAMUSCULAR; SUBCUTANEOUS ONCE
Refills: 0 | Status: COMPLETED | OUTPATIENT
Start: 2021-09-10 | End: 2021-09-10

## 2021-09-10 RX ADMIN — Medication 162 MILLIGRAM(S): at 23:48

## 2021-09-10 RX ADMIN — Medication 0.5 MILLIGRAM(S): at 22:53

## 2021-09-10 RX ADMIN — EPINEPHRINE 0.3 MILLIGRAM(S): 0.3 INJECTION INTRAMUSCULAR; SUBCUTANEOUS at 22:52

## 2021-09-10 RX ADMIN — Medication 25 MILLIGRAM(S): at 22:53

## 2021-09-10 RX ADMIN — Medication 125 MILLIGRAM(S): at 22:52

## 2021-09-10 NOTE — ED ADULT NURSE NOTE - OBJECTIVE STATEMENT
Pt is a 51 year old female reporting to the ED for allergic reaction. States she was in her fig tree on tuesday when she noticed diffused rash over he body with itching. States sxs increased and now has throat itching and chest tightness. Pt speaking in full sentences, no drooling or stridor noted. spo2 99 % on room air, HR77. Pt is AOX4. Pt denies chest pain or SOB. PT respirations even an unlabored. Pt appears to be comfortable, in NAD. Pt denies fever, chills, n/v/d. Pt denies abdominal pain, dysuria, hematuria. IV placed, meds given as ordered, will continue to monitor.

## 2021-09-10 NOTE — ED ADULT TRIAGE NOTE - CHIEF COMPLAINT QUOTE
pt arrives w/ c/o swelling in her throat and pain throughout her body after touching her fig tree. pt states she feels tightness in her throat. pt speaking in full sentences no signs of respiratory distress or drooling. pt also c/o chest tightness. pmh anxiety, fibromyalgia

## 2021-09-11 VITALS — RESPIRATION RATE: 16 BRPM | OXYGEN SATURATION: 98 %

## 2021-09-11 RX ORDER — EPINEPHRINE 0.3 MG/.3ML
0.3 INJECTION INTRAMUSCULAR; SUBCUTANEOUS
Qty: 1 | Refills: 0
Start: 2021-09-11 | End: 2021-09-11

## 2021-09-11 RX ORDER — EPINEPHRINE 0.3 MG/.3ML
0.3 INJECTION INTRAMUSCULAR; SUBCUTANEOUS
Qty: 1 | Refills: 0
Start: 2021-09-11

## 2021-09-11 NOTE — ED PROVIDER NOTE - PHYSICAL EXAMINATION
Attending MD Solorio :   PHYSICAL EXAM:    GENERAL: NAD  HEENT:  Atraumatic  CHEST/LUNG: Chest rise equal bilaterally. Mild scattered wheezing.   HEART: Regular rate and rhythm. No murmurs or rubs.   ABDOMEN: Soft, Nontender, Nondistended  EXTREMITIES:  Extremities warm  PSYCH: A&Ox3  SKIN: Mild urticarial lesions over legs and abdomen with scratch marks.

## 2021-09-11 NOTE — ED PROVIDER NOTE - TEMPLATE, MLM
Patient seen and examined. Pain controlled. States pain in left index finger and hand improved from yesterday. Also reports increased ROM of finger from yesterday.    Physical exam  VS: Afebrile, vital signs stable  Gen: NAD  LUE: s/p I&D L index finger PIP, warm well perfused, radial pulse intact, +r/u/m/ain/pin function, silt, AROM with pain of index PIP joint limited, PROM index PIP joint 0-60 with pain at end flexion, full AROM without pain to all other fingers without erythema or signs of infection  R Hand: +small scab over dorsal aspect of small finger pip joint with fluid collection without erythema/TTP, full painless AROM all joint, no signs of infection/cellulitis/phlebitis, NV intact General

## 2021-09-11 NOTE — ED PROVIDER NOTE - NSFOLLOWUPINSTRUCTIONS_ED_ALL_ED_FT
Follow-up with your primary care doctor within 1 week. Call office for appointment.    Take Prednisone as prescribed.    Take Benadryl over the counter per label instructions.    Seek medical attention if your symptoms worsen or you need to use your epi-pen.

## 2021-09-11 NOTE — ED PROVIDER NOTE - NSICDXPASTSURGICALHX_GEN_ALL_CORE_FT
PAST SURGICAL HISTORY:  Bowel obstruction     H/O abdominoplasty     H/O  section X3 (,,)    H/O gastric bypass     H/O tubal ligation , s/p ovarian ablation

## 2021-09-11 NOTE — ED PROVIDER NOTE - CLINICAL SUMMARY MEDICAL DECISION MAKING FREE TEXT BOX
Attending MD Solorio : Bree appears well but signs and symptoms are concerning for mild anaphylaxis givne involement of two organ systems. WIll give epi, steroids, benadryl and likely d/c home.

## 2021-09-11 NOTE — ED PROVIDER NOTE - OBJECTIVE STATEMENT
Attending MD Solorio : 51 F hx as below p/w increasing itchiness, throat closing sensation and wheezing x 2 days. Patient noted that she was cutting a fig tree in her garden when the symptoms started. No improvement with benadryl at home. Patient states that I nthe 1990s she had an anaphylactic reaction. Also states that she has some 'chest tightness'. Is supposed to have an epi pen, but does not have one at home. Itching is all over body, but worst in legs.

## 2021-09-11 NOTE — ED PROVIDER NOTE - PROGRESS NOTE DETAILS
Attending MD Solorio : Bree bourne. Sleeping. Will continue to observe for another hour. Attending MD Solorio : Patient still has some mild itchiness and throat sensation, but objectively urticarial lesions are gone and wheezing is gone. WIll d/c home with prednisone and epi pedn. Savitent still sedated from ativan for anxiety with epi pen. Signed out to Dr Palomo pending reassessment of sedation for safety of discharge at approx 6am. Patient has pickup that can pick her up in the am. Sign out follow-up: Pt awake and alert. Rash improved. Pt has a ride. SHANNAN.

## 2021-09-11 NOTE — ED PROVIDER NOTE - PATIENT PORTAL LINK FT
You can access the FollowMyHealth Patient Portal offered by [facility] by registering at the following website: [email or web address]. By joining [email or web address]’s FollowMyHealth portal, you will also be able to view your health information using other applications (apps) compatible with our system.

## 2021-09-11 NOTE — ED PROVIDER NOTE - NSICDXPASTMEDICALHX_GEN_ALL_CORE_FT
PAST MEDICAL HISTORY:  Abnormal uterine bleeding     Anemia     Anxiety     Chronic lower back pain     Depression     Fibromyalgia     GERD (gastroesophageal reflux disease)     IBS (irritable bowel syndrome)     Post traumatic stress disorder

## 2021-10-04 PROBLEM — Z01.419 ENCOUNTER FOR ANNUAL ROUTINE GYNECOLOGICAL EXAMINATION: Status: ACTIVE | Noted: 2018-09-04

## 2021-10-07 ENCOUNTER — APPOINTMENT (OUTPATIENT)
Dept: OBGYN | Facility: CLINIC | Age: 51
End: 2021-10-07

## 2021-10-07 DIAGNOSIS — Z01.419 ENCOUNTER FOR GYNECOLOGICAL EXAMINATION (GENERAL) (ROUTINE) W/OUT ABNORMAL FINDINGS: ICD-10-CM

## 2021-10-13 ENCOUNTER — APPOINTMENT (OUTPATIENT)
Dept: OBGYN | Facility: CLINIC | Age: 51
End: 2021-10-13
Payer: COMMERCIAL

## 2021-10-13 VITALS
WEIGHT: 293 LBS | SYSTOLIC BLOOD PRESSURE: 132 MMHG | DIASTOLIC BLOOD PRESSURE: 82 MMHG | HEIGHT: 70 IN | HEART RATE: 99 BPM | BODY MASS INDEX: 41.95 KG/M2

## 2021-10-13 DIAGNOSIS — N90.89 OTHER SPECIFIED NONINFLAMMATORY DISORDERS OF VULVA AND PERINEUM: ICD-10-CM

## 2021-10-13 PROBLEM — R35.0 INCREASED URINARY FREQUENCY: Status: ACTIVE | Noted: 2021-10-13

## 2021-10-13 PROCEDURE — 99213 OFFICE O/P EST LOW 20 MIN: CPT

## 2021-10-13 RX ORDER — BUSPIRONE HYDROCHLORIDE 10 MG/1
10 TABLET ORAL
Refills: 0 | Status: DISCONTINUED | COMMUNITY
End: 2021-10-13

## 2021-10-13 RX ORDER — TOPIRAMATE 50 MG/1
TABLET, COATED ORAL
Refills: 0 | Status: DISCONTINUED | COMMUNITY
End: 2021-10-13

## 2021-10-13 RX ORDER — LITHIUM CARBONATE 150 MG/1
150 CAPSULE ORAL
Qty: 30 | Refills: 0 | Status: DISCONTINUED | COMMUNITY
Start: 2020-11-13 | End: 2021-10-13

## 2021-10-13 RX ORDER — LAMOTRIGINE 200 MG/1
200 TABLET ORAL
Qty: 90 | Refills: 0 | Status: DISCONTINUED | COMMUNITY
Start: 2021-03-30 | End: 2021-10-13

## 2021-10-13 RX ORDER — LURASIDONE HYDROCHLORIDE 40 MG/1
40 TABLET, FILM COATED ORAL
Refills: 0 | Status: DISCONTINUED | COMMUNITY
End: 2021-10-13

## 2021-10-13 RX ORDER — OMEPRAZOLE 40 MG/1
40 CAPSULE, DELAYED RELEASE ORAL
Qty: 60 | Refills: 0 | Status: DISCONTINUED | COMMUNITY
Start: 2020-02-27 | End: 2021-10-13

## 2021-10-13 RX ORDER — TOPIRAMATE 100 MG/1
100 TABLET, FILM COATED ORAL
Qty: 30 | Refills: 0 | Status: DISCONTINUED | COMMUNITY
Start: 2020-06-09 | End: 2021-10-13

## 2021-10-13 RX ORDER — ARIPIPRAZOLE 5 MG/1
5 TABLET ORAL
Qty: 30 | Refills: 0 | Status: DISCONTINUED | COMMUNITY
Start: 2020-07-16 | End: 2021-10-13

## 2021-10-13 RX ORDER — LITHIUM CARBONATE 300 MG/1
300 CAPSULE ORAL
Qty: 30 | Refills: 0 | Status: DISCONTINUED | COMMUNITY
Start: 2020-10-15 | End: 2021-10-13

## 2021-10-13 RX ORDER — OSELTAMIVIR PHOSPHATE 75 MG/1
75 CAPSULE ORAL
Qty: 10 | Refills: 0 | Status: DISCONTINUED | COMMUNITY
Start: 2020-02-04 | End: 2021-10-13

## 2021-10-13 RX ORDER — GLUCOSAMINE SULFATE 500 MG
3-2 TABLET ORAL
Qty: 15 | Refills: 0 | Status: DISCONTINUED | COMMUNITY
Start: 2020-05-29 | End: 2021-10-13

## 2021-10-13 RX ORDER — BUPROPION HYDROCHLORIDE 100 MG/1
100 TABLET, FILM COATED, EXTENDED RELEASE ORAL
Qty: 30 | Refills: 0 | Status: DISCONTINUED | COMMUNITY
Start: 2020-07-23 | End: 2021-10-13

## 2021-10-13 RX ORDER — MENTHOL 5 %
5 ADHESIVE PATCH, MEDICATED TOPICAL
Qty: 15 | Refills: 0 | Status: DISCONTINUED | COMMUNITY
Start: 2020-07-13 | End: 2021-10-13

## 2021-10-13 RX ORDER — INDOMETHACIN 25 MG/1
25 CAPSULE ORAL
Qty: 20 | Refills: 0 | Status: DISCONTINUED | COMMUNITY
Start: 2021-01-27 | End: 2021-10-13

## 2021-10-13 RX ORDER — DULOXETINE HYDROCHLORIDE 20 MG/1
20 CAPSULE, DELAYED RELEASE PELLETS ORAL
Refills: 0 | Status: DISCONTINUED | COMMUNITY
End: 2021-10-13

## 2021-10-13 RX ORDER — HYDROCORTISONE 2.5% 25 MG/G
2.5 CREAM TOPICAL
Qty: 30 | Refills: 0 | Status: DISCONTINUED | COMMUNITY
Start: 2020-05-29 | End: 2021-10-13

## 2021-10-13 RX ORDER — POLYETHYLENE GLYCOL 3350 17 G/17G
17 POWDER, FOR SOLUTION ORAL
Qty: 14 | Refills: 0 | Status: DISCONTINUED | COMMUNITY
Start: 2020-05-29 | End: 2021-10-13

## 2021-10-13 RX ORDER — FLUCONAZOLE 150 MG/1
150 TABLET ORAL
Qty: 19 | Refills: 0 | Status: DISCONTINUED | COMMUNITY
Start: 2021-04-15 | End: 2021-10-13

## 2021-10-13 RX ORDER — LITHIUM CARBONATE 600 MG/1
600 CAPSULE ORAL
Qty: 90 | Refills: 0 | Status: DISCONTINUED | COMMUNITY
Start: 2021-04-10 | End: 2021-10-13

## 2021-10-13 RX ORDER — VERAPAMIL HYDROCHLORIDE 40 MG/1
40 TABLET ORAL
Qty: 36 | Refills: 0 | Status: DISCONTINUED | COMMUNITY
Start: 2021-03-04 | End: 2021-10-13

## 2021-10-13 NOTE — PHYSICAL EXAM
[No Lesions] : no lesions  [Normal] : normal [Discharge] : a  ~M vaginal discharge was present [Scant] : scant [White] : white [FreeTextEntry1] : bilateral vulvar pink/ erythema

## 2021-10-20 DIAGNOSIS — B37.3 CANDIDIASIS OF VULVA AND VAGINA: ICD-10-CM

## 2021-10-20 LAB
BACTERIA UR CULT: NORMAL
CANDIDA VAG CYTO: DETECTED
G VAGINALIS+PREV SP MTYP VAG QL MICRO: NOT DETECTED
T VAGINALIS VAG QL WET PREP: NOT DETECTED

## 2021-12-17 ENCOUNTER — EMERGENCY (EMERGENCY)
Facility: HOSPITAL | Age: 51
LOS: 1 days | Discharge: HOME | End: 2021-12-17
Attending: EMERGENCY MEDICINE | Admitting: EMERGENCY MEDICINE
Payer: MEDICARE

## 2021-12-17 VITALS
RESPIRATION RATE: 18 BRPM | DIASTOLIC BLOOD PRESSURE: 67 MMHG | HEART RATE: 91 BPM | OXYGEN SATURATION: 100 % | SYSTOLIC BLOOD PRESSURE: 115 MMHG | TEMPERATURE: 98 F

## 2021-12-17 VITALS
RESPIRATION RATE: 18 BRPM | TEMPERATURE: 99 F | HEART RATE: 81 BPM | SYSTOLIC BLOOD PRESSURE: 100 MMHG | HEIGHT: 70 IN | OXYGEN SATURATION: 100 % | DIASTOLIC BLOOD PRESSURE: 70 MMHG

## 2021-12-17 DIAGNOSIS — Z98.51 TUBAL LIGATION STATUS: Chronic | ICD-10-CM

## 2021-12-17 DIAGNOSIS — K56.60 UNSPECIFIED INTESTINAL OBSTRUCTION: Chronic | ICD-10-CM

## 2021-12-17 DIAGNOSIS — Z98.89 OTHER SPECIFIED POSTPROCEDURAL STATES: Chronic | ICD-10-CM

## 2021-12-17 LAB
ALBUMIN SERPL ELPH-MCNC: 4.2 G/DL — SIGNIFICANT CHANGE UP (ref 3.3–5)
ALP SERPL-CCNC: 103 U/L — SIGNIFICANT CHANGE UP (ref 40–120)
ALT FLD-CCNC: 14 U/L — SIGNIFICANT CHANGE UP (ref 4–33)
ANION GAP SERPL CALC-SCNC: 10 MMOL/L — SIGNIFICANT CHANGE UP (ref 7–14)
APPEARANCE UR: CLEAR — SIGNIFICANT CHANGE UP
AST SERPL-CCNC: 19 U/L — SIGNIFICANT CHANGE UP (ref 4–32)
B PERT DNA SPEC QL NAA+PROBE: SIGNIFICANT CHANGE UP
B PERT+PARAPERT DNA PNL SPEC NAA+PROBE: SIGNIFICANT CHANGE UP
BACTERIA # UR AUTO: ABNORMAL
BASOPHILS # BLD AUTO: 0.04 K/UL — SIGNIFICANT CHANGE UP (ref 0–0.2)
BASOPHILS NFR BLD AUTO: 0.6 % — SIGNIFICANT CHANGE UP (ref 0–2)
BILIRUB SERPL-MCNC: 0.3 MG/DL — SIGNIFICANT CHANGE UP (ref 0.2–1.2)
BILIRUB UR-MCNC: NEGATIVE — SIGNIFICANT CHANGE UP
BORDETELLA PARAPERTUSSIS (RAPRVP): SIGNIFICANT CHANGE UP
BUN SERPL-MCNC: 12 MG/DL — SIGNIFICANT CHANGE UP (ref 7–23)
C PNEUM DNA SPEC QL NAA+PROBE: SIGNIFICANT CHANGE UP
CALCIUM SERPL-MCNC: 9.1 MG/DL — SIGNIFICANT CHANGE UP (ref 8.4–10.5)
CHLORIDE SERPL-SCNC: 104 MMOL/L — SIGNIFICANT CHANGE UP (ref 98–107)
CO2 SERPL-SCNC: 25 MMOL/L — SIGNIFICANT CHANGE UP (ref 22–31)
COLOR SPEC: SIGNIFICANT CHANGE UP
CREAT SERPL-MCNC: 0.88 MG/DL — SIGNIFICANT CHANGE UP (ref 0.5–1.3)
DIFF PNL FLD: ABNORMAL
EOSINOPHIL # BLD AUTO: 0.14 K/UL — SIGNIFICANT CHANGE UP (ref 0–0.5)
EOSINOPHIL NFR BLD AUTO: 2.1 % — SIGNIFICANT CHANGE UP (ref 0–6)
FLUAV SUBTYP SPEC NAA+PROBE: SIGNIFICANT CHANGE UP
FLUBV RNA SPEC QL NAA+PROBE: SIGNIFICANT CHANGE UP
GLUCOSE SERPL-MCNC: 96 MG/DL — SIGNIFICANT CHANGE UP (ref 70–99)
GLUCOSE UR QL: NEGATIVE — SIGNIFICANT CHANGE UP
HADV DNA SPEC QL NAA+PROBE: SIGNIFICANT CHANGE UP
HCG SERPL-ACNC: <5 MIU/ML — SIGNIFICANT CHANGE UP
HCOV 229E RNA SPEC QL NAA+PROBE: SIGNIFICANT CHANGE UP
HCOV HKU1 RNA SPEC QL NAA+PROBE: SIGNIFICANT CHANGE UP
HCOV NL63 RNA SPEC QL NAA+PROBE: SIGNIFICANT CHANGE UP
HCOV OC43 RNA SPEC QL NAA+PROBE: DETECTED
HCT VFR BLD CALC: 34.7 % — SIGNIFICANT CHANGE UP (ref 34.5–45)
HGB BLD-MCNC: 10.6 G/DL — LOW (ref 11.5–15.5)
HMPV RNA SPEC QL NAA+PROBE: SIGNIFICANT CHANGE UP
HPIV1 RNA SPEC QL NAA+PROBE: SIGNIFICANT CHANGE UP
HPIV2 RNA SPEC QL NAA+PROBE: SIGNIFICANT CHANGE UP
HPIV3 RNA SPEC QL NAA+PROBE: SIGNIFICANT CHANGE UP
HPIV4 RNA SPEC QL NAA+PROBE: SIGNIFICANT CHANGE UP
IANC: 4.41 K/UL — SIGNIFICANT CHANGE UP (ref 1.5–8.5)
IMM GRANULOCYTES NFR BLD AUTO: 1.2 % — SIGNIFICANT CHANGE UP (ref 0–1.5)
KETONES UR-MCNC: NEGATIVE — SIGNIFICANT CHANGE UP
LEUKOCYTE ESTERASE UR-ACNC: NEGATIVE — SIGNIFICANT CHANGE UP
LYMPHOCYTES # BLD AUTO: 1.23 K/UL — SIGNIFICANT CHANGE UP (ref 1–3.3)
LYMPHOCYTES # BLD AUTO: 18.6 % — SIGNIFICANT CHANGE UP (ref 13–44)
M PNEUMO DNA SPEC QL NAA+PROBE: SIGNIFICANT CHANGE UP
MCHC RBC-ENTMCNC: 26.4 PG — LOW (ref 27–34)
MCHC RBC-ENTMCNC: 30.5 GM/DL — LOW (ref 32–36)
MCV RBC AUTO: 86.3 FL — SIGNIFICANT CHANGE UP (ref 80–100)
MONOCYTES # BLD AUTO: 0.7 K/UL — SIGNIFICANT CHANGE UP (ref 0–0.9)
MONOCYTES NFR BLD AUTO: 10.6 % — SIGNIFICANT CHANGE UP (ref 2–14)
NEUTROPHILS # BLD AUTO: 4.41 K/UL — SIGNIFICANT CHANGE UP (ref 1.8–7.4)
NEUTROPHILS NFR BLD AUTO: 66.9 % — SIGNIFICANT CHANGE UP (ref 43–77)
NITRITE UR-MCNC: NEGATIVE — SIGNIFICANT CHANGE UP
NRBC # BLD: 0 /100 WBCS — SIGNIFICANT CHANGE UP
NRBC # FLD: 0 K/UL — SIGNIFICANT CHANGE UP
PH UR: 6.5 — SIGNIFICANT CHANGE UP (ref 5–8)
PLATELET # BLD AUTO: 219 K/UL — SIGNIFICANT CHANGE UP (ref 150–400)
POTASSIUM SERPL-MCNC: 4.4 MMOL/L — SIGNIFICANT CHANGE UP (ref 3.5–5.3)
POTASSIUM SERPL-SCNC: 4.4 MMOL/L — SIGNIFICANT CHANGE UP (ref 3.5–5.3)
PROT SERPL-MCNC: 6.6 G/DL — SIGNIFICANT CHANGE UP (ref 6–8.3)
PROT UR-MCNC: NEGATIVE — SIGNIFICANT CHANGE UP
RAPID RVP RESULT: DETECTED
RBC # BLD: 4.02 M/UL — SIGNIFICANT CHANGE UP (ref 3.8–5.2)
RBC # FLD: 14 % — SIGNIFICANT CHANGE UP (ref 10.3–14.5)
RBC CASTS # UR COMP ASSIST: SIGNIFICANT CHANGE UP /HPF (ref 0–4)
RSV RNA SPEC QL NAA+PROBE: SIGNIFICANT CHANGE UP
RV+EV RNA SPEC QL NAA+PROBE: SIGNIFICANT CHANGE UP
SARS-COV-2 RNA SPEC QL NAA+PROBE: SIGNIFICANT CHANGE UP
SODIUM SERPL-SCNC: 139 MMOL/L — SIGNIFICANT CHANGE UP (ref 135–145)
SP GR SPEC: 1.01 — SIGNIFICANT CHANGE UP (ref 1–1.05)
UROBILINOGEN FLD QL: SIGNIFICANT CHANGE UP
WBC # BLD: 6.6 K/UL — SIGNIFICANT CHANGE UP (ref 3.8–10.5)
WBC # FLD AUTO: 6.6 K/UL — SIGNIFICANT CHANGE UP (ref 3.8–10.5)
WBC UR QL: SIGNIFICANT CHANGE UP /HPF (ref 0–5)

## 2021-12-17 PROCEDURE — 72148 MRI LUMBAR SPINE W/O DYE: CPT | Mod: 26,MA

## 2021-12-17 PROCEDURE — 99285 EMERGENCY DEPT VISIT HI MDM: CPT

## 2021-12-17 PROCEDURE — 72146 MRI CHEST SPINE W/O DYE: CPT | Mod: 26,MA

## 2021-12-17 PROCEDURE — 71045 X-RAY EXAM CHEST 1 VIEW: CPT | Mod: 26

## 2021-12-17 RX ORDER — SODIUM CHLORIDE 9 MG/ML
1000 INJECTION INTRAMUSCULAR; INTRAVENOUS; SUBCUTANEOUS ONCE
Refills: 0 | Status: COMPLETED | OUTPATIENT
Start: 2021-12-17 | End: 2021-12-17

## 2021-12-17 RX ORDER — ACETAMINOPHEN 500 MG
975 TABLET ORAL ONCE
Refills: 0 | Status: COMPLETED | OUTPATIENT
Start: 2021-12-17 | End: 2021-12-17

## 2021-12-17 RX ORDER — KETOROLAC TROMETHAMINE 30 MG/ML
15 SYRINGE (ML) INJECTION ONCE
Refills: 0 | Status: DISCONTINUED | OUTPATIENT
Start: 2021-12-17 | End: 2021-12-17

## 2021-12-17 RX ADMIN — Medication 975 MILLIGRAM(S): at 15:51

## 2021-12-17 RX ADMIN — Medication 1 MILLIGRAM(S): at 19:15

## 2021-12-17 RX ADMIN — Medication 15 MILLIGRAM(S): at 23:02

## 2021-12-17 RX ADMIN — SODIUM CHLORIDE 1000 MILLILITER(S): 9 INJECTION INTRAMUSCULAR; INTRAVENOUS; SUBCUTANEOUS at 15:51

## 2021-12-17 NOTE — ED ADULT NURSE NOTE - NSIMPLEMENTINTERV_GEN_ALL_ED
Implemented All Fall Risk Interventions:  [place] to call system. Call bell, personal items and telephone within reach. Instruct patient to call for assistance. Room bathroom lighting operational. Non-slip footwear when patient is off stretcher. Physically safe environment: no spills, clutter or unnecessary equipment. Stretcher in lowest position, wheels locked, appropriate side rails in place. Provide visual cue, wrist band, yellow gown, etc. Monitor gait and stability. Monitor for mental status changes and reorient to person, place, and time. Review medications for side effects contributing to fall risk. Reinforce activity limits and safety measures with patient and family.

## 2021-12-17 NOTE — ED PROVIDER NOTE - NSFOLLOWUPINSTRUCTIONS_ED_ALL_ED_FT
1. TAKE ALL MEDICATIONS AS DIRECTED.  FOR PAIN YOU CAN TAKE IBUPROFEN (MOTRIN, ADVIL) OR ACETAMINOPHEN (TYLENOL) AS NEEDED, AS DIRECTED ON PACKAGING.  2. FOLLOW UP WITH primary care doctor AS DIRECTED.  YOU WERE GIVEN COPIES OF ALL LABS AND IMAGING RESULTS FROM YOUR ER VISIT--PLEASE TAKE THEM WITH YOU TO YOUR APPOINTMENT.  3. IF NEEDED, CALL [phone] TO FIND A PRIMARY CARE PHYSICIAN.  OR CALL [phone] TO MAKE AN APPOINTMENT WITH THE MEDICAL CLINIC.  4. RETURN TO THE ER FOR ANY WORSENING SYMPTOMS.     Back Pain    Back pain is very common in adults. The cause of back pain is rarely dangerous and the pain often gets better over time. The cause of your back pain may not be known and may include strain of muscles or ligaments, degeneration of the spinal disks, or arthritis. Occasionally the pain may radiate down your leg(s). Over-the-counter medicines to reduce pain and inflammation are often the most helpful. Stretching and remaining active frequently helps the healing process.     SEEK IMMEDIATE MEDICAL CARE IF YOU HAVE ANY OF THE FOLLOWING SYMPTOMS: bowel or bladder control problems, unusual weakness or numbness in your arms or legs, nausea or vomiting, abdominal pain, fever, dizziness/lightheadedness.   Viral Respiratory Infection    A viral respiratory infection is an illness that affects parts of the body used for breathing, like the lungs, nose, and throat. It is caused by a germ called a virus. Symptoms can include runny nose, coughing, sneezing, fatigue, body aches, sore throat, fever, or headache. Over the counter medicine can be used to manage the symptoms but the infection typically goes away on its own in 5 to 10 days.     SEEK IMMEDIATE MEDICAL CARE IF YOU HAVE ANY OF THE FOLLOWING SYMPTOMS: shortness of breath, chest pain, fever over 10 days, or lightheadedness/dizziness.

## 2021-12-17 NOTE — ED ADULT NURSE NOTE - OBJECTIVE STATEMENT
Break cover RN. PT is a 51 year old female reporting to the ED for chest pain, cough, SOB, general myalgia since monday. seen at urgent care today and has negative covid and flu test. Pt also endorsing urinary incontinence starting last "few days". PHX: IBS, Crohns, fibromyalgia, spinal stenosis. Pt is AOX4. Place don CM, NSr. Afebrile at this time.  PT respirations even an unlabored. spo2 maintaining on room air. Pt denies fever, chills, n/v/d. Pt denies abdominal pain, dysuria, hematuria. 20 g iv placed in R ac.

## 2021-12-17 NOTE — ED PROVIDER NOTE - CLINICAL SUMMARY MEDICAL DECISION MAKING FREE TEXT BOX
51 year old female with history of fibromyalgia, scoliosis, ?Crohn's disease, chronic pain, presenting with generalized weakness/fatigue, cough, urinary incontinence x 4-5d. 51 year old female with history of fibromyalgia, scoliosis, ?Crohn's disease, chronic pain, presenting with generalized weakness/fatigue, cough, urinary incontinence x 4-5d. Appears nontoxic here but tired, borderline tachy, vitals otherwise wnl. Exam nonfocal, questionable decreased sensation over suprapubic region

## 2021-12-17 NOTE — ED ADULT NURSE NOTE - CHIEF COMPLAINT QUOTE
PT C/O ABD cramping, diarrhea, myalgias, fevers, cough, chills worsening since Monday. Went to UC prior to ED was told to come for further eval. PHX: IBS, Crohns, fibromyalgia, spinal stenosis. Negative for flu/ Covid at  today.  Addendum: PT C/O worsening lower back pain that is usually chronic accompanied by multiple instances of urinary incontinence.

## 2021-12-17 NOTE — ED PROVIDER NOTE - ATTENDING CONTRIBUTION TO CARE
51 year old female with multiple medical problems came to the ED because of chest pain with viral symptoms as the primary complaint, but also reports that she is incontinent of urine, but is able to provide a urine sample on demand. Complaints of tingling to the thighs, no saddle paresthesia, no bowel incontinence, no weakness of her legs, no trauma. Will MRI spine to r/o cord compression and check labs, PERC out for PE.

## 2021-12-17 NOTE — ED PROVIDER NOTE - PHYSICAL EXAMINATION
Gen - NAD; well-appearing; A+Ox3   HEENT - NCAT, EOMI, moist mucous membranes  Neck - supple  Resp - CTAB, no increased WOB  CV -  RRR, no m/r/g  Abd - soft, NT, ND; no guarding or rebound  Back - no midline, paraspinous, or CVA tenderness  MSK - 5/5 strength and FROM b/l UE and LE  Extrem - no LE edema/erythema/tenderness  Neuro - no focal motor or sensation deficits  Rectal (chaperone RN) - Gen - NAD; well-appearing; A+Ox3   HEENT - NCAT, EOMI, moist mucous membranes  Neck - supple  Resp - CTAB, no increased WOB  CV -  RRR, no m/r/g  Abd - soft, NT, ND; no guarding or rebound  Back - no midline, paraspinous, or CVA tenderness  Extrem - no LE edema/erythema/tenderness  Neuro - no focal motor deficits, questionable decreased sensation to LT over suprapubic region, patellar reflex 2+ b/l  Rectal/ (chaperone RN) - normal rectal tone, no obvious blood, no saddle anesthesia

## 2021-12-17 NOTE — ED ADULT TRIAGE NOTE - CHIEF COMPLAINT QUOTE
PT C/O ABD cramping, diarrhea, myalgias, fevers, cough, chills worsening since Monday. Went to UC prior to ED was told to come for further eval. PHX: IBS, Crohns, fibromyalgia, spinal stenosis. Negative for flu/ Covid at  today. PT C/O ABD cramping, diarrhea, myalgias, fevers, cough, chills worsening since Monday. Went to UC prior to ED was told to come for further eval. PHX: IBS, Crohns, fibromyalgia, spinal stenosis. Negative for flu/ Covid at  today.  Addendum: PT C/O worsening lower back pain that is usually chronic accompanied by multiple instances of urinary incontinence.

## 2021-12-17 NOTE — ED PROVIDER NOTE - PATIENT PORTAL LINK FT
You can access the FollowMyHealth Patient Portal offered by [facility] by registering at the following website: [email or web address]. By joining [employer]’s FollowMyHealth portal, you will also be able to view your health information using other applications (apps) compatible with our system.

## 2021-12-17 NOTE — ED ADULT NURSE REASSESSMENT NOTE - NS ED NURSE REASSESS COMMENT FT1
Pt requesting for pain meds. alert ox3. clothing and jewellery removed. patient denies having any metal in her body, awaiting to go to MRI.

## 2021-12-17 NOTE — ED PROVIDER NOTE - OBJECTIVE STATEMENT
51 year old female with history of fibromyalgia, scoliosis, ?Crohn's disease, chronic pain, presenting with generalized weakness/fatigue, cough, urinary incontinence x 4-5d. States feeling "wiped out" with some fevers, malaise, worse than usual generalized pain including to chest, R hip, and lower back, went to UC today and suggested ED visit. Tested negative for COVID/flu rapid. Reports having sudden onset of inability to control urination few days ago, worse in past 2d, also admits to numbness over pubic region. No trauma, fecal incontinence, IV drug use, diabetic history, known cancer. No prior DVT/PE, OCP use.

## 2021-12-21 RX ORDER — CEPHALEXIN 500 MG
1 CAPSULE ORAL
Qty: 12 | Refills: 0
Start: 2021-12-21 | End: 2021-12-23

## 2021-12-21 NOTE — ED POST DISCHARGE NOTE - RESULT SUMMARY
UCX : E. Coli > 100,000 No antibiotic listed in ED provider note or prescription writer at time of discharge.  No antibiotic listed in ED provider note or prescription writer at time of discharge. Patient contact # [phone] and  messages left with Call Back  P.A. number and hours for return call back.

## 2021-12-29 ENCOUNTER — EMERGENCY (EMERGENCY)
Facility: HOSPITAL | Age: 51
LOS: 1 days | Discharge: HOME | End: 2021-12-29
Attending: EMERGENCY MEDICINE | Admitting: EMERGENCY MEDICINE
Payer: MEDICARE

## 2021-12-29 VITALS
SYSTOLIC BLOOD PRESSURE: 106 MMHG | OXYGEN SATURATION: 95 % | TEMPERATURE: 99 F | RESPIRATION RATE: 16 BRPM | DIASTOLIC BLOOD PRESSURE: 74 MMHG | HEART RATE: 71 BPM | HEIGHT: 70 IN

## 2021-12-29 DIAGNOSIS — Z98.89 OTHER SPECIFIED POSTPROCEDURAL STATES: Chronic | ICD-10-CM

## 2021-12-29 DIAGNOSIS — Z98.51 TUBAL LIGATION STATUS: Chronic | ICD-10-CM

## 2021-12-29 DIAGNOSIS — K56.60 UNSPECIFIED INTESTINAL OBSTRUCTION: Chronic | ICD-10-CM

## 2021-12-29 LAB
ALBUMIN SERPL ELPH-MCNC: 4.1 G/DL — SIGNIFICANT CHANGE UP (ref 3.3–5)
ALP SERPL-CCNC: 101 U/L — SIGNIFICANT CHANGE UP (ref 40–120)
ALT FLD-CCNC: 8 U/L — SIGNIFICANT CHANGE UP (ref 4–33)
ANION GAP SERPL CALC-SCNC: 10 MMOL/L — SIGNIFICANT CHANGE UP (ref 7–14)
AST SERPL-CCNC: 16 U/L — SIGNIFICANT CHANGE UP (ref 4–32)
BASOPHILS # BLD AUTO: 0.03 K/UL — SIGNIFICANT CHANGE UP (ref 0–0.2)
BASOPHILS NFR BLD AUTO: 0.6 % — SIGNIFICANT CHANGE UP (ref 0–2)
BILIRUB SERPL-MCNC: 0.3 MG/DL — SIGNIFICANT CHANGE UP (ref 0.2–1.2)
BUN SERPL-MCNC: 13 MG/DL — SIGNIFICANT CHANGE UP (ref 7–23)
CALCIUM SERPL-MCNC: 9.2 MG/DL — SIGNIFICANT CHANGE UP (ref 8.4–10.5)
CHLORIDE SERPL-SCNC: 104 MMOL/L — SIGNIFICANT CHANGE UP (ref 98–107)
CO2 SERPL-SCNC: 25 MMOL/L — SIGNIFICANT CHANGE UP (ref 22–31)
CREAT SERPL-MCNC: 0.77 MG/DL — SIGNIFICANT CHANGE UP (ref 0.5–1.3)
EOSINOPHIL # BLD AUTO: 0.13 K/UL — SIGNIFICANT CHANGE UP (ref 0–0.5)
EOSINOPHIL NFR BLD AUTO: 2.7 % — SIGNIFICANT CHANGE UP (ref 0–6)
GLUCOSE SERPL-MCNC: 83 MG/DL — SIGNIFICANT CHANGE UP (ref 70–99)
HCT VFR BLD CALC: 35.6 % — SIGNIFICANT CHANGE UP (ref 34.5–45)
HGB BLD-MCNC: 10.9 G/DL — LOW (ref 11.5–15.5)
IANC: 2.07 K/UL — SIGNIFICANT CHANGE UP (ref 1.5–8.5)
IMM GRANULOCYTES NFR BLD AUTO: 1.7 % — HIGH (ref 0–1.5)
LYMPHOCYTES # BLD AUTO: 1.83 K/UL — SIGNIFICANT CHANGE UP (ref 1–3.3)
LYMPHOCYTES # BLD AUTO: 38 % — SIGNIFICANT CHANGE UP (ref 13–44)
MCHC RBC-ENTMCNC: 27.2 PG — SIGNIFICANT CHANGE UP (ref 27–34)
MCHC RBC-ENTMCNC: 30.6 GM/DL — LOW (ref 32–36)
MCV RBC AUTO: 88.8 FL — SIGNIFICANT CHANGE UP (ref 80–100)
MONOCYTES # BLD AUTO: 0.67 K/UL — SIGNIFICANT CHANGE UP (ref 0–0.9)
MONOCYTES NFR BLD AUTO: 13.9 % — SIGNIFICANT CHANGE UP (ref 2–14)
NEUTROPHILS # BLD AUTO: 2.07 K/UL — SIGNIFICANT CHANGE UP (ref 1.8–7.4)
NEUTROPHILS NFR BLD AUTO: 43.1 % — SIGNIFICANT CHANGE UP (ref 43–77)
NRBC # BLD: 0 /100 WBCS — SIGNIFICANT CHANGE UP
NRBC # FLD: 0 K/UL — SIGNIFICANT CHANGE UP
PLATELET # BLD AUTO: 235 K/UL — SIGNIFICANT CHANGE UP (ref 150–400)
POTASSIUM SERPL-MCNC: 4.1 MMOL/L — SIGNIFICANT CHANGE UP (ref 3.5–5.3)
POTASSIUM SERPL-SCNC: 4.1 MMOL/L — SIGNIFICANT CHANGE UP (ref 3.5–5.3)
PROT SERPL-MCNC: 6.7 G/DL — SIGNIFICANT CHANGE UP (ref 6–8.3)
RBC # BLD: 4.01 M/UL — SIGNIFICANT CHANGE UP (ref 3.8–5.2)
RBC # FLD: 14.3 % — SIGNIFICANT CHANGE UP (ref 10.3–14.5)
SODIUM SERPL-SCNC: 139 MMOL/L — SIGNIFICANT CHANGE UP (ref 135–145)
TROPONIN T, HIGH SENSITIVITY RESULT: <6 NG/L — SIGNIFICANT CHANGE UP
WBC # BLD: 4.81 K/UL — SIGNIFICANT CHANGE UP (ref 3.8–10.5)
WBC # FLD AUTO: 4.81 K/UL — SIGNIFICANT CHANGE UP (ref 3.8–10.5)

## 2021-12-29 PROCEDURE — 71045 X-RAY EXAM CHEST 1 VIEW: CPT | Mod: 26

## 2021-12-29 PROCEDURE — 99285 EMERGENCY DEPT VISIT HI MDM: CPT | Mod: 25

## 2021-12-29 PROCEDURE — 93010 ELECTROCARDIOGRAM REPORT: CPT

## 2021-12-29 RX ORDER — ACETAMINOPHEN 500 MG
975 TABLET ORAL ONCE
Refills: 0 | Status: COMPLETED | OUTPATIENT
Start: 2021-12-29 | End: 2021-12-29

## 2021-12-29 RX ADMIN — Medication 975 MILLIGRAM(S): at 13:19

## 2021-12-29 NOTE — ED ADULT NURSE NOTE - OBJECTIVE STATEMENT
PT received AOx4, ambulatory at baseline w. pmhx of fibromyalgia, scoliosis, chron disease, chronic pain presents to the ED w, chief complaint of midsternal chest pain radiating to the throat, generalized weakness, fever, chills, cough, nausea w/o vomiting, SOB, and urinary frequency for the past 2 weeks. Pt states she was seen at [facility] for similar complaints on the 17th and was told she has a UTI. Pt breathing even and unlabored, saturating 100% on RA. 20G placed in right wrist area.

## 2021-12-29 NOTE — ED PROVIDER NOTE - NSFOLLOWUPINSTRUCTIONS_ED_ALL_ED_FT
There are no signs of emergency conditions requiring admission to the hospital on today's workup.  Based on the evaluation, a presumptive diagnosis was made, however, further evaluation may be required by your primary care physician or a specialist for a more definitive diagnosis.  Therefore, please follow-up as directed or return to the Emergency Department if your symptoms change or worsen.    We recommend that you:  1. See your primary care physician within the next 72 hours for follow up.  Bring a copy of your discharge paperwork (including any test results) to your doctor.  2. Please quarantine until test results come back negative, if positive please follow CDC guidelines regarding quarantine.   3. Please take 675mg of Tylenol and/or 400mg of Motrin every 6-8 hours as needed for pain/ fever, with food.   4. Please maintain hydration and have full meals.       *** Return immediately if you have worsening symptoms, chest pain, Shortness of Breath, abdominal pain, Nausea/Vomiting/Diarrhea, dizziness, weakness, confusion, severe headache, vision changes, urinary symptoms, syncope, falls, trauma, discharge, bleeding, fevers, or any other new/concerning symptoms. ***

## 2021-12-29 NOTE — ED PROVIDER NOTE - PHYSICAL EXAMINATION
On Physical Exam:  General: well appearing, in NAD, speaking clearly in full sentences and without difficulty; cooperative with exam  HEENT: PERRL, MMM  Neck: no neck tenderness, no nuchal rigidity  Cardiac: mostly reproducible to palpation of chest, normal s1, s2; RRR; no MGR  Lungs: CTABL  Abdomen: soft nontender/nondistended  : no bladder tenderness or distension  Skin: warm, intact, no rash  Extremities: no peripheral edema, no gross deformities  Neuro: no gross neurologic deficits

## 2021-12-29 NOTE — ED PROVIDER NOTE - NS ED ROS FT
Review of Systems:  · Constitutional: no chills, no fever, no night sweats, no weight loss  · Nose: no epistaxis  · Mouth/Throat: no difficulty in swallowing, trachea midline, uvula midline  . Cardio: chest pain, no palpitations, no chest pressure, no ripping chest pain  · Respiratory:  SOB, cough, no exertional dyspnea, no hemoptysis, no orthopnea,  · Gastrointestinal: no abdominal pain, no diarrhea, no melena, no nausea, no vomiting  · Genitourinary: no difficulty urinating, no dysuria, no hematuria  · MUSCULOSKELETAL: body aches, FROM of all extremities  · Skin: no abrasion; no bruising; no laceration  · Neurological: no change in level of consciousness, no headache, no seizures  · ROS STATEMENT: all other ROS negative except as per HPI

## 2021-12-29 NOTE — ED PROVIDER NOTE - CLINICAL SUMMARY MEDICAL DECISION MAKING FREE TEXT BOX
51 year old fm with pmhx anxiety, depression, fibromyalgia, ibs presents to the ED with worsening body aches, chest pains, shortness of breath. patient was seen in ED on 12/17 and diagnosed with a non COVID-19 coronavirus on RVP. will obtain ACS work up, COVID PCR, CXR, reassess, pain management

## 2021-12-29 NOTE — ED PROVIDER NOTE - OBJECTIVE STATEMENT
51 year old fm with pmhx anxiety, depression, fibromyalgia, ibs presents to the ED with worsening body aches, chest pains, shortness of breath. patient was seen in ED on 12/17 and diagnosed with a non COVID-19 coronavirus on RVP. patient reports since then symptoms have not resolved and body aches have gotten worse. patient denies taking anything for symptoms. patient denies active chest pain, abdominal pain, Nausea/Vomiting/Diarrhea, dizziness, confusion, vision changes, urinary symptoms, syncope, falls, trauma, discharge, bleeding. patient fully vaccinated 5/21 pfizer 51 year old fm with pmhx anxiety, depression, fibromyalgia, ibs presents to the ED with worsening body aches, chest pains, shortness of breath. patient was seen in ED on 12/17 and diagnosed with a non COVID-19 coronavirus on RVP. patient reports since then symptoms have not resolved and body aches have gotten worse. patient denies taking anything for symptoms. patient denies active chest pain, abdominal pain, Nausea/Vomiting/Diarrhea, dizziness, confusion, vision changes, urinary symptoms, syncope, falls, trauma, discharge, bleeding. patient fully vaccinated 5/21 Pfizer

## 2021-12-29 NOTE — ED ADULT TRIAGE NOTE - CHIEF COMPLAINT QUOTE
Pt c/o chest pain,  nausea, cough , sob, weakness, body ache, chills x 2 weeks.  Pt tested covid 12/17.

## 2021-12-29 NOTE — ED PROVIDER NOTE - ATTENDING CONTRIBUTION TO CARE
agree with PA note    "51 year old fm with pmhx anxiety, depression, fibromyalgia, ibs presents to the ED with worsening body aches, chest pains, shortness of breath. patient was seen in ED on 12/17 and diagnosed with a non COVID-19 coronavirus on RVP. patient reports since then symptoms have not resolved and body aches have gotten worse. patient denies taking anything for symptoms. patient denies active chest pain, abdominal pain, Nausea/Vomiting/Diarrhea, dizziness, confusion, vision changes, urinary symptoms, syncope, falls, trauma, discharge, bleeding. patient fully vaccinated 5/21 Pfizer"    pt primary complaint is she is COVID positive and concerned she will give it to her 9 yr old child.  Denies SOB.  Has had COVID for over 2 weeks.    PE: anxious, well appearing; VSS: CTAB/L; s1 s2 no m/r/g abd soft/NT/ND ext: no edema  not hypoxic    Imp: reassured pt highly unlikely she is contagious 2 weeks after being diagnosed  EKG non ischemic  will get labs/CXR and reassess

## 2021-12-29 NOTE — ED PROVIDER NOTE - PROGRESS NOTE DETAILS
Pt reassessed at bedside, feels well, pain controlled. Informed of workup in ED, reviewed lab and/or radiology results (when applicable) with patient/caregiver. Informed pt of plan for discharge with instructions to follow up with PMD. Pt/caregiver expressed understanding of plan and agrees with plan for discharge. Strict return precautions discussed with patient in layman's terms, patient demonstrated understanding of return precautions. MD aware and agrees with plan. Patient advised COVID test still pending. Kash Lewis PA-C

## 2021-12-29 NOTE — ED ADULT NURSE NOTE - NSIMPLEMENTINTERV_GEN_ALL_ED
Implemented All Universal Safety Interventions:  [place] to call system. Call bell, personal items and telephone within reach. Instruct patient to call for assistance. Room bathroom lighting operational. Non-slip footwear when patient is off stretcher. Physically safe environment: no spills, clutter or unnecessary equipment. Stretcher in lowest position, wheels locked, appropriate side rails in place.

## 2021-12-29 NOTE — ED PROVIDER NOTE - PATIENT PORTAL LINK FT
You can access the FollowMyHealth Patient Portal offered by [facility] by registering at the following website: [email or web address]. By joining [employer]’s FollowMyHealth portal, you will also be able to view your health information using other applications (apps) compatible with our system.

## 2021-12-29 NOTE — ED ADULT NURSE NOTE - PRO INTERPRETER NEED 2
English
PAST SURGICAL HISTORY:  Automatic implantable cardiac defibrillator in situ ICD (implantable cardioverter-defibrillator) in place

## 2022-03-09 ENCOUNTER — APPOINTMENT (OUTPATIENT)
Dept: OBGYN | Facility: CLINIC | Age: 52
End: 2022-03-09

## 2022-04-07 ENCOUNTER — APPOINTMENT (OUTPATIENT)
Dept: OBGYN | Facility: CLINIC | Age: 52
End: 2022-04-07

## 2022-05-17 NOTE — ED ADULT NURSE NOTE - NSFALLRSKOUTCOME_ED_ALL_ED
Universal Safety Interventions 28 yo male with no significant pmh presents to the ED c/o abdominal pain and constipation x several days. Patient was evaluated in ED 1 month ago for similar symptoms, had labs and urine - unremarkable. Patient states he was feeling better for a few weeks however symptoms returned. Pain is intermittent crampy, nonradiating. Patient recently covid + 1 week ago, symptoms resolved. Patient states he feels constipated but having 1 BM per day, his baseline is 2 BM per day. Patient went to  today and was instructed to come to the ED for CT. Denies fever, chils, chest pain, sob, N/V/D, urinary sxs, flank pain, testicular pain/swelling. + h/o appendectomy

## 2022-07-13 ENCOUNTER — APPOINTMENT (OUTPATIENT)
Dept: OBGYN | Facility: CLINIC | Age: 52
End: 2022-07-13

## 2022-08-12 NOTE — ED PROVIDER NOTE - CROS ED SKIN ALL NEG
HPI     Date of Service:  8/12/2022    Patient:  Lanny Whitfield    Chief Complaint:  Other       HPI:  Lanny Whitfield is a 54 y.o.  male with a past medical history of hypertension, diabetes, CAD, germ cell cancer in remission, PE previously with IVC filter in place subsequently removed today who presents for evaluation of surgical site bleeding. Patient reports earlier today he had his IVC filter removed, notes there was bleeding after the procedure and had to stay longer than normal afterwards. States the bleeding was subsequently controlled and he was discharged home. Shortly prior to arrival he noticed his neck feeling wet and subsequently into the bathroom and noticed there was bleeding from the site. He presented immediately to the emergency department. Denies any lightheadedness or dizziness. No chest pain or shortness of breath. No other complaints at this time.     Past Medical History:   Diagnosis Date    Aortic stenosis, severe     Arrhythmia     heart murmur    CAD (coronary artery disease)     Diabetes (HCC)     GERD (gastroesophageal reflux disease)     Germ cell cancer ([address]     stage 3     Hypertension     Pulmonary embolism ([address]     both lungs    Sleep apnea        Past Surgical History:   Procedure Laterality Date    HX HEART VALVE SURGERY  01/07/2022    HX HEENT      tonsils    HX ORTHOPAEDIC      Plantar Facitits L foot    HX OTHER SURGICAL      removal of blood clots in lungs    IR INSERT TUNL CVC W PORT OVER 5 YEARS  08/26/2021    IR PLC IVC FILTER  12/30/2021    IR REMOVE IVC FILTER W SI  8/12/2022         Family History:   Adopted: Yes       Social History     Socioeconomic History    Marital status: SINGLE     Spouse name: Not on file    Number of children: Not on file    Years of education: Not on file    Highest education level: Not on file   Occupational History    Not on file   Tobacco Use    Smoking status: Never    Smokeless tobacco: Never   Vaping Use    Vaping Use: Never used   Substance and Sexual Activity    Alcohol use: No     Comment:      Drug use: No    Sexual activity: Not on file   Other Topics Concern    Not on file   Social History Narrative    Not on file     Social Determinants of Health     Financial Resource Strain: Not on file   Food Insecurity: Not on file   Transportation Needs: Not on file   Physical Activity: Not on file   Stress: Not on file   Social Connections: Not on file   Intimate Partner Violence: Not on file   Housing Stability: Not on file         ALLERGIES: Other food and Cucumber fruit extract    Review of Systems   Constitutional:  Negative for chills and fever. HENT:  Negative for trouble swallowing and voice change. Eyes:  Negative for discharge and redness. Respiratory:  Negative for cough and shortness of breath. Cardiovascular:  Negative for chest pain. Gastrointestinal:  Negative for nausea and vomiting. Musculoskeletal:  Negative for back pain and joint swelling. Skin:  Negative for color change and rash. Neurological:  Negative for dizziness and light-headedness. Psychiatric/Behavioral:  Negative for agitation and confusion. Vitals:    08/12/22 1515   BP: (!) 196/117   Pulse: (!) 103   Resp: 16   Temp: 98 °F (36.7 °C)   SpO2: 100%   Weight: (!) 194.1 kg (428 lb)            Physical Exam  Vitals and nursing note reviewed. Constitutional:       General: He is not in acute distress. Appearance: He is obese. HENT:      Head: Normocephalic. Eyes:      Extraocular Movements: Extraocular movements intact. Conjunctiva/sclera: Conjunctivae normal.   Neck:     Cardiovascular:      Rate and Rhythm: Normal rate. Pulses: Normal pulses. Pulmonary:      Effort: Pulmonary effort is normal. No respiratory distress. Abdominal:      Palpations: Abdomen is soft. Tenderness: There is no abdominal tenderness. Musculoskeletal:         General: Normal range of motion. Skin:     General: Skin is warm and dry. Capillary Refill: Capillary refill takes less than 2 seconds. Neurological:      General: No focal deficit present. Mental Status: He is alert and oriented to person, place, and time. Psychiatric:         Mood and Affect: Mood normal.         Behavior: Behavior normal.        MDM  Number of Diagnoses or Management Options  Postoperative hemorrhage of skin following dermatologic procedure  Diagnosis management comments:     DECISION MAKING:  Emily Whitfield is a 54 y.o. male who comes in as above. On arrival to the emergency department patient is afebrile. Vital signs notable for elevated blood pressure at 196/117, mild tachycardia with a heart rate of 103, vital signs otherwise stable. Patient notes he is on Lovenox, last dose last night. INR this morning was 1.3. On my examination he does have a saturated dressing over his right anterior lateral neck. Dressing was taken down and there was slow bleeding from the surgical site. I subsequently tried applying pressure to the area without resolution. Silver nitrate attempted and again unsuccessful. Quick clot was placed and bleeding appeared to stop    On 1 hour repeat evaluation dressing now with blood on it, but not completely saturated. Dressing was taken down and continues to have oozing at the site. Discussed with patient placing lidocaine with epi which he is in agreement. The wound was extensively cleansed with alcohol and lido with epi was infiltrated in the area. This did slow down the bleeding but he still had some oozing therefore I placed dermabond and bleeding well controlled. On reevaluation, he continues to have resolution of bleeding. He was instructed on monitoring site for return of bleeding, swelling or any other concerns. He verbalized understanding and was discharged home.             Medications During Visit:  Medications   lidocaine-EPINEPHrine (PF) (XYLOCAINE) 1 %-1:200,000 injection 15 mg (15 mg IntraDERMal Given by Provider 8/12/22 1813)   silver nitrate applicators (ARZOL) 1 Applicator (1 Applicator Topical Given 8/12/22 1813)         IMPRESSION:  1. Postoperative hemorrhage of skin following dermatologic procedure        DISPOSITION:  Discharged      Discharge Medication List as of 8/12/2022  6:05 PM           Follow-up Information       Follow up With Specialties Details Why Contact Info    She Jerry MD [facility] Schedule an appointment as soon as possible for a visit   Meaghan Porter [phone]                The patient is asked to follow-up with their primary care provider in the next several days. They are to call tomorrow for an appointment. The patient is asked to return promptly for any increased concerns or worsening of symptoms. They can return to this emergency department or any other emergency department.       Procedures        Kay Pérez, DO negative...

## 2022-11-14 ENCOUNTER — APPOINTMENT (OUTPATIENT)
Dept: ORTHOPEDIC SURGERY | Facility: CLINIC | Age: 52
End: 2022-11-14

## 2022-11-29 ENCOUNTER — APPOINTMENT (OUTPATIENT)
Dept: ORTHOPEDIC SURGERY | Facility: CLINIC | Age: 52
End: 2022-11-29

## 2022-11-29 DIAGNOSIS — M54.50 LOW BACK PAIN, UNSPECIFIED: ICD-10-CM

## 2022-11-29 DIAGNOSIS — M47.816 SPONDYLOSIS W/OUT MYELOPATHY OR RADICULOPATHY, LUMBAR REGION: ICD-10-CM

## 2022-11-29 PROCEDURE — 72170 X-RAY EXAM OF PELVIS: CPT

## 2022-11-29 PROCEDURE — 72200 X-RAY EXAM SI JOINTS: CPT

## 2022-11-29 PROCEDURE — 99214 OFFICE O/P EST MOD 30 MIN: CPT | Mod: 25

## 2022-11-29 PROCEDURE — 72100 X-RAY EXAM L-S SPINE 2/3 VWS: CPT

## 2022-11-29 NOTE — HISTORY OF PRESENT ILLNESS
[Gradual] : gradual [9] : 9 [8] : 8 [Sharp] : sharp [Frequent] : frequent [Heat] : heat [de-identified] : This is Ms. VI ALVARADO  a 52 year old female who comes in today complaining of low back, tailbone. Pain radiates to her lateral thighs - tingling sensation. Shooting nature with prolonged standing and ambulation. No loss of b/b control, no leg weakness.  Tylenol ES without relief. Saw her PMD, Dr. Flynn in August.  Last MRI and TPI and LESI with Dr. Lang 2019, which she reports helped until recently.  She has tried PT and acupuncture without good response.\par \par Was treated by chiropractor, Dr. Osullivan in her 30's for 10 years - which helped her back pain. \par \par pmh: IBS - constipation, cannot take nsaids with GI issues,mental health issues, no DM, non smoker. \par \par ---------------------------------------------------------------------------------------------------------------------------------------------------\par \par 7/21/20- Ms. Vi Alvarado, a 48-year-old female, presents today for fall 1 month ago and landing on\par the right hip laterally. reports pain radiates from lataral hip to back and groin. she had a LESI with Dr. Lang which\par helped a lot.\par 12/3/19- reports that she developed low back pain and right lateral hip pain radiating into the groin since she twisted\par on Thursday night. reports difficulty walking and working since. pain is significant.\par 3/18/20- reports continued right side groin area pain in c-sign distribution. reports that pain is present despite multiple\par injections to spine with dr. Lang. \par 4/1/20- notes continued pain in right hip that is significant.\par MRI right hip - right hip mild djd, right diffuse superior labral tear. \par 6/29/20- recently had mental breakdown and ?diagnosis of bipolar she is taking meds. reports significant family stress.\par hip pain on right and radiating down to the right foot/ankle. pain is significant, no injectiosn due to covid. recently had\par OB procedure for cyst removal \par 7/15/20- unable to do hip injection due to other health concerns. [] : no [FreeTextEntry9] : laying down

## 2022-11-29 NOTE — ASSESSMENT
[FreeTextEntry1] : 52F with exacerbation lumbar spine and radicular pain, Lumbar DDD, facet arthritis\par \par 1) Tylenol ES and conservative tx has not been helpful, increased radicular pain and symptoms, indicated for          updated MRI Lumbar spine to evaluate new hnp/stenosis\par 2) script for chirocare which was helpful for her in the past\par 3) return with Dr. Lang for MRI review and possible repeat LESI\par 4) MDP\par

## 2022-11-29 NOTE — PHYSICAL EXAM
[Facet arthropathy] : Facet arthropathy [Disc space narrowing] : Disc space narrowing [Scoliosis] : Scoliosis [AP] : anteroposterior [There are no fractures, subluxations or dislocations. No significant abnormalities are seen] : There are no fractures, subluxations or dislocations. No significant abnormalities are seen

## 2022-12-01 ENCOUNTER — FORM ENCOUNTER (OUTPATIENT)
Age: 52
End: 2022-12-01

## 2022-12-02 ENCOUNTER — APPOINTMENT (OUTPATIENT)
Dept: MRI IMAGING | Facility: CLINIC | Age: 52
End: 2022-12-02

## 2022-12-02 PROCEDURE — 72148 MRI LUMBAR SPINE W/O DYE: CPT

## 2022-12-02 PROCEDURE — 99072 ADDL SUPL MATRL&STAF TM PHE: CPT

## 2022-12-10 NOTE — ED PROVIDER NOTE - CROS ED ROS STATEMENT
Malnutrition Findings:     BMI Findings: Body mass index is 18 44 kg/m²     Nutrition consult and recommendation all other ROS negative except as per HPI

## 2023-01-06 ENCOUNTER — APPOINTMENT (OUTPATIENT)
Dept: PAIN MANAGEMENT | Facility: CLINIC | Age: 53
End: 2023-01-06
Payer: COMMERCIAL

## 2023-01-06 VITALS — HEIGHT: 70 IN | BODY MASS INDEX: 41.09 KG/M2 | WEIGHT: 287 LBS

## 2023-01-06 PROCEDURE — 99244 OFF/OP CNSLTJ NEW/EST MOD 40: CPT | Mod: 25

## 2023-01-06 PROCEDURE — 99072 ADDL SUPL MATRL&STAF TM PHE: CPT

## 2023-01-06 PROCEDURE — 20552 NJX 1/MLT TRIGGER POINT 1/2: CPT

## 2023-01-06 PROCEDURE — J3490M: CUSTOM

## 2023-01-06 NOTE — HISTORY OF PRESENT ILLNESS
[Lower back] : lower back [6] : 6 [Dull/Aching] : dull/aching [Radiating] : radiating [Intermittent] : intermittent [Household chores] : household chores [Leisure] : leisure [Sleep] : sleep [Meds] : meds [Standing] : standing [Walking] : walking [FreeTextEntry1] : 01/06/2023: Pt is on the b/l lower back and down the right posterior thigh to the knee described as a burning pain with associated numbness and tingling in the feet. Has been having migraines. Does not want to do PT bc it didn’t help in the past. \par \par 11.12.19: s/p C7-T1 JEREMI on 10.16.19 with >80% relief of pain and improvement of ADLs.\par Pain is now in the mid back radiating into the right ribs and down the lower back and across the hips. Came in for an emergency visit last week and got MDP which helped. Pt stopped gababentin and was started on lyrica. Has Rx for PT.\par \par 9.23.19: s/p RIGHT HIP INJECTION on 8.14.19 with >50% relief of pain and improvement of ADLs. Now complaining of b/l neck pain and radiates up to her head and into the b/l shoulders and down her arms with associated numbness and tingling in her hands but also has carpel tunnel. Also complaining of migraines.\par 7/16/19: Right hip pain started 2 weeks ago and is worse with walking. Saw Dr. ECHOLS who did an xray showing mild hip dysplasia. Pain is on the right lateral hip and radiates into the groin.\par \par 5/21/19: s/p L4-5 LESI on 5/8/19 with 100% relief of pain and improvement of ADLs. No longer has any more lower back pain or radicular pain down the legs. Pain is now mostly in the middle of the back radiating towards the right ribs described as a sharp/dull shooting pain.\par \par Initial HPI:\par Pt with fibromyalgia and anxiety. Pain started the beginning of april 2019 and is on the right side of the neck and\par radiates down the right arm to the fingers with associated tingling. Pain is also going down the entire right side of the back and radiates down the anterior aspect of the right thigh and lower leg to the feet with associated numbness and tingling. Back pain is worse than neck pain. Taking gabapentin, clonazepam, flexeril and escitalopram. [] : no [FreeTextEntry6] : spasm [FreeTextEntry7] : right leg

## 2023-01-06 NOTE — DISCUSSION/SUMMARY
[de-identified] : After discussing various treatment options with the patient including but not limited to oral medications, physical therapy, exercise modalities as well as interventional spinal injections, we have decided with the following plan:\par \par - Continue Home exercises, stretching, activity modification, physical therapy, and conservative care.\par - MRI report and/or images was reviewed and discussed with the patient.\par - Recommend L4-5 Lumbar Epidural Steroid Injection under fluoroscopic guidance with image.\par - The risks, benefits and alternatives of the proposed procedure were explained in detail with the patient. The risks outlined include but are not limited to infection, bleeding, post-dural puncture headache, nerve injury, a temporary increase in pain, failure to resolve symptoms, allergic reaction, symptom recurrence, and possible elevation of blood sugar in diabetics. All questions were answered to patient's apparent satisfaction and he/she verbalized an understanding.\par - Patient is presenting with acute/sub-acute radicular pain with impairment in ADLs and functionality.  The pain has not responded to conservative care including NSAID therapy and/or physical therapy.  There is no bleeding tendency, unstable medical condition, or systemic infection.\par - Follow up in 1-2 weeks post injection for re-evaluation.\par

## 2023-01-06 NOTE — PHYSICAL EXAM
[de-identified] : Constitutional; Appears well, no apparent distress\par Ability to communicate: Normal \par Respiratory: non-labored breathing\par Skin: No rash noted\par Head: Normocephalic, atraumatic\par Neck: no visible thyroid enlargement\par Eyes: Extraocular movements intact\par Neurologic: Alert and oriented x3\par Psychiatric: normal mood, affect and behavior \par \par  [Flexion] : flexion [] : light touch intact throughout both lower extremities

## 2023-01-17 NOTE — ED ADULT NURSE NOTE - NS ED NURSE IV DC DT
09-May-2019 09:30 Localized Dermabrasion Text: The patient was draped in routine manner.  Localized dermabrasion using 3 x 17 mm wire brush was performed in routine manner to papillary dermis. This spot dermabrasion is being performed to complete skin cancer reconstruction. It also will eliminate the other sun damaged precancerous cells that are known to be part of the regional effect of a lifetime's worth of sun exposure. This localized dermabrasion is therapeutic and should not be considered cosmetic in any regard. Localized Dermabrasion With Wire Brush Text: The patient was draped in routine manner.  Localized dermabrasion using 3 x 17 mm wire brush was performed in routine manner to papillary dermis. This spot dermabrasion is being performed to complete skin cancer reconstruction. It also will eliminate the other sun damaged precancerous cells that are known to be part of the regional effect of a lifetime's worth of sun exposure. This localized dermabrasion is therapeutic and should not be considered cosmetic in any regard.

## 2023-01-18 NOTE — ED PROVIDER NOTE - WR ORDER DATE AND TIME 1
[FreeTextEntry1] : GERD-\par pt taking omeprazole 20 mg as needed for heartburn, \par that is not always effective \par he is taking as needed\par  he ia following a GERD diet \par  he is followed once/twice a year by his GI Dr Schneider\par he already saw him earlier this month  \par  29-Dec-2021 13:09

## 2023-01-18 NOTE — ED ADULT NURSE NOTE - TEMPLATE LIST FOR HEAD TO TOE ASSESSMENT
Airway  Performed by: Beni Pereira CRNA  Authorized by: Debora Carlson MD     Final Airway Type:  Endotracheal airway  Final Endotracheal Airway*:  ETT  ETT Size (mm)*:  7.5  Cuff*:  Regular  Technique Used for Successful ETT Placement:  Direct laryngoscopy  Devices/Methods Used in Placement*:  Mask  Intubation Procedure*:  Preoxygenation, ETCO2, Atraumatic, Dentition Unchanged and Pharynx Clear  Insertion Site:  Oral  Blade Type*:  MAC  Blade Size*:  3  Measured from*:  Lips  Secured at (cm)*:  22  Placement Verified by: auscultation and capnometry    Glottic View*:  1 - full view of glottis  Attempts*:  1  Number of Other Approaches Attempted:  0   Patient Identified, Procedure confirmed, Emergency equipment available and Safety protocols followed  Location:  OR  Urgency:  Elective  Difficult Airway: No    Indications for Airway Management:  Anesthesia  Spontaneous Ventilation: absent    Sedation Level:  Anesthetized  Mask Difficulty Assessment:  0 - not attempted  Performed By:  CRNA  CRNA:  Beni Pereira CRNA  Start Time: 1/18/2023 1:28 PM     Abdominal Pain, N/V/D

## 2023-02-02 ENCOUNTER — APPOINTMENT (OUTPATIENT)
Dept: OTOLARYNGOLOGY | Facility: CLINIC | Age: 53
End: 2023-02-02

## 2023-02-08 NOTE — PROGRESS NOTE ADULT - PROVIDER SPECIALTY LIST ADULT
Infectious Disease
Infectious Disease
Internal Medicine
Neurology
Orthopedics
Negative

## 2023-02-10 ENCOUNTER — APPOINTMENT (OUTPATIENT)
Dept: PAIN MANAGEMENT | Facility: CLINIC | Age: 53
End: 2023-02-10

## 2023-02-16 ENCOUNTER — EMERGENCY (EMERGENCY)
Facility: HOSPITAL | Age: 53
LOS: 1 days | Discharge: HOME | End: 2023-02-16
Attending: STUDENT IN AN ORGANIZED HEALTH CARE EDUCATION/TRAINING PROGRAM | Admitting: STUDENT IN AN ORGANIZED HEALTH CARE EDUCATION/TRAINING PROGRAM
Payer: COMMERCIAL

## 2023-02-16 VITALS
SYSTOLIC BLOOD PRESSURE: 108 MMHG | OXYGEN SATURATION: 97 % | DIASTOLIC BLOOD PRESSURE: 61 MMHG | TEMPERATURE: 98 F | HEART RATE: 69 BPM | RESPIRATION RATE: 18 BRPM

## 2023-02-16 VITALS
RESPIRATION RATE: 18 BRPM | TEMPERATURE: 98 F | DIASTOLIC BLOOD PRESSURE: 104 MMHG | OXYGEN SATURATION: 99 % | SYSTOLIC BLOOD PRESSURE: 129 MMHG | HEART RATE: 98 BPM

## 2023-02-16 DIAGNOSIS — Z98.89 OTHER SPECIFIED POSTPROCEDURAL STATES: Chronic | ICD-10-CM

## 2023-02-16 DIAGNOSIS — Z98.51 TUBAL LIGATION STATUS: Chronic | ICD-10-CM

## 2023-02-16 DIAGNOSIS — K56.60 UNSPECIFIED INTESTINAL OBSTRUCTION: Chronic | ICD-10-CM

## 2023-02-16 PROCEDURE — 71045 X-RAY EXAM CHEST 1 VIEW: CPT | Mod: 26

## 2023-02-16 PROCEDURE — 99284 EMERGENCY DEPT VISIT MOD MDM: CPT

## 2023-02-16 RX ORDER — ACETAMINOPHEN 500 MG
975 TABLET ORAL ONCE
Refills: 0 | Status: COMPLETED | OUTPATIENT
Start: 2023-02-16 | End: 2023-02-16

## 2023-02-16 RX ORDER — SODIUM CHLORIDE 9 MG/ML
1000 INJECTION INTRAMUSCULAR; INTRAVENOUS; SUBCUTANEOUS ONCE
Refills: 0 | Status: COMPLETED | OUTPATIENT
Start: 2023-02-16 | End: 2023-02-16

## 2023-02-16 RX ADMIN — Medication 975 MILLIGRAM(S): at 09:32

## 2023-02-16 RX ADMIN — Medication 100 MILLIGRAM(S): at 09:32

## 2023-02-16 NOTE — ED PROVIDER NOTE - CLINICAL SUMMARY MEDICAL DECISION MAKING FREE TEXT BOX
52F h/o depression, anxiety, fibromyalgia, IBS, GERD, found Covid+ 3 days ago p/w worsening cough, cp and sob likely in setting of viral illness with Covid. Pt appears uncomfortable but nontoxic appearing with no evidence of respiratory distress, clear lungs on exam. Low suspicion of superimposed bacterial process but can get CXR to further assess. HEART score 1 assuming normal troponin, low suspicion for ACS based on history provided. Can get screening EKG. Analgesia provided.

## 2023-02-16 NOTE — ED ADULT TRIAGE NOTE - CHIEF COMPLAINT QUOTE
Pt AOPX4 c/o difficulty breathing x 24 hrs; pt is Covid ++ had positive test on 2/13; pt taking Lagevrio for Covid; pt c/o pain in throat and chest when coughing

## 2023-02-16 NOTE — ED PROVIDER NOTE - NSFOLLOWUPINSTRUCTIONS_ED_ALL_ED_FT
You were seen in the ED for shortness of breath.    Your chest x ray, ekg, and examination showed no findings requiring further evaluation or treatment in the hospital at this time.    You may use Tylenol up to 1000mg every 6 hours as needed for fever/pain. You may also use the Tessalon Perles prescribed to you up to 3 times per day as needed.    You had tested positive for COVID 19 prior to arrival in the ED, please follow all CDC isolation guidelines and any isolation guidelines required by your work.    Please follow up with your PCP as discussed within 1 week.    Seek immediate medical attention if you experience new or worsening symptoms, worsening difficulty breathing, loss of consciousness.          COVID-19      COVID-19, or coronavirus disease 2019, is a systemic infection that is caused by a novel coronavirus called SARS-CoV-2. In some people, the virus may not cause any symptoms. In others, it may cause mild or severe symptoms. Some people with severe infection develop severe disease, which may lead to acute respiratory distress syndrome and shock.      What are the causes?  The human body, showing how the coronavirus travels from the air to a person's lungs.   This illness is caused by a virus. The virus may be in the air or on surfaces as droplets or aerosols of various sizes. You may catch the virus by:  •Breathing in droplets from an infected person. Droplets can be spread by a person breathing, speaking, singing, coughing, or sneezing.      •Touching something, like a table or a doorknob, that was exposed to the virus (is contaminated) and then touching your mouth, nose, or eyes.        What increases the risk?    Risk for infection:     You are more likely to become infected with the COVID-19 virus if:  •You are within 6 ft (1.8 m) of a person with COVID-19 for 15 minutes or longer.      •You are providing care for a person who is infected with COVID-19.      •You are in close personal contact with other people. Close personal contact includes hugging, kissing, or sharing eating or drinking utensils.      Risk for serious illness due to COVID-19:    You are more likely to become seriously ill from the COVID-19 virus if:  •You have cancer.    •You have a long-term (chronic) disease, such as:  •Chronic lung disease, including pulmonary embolism, chronic obstructive pulmonary disease, and cystic fibrosis.      •Long-term disease that lowers your body's ability to fight infection (immunocompromise).      •Serious cardiac conditions, such as heart failure, coronary artery disease, or cardiomyopathy.      •Diabetes.      •Chronic kidney disease.      •Liver diseases, including cirrhosis, nonalcoholic fatty liver disease, alcoholic liver disease, or autoimmune hepatitis.        •You are obese.      •You are pregnant or recently pregnant.      •You have sickle cell disease.        What are the signs or symptoms?    Symptoms of this condition can range from mild to severe. Symptoms may appear any time from 2 to 14 days after being exposed to the virus. They include:  •Fever or chills.      •Difficulty breathing or having shortness of breath.      •Feeling tired or very tired.      •Headaches, body aches, or muscle aches.      •Runny or stuffy nose, sneezing, cough, sore throat.      •New loss of taste or smell. This is rare.      Some people may also have stomach problems, such as nausea, vomiting, or diarrhea.    Other people may not have any symptoms of COVID-19.      How is this diagnosed?  A sample being collected by swabbing the nose.   This condition may be diagnosed by testing samples to check for the COVID-19 virus. The most common tests are the PCR test and the antigen test. Tests may be done in the lab or at home. They include:  •Using a swab to take a sample of fluid from the back of your nose and throat (nasopharyngeal fluid), from your nose, or from your throat.      •Testing a sample of saliva from your mouth.      •Testing a sample of coughed-up mucus from your lungs (sputum).        How is this treated?    Treatment for COVID-19 infection depends on the severity of the condition.  •Mild symptoms can be managed at home with rest, fluids, and over-the-counter medicines.    •Serious symptoms may be treated in a hospital intensive care unit, or ICU. Treatment in the ICU may include:  •Supplemental oxygen. Extra oxygen is given through a tube in the nose, a face mask, or a chao.    •Medicines. You may be given medicines:  •To help your body fight off certain viruses that can cause disease (antivirals).      •To reduce inflammation and suppress the immune system (corticosteroids).      •To prevent or treat blood clots, if they develop (antithrombotics).        •Antibodies made in a lab that can restore or strengthen your body's natural immune system (monoclonal antibody).      •Positioning you to lie on your stomach (prone position). This makes it easier for oxygen to get into the lungs.      •Infection control measures.        If you are at risk for more serious illness due to COVID-19, your health care provider may prescribe a combination of two long-acting monoclonal antibodies, administered together every 6 months.      How is this prevented?    To protect yourself:   •Get vaccinated. COVID-19 vaccines are available for everyone aged 6 months and older.  •Vaccination is recommended for women who are pregnant, may become pregnant, or are breastfeeding.      •Patients who require major surgery should plan their vaccination for several days before or after the surgery.      •Talk to your health care provider about receiving experimental monoclonal antibodies. This treatment has been approved under emergency use authorization to prevent severe illness before or after you are exposed to the COVID-19 virus. You may be given monoclonal antibodies if:  •You are moderately or severely immunocompromised. This includes treatments that lower your immune response. People with immunocompromise may not develop protection against COVID-19 when they are vaccinated.      •You cannot be vaccinated. You may not receive a vaccine if you have a severe allergic reaction to the vaccine or its components.      •You are not fully vaccinated.      •You are in close contact with a person who is infected with SARS-CoV-2, or at high risk of exposure to SARS-CoV-2, in an institution in which COVID-19 is occurring.      •You are at risk of illness from new variants of the COVID-19 virus.        To protect others:     If you have symptoms of COVID-19, take steps to prevent the virus from spreading to others.  •Stay home. Leave your house only to seek medical care. Do not use public transport, if possible.      •Do not travel while you are sick.      •Wash your hands often with soap and water for at least 20 seconds. If soap and water are not available, use alcohol-based hand .      •Stay away from other members of your household. Let healthy household members care for children and pets, if possible. If you have to care for children or pets, wash your hands often and wear a mask. If possible, stay in your own room, separate from others. Use a different bathroom.      •Make sure that all people in your household wash their hands well and often.      •Cough or sneeze into a tissue or your sleeve or elbow. Do not cough or sneeze into your hand or into the air.        Where to find more information    •Centers for Disease Control and Prevention: www.cdc.gov/coronavirus      •World Health Organization: www.who.int/health-topics/coronavirus        Get help right away if:    •You have trouble breathing.      •You have pain or pressure in your chest.      •You have confusion.      •You have bluish lips and fingernails.      •You have difficulty waking from sleep.      •You have symptoms that get worse.      These symptoms may be an emergency. Get help right away. Call 911.   • Do not wait to see if the symptoms will go away.        •  Do not drive yourself to the hospital.         Summary    •COVID-19 is a respiratory infection that is caused by a virus.      •Some people with a severe COVID-19 infection develop severe disease, which may lead to acute respiratory distress syndrome and shock.      •The virus that causes COVID-19 is contagious. This means that it can spread from person to person through droplets from breathing, speaking, singing, coughing, or sneezing.      •Mild symptoms of COVID-19 can be managed at home with rest, fluids, and over-the-counter medicines.

## 2023-02-16 NOTE — ED ADULT TRIAGE NOTE - STATUS:
Patient brought by police for reportedly threatening to shoot police and EMS. Patient does own guns.    Applied

## 2023-02-16 NOTE — ED PROVIDER NOTE - PROGRESS NOTE DETAILS
Alexandre Amezcua MD PGY1: Pt feels mild improvement with meds, discussed likely course of covid and follow up, isolation, home analgesia. Pt understands plan and is amenable at this time.

## 2023-02-16 NOTE — ED PROVIDER NOTE - OBJECTIVE STATEMENT
52F h/o depression, anxiety, fibromyalgia, IBS, GERD, found Covid+ 3 days ago p/w worsening cough, cp and sob. Symptoms began 4 days ago, also with chills. Went to urgent care the next day, found Covid+ ( and children also positive). Given antiviral which she has been taking but symptoms have not improved prompting ED visit. Denies calf pain/swelling. Cough non-productive. Chest pain always present but worsened with coughing. Reports prior episode of bronchitis ~2wks ago that had resolved prior to onset of these symptoms. Denies fever.

## 2023-02-16 NOTE — ED ADULT NURSE REASSESSMENT NOTE - NS ED NURSE REASSESS COMMENT FT1
pt resting in stretcher, left foreaem 20g inserted using aseptic technique. blood return noted and flushed w/ no infiltration.

## 2023-02-24 ENCOUNTER — APPOINTMENT (OUTPATIENT)
Dept: PAIN MANAGEMENT | Facility: CLINIC | Age: 53
End: 2023-02-24

## 2023-03-01 NOTE — ED ADULT NURSE NOTE - NS ED NOTE  TALK SOMEONE YN
Otc Regimen: Recommend >30 SPF reapplied every 2 hours (1 hour if sweating or in water), in addition to sun-protective clothing, wide brimmed hats, shade.
Detail Level: Generalized
No

## 2023-03-03 ENCOUNTER — APPOINTMENT (OUTPATIENT)
Dept: PAIN MANAGEMENT | Facility: CLINIC | Age: 53
End: 2023-03-03

## 2023-03-07 ENCOUNTER — APPOINTMENT (OUTPATIENT)
Dept: PAIN MANAGEMENT | Facility: CLINIC | Age: 53
End: 2023-03-07
Payer: COMMERCIAL

## 2023-03-07 VITALS — WEIGHT: 287 LBS | HEIGHT: 70 IN | BODY MASS INDEX: 41.09 KG/M2

## 2023-03-07 DIAGNOSIS — M54.17 RADICULOPATHY, LUMBOSACRAL REGION: ICD-10-CM

## 2023-03-07 PROCEDURE — 99072 ADDL SUPL MATRL&STAF TM PHE: CPT

## 2023-03-07 PROCEDURE — 99213 OFFICE O/P EST LOW 20 MIN: CPT

## 2023-03-07 NOTE — DISCUSSION/SUMMARY
[de-identified] : After discussing various treatment options with the patient including but not limited to oral medications, physical therapy, exercise modalities as well as interventional spinal injections, we have decided with the following plan:\par \par - Continue home exercises, stretching, activity modification, physical therapy, and conservative care.\par - Follow-up as needed.\par - Recommend to f/u with psychiatrist for depression \par - Will provide prescription for Physical Therapy.\par

## 2023-03-07 NOTE — PHYSICAL EXAM
[Flexion] : flexion [de-identified] : Constitutional; Appears well, no apparent distress\par Ability to communicate: Normal \par Respiratory: non-labored breathing\par Skin: No rash noted\par Head: Normocephalic, atraumatic\par Neck: no visible thyroid enlargement\par Eyes: Extraocular movements intact\par Neurologic: Alert and oriented x3\par Psychiatric: normal mood, affect and behavior \par \par  [] : negative sitting straight leg raise

## 2023-03-07 NOTE — HISTORY OF PRESENT ILLNESS
[Mid-back] : mid-back [Lower back] : lower back [10] : 10 [7] : 7 [Dull/Aching] : dull/aching [Radiating] : radiating [Constant] : constant [Household chores] : household chores [Leisure] : leisure [Sleep] : sleep [Meds] : meds [Nothing helps with pain getting better] : Nothing helps with pain getting better [Standing] : standing [Walking] : walking [FreeTextEntry1] : 03/07/2023: Pain is in the center of the mid back and radiating to the lower back and down the right leg to the knee. Taking tylenol and methocarbamol with no relief.  Having trouble with balance and has trouble standing for long periods. Pt says she feels depressed and seeing a psychiatrist. Does not want to harm herself in any way. \par \par 01/06/2023: Pt is on the b/l lower back and down the right posterior thigh to the knee described as a burning pain with associated numbness and tingling in the feet. Has been having migraines. Does not want to do PT bc it didn’t help in the past. \par \par 11.12.19: s/p C7-T1 JEREMI on 10.16.19 with >80% relief of pain and improvement of ADLs.\par Pain is now in the mid back radiating into the right ribs and down the lower back and across the hips. Came in for an emergency visit last week and got MDP which helped. Pt stopped gabapentin and was started on lyrica. Has Rx for PT.\par \par 9.23.19: s/p RIGHT HIP INJECTION on 8.14.19 with >50% relief of pain and improvement of ADLs. Now complaining of b/l neck pain and radiates up to her head and into the b/l shoulders and down her arms with associated numbness and tingling in her hands but also has carpel tunnel. Also complaining of migraines.\par 7/16/19: Right hip pain started 2 weeks ago and is worse with walking. Saw Dr. ECHOLS who did an x-ray showing mild hip dysplasia. Pain is on the right lateral hip and radiates into the groin.\par \par 5/21/19: s/p L4-5 LESI on 5/8/19 with 100% relief of pain and improvement of ADLs. No longer has any more lower back pain or radicular pain down the legs. Pain is now mostly in the middle of the back radiating towards the right ribs described as a sharp/dull shooting pain.\par \par Initial HPI:\par Pt with fibromyalgia and anxiety. Pain started the beginning of april 2019 and is on the right side of the neck and\par radiates down the right arm to the fingers with associated tingling. Pain is also going down the entire right side of the back and radiates down the anterior aspect of the right thigh and lower leg to the feet with associated numbness and tingling. Back pain is worse than neck pain. Taking gabapentin, clonazepam, flexeril and escitalopram. [] : no [FreeTextEntry6] : spasm [FreeTextEntry7] : right hip  ,  leg

## 2023-03-11 ENCOUNTER — INPATIENT (INPATIENT)
Facility: HOSPITAL | Age: 53
LOS: 4 days | Discharge: HOME | DRG: 552 | End: 2023-03-16
Attending: HOSPITALIST | Admitting: HOSPITALIST
Payer: COMMERCIAL

## 2023-03-11 VITALS
RESPIRATION RATE: 18 BRPM | WEIGHT: 268.08 LBS | TEMPERATURE: 97 F | DIASTOLIC BLOOD PRESSURE: 68 MMHG | OXYGEN SATURATION: 98 % | HEIGHT: 70 IN | HEART RATE: 91 BPM | SYSTOLIC BLOOD PRESSURE: 105 MMHG

## 2023-03-11 DIAGNOSIS — Z98.89 OTHER SPECIFIED POSTPROCEDURAL STATES: Chronic | ICD-10-CM

## 2023-03-11 DIAGNOSIS — M54.9 DORSALGIA, UNSPECIFIED: ICD-10-CM

## 2023-03-11 DIAGNOSIS — Z98.51 TUBAL LIGATION STATUS: Chronic | ICD-10-CM

## 2023-03-11 DIAGNOSIS — K21.9 GASTRO-ESOPHAGEAL REFLUX DISEASE WITHOUT ESOPHAGITIS: ICD-10-CM

## 2023-03-11 DIAGNOSIS — F31.9 BIPOLAR DISORDER, UNSPECIFIED: ICD-10-CM

## 2023-03-11 DIAGNOSIS — D64.9 ANEMIA, UNSPECIFIED: ICD-10-CM

## 2023-03-11 DIAGNOSIS — K56.60 UNSPECIFIED INTESTINAL OBSTRUCTION: Chronic | ICD-10-CM

## 2023-03-11 DIAGNOSIS — Z29.9 ENCOUNTER FOR PROPHYLACTIC MEASURES, UNSPECIFIED: ICD-10-CM

## 2023-03-11 DIAGNOSIS — F41.9 ANXIETY DISORDER, UNSPECIFIED: ICD-10-CM

## 2023-03-11 LAB
ALBUMIN SERPL ELPH-MCNC: 4 G/DL — SIGNIFICANT CHANGE UP (ref 3.3–5)
ALP SERPL-CCNC: 87 U/L — SIGNIFICANT CHANGE UP (ref 40–120)
ALT FLD-CCNC: 10 U/L — SIGNIFICANT CHANGE UP (ref 10–45)
ANION GAP SERPL CALC-SCNC: 12 MMOL/L — SIGNIFICANT CHANGE UP (ref 5–17)
ANISOCYTOSIS BLD QL: SLIGHT — SIGNIFICANT CHANGE UP
AST SERPL-CCNC: 16 U/L — SIGNIFICANT CHANGE UP (ref 10–40)
BASOPHILS # BLD AUTO: 0 K/UL — SIGNIFICANT CHANGE UP (ref 0–0.2)
BASOPHILS NFR BLD AUTO: 0 % — SIGNIFICANT CHANGE UP (ref 0–2)
BILIRUB SERPL-MCNC: 0.3 MG/DL — SIGNIFICANT CHANGE UP (ref 0.2–1.2)
BUN SERPL-MCNC: 17 MG/DL — SIGNIFICANT CHANGE UP (ref 7–23)
CALCIUM SERPL-MCNC: 9.3 MG/DL — SIGNIFICANT CHANGE UP (ref 8.4–10.5)
CHLORIDE SERPL-SCNC: 108 MMOL/L — SIGNIFICANT CHANGE UP (ref 96–108)
CO2 SERPL-SCNC: 21 MMOL/L — LOW (ref 22–31)
CREAT SERPL-MCNC: 0.93 MG/DL — SIGNIFICANT CHANGE UP (ref 0.5–1.3)
DACRYOCYTES BLD QL SMEAR: SLIGHT — SIGNIFICANT CHANGE UP
EGFR: 75 ML/MIN/1.73M2 — SIGNIFICANT CHANGE UP
ELLIPTOCYTES BLD QL SMEAR: SLIGHT — SIGNIFICANT CHANGE UP
EOSINOPHIL # BLD AUTO: 0.04 K/UL — SIGNIFICANT CHANGE UP (ref 0–0.5)
EOSINOPHIL NFR BLD AUTO: 0.9 % — SIGNIFICANT CHANGE UP (ref 0–6)
FLUAV AG NPH QL: SIGNIFICANT CHANGE UP
FLUBV AG NPH QL: SIGNIFICANT CHANGE UP
GLUCOSE SERPL-MCNC: 87 MG/DL — SIGNIFICANT CHANGE UP (ref 70–99)
HCT VFR BLD CALC: 31.9 % — LOW (ref 34.5–45)
HGB BLD-MCNC: 9.9 G/DL — LOW (ref 11.5–15.5)
LYMPHOCYTES # BLD AUTO: 1.62 K/UL — SIGNIFICANT CHANGE UP (ref 1–3.3)
LYMPHOCYTES # BLD AUTO: 35.7 % — SIGNIFICANT CHANGE UP (ref 13–44)
MACROCYTES BLD QL: SLIGHT — SIGNIFICANT CHANGE UP
MANUAL SMEAR VERIFICATION: SIGNIFICANT CHANGE UP
MCHC RBC-ENTMCNC: 26.6 PG — LOW (ref 27–34)
MCHC RBC-ENTMCNC: 31 GM/DL — LOW (ref 32–36)
MCV RBC AUTO: 85.8 FL — SIGNIFICANT CHANGE UP (ref 80–100)
MICROCYTES BLD QL: SLIGHT — SIGNIFICANT CHANGE UP
MONOCYTES # BLD AUTO: 0.28 K/UL — SIGNIFICANT CHANGE UP (ref 0–0.9)
MONOCYTES NFR BLD AUTO: 6.1 % — SIGNIFICANT CHANGE UP (ref 2–14)
NEUTROPHILS # BLD AUTO: 2.53 K/UL — SIGNIFICANT CHANGE UP (ref 1.8–7.4)
NEUTROPHILS NFR BLD AUTO: 55.6 % — SIGNIFICANT CHANGE UP (ref 43–77)
PLAT MORPH BLD: NORMAL — SIGNIFICANT CHANGE UP
PLATELET # BLD AUTO: 190 K/UL — SIGNIFICANT CHANGE UP (ref 150–400)
POLYCHROMASIA BLD QL SMEAR: SLIGHT — SIGNIFICANT CHANGE UP
POTASSIUM SERPL-MCNC: 4.1 MMOL/L — SIGNIFICANT CHANGE UP (ref 3.5–5.3)
POTASSIUM SERPL-SCNC: 4.1 MMOL/L — SIGNIFICANT CHANGE UP (ref 3.5–5.3)
PROT SERPL-MCNC: 6.6 G/DL — SIGNIFICANT CHANGE UP (ref 6–8.3)
RBC # BLD: 3.72 M/UL — LOW (ref 3.8–5.2)
RBC # FLD: 14.9 % — HIGH (ref 10.3–14.5)
RBC BLD AUTO: ABNORMAL
RSV RNA NPH QL NAA+NON-PROBE: SIGNIFICANT CHANGE UP
SARS-COV-2 RNA SPEC QL NAA+PROBE: SIGNIFICANT CHANGE UP
SODIUM SERPL-SCNC: 141 MMOL/L — SIGNIFICANT CHANGE UP (ref 135–145)
VARIANT LYMPHS # BLD: 1.7 % — SIGNIFICANT CHANGE UP (ref 0–6)
WBC # BLD: 4.55 K/UL — SIGNIFICANT CHANGE UP (ref 3.8–10.5)
WBC # FLD AUTO: 4.55 K/UL — SIGNIFICANT CHANGE UP (ref 3.8–10.5)

## 2023-03-11 PROCEDURE — 99285 EMERGENCY DEPT VISIT HI MDM: CPT | Mod: GC

## 2023-03-11 PROCEDURE — 99223 1ST HOSP IP/OBS HIGH 75: CPT

## 2023-03-11 RX ORDER — QUETIAPINE FUMARATE 200 MG/1
300 TABLET, FILM COATED ORAL AT BEDTIME
Refills: 0 | Status: DISCONTINUED | OUTPATIENT
Start: 2023-03-11 | End: 2023-03-16

## 2023-03-11 RX ORDER — IBUPROFEN 200 MG
400 TABLET ORAL ONCE
Refills: 0 | Status: COMPLETED | OUTPATIENT
Start: 2023-03-11 | End: 2023-03-11

## 2023-03-11 RX ORDER — ACETAMINOPHEN 500 MG
650 TABLET ORAL EVERY 6 HOURS
Refills: 0 | Status: DISCONTINUED | OUTPATIENT
Start: 2023-03-11 | End: 2023-03-11

## 2023-03-11 RX ORDER — DIAZEPAM 5 MG
5 TABLET ORAL EVERY 6 HOURS
Refills: 0 | Status: DISCONTINUED | OUTPATIENT
Start: 2023-03-11 | End: 2023-03-16

## 2023-03-11 RX ORDER — GABAPENTIN 400 MG/1
1 CAPSULE ORAL
Qty: 0 | Refills: 0 | DISCHARGE

## 2023-03-11 RX ORDER — LIDOCAINE 4 G/100G
1 CREAM TOPICAL DAILY
Refills: 0 | Status: DISCONTINUED | OUTPATIENT
Start: 2023-03-11 | End: 2023-03-16

## 2023-03-11 RX ORDER — PREGABALIN 225 MG/1
1 CAPSULE ORAL
Qty: 0 | Refills: 0 | DISCHARGE

## 2023-03-11 RX ORDER — OXYCODONE AND ACETAMINOPHEN 5; 325 MG/1; MG/1
1 TABLET ORAL ONCE
Refills: 0 | Status: DISCONTINUED | OUTPATIENT
Start: 2023-03-11 | End: 2023-03-11

## 2023-03-11 RX ORDER — PANTOPRAZOLE SODIUM 20 MG/1
40 TABLET, DELAYED RELEASE ORAL
Refills: 0 | Status: DISCONTINUED | OUTPATIENT
Start: 2023-03-11 | End: 2023-03-16

## 2023-03-11 RX ORDER — OXYCODONE HYDROCHLORIDE 5 MG/1
10 TABLET ORAL EVERY 6 HOURS
Refills: 0 | Status: DISCONTINUED | OUTPATIENT
Start: 2023-03-11 | End: 2023-03-13

## 2023-03-11 RX ORDER — SENNA PLUS 8.6 MG/1
2 TABLET ORAL AT BEDTIME
Refills: 0 | Status: DISCONTINUED | OUTPATIENT
Start: 2023-03-11 | End: 2023-03-16

## 2023-03-11 RX ORDER — DIAZEPAM 5 MG
5 TABLET ORAL ONCE
Refills: 0 | Status: DISCONTINUED | OUTPATIENT
Start: 2023-03-11 | End: 2023-03-11

## 2023-03-11 RX ORDER — LIDOCAINE 4 G/100G
1 CREAM TOPICAL ONCE
Refills: 0 | Status: COMPLETED | OUTPATIENT
Start: 2023-03-11 | End: 2023-03-11

## 2023-03-11 RX ORDER — OXYCODONE HYDROCHLORIDE 5 MG/1
5 TABLET ORAL EVERY 6 HOURS
Refills: 0 | Status: DISCONTINUED | OUTPATIENT
Start: 2023-03-11 | End: 2023-03-13

## 2023-03-11 RX ORDER — LAMOTRIGINE 25 MG/1
200 TABLET, ORALLY DISINTEGRATING ORAL DAILY
Refills: 0 | Status: DISCONTINUED | OUTPATIENT
Start: 2023-03-11 | End: 2023-03-16

## 2023-03-11 RX ORDER — ACETAMINOPHEN 500 MG
975 TABLET ORAL EVERY 8 HOURS
Refills: 0 | Status: DISCONTINUED | OUTPATIENT
Start: 2023-03-11 | End: 2023-03-16

## 2023-03-11 RX ORDER — SERTRALINE 25 MG/1
100 TABLET, FILM COATED ORAL AT BEDTIME
Refills: 0 | Status: DISCONTINUED | OUTPATIENT
Start: 2023-03-11 | End: 2023-03-16

## 2023-03-11 RX ORDER — ENOXAPARIN SODIUM 100 MG/ML
40 INJECTION SUBCUTANEOUS EVERY 12 HOURS
Refills: 0 | Status: DISCONTINUED | OUTPATIENT
Start: 2023-03-11 | End: 2023-03-16

## 2023-03-11 RX ORDER — METHOCARBAMOL 500 MG/1
750 TABLET, FILM COATED ORAL
Refills: 0 | Status: DISCONTINUED | OUTPATIENT
Start: 2023-03-11 | End: 2023-03-15

## 2023-03-11 RX ORDER — SERTRALINE 25 MG/1
200 TABLET, FILM COATED ORAL DAILY
Refills: 0 | Status: DISCONTINUED | OUTPATIENT
Start: 2023-03-11 | End: 2023-03-16

## 2023-03-11 RX ORDER — LURASIDONE HYDROCHLORIDE 40 MG/1
1 TABLET ORAL
Qty: 0 | Refills: 0 | DISCHARGE

## 2023-03-11 RX ORDER — HYOSCYAMINE SULFATE 0.13 MG
1 TABLET ORAL
Qty: 0 | Refills: 0 | DISCHARGE

## 2023-03-11 RX ORDER — TOPIRAMATE 25 MG
1 TABLET ORAL
Qty: 0 | Refills: 0 | DISCHARGE

## 2023-03-11 RX ORDER — POLYETHYLENE GLYCOL 3350 17 G/17G
17 POWDER, FOR SOLUTION ORAL DAILY
Refills: 0 | Status: DISCONTINUED | OUTPATIENT
Start: 2023-03-11 | End: 2023-03-16

## 2023-03-11 RX ADMIN — OXYCODONE AND ACETAMINOPHEN 1 TABLET(S): 5; 325 TABLET ORAL at 13:46

## 2023-03-11 RX ADMIN — QUETIAPINE FUMARATE 300 MILLIGRAM(S): 200 TABLET, FILM COATED ORAL at 22:48

## 2023-03-11 RX ADMIN — Medication 15 MILLIGRAM(S): at 23:58

## 2023-03-11 RX ADMIN — SERTRALINE 100 MILLIGRAM(S): 25 TABLET, FILM COATED ORAL at 22:47

## 2023-03-11 RX ADMIN — OXYCODONE HYDROCHLORIDE 10 MILLIGRAM(S): 5 TABLET ORAL at 23:17

## 2023-03-11 RX ADMIN — LIDOCAINE 1 PATCH: 4 CREAM TOPICAL at 22:48

## 2023-03-11 RX ADMIN — Medication 400 MILLIGRAM(S): at 16:16

## 2023-03-11 RX ADMIN — Medication 400 MILLIGRAM(S): at 13:33

## 2023-03-11 RX ADMIN — Medication 5 MILLIGRAM(S): at 13:32

## 2023-03-11 RX ADMIN — OXYCODONE HYDROCHLORIDE 10 MILLIGRAM(S): 5 TABLET ORAL at 22:47

## 2023-03-11 RX ADMIN — OXYCODONE AND ACETAMINOPHEN 1 TABLET(S): 5; 325 TABLET ORAL at 16:16

## 2023-03-11 NOTE — PATIENT PROFILE ADULT - FALL HARM RISK - HARM RISK INTERVENTIONS

## 2023-03-11 NOTE — H&P ADULT - NSHPREVIEWOFSYSTEMS_GEN_ALL_CORE
REVIEW OF SYSTEMS:    CONSTITUTIONAL: No fevers or chills  EYES:  No visual changes or eye pain  ENMT: No hearing loss or sore throat  RESPIRATORY: No cough, wheezing, hemoptysis; No shortness of breath  CARDIOVASCULAR: No chest pain or palpitations  GASTROINTESTINAL: +chronic abdominal pains; +chronic intermittent diarrhea/constipation; No nausea, vomiting, or hematemesis; No melena or hematochezia  GENITOURINARY: No dysuria or hematuria  MUSCULOSKELETAL: +acute on chronic back pain (see HPI); No joint swelling  NEUROLOGICAL: No headache; +chronic numbness of b/l feet; +shooting pain in RLE  PSYCHIATRIC: +anxiety/depression  SKIN: No itching, burning, rashes, or lesions

## 2023-03-11 NOTE — H&P ADULT - NSHPLABSRESULTS_GEN_ALL_CORE
LABS:                        9.9    4.55  )-----------( 190      ( 11 Mar 2023 19:28 )             31.9     03-11    141  |  108  |  17  ----------------------------<  87  4.1   |  21<L>  |  0.93    Ca    9.3      11 Mar 2023 19:28    TPro  6.6  /  Alb  4.0  /  TBili  0.3  /  DBili  x   /  AST  16  /  ALT  10  /  AlkPhos  87  03-11        IMAGING/ADDITIONAL TESTS:    XRs of T-spine, L-spine, and Pelvis ordered

## 2023-03-11 NOTE — H&P ADULT - PROBLEM SELECTOR PLAN 3
Hx of depression/bipolar disorder (c/b multiple SA and 3 prior psychiatric hospitalizations)  - c/w home Zoloft, Seroquel, Buspirone, and Lamotrigine - c/w home Valium (though prescription may have recently ran out based on ISTOP#: [id number])

## 2023-03-11 NOTE — ED ADULT NURSE NOTE - OBJECTIVE STATEMENT
51yo F aaox4 h/o IBS, chronic back pain, presents ambulatory/cane and assistance (son) , as per pt in the last 2 weeks worsening back pain with radiation to the R leg ( numbness) pt repots f/u with pain management team , which prescriptive  Extra strength Tylenol and Robaxin w/o any relief, also pt reports that she fell 4 weeks ago , no LOC, pt endorses constipation Pt denies CP, SOB, HA, vision changes, n/v/, fevers chills, abdominal pain, Safety and comfort measures initiated- bed placed in lowest position and side rails raised. Pt oriented to call bell system. 53yo F aaox4 h/o IBS, chronic back pain, depression, anxiety presents ambulatory/cane and assistance (son) , as per pt in the last 2 weeks worsening back pain with radiation to the R leg ( numbness) pt repots f/u with pain management team , which prescriptive  Extra strength Tylenol and Robaxin w/o any relief, also pt reports that she fell 4 weeks ago , no LOC, pt endorses constipation Pt denies CP, SOB, HA, vision changes, n/v/, fevers chills, abdominal pain, Safety and comfort measures initiated- bed placed in lowest position and side rails raised. Pt oriented to call bell system.

## 2023-03-11 NOTE — H&P ADULT - HISTORY OF PRESENT ILLNESS
52F w/ hx of anxiety, depression/bipolar disorder (c/b multiple SA and 3 prior psychiatric hospitalizations), fibromyalgia, chronic back pain, sciatica 2/2 left L4 nerve root impingement, IBS, s/p gastric bypass, presenting with acute worsening of chronic back pain. Ambulates with cane at baseline and has had difficulty ambulating due to pain after getting a massage in chair for 1.5 hrs recently. Also had a fall ~1 month ago, but symptoms reported to have started more recently. She has tried taking Valium without much relief. She spoke to her pain management doctor who asked her to follow-up with her psychiatrist, making her feel upset and ultimately come to the hospital for her persistent symptoms. Denied f/c/n/v, CP, SOB, abdominal pain, dysuria, saddle anesthesia, or urinary or bowl incontinence.    In ED: Afebrile, HR 60s-90s, SBP 100s, RR 18, sating 97-98% on RA. Labs notable for Hgb 9.9. Given valium 5mg PO, ibuprofen 400mg PO, Percocet 5mg-325mg x2. Admitted to Medicine for further management. 52F w/ hx of anxiety, depression/bipolar disorder (c/b multiple SA and 3 prior psychiatric hospitalizations), fibromyalgia, chronic back pain, sciatica, left L4 nerve root impingement, IBS, GERD,  s/p gastric bypass, s/p abdominoplasty, presenting with acute worsening of chronic back pain for the past two weeks. Has been dealing with back pain for >5 yrs, has good and bad days, but felt it has gotten much worse about 2 weeks ago after getting a massage in massage chair for 1.5 hrs. Also had a fall ~1 month ago on her back/bottom, but symptoms reported to have started more recently. She normally does not use a cane to ambulate, but over the past few days has been using one because it has been difficult for her to ambulate due to her severe pain. She has tried Valium,Tylenol extra strength 3 tabs q4-6h, methocarbamol, lidocaine patch, ice, arthritis creams, and a back brace without much relief. Pain is a "shooting pain" that mainly goes from mid back down to RLE. Reported having chronic numbness in both feet (R worse than L) and now having difficulty moving her RLE due to pain. Noted she has gotten multiple XRs from December to February which she was told her "arthritis has gotten very bad." Stated she tried to get an appointment with a rheumatologist (she believes she may have rheumatoid arthritis), but was unable to get one soon enough to help with the pain. Denied f/c/n/v, CP, SOB, dysuria, saddle anesthesia, or urinary or bowl incontinence. Has chronic abdominal pains due to her multiple abdominal surgeries and chronic intermittent constipation/diarrhea due to her IBS.    In ED: Afebrile, HR 60s-90s, SBP 100s, RR 18, sating 97-98% on RA. Labs notable for Hgb 9.9. Given valium 5mg PO, ibuprofen 400mg PO, Percocet 5mg-325mg x2. Admitted to Medicine for further management.

## 2023-03-11 NOTE — H&P ADULT - TIME BILLING
reviewing prior documentation, independently obtaining a history and interpreting results of tests, performing a physical examination, reviewing tests/imaging, discussing the plan with the patient, counseling and educating the patient, ordering medications/tests, documenting clinical information in the electronic health record, and coordinating care with staff.

## 2023-03-11 NOTE — H&P ADULT - PROBLEM SELECTOR PLAN 1
Presented with acute worsening of chronic back pain for the past two weeks, reportedly after using massage chair for 1.5 hrs. Also reports hx of multiple falls, most recently ~1 month ago. Exam with diffuse TTP across entire back, positive RLE straight leg raise test, chronic R>L foot/toes paresthesias. Prior MRI L-spine (Dec 2021) notable for spinal canal stenosis and left L4 nerve root impingement 2/2 herniated disc. Suspect presenting symptoms 2/2 exacerbation of lumbar radiculopathy vs worsening OA.  - c/w multimodal pain regimen, including short-course of oxycodone IR PRN for mod-severe pain for now as well as home meds (Valium, tylenol, lidocaine patch, methocarbamol PRN)  - may consider starting gabapentin, lyrica, or SNRI if pain uncontrolled (though caution as pt already on several psych meds)  - c/w bowel regimen while on opioid pain meds  - f/u PT eval  - f/u XRs of T-spine, L-spine, and pelvis (given reported recent fall)  - can consider repeat MRI T-/L-spine if no improvement

## 2023-03-11 NOTE — ED PROVIDER NOTE - PHYSICAL EXAMINATION
Physical Exam:  Gen: NAD, AOx3, non-toxic appearing, able to ambulate without assistance  Head: NCAT  HEENT: EOMI, PEERLA, normal conjunctiva, tongue midline, oral mucosa moist  Lung: CTAB, no respiratory distress, no wheezes/rhonchi/rales B/L, speaking in full sentences  CV: RRR, no murmurs, rubs or gallops  Abd: soft, NT, ND, no guarding, no rigidity, no rebound tenderness, no CVA tenderness   MSK: no visible deformities, Patient able to range all extremities, has normal sensation  Neuro: No focal sensory or motor deficits. neuro exam normal   Skin: Warm, well perfused, no rash, no leg swelling  Psych: normal affect, calm

## 2023-03-11 NOTE — H&P ADULT - PROBLEM SELECTOR PLAN 2
- c/w home Valium (though prescription may have recently ran out based on ISTOP#: [id number]) Presented with anemia to Hgb 9.9 (baseline ~11 in 2021), MCV 85.8. Previously on B12 injects, hx of gastric bypass. No signs of overt active bleeding.  - monitor CBC  - f/u iron studies, b12, folate, LDH, haptoglobin, retic count

## 2023-03-11 NOTE — ED PROVIDER NOTE - NS ED ROS FT

## 2023-03-11 NOTE — PATIENT PROFILE ADULT - HOW PATIENT ADDRESSED, PROFILE
"  9/13/2018      RE: Catherine STACK Maduekwe  [address] [facility] [address]       Genetic Counseling Consultation    Presenting Information:  Catherine is a 9 year old female referred by Dr. 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 She was accompanied by her mother, Jennifer.     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 She recently moved to the  from [facility] in January 2018 and presented to  Ophthalmology for evaluation and management. Findings include esotropia, congenital nystagmus, iris transillumination, anisometropia, and low vision of both eyes. OCA type 2 or 4 is suspected, although other forms cannot be ruled out without genetic testing, which she has not had.      Family History:  A three generation family history was obtained and scanned into the medical record. Pertinent information is as follows:  1. Catherine has a 9 year old maternal half sister with reported low hemoglobin, and mom indicates she has a hemoglobinopathy trait (type unspecified).  Otherwise no pigment or vision issues. Catherine also has three paternal half siblings all of whom are reportedly healthy with no pigment changes or vision issues.    2. Maternal history is remarkable for a half-aunt who passed away at 6 months from an infection, but who reportedly had very light skin and hair similar to Catherine. Several other extended family members have variations of lighter hair, skin, and eyes, but none with all together or to the degree as is seen in albinism. Heritage is [place] and [place].   3. Paternal history is remarkable for a cousin with albinism, and it is noted that his father has \"lighter skin\". Minimal information is available for other extended family members. Heritage is [place]ian.   4. No family history of other significant vision problems, hearing problems, or birth defects.. Consanguinity denied.      Discussion:  We " discussed oculocutaneous albinism and that there can be many different genetic causes. Reviewed basic concepts of DNA, genes, and different types of inheritance associated with the genes known to cause these conditions, including dominant, recessive, and X-linked. Discussed potential outcomes of testing including negative (no mutations), positive (disease causing mutation), or variants of uncertain significance. With testing for many genes and many possible outcomes, will be able to review specifics to Catherine and her family in further detail once results are back. If a genetic cause is able to be identified (or particular genes ruled out), this will provide important information to direct future medical management.      Discussed process of molecular testing by Next Generation Sequencing through a blood draw and DNA extraction. Will follow standard process of verification of insurance coverage via financial counselor and follow up communication with family regarding expected out of pocket cost before proceeding with testing.  Reviewed benefits and limitations of testing, and Catherine's mother signed consent form today.    One of Catherine's mother's primary questions was the chance of having another child with albinism in a future pregnancy. We discussed that once Catherine's genetic cause is identified it will be much easier to accurately answer that question. However, given that OCA2 is more common in individuals of  decent, we reviewed this type of albinism specifically with recessive inheritance and mom being a presumed carrier. Based on population data, Catherine's stepfather could have as high as a 1/20 to 1/45 chance of being a OCA2 carrier as well. This would give them a 1/80 to 1/180 chance of having a child with OCA2 in a future pregnancy. It was emphasized that this is a potential estimate, and again, more definitely information will be available after genetic testing. Briefly mentioned the option of prenatal  testing if needed.     Plan:   1. Genetic testing for Albinism was ordered today. Blood drawn and insurance verification initiated, will be in contact with family regarding the outcome of this information. After testing proceeds, results will be available in 4-8 weeks.   2. Additional recommendations and recurrence risks for Catherine and her family will be discussed once results are received.   3. Contact information was provided to family and they were encouraged to call me at [phone] with any questions or concerns in the meantime.    Suzanne Casanova MS, [facility]  Genetic Counselor  [facility]    Time spent in consultation: 45 minutes    CC:  Emilia Garcia MD        Dasia

## 2023-03-11 NOTE — H&P ADULT - PROBLEM SELECTOR PLAN 5
- DVT ppx: Lovenox  - Diet: Regular  - Dispo: pending improvement and PT eval - c/w home pantoprazole

## 2023-03-11 NOTE — ED PROVIDER NOTE - ATTENDING CONTRIBUTION TO CARE
Patient is a 52-year-old female with history of depression anxiety and chronic back pain.  Patient presents with worsening back pains over the last several weeks she states it might of occurred while she was in a massage chair for an hour and a half while on vacation also had a fall over a month ago but the symptoms are more recent acute on chronic she spoke with her pain management doctor who has her on muscle relaxer and Tylenol and told her to follow-up with a psychiatrist made her upset and she is here in the ER.  Patient states that she has not had an MRI in December which showed hernia status.  Patient's pain rating down her right leg which is consistent with her history of sciatica patient is making poor effort but has 5 out of 5 strength complaining of numbness to both legs no change in bowel or bladder habits.  Pain control and reassess with acute on chronic pain.

## 2023-03-11 NOTE — ED PROVIDER NOTE - PROGRESS NOTE DETAILS
ED sign out, eval for acute on chronic back pain, complicated as underlying psychiatric issues, no red flags by hx/pe for more serious process at this time, pending pain control and close reassessments for tx / dispo decision -- Stuart Lai MD

## 2023-03-11 NOTE — H&P ADULT - NSHPPHYSICALEXAM_GEN_ALL_CORE
Vital Signs Last 24 Hrs  T(C): 36.5 (11 Mar 2023 20:10), Max: 36.7 (11 Mar 2023 19:26)  T(F): 97.7 (11 Mar 2023 20:10), Max: 98 (11 Mar 2023 19:26)  HR: 65 (11 Mar 2023 20:10) (65 - 91)  BP: 117/72 (11 Mar 2023 20:10) (105/68 - 117/72)  BP(mean): 87 (11 Mar 2023 20:10) (87 - 87)  RR: 16 (11 Mar 2023 20:10) (16 - 18)  SpO2: 96% (11 Mar 2023 20:10) (96% - 98%)    Parameters below as of 11 Mar 2023 20:10  Patient On (Oxygen Delivery Method): room air        CONSTITUTIONAL: NAD, well-developed, well-groomed, laying in bed  EYES: PERRL; conjunctiva and sclera clear  ENMT: Moist oral mucosa, no pharyngeal exudates  NECK: Supple, no palpable masses  RESPIRATORY: Normal respiratory effort; lungs are clear to auscultation bilaterally; No wheezes or rales  CARDIOVASCULAR: Regular rate and rhythm; Normal S1 and S2; No murmurs, rubs, or gallops; No lower extremity edema; Peripheral pulses are 2+ bilaterally  ABDOMEN: Soft, Nondistended; diffuse mild TTP (chronic per pt); Bowel sounds present; well-healed abdominal scars noted  MUSCULOSKELETAL:  No clubbing or cyanosis of digits; No joint swelling; TTP of R hip  BACK: home back brace in place; diffuse TTP across entire back, greatest in mid-thoracic down to lumbar b/l paraspinal regions; positive RLE straight leg raise test  PSYCH: AAOx3 (oriented to person, place, and time); labile affect  NEUROLOGY: CN II-XII are grossly intact and symmetric; slight decreased sensation to light touch in b/l feet/toes (R>L); strength testing in RLE limited by pain/effort, otherwise grossly 5/5 in LLE and b/l UEs  SKIN: No rashes; No palpable lesions

## 2023-03-11 NOTE — ED PROVIDER NOTE - OBJECTIVE STATEMENT
Patient is a 52-year-old female with past medical chronic back pain who presents emergency department complaining of back pain.  Patient states that she believes the back pain got worse while she was getting massage in a massage chair for 1-1/2-hour.  Patient states she normally walks with a cane and has had difficulty walking due to pain.  Has taken Valium at home with little relief.  Denies any fevers, chills, shortness of breath, saddle anesthesia, urinary or bowel incontinence.  Patient had MRI done in December.  Patient also followed by pain management.

## 2023-03-11 NOTE — H&P ADULT - ASSESSMENT
52F w/ hx of anxiety, depression/bipolar disorder (c/b multiple SA and 3 prior psychiatric hospitalizations), fibromyalgia, chronic back pain, sciatica, left L4 nerve root impingement, IBS, GERD, s/p gastric bypass, s/p abdominoplasty, presenting with acute worsening of chronic back pain for the past two weeks. Suspect exacerbation of lumbar radiculopathy vs worsening OA.

## 2023-03-11 NOTE — H&P ADULT - NSICDXFAMILYHX_GEN_ALL_CORE_FT
FAMILY HISTORY:  Father  Still living? Unknown  FH: diabetes mellitus, Age at diagnosis: Age Unknown  FH: rheumatoid arthritis, Age at diagnosis: Age Unknown

## 2023-03-11 NOTE — ED ADULT TRIAGE NOTE - CHIEF COMPLAINT QUOTE
c/o medial back/spine pain for years and was Diagnosed Rheumatoid Arthritis, was seen in the spine clinic yesterday and was told to ff-up with Rheumatologist but the patient reports that she cant function on her daily living. Had been taking Robaxin and ES Tylenol every 6 hours.

## 2023-03-11 NOTE — H&P ADULT - PROBLEM SELECTOR PLAN 4
- c/w home pantoprazole Hx of depression/bipolar disorder (c/b multiple SA and 3 prior psychiatric hospitalizations)  - c/w home Zoloft, Seroquel, Buspirone, and Lamotrigine

## 2023-03-12 LAB
A1C WITH ESTIMATED AVERAGE GLUCOSE RESULT: 5.2 % — SIGNIFICANT CHANGE UP (ref 4–5.6)
ANION GAP SERPL CALC-SCNC: 12 MMOL/L — SIGNIFICANT CHANGE UP (ref 5–17)
BUN SERPL-MCNC: 20 MG/DL — SIGNIFICANT CHANGE UP (ref 7–23)
CALCIUM SERPL-MCNC: 9.2 MG/DL — SIGNIFICANT CHANGE UP (ref 8.4–10.5)
CHLORIDE SERPL-SCNC: 107 MMOL/L — SIGNIFICANT CHANGE UP (ref 96–108)
CO2 SERPL-SCNC: 23 MMOL/L — SIGNIFICANT CHANGE UP (ref 22–31)
CREAT SERPL-MCNC: 0.99 MG/DL — SIGNIFICANT CHANGE UP (ref 0.5–1.3)
EGFR: 69 ML/MIN/1.73M2 — SIGNIFICANT CHANGE UP
ESTIMATED AVERAGE GLUCOSE: 103 MG/DL — SIGNIFICANT CHANGE UP (ref 68–114)
FERRITIN SERPL-MCNC: 23 NG/ML — SIGNIFICANT CHANGE UP (ref 15–150)
FOLATE SERPL-MCNC: 12.8 NG/ML — SIGNIFICANT CHANGE UP
GLUCOSE SERPL-MCNC: 80 MG/DL — SIGNIFICANT CHANGE UP (ref 70–99)
HAPTOGLOB SERPL-MCNC: 109 MG/DL — SIGNIFICANT CHANGE UP (ref 34–200)
HCT VFR BLD CALC: 31.5 % — LOW (ref 34.5–45)
HGB BLD-MCNC: 9.7 G/DL — LOW (ref 11.5–15.5)
IRON SATN MFR SERPL: 31 UG/DL — SIGNIFICANT CHANGE UP (ref 30–160)
IRON SATN MFR SERPL: 9 % — LOW (ref 14–50)
LDH SERPL L TO P-CCNC: 153 U/L — SIGNIFICANT CHANGE UP (ref 50–242)
MAGNESIUM SERPL-MCNC: 2.2 MG/DL — SIGNIFICANT CHANGE UP (ref 1.6–2.6)
MCHC RBC-ENTMCNC: 26.8 PG — LOW (ref 27–34)
MCHC RBC-ENTMCNC: 30.8 GM/DL — LOW (ref 32–36)
MCV RBC AUTO: 87 FL — SIGNIFICANT CHANGE UP (ref 80–100)
NRBC # BLD: 0 /100 WBCS — SIGNIFICANT CHANGE UP (ref 0–0)
PHOSPHATE SERPL-MCNC: 5.1 MG/DL — HIGH (ref 2.5–4.5)
PLATELET # BLD AUTO: 195 K/UL — SIGNIFICANT CHANGE UP (ref 150–400)
POTASSIUM SERPL-MCNC: 4.1 MMOL/L — SIGNIFICANT CHANGE UP (ref 3.5–5.3)
POTASSIUM SERPL-SCNC: 4.1 MMOL/L — SIGNIFICANT CHANGE UP (ref 3.5–5.3)
RBC # BLD: 3.62 M/UL — LOW (ref 3.8–5.2)
RBC # BLD: 3.62 M/UL — LOW (ref 3.8–5.2)
RBC # FLD: 15.1 % — HIGH (ref 10.3–14.5)
RETICS #: 78.2 K/UL — SIGNIFICANT CHANGE UP (ref 25–125)
RETICS/RBC NFR: 2.2 % — SIGNIFICANT CHANGE UP (ref 0.5–2.5)
SODIUM SERPL-SCNC: 142 MMOL/L — SIGNIFICANT CHANGE UP (ref 135–145)
TIBC SERPL-MCNC: 345 UG/DL — SIGNIFICANT CHANGE UP (ref 220–430)
TSH SERPL-MCNC: 1.2 UIU/ML — SIGNIFICANT CHANGE UP (ref 0.27–4.2)
UIBC SERPL-MCNC: 314 UG/DL — SIGNIFICANT CHANGE UP (ref 110–370)
VIT B12 SERPL-MCNC: 377 PG/ML — SIGNIFICANT CHANGE UP (ref 232–1245)
WBC # BLD: 5.08 K/UL — SIGNIFICANT CHANGE UP (ref 3.8–10.5)
WBC # FLD AUTO: 5.08 K/UL — SIGNIFICANT CHANGE UP (ref 3.8–10.5)

## 2023-03-12 PROCEDURE — 72070 X-RAY EXAM THORAC SPINE 2VWS: CPT | Mod: 26

## 2023-03-12 PROCEDURE — 72170 X-RAY EXAM OF PELVIS: CPT | Mod: 26

## 2023-03-12 PROCEDURE — 72141 MRI NECK SPINE W/O DYE: CPT | Mod: 26

## 2023-03-12 PROCEDURE — 72100 X-RAY EXAM L-S SPINE 2/3 VWS: CPT | Mod: 26

## 2023-03-12 PROCEDURE — 72148 MRI LUMBAR SPINE W/O DYE: CPT | Mod: 26

## 2023-03-12 PROCEDURE — 72146 MRI CHEST SPINE W/O DYE: CPT | Mod: 26

## 2023-03-12 RX ADMIN — Medication 15 MILLIGRAM(S): at 21:03

## 2023-03-12 RX ADMIN — OXYCODONE HYDROCHLORIDE 10 MILLIGRAM(S): 5 TABLET ORAL at 16:26

## 2023-03-12 RX ADMIN — METHOCARBAMOL 750 MILLIGRAM(S): 500 TABLET, FILM COATED ORAL at 21:04

## 2023-03-12 RX ADMIN — LIDOCAINE 1 PATCH: 4 CREAM TOPICAL at 19:00

## 2023-03-12 RX ADMIN — SENNA PLUS 2 TABLET(S): 8.6 TABLET ORAL at 21:04

## 2023-03-12 RX ADMIN — Medication 975 MILLIGRAM(S): at 19:38

## 2023-03-12 RX ADMIN — ENOXAPARIN SODIUM 40 MILLIGRAM(S): 100 INJECTION SUBCUTANEOUS at 19:39

## 2023-03-12 RX ADMIN — Medication 975 MILLIGRAM(S): at 10:20

## 2023-03-12 RX ADMIN — LIDOCAINE 1 PATCH: 4 CREAM TOPICAL at 10:20

## 2023-03-12 RX ADMIN — SERTRALINE 100 MILLIGRAM(S): 25 TABLET, FILM COATED ORAL at 21:03

## 2023-03-12 RX ADMIN — Medication 5 MILLIGRAM(S): at 19:39

## 2023-03-12 RX ADMIN — Medication 15 MILLIGRAM(S): at 06:49

## 2023-03-12 RX ADMIN — LIDOCAINE 1 PATCH: 4 CREAM TOPICAL at 07:55

## 2023-03-12 RX ADMIN — Medication 5 MILLIGRAM(S): at 14:04

## 2023-03-12 RX ADMIN — Medication 5 MILLIGRAM(S): at 00:00

## 2023-03-12 RX ADMIN — OXYCODONE HYDROCHLORIDE 10 MILLIGRAM(S): 5 TABLET ORAL at 09:41

## 2023-03-12 RX ADMIN — Medication 15 MILLIGRAM(S): at 16:01

## 2023-03-12 RX ADMIN — Medication 975 MILLIGRAM(S): at 20:08

## 2023-03-12 RX ADMIN — QUETIAPINE FUMARATE 300 MILLIGRAM(S): 200 TABLET, FILM COATED ORAL at 21:03

## 2023-03-12 RX ADMIN — PANTOPRAZOLE SODIUM 40 MILLIGRAM(S): 20 TABLET, DELAYED RELEASE ORAL at 06:49

## 2023-03-12 RX ADMIN — LIDOCAINE 1 PATCH: 4 CREAM TOPICAL at 12:34

## 2023-03-12 RX ADMIN — LAMOTRIGINE 200 MILLIGRAM(S): 25 TABLET, ORALLY DISINTEGRATING ORAL at 12:34

## 2023-03-12 RX ADMIN — OXYCODONE HYDROCHLORIDE 10 MILLIGRAM(S): 5 TABLET ORAL at 22:30

## 2023-03-12 RX ADMIN — ENOXAPARIN SODIUM 40 MILLIGRAM(S): 100 INJECTION SUBCUTANEOUS at 06:49

## 2023-03-12 RX ADMIN — OXYCODONE HYDROCHLORIDE 10 MILLIGRAM(S): 5 TABLET ORAL at 16:02

## 2023-03-12 RX ADMIN — SERTRALINE 200 MILLIGRAM(S): 25 TABLET, FILM COATED ORAL at 12:34

## 2023-03-12 RX ADMIN — METHOCARBAMOL 750 MILLIGRAM(S): 500 TABLET, FILM COATED ORAL at 12:37

## 2023-03-12 RX ADMIN — OXYCODONE HYDROCHLORIDE 10 MILLIGRAM(S): 5 TABLET ORAL at 10:20

## 2023-03-12 RX ADMIN — OXYCODONE HYDROCHLORIDE 10 MILLIGRAM(S): 5 TABLET ORAL at 23:00

## 2023-03-12 RX ADMIN — Medication 975 MILLIGRAM(S): at 09:41

## 2023-03-12 NOTE — PHYSICAL THERAPY INITIAL EVALUATION ADULT - BALANCE TRAINING, PT EVAL
GOAL: Pt will improve balance by at least 1/2 grade to decrease fall risk during functional mobility within 2 wks.

## 2023-03-12 NOTE — PHYSICAL THERAPY INITIAL EVALUATION ADULT - GENERAL OBSERVATIONS, REHAB EVAL
Pt received semi-supine in bed in NAD, +IVL, +premedicated for pain by RN MIRIAM Vazquez, agreeable to participate in therapy at this time

## 2023-03-12 NOTE — PHYSICAL THERAPY INITIAL EVALUATION ADULT - PLANNED THERAPY INTERVENTIONS, PT EVAL
STAIR GOAL: Pt will negotiate 1 FOS independently with HR assist within 2 weeks./balance training/gait training/transfer training

## 2023-03-12 NOTE — PHYSICAL THERAPY INITIAL EVALUATION ADULT - LIVES WITH, PROFILE
Pt resides with spouse and children in private home with 4 steps to enter +HR assist and first floor setup. Pt reports being functionally independent in all aspects of mobility and self care without AD however, notes using cane for the last few days due to increased pain. Pt reports having 1 FOS to basement where laundry is done a few times a week./children/spouse

## 2023-03-12 NOTE — PHYSICAL THERAPY INITIAL EVALUATION ADULT - PERTINENT HX OF CURRENT PROBLEM, REHAB EVAL
51yo F w/ hx of anxiety, depression/bipolar disorder (c/b multiple SA and 3 prior psychiatric hospitalizations), fibromyalgia, chronic back pain, sciatica, left L4 nerve root impingement, IBS, GERD,  s/p gastric bypass, s/p abdominoplasty, presenting with acute worsening of chronic back pain for the past two weeks. Has been dealing with back pain for >5 yrs, has good and bad days, but felt it has gotten much worse about 2 weeks ago after getting a massage in massage chair for 1.5 hrs. Also had a fall ~1 month ago on her back/bottom, but symptoms reported to have started more recently. She normally does not use a cane to ambulate, but over the past few days has been using one because it has been difficult for her to ambulate due to her severe pain. She has tried Valium, Tylenol extra strength 3 tabs q4-6h, methocarbamol, lidocaine patch, ice, arthritis creams, and a back brace without much relief. Pain is a "shooting pain" that mainly goes from mid back down to RLE. Reported having chronic numbness in both feet (R worse than L) and now having difficulty moving her RLE due to pain. Noted she has gotten multiple XRs from December to February which she was told her "arthritis has gotten very bad." Stated she tried to get an appointment with a rheumatologist (she believes she may have rheumatoid arthritis), but was unable to get one soon enough to help with the pain. Denied f/c/n/v, CP, SOB, dysuria, saddle anesthesia, or urinary or bowl incontinence. Has chronic abdominal pains due to her multiple abdominal surgeries and chronic intermittent constipation/diarrhea due to her IBS. Pt admitted to [address] [place] [address] for further management and monitoring.

## 2023-03-12 NOTE — PHYSICAL THERAPY INITIAL EVALUATION ADULT - DIAGNOSIS, PT EVAL
Pt presents with pain, impairments in strength, balance and endurance all impacting ability to perform ADLs and functional mobility

## 2023-03-12 NOTE — PHYSICAL THERAPY INITIAL EVALUATION ADULT - TRANSFER TRAINING, PT EVAL
GOAL: Pt will perform all functional transfers independently with or without AD as needed within 2 wks.

## 2023-03-13 LAB — RHEUMATOID FACT SERPL-ACNC: <10 IU/ML — SIGNIFICANT CHANGE UP (ref 0–13)

## 2023-03-13 RX ORDER — OXYCODONE HYDROCHLORIDE 5 MG/1
10 TABLET ORAL EVERY 6 HOURS
Refills: 0 | Status: DISCONTINUED | OUTPATIENT
Start: 2023-03-13 | End: 2023-03-15

## 2023-03-13 RX ORDER — MORPHINE SULFATE 50 MG/1
2 CAPSULE, EXTENDED RELEASE ORAL EVERY 6 HOURS
Refills: 0 | Status: DISCONTINUED | OUTPATIENT
Start: 2023-03-13 | End: 2023-03-14

## 2023-03-13 RX ADMIN — Medication 15 MILLIGRAM(S): at 21:17

## 2023-03-13 RX ADMIN — ENOXAPARIN SODIUM 40 MILLIGRAM(S): 100 INJECTION SUBCUTANEOUS at 08:43

## 2023-03-13 RX ADMIN — PANTOPRAZOLE SODIUM 40 MILLIGRAM(S): 20 TABLET, DELAYED RELEASE ORAL at 06:05

## 2023-03-13 RX ADMIN — Medication 5 MILLIGRAM(S): at 20:46

## 2023-03-13 RX ADMIN — METHOCARBAMOL 750 MILLIGRAM(S): 500 TABLET, FILM COATED ORAL at 12:23

## 2023-03-13 RX ADMIN — SERTRALINE 200 MILLIGRAM(S): 25 TABLET, FILM COATED ORAL at 12:24

## 2023-03-13 RX ADMIN — MORPHINE SULFATE 2 MILLIGRAM(S): 50 CAPSULE, EXTENDED RELEASE ORAL at 18:42

## 2023-03-13 RX ADMIN — MORPHINE SULFATE 2 MILLIGRAM(S): 50 CAPSULE, EXTENDED RELEASE ORAL at 19:05

## 2023-03-13 RX ADMIN — LIDOCAINE 1 PATCH: 4 CREAM TOPICAL at 19:00

## 2023-03-13 RX ADMIN — LIDOCAINE 1 PATCH: 4 CREAM TOPICAL at 00:16

## 2023-03-13 RX ADMIN — OXYCODONE HYDROCHLORIDE 10 MILLIGRAM(S): 5 TABLET ORAL at 17:33

## 2023-03-13 RX ADMIN — Medication 975 MILLIGRAM(S): at 09:38

## 2023-03-13 RX ADMIN — OXYCODONE HYDROCHLORIDE 10 MILLIGRAM(S): 5 TABLET ORAL at 06:06

## 2023-03-13 RX ADMIN — Medication 975 MILLIGRAM(S): at 10:38

## 2023-03-13 RX ADMIN — LAMOTRIGINE 200 MILLIGRAM(S): 25 TABLET, ORALLY DISINTEGRATING ORAL at 12:25

## 2023-03-13 RX ADMIN — MORPHINE SULFATE 2 MILLIGRAM(S): 50 CAPSULE, EXTENDED RELEASE ORAL at 12:54

## 2023-03-13 RX ADMIN — LIDOCAINE 1 PATCH: 4 CREAM TOPICAL at 12:25

## 2023-03-13 RX ADMIN — Medication 5 MILLIGRAM(S): at 09:38

## 2023-03-13 RX ADMIN — Medication 15 MILLIGRAM(S): at 06:05

## 2023-03-13 RX ADMIN — MORPHINE SULFATE 2 MILLIGRAM(S): 50 CAPSULE, EXTENDED RELEASE ORAL at 13:20

## 2023-03-13 RX ADMIN — OXYCODONE HYDROCHLORIDE 10 MILLIGRAM(S): 5 TABLET ORAL at 16:34

## 2023-03-13 RX ADMIN — SENNA PLUS 2 TABLET(S): 8.6 TABLET ORAL at 21:16

## 2023-03-13 RX ADMIN — Medication 15 MILLIGRAM(S): at 12:55

## 2023-03-13 RX ADMIN — OXYCODONE HYDROCHLORIDE 10 MILLIGRAM(S): 5 TABLET ORAL at 06:36

## 2023-03-13 RX ADMIN — QUETIAPINE FUMARATE 300 MILLIGRAM(S): 200 TABLET, FILM COATED ORAL at 21:16

## 2023-03-13 RX ADMIN — SERTRALINE 100 MILLIGRAM(S): 25 TABLET, FILM COATED ORAL at 21:17

## 2023-03-13 RX ADMIN — ENOXAPARIN SODIUM 40 MILLIGRAM(S): 100 INJECTION SUBCUTANEOUS at 21:16

## 2023-03-13 NOTE — DIETITIAN INITIAL EVALUATION ADULT - PERTINENT MEDS FT
MEDICATIONS  (STANDING):  busPIRone 15 milliGRAM(s) Oral three times a day  enoxaparin Injectable 40 milliGRAM(s) SubCutaneous every 12 hours  lamoTRIgine 200 milliGRAM(s) Oral daily  lidocaine   4% Patch 1 Patch Transdermal daily  pantoprazole    Tablet 40 milliGRAM(s) Oral before breakfast  QUEtiapine 300 milliGRAM(s) Oral at bedtime  senna 2 Tablet(s) Oral at bedtime  sertraline 200 milliGRAM(s) Oral daily  sertraline 100 milliGRAM(s) Oral at bedtime    MEDICATIONS  (PRN):  acetaminophen     Tablet .. 975 milliGRAM(s) Oral every 8 hours PRN Temp greater or equal to 38C (100.4F), Mild Pain (1 - 3)  aluminum hydroxide/magnesium hydroxide/simethicone Suspension 30 milliLiter(s) Oral every 6 hours PRN Dyspepsia  diazepam    Tablet 5 milliGRAM(s) Oral every 6 hours PRN Anxiety  methocarbamol 750 milliGRAM(s) Oral two times a day PRN Muscle Spasm  oxyCODONE    IR 5 milliGRAM(s) Oral every 6 hours PRN Moderate Pain (4 - 6)  oxyCODONE    IR 10 milliGRAM(s) Oral every 6 hours PRN Severe Pain (7 - 10)  polyethylene glycol 3350 17 Gram(s) Oral daily PRN Constipation

## 2023-03-13 NOTE — CONSULT NOTE ADULT - ASSESSMENT
Dasia Alvarado  52F w/ extensive psych hx of anxiety, depression/bipolar (requiring hospitalization), fibromyalgia, IBS, p/w acute on chronic LBP (5 yr hx s/p epidurals, trigger point injections, oral steroids), with complaints of RLE pain for past 2 weeks. No recent trauma. Says pain started after sitting in massage chair for 1.5 hr. Her back pain is diffuse from top of back to coccyx, and any movement triggers pain. She c/o weakness but can ambulate w/ gonsalez. MRI C/T/L spine shows mild C6-7/C7-T1, R L2-3 and L L4-5 disc protrusions. No cord compression or major evidence of foraminal stenosis. No urinary/bowel incontinence. Exam: obese, AO3, PERRL, BUE 5/5, RLE HF/KE/KF 2/5 (pain limited), R DF/PF 3/5, L HF 2/5 (pain limited), L KE 4/5, L KF 2/5, L DF/PF, diffuse pain from top of neck to coccyx, decreased sensation in b/l delts, and BLE     -no neurosurgical intervention; no major evidence of structural injury  -distribution of complaints/deficits are incongruent with imaging findings  -continued pain control per primary team

## 2023-03-13 NOTE — DIETITIAN INITIAL EVALUATION ADULT - ENERGY INTAKE
Currently, Pt reports good appetite/PO intake. States her current pain level is not affecting her appetite. States some foods lack taste after recently having COVID. No specific food preferences offered. Fair (50-75%)

## 2023-03-13 NOTE — DIETITIAN INITIAL EVALUATION ADULT - REASON FOR ADMISSION
Per chart, Pt is a 51 y/o F with PMH: "anxiety, depression/bipolar disorder (c/b multiple SA and 3 prior psychiatric hospitalizations), fibromyalgia, chronic back pain, sciatica, left L4 nerve root impingement, IBS, GERD,  s/p gastric bypass, s/p abdominoplasty, presenting with acute worsening of chronic back pain for the past two weeks. Has chronic abdominal pains due to her multiple abdominal surgeries and chronic intermittent constipation/diarrhea due to her IBS. Suspect exacerbation of lumbar radiculopathy vs worsening OA."

## 2023-03-13 NOTE — DIETITIAN INITIAL EVALUATION ADULT - PERTINENT LABORATORY DATA
03-12    142  |  107  |  20  ----------------------------<  80  4.1   |  23  |  0.99    Ca    9.2      12 Mar 2023 06:53  Phos  5.1     03-12  Mg     2.2     03-12    TPro  6.6  /  Alb  4.0  /  TBili  0.3  /  DBili  x   /  AST  16  /  ALT  10  /  AlkPhos  87  03-11  A1C with Estimated Average Glucose Result: 5.2 % (03-12-23 @ 06:52)

## 2023-03-13 NOTE — DIETITIAN INITIAL EVALUATION ADULT - NSFNSADHERENCEPTAFT_GEN_A_CORE
Pt not following any specific therapeutic diet PTA. Pt's HbA1c 5.2% (3/12) indicating no DM at this time. Pt asking for help to lose weight, written and verbal education provided.

## 2023-03-13 NOTE — DIETITIAN INITIAL EVALUATION ADULT - HEIGHT FOR BMI (INCHES)
Fingerstick INR drawn per clinic protocol. Patient states no visible blood in urine and no black tarry stool. Denies any missed doses of warfarin. No change in other maintenance medications or in diet. Will have patient take 2 mg daily for the next 4 days and recheck INR in 4 days on Friday.
10

## 2023-03-13 NOTE — DIETITIAN INITIAL EVALUATION ADULT - EDUCATION DIETARY MODIFICATIONS
small, frequent meals with emphasis on protein, fruits/vegetables/whole grains; exercise when able/(1) partially meets; needs review/practice/verbalization

## 2023-03-13 NOTE — DIETITIAN INITIAL EVALUATION ADULT - ADD RECOMMEND
1) continue Regular diet  2) Recommend MVI daily to optimize nutrition status  3) Reinforce diet education at f/u  4) Obtain current wt to trend  5) Adjust bowel regimen PRN  6) Monitor PO intake, weight, labs, skin, GI status, diet

## 2023-03-13 NOTE — DIETITIAN INITIAL EVALUATION ADULT - ORAL INTAKE PTA/DIET HISTORY
Chart reviewed, events noted. Pt reports good appetite PTA but only able to tolerate small meals/snacks. Of note, Pt had gastric bypass 2005 and a revision in 2007. Usually eats breakfast (cereal or 2 waffles with PB&J, eggs, or bagel), some snacks/fruit mid-day (crackers and peanut butter, apple or orange, small bags of chips), and an early dinner with 10 y/o son (vegetables + protein, sometimes Chinese food). Pt reports IBS does not affect appetite, but does often fluctuate between diarrhea and constipation. Does not know if certain foods affect her IBS. Reports pre-bariatric sx her weight was >400 pounds, lowest wt post-surgery was 198 pounds but has gained some wt back and feels "stuck" at 215-235 pounds x 7-9 years. Belongs to a gym but has not been going due to physical and psychological pain.

## 2023-03-14 ENCOUNTER — APPOINTMENT (OUTPATIENT)
Dept: OBGYN | Facility: CLINIC | Age: 53
End: 2023-03-14

## 2023-03-14 LAB
ANION GAP SERPL CALC-SCNC: 9 MMOL/L — SIGNIFICANT CHANGE UP (ref 5–17)
BUN SERPL-MCNC: 21 MG/DL — SIGNIFICANT CHANGE UP (ref 7–23)
CALCIUM SERPL-MCNC: 9.4 MG/DL — SIGNIFICANT CHANGE UP (ref 8.4–10.5)
CCP IGG SERPL-ACNC: <8 UNITS — SIGNIFICANT CHANGE UP
CHLORIDE SERPL-SCNC: 104 MMOL/L — SIGNIFICANT CHANGE UP (ref 96–108)
CO2 SERPL-SCNC: 27 MMOL/L — SIGNIFICANT CHANGE UP (ref 22–31)
CREAT SERPL-MCNC: 0.95 MG/DL — SIGNIFICANT CHANGE UP (ref 0.5–1.3)
EGFR: 73 ML/MIN/1.73M2 — SIGNIFICANT CHANGE UP
GLUCOSE SERPL-MCNC: 92 MG/DL — SIGNIFICANT CHANGE UP (ref 70–99)
HCT VFR BLD CALC: 33.5 % — LOW (ref 34.5–45)
HGB BLD-MCNC: 10.2 G/DL — LOW (ref 11.5–15.5)
MCHC RBC-ENTMCNC: 26.2 PG — LOW (ref 27–34)
MCHC RBC-ENTMCNC: 30.4 GM/DL — LOW (ref 32–36)
MCV RBC AUTO: 85.9 FL — SIGNIFICANT CHANGE UP (ref 80–100)
NRBC # BLD: 0 /100 WBCS — SIGNIFICANT CHANGE UP (ref 0–0)
PLATELET # BLD AUTO: 206 K/UL — SIGNIFICANT CHANGE UP (ref 150–400)
POTASSIUM SERPL-MCNC: 4.2 MMOL/L — SIGNIFICANT CHANGE UP (ref 3.5–5.3)
POTASSIUM SERPL-SCNC: 4.2 MMOL/L — SIGNIFICANT CHANGE UP (ref 3.5–5.3)
RBC # BLD: 3.9 M/UL — SIGNIFICANT CHANGE UP (ref 3.8–5.2)
RBC # FLD: 15 % — HIGH (ref 10.3–14.5)
RF+CCP IGG SER-IMP: NEGATIVE — SIGNIFICANT CHANGE UP
SODIUM SERPL-SCNC: 140 MMOL/L — SIGNIFICANT CHANGE UP (ref 135–145)
WBC # BLD: 6.38 K/UL — SIGNIFICANT CHANGE UP (ref 3.8–10.5)
WBC # FLD AUTO: 6.38 K/UL — SIGNIFICANT CHANGE UP (ref 3.8–10.5)

## 2023-03-14 RX ORDER — HYDROMORPHONE HYDROCHLORIDE 2 MG/ML
0.5 INJECTION INTRAMUSCULAR; INTRAVENOUS; SUBCUTANEOUS EVERY 6 HOURS
Refills: 0 | Status: DISCONTINUED | OUTPATIENT
Start: 2023-03-14 | End: 2023-03-15

## 2023-03-14 RX ORDER — HYDROMORPHONE HYDROCHLORIDE 2 MG/ML
0.5 INJECTION INTRAMUSCULAR; INTRAVENOUS; SUBCUTANEOUS EVERY 6 HOURS
Refills: 0 | Status: DISCONTINUED | OUTPATIENT
Start: 2023-03-14 | End: 2023-03-14

## 2023-03-14 RX ADMIN — HYDROMORPHONE HYDROCHLORIDE 0.5 MILLIGRAM(S): 2 INJECTION INTRAMUSCULAR; INTRAVENOUS; SUBCUTANEOUS at 10:37

## 2023-03-14 RX ADMIN — METHOCARBAMOL 750 MILLIGRAM(S): 500 TABLET, FILM COATED ORAL at 08:19

## 2023-03-14 RX ADMIN — OXYCODONE HYDROCHLORIDE 10 MILLIGRAM(S): 5 TABLET ORAL at 15:59

## 2023-03-14 RX ADMIN — SENNA PLUS 2 TABLET(S): 8.6 TABLET ORAL at 21:20

## 2023-03-14 RX ADMIN — OXYCODONE HYDROCHLORIDE 10 MILLIGRAM(S): 5 TABLET ORAL at 08:21

## 2023-03-14 RX ADMIN — ENOXAPARIN SODIUM 40 MILLIGRAM(S): 100 INJECTION SUBCUTANEOUS at 20:08

## 2023-03-14 RX ADMIN — Medication 15 MILLIGRAM(S): at 05:08

## 2023-03-14 RX ADMIN — HYDROMORPHONE HYDROCHLORIDE 0.5 MILLIGRAM(S): 2 INJECTION INTRAMUSCULAR; INTRAVENOUS; SUBCUTANEOUS at 20:38

## 2023-03-14 RX ADMIN — ENOXAPARIN SODIUM 40 MILLIGRAM(S): 100 INJECTION SUBCUTANEOUS at 08:13

## 2023-03-14 RX ADMIN — LIDOCAINE 1 PATCH: 4 CREAM TOPICAL at 00:25

## 2023-03-14 RX ADMIN — SERTRALINE 200 MILLIGRAM(S): 25 TABLET, FILM COATED ORAL at 11:24

## 2023-03-14 RX ADMIN — Medication 975 MILLIGRAM(S): at 11:25

## 2023-03-14 RX ADMIN — OXYCODONE HYDROCHLORIDE 10 MILLIGRAM(S): 5 TABLET ORAL at 16:59

## 2023-03-14 RX ADMIN — HYDROMORPHONE HYDROCHLORIDE 0.5 MILLIGRAM(S): 2 INJECTION INTRAMUSCULAR; INTRAVENOUS; SUBCUTANEOUS at 13:22

## 2023-03-14 RX ADMIN — QUETIAPINE FUMARATE 300 MILLIGRAM(S): 200 TABLET, FILM COATED ORAL at 21:20

## 2023-03-14 RX ADMIN — SERTRALINE 100 MILLIGRAM(S): 25 TABLET, FILM COATED ORAL at 21:20

## 2023-03-14 RX ADMIN — OXYCODONE HYDROCHLORIDE 10 MILLIGRAM(S): 5 TABLET ORAL at 22:03

## 2023-03-14 RX ADMIN — OXYCODONE HYDROCHLORIDE 10 MILLIGRAM(S): 5 TABLET ORAL at 09:20

## 2023-03-14 RX ADMIN — HYDROMORPHONE HYDROCHLORIDE 0.5 MILLIGRAM(S): 2 INJECTION INTRAMUSCULAR; INTRAVENOUS; SUBCUTANEOUS at 11:00

## 2023-03-14 RX ADMIN — Medication 5 MILLIGRAM(S): at 11:24

## 2023-03-14 RX ADMIN — OXYCODONE HYDROCHLORIDE 10 MILLIGRAM(S): 5 TABLET ORAL at 22:33

## 2023-03-14 RX ADMIN — PANTOPRAZOLE SODIUM 40 MILLIGRAM(S): 20 TABLET, DELAYED RELEASE ORAL at 05:08

## 2023-03-14 RX ADMIN — MORPHINE SULFATE 2 MILLIGRAM(S): 50 CAPSULE, EXTENDED RELEASE ORAL at 05:01

## 2023-03-14 RX ADMIN — Medication 15 MILLIGRAM(S): at 21:20

## 2023-03-14 RX ADMIN — Medication 15 MILLIGRAM(S): at 13:21

## 2023-03-14 RX ADMIN — HYDROMORPHONE HYDROCHLORIDE 0.5 MILLIGRAM(S): 2 INJECTION INTRAMUSCULAR; INTRAVENOUS; SUBCUTANEOUS at 13:37

## 2023-03-14 RX ADMIN — HYDROMORPHONE HYDROCHLORIDE 0.5 MILLIGRAM(S): 2 INJECTION INTRAMUSCULAR; INTRAVENOUS; SUBCUTANEOUS at 20:08

## 2023-03-14 RX ADMIN — LAMOTRIGINE 200 MILLIGRAM(S): 25 TABLET, ORALLY DISINTEGRATING ORAL at 11:25

## 2023-03-14 RX ADMIN — MORPHINE SULFATE 2 MILLIGRAM(S): 50 CAPSULE, EXTENDED RELEASE ORAL at 06:01

## 2023-03-14 RX ADMIN — METHOCARBAMOL 750 MILLIGRAM(S): 500 TABLET, FILM COATED ORAL at 15:59

## 2023-03-14 RX ADMIN — Medication 975 MILLIGRAM(S): at 12:25

## 2023-03-15 ENCOUNTER — APPOINTMENT (OUTPATIENT)
Age: 53
End: 2023-03-15

## 2023-03-15 PROCEDURE — 99222 1ST HOSP IP/OBS MODERATE 55: CPT

## 2023-03-15 RX ORDER — DEXAMETHASONE 0.5 MG/5ML
4 ELIXIR ORAL EVERY 8 HOURS
Refills: 0 | Status: DISCONTINUED | OUTPATIENT
Start: 2023-03-15 | End: 2023-03-16

## 2023-03-15 RX ORDER — MORPHINE SULFATE 50 MG/1
15 CAPSULE, EXTENDED RELEASE ORAL EVERY 6 HOURS
Refills: 0 | Status: DISCONTINUED | OUTPATIENT
Start: 2023-03-15 | End: 2023-03-16

## 2023-03-15 RX ORDER — TIZANIDINE 4 MG/1
2 TABLET ORAL EVERY 8 HOURS
Refills: 0 | Status: DISCONTINUED | OUTPATIENT
Start: 2023-03-15 | End: 2023-03-16

## 2023-03-15 RX ADMIN — MORPHINE SULFATE 15 MILLIGRAM(S): 50 CAPSULE, EXTENDED RELEASE ORAL at 15:20

## 2023-03-15 RX ADMIN — TIZANIDINE 2 MILLIGRAM(S): 4 TABLET ORAL at 21:06

## 2023-03-15 RX ADMIN — Medication 4 MILLIGRAM(S): at 21:05

## 2023-03-15 RX ADMIN — OXYCODONE HYDROCHLORIDE 10 MILLIGRAM(S): 5 TABLET ORAL at 09:59

## 2023-03-15 RX ADMIN — Medication 975 MILLIGRAM(S): at 07:43

## 2023-03-15 RX ADMIN — HYDROMORPHONE HYDROCHLORIDE 0.5 MILLIGRAM(S): 2 INJECTION INTRAMUSCULAR; INTRAVENOUS; SUBCUTANEOUS at 06:31

## 2023-03-15 RX ADMIN — HYDROMORPHONE HYDROCHLORIDE 0.5 MILLIGRAM(S): 2 INJECTION INTRAMUSCULAR; INTRAVENOUS; SUBCUTANEOUS at 12:30

## 2023-03-15 RX ADMIN — ENOXAPARIN SODIUM 40 MILLIGRAM(S): 100 INJECTION SUBCUTANEOUS at 20:00

## 2023-03-15 RX ADMIN — Medication 15 MILLIGRAM(S): at 21:06

## 2023-03-15 RX ADMIN — Medication 5 MILLIGRAM(S): at 18:39

## 2023-03-15 RX ADMIN — Medication 975 MILLIGRAM(S): at 07:02

## 2023-03-15 RX ADMIN — OXYCODONE HYDROCHLORIDE 10 MILLIGRAM(S): 5 TABLET ORAL at 04:16

## 2023-03-15 RX ADMIN — SERTRALINE 200 MILLIGRAM(S): 25 TABLET, FILM COATED ORAL at 12:31

## 2023-03-15 RX ADMIN — SERTRALINE 100 MILLIGRAM(S): 25 TABLET, FILM COATED ORAL at 21:04

## 2023-03-15 RX ADMIN — MORPHINE SULFATE 15 MILLIGRAM(S): 50 CAPSULE, EXTENDED RELEASE ORAL at 14:23

## 2023-03-15 RX ADMIN — QUETIAPINE FUMARATE 300 MILLIGRAM(S): 200 TABLET, FILM COATED ORAL at 21:04

## 2023-03-15 RX ADMIN — MORPHINE SULFATE 15 MILLIGRAM(S): 50 CAPSULE, EXTENDED RELEASE ORAL at 21:34

## 2023-03-15 RX ADMIN — PANTOPRAZOLE SODIUM 40 MILLIGRAM(S): 20 TABLET, DELAYED RELEASE ORAL at 06:02

## 2023-03-15 RX ADMIN — HYDROMORPHONE HYDROCHLORIDE 0.5 MILLIGRAM(S): 2 INJECTION INTRAMUSCULAR; INTRAVENOUS; SUBCUTANEOUS at 13:00

## 2023-03-15 RX ADMIN — LAMOTRIGINE 200 MILLIGRAM(S): 25 TABLET, ORALLY DISINTEGRATING ORAL at 12:32

## 2023-03-15 RX ADMIN — Medication 975 MILLIGRAM(S): at 17:20

## 2023-03-15 RX ADMIN — OXYCODONE HYDROCHLORIDE 10 MILLIGRAM(S): 5 TABLET ORAL at 04:46

## 2023-03-15 RX ADMIN — OXYCODONE HYDROCHLORIDE 10 MILLIGRAM(S): 5 TABLET ORAL at 11:00

## 2023-03-15 RX ADMIN — Medication 15 MILLIGRAM(S): at 06:02

## 2023-03-15 RX ADMIN — TIZANIDINE 2 MILLIGRAM(S): 4 TABLET ORAL at 15:19

## 2023-03-15 RX ADMIN — Medication 4 MILLIGRAM(S): at 16:21

## 2023-03-15 RX ADMIN — HYDROMORPHONE HYDROCHLORIDE 0.5 MILLIGRAM(S): 2 INJECTION INTRAMUSCULAR; INTRAVENOUS; SUBCUTANEOUS at 06:01

## 2023-03-15 RX ADMIN — Medication 15 MILLIGRAM(S): at 15:19

## 2023-03-15 RX ADMIN — Medication 975 MILLIGRAM(S): at 16:21

## 2023-03-15 RX ADMIN — SENNA PLUS 2 TABLET(S): 8.6 TABLET ORAL at 21:04

## 2023-03-15 RX ADMIN — MORPHINE SULFATE 15 MILLIGRAM(S): 50 CAPSULE, EXTENDED RELEASE ORAL at 21:04

## 2023-03-15 RX ADMIN — ENOXAPARIN SODIUM 40 MILLIGRAM(S): 100 INJECTION SUBCUTANEOUS at 09:59

## 2023-03-15 NOTE — PROGRESS NOTE ADULT - ASSESSMENT
Oriented - self; Oriented - place; Oriented - time
52F w/ hx of anxiety, depression/bipolar disorder (c/b multiple SA and 3 prior psychiatric hospitalizations), fibromyalgia, chronic back pain, sciatica, left L4 nerve root impingement, IBS, GERD, s/p gastric bypass, s/p abdominoplasty, presenting with acute worsening of chronic back pain for the past two weeks. Suspect exacerbation of lumbar radiculopathy vs worsening OA.
52F w/ hx of anxiety, depression/bipolar disorder (c/b multiple SA and 3 prior psychiatric hospitalizations), fibromyalgia, chronic back pain, sciatica, left L4 nerve root impingement, IBS, GERD, s/p gastric bypass, s/p abdominoplasty, presenting with acute worsening of chronic back pain for the past two weeks. Suspect exacerbation of lumbar radiculopathy vs worsening OA.
52 F w/ hx of anxiety, depression/bipolar disorder (c/b multiple SA and 3 prior psychiatric hospitalizations), fibromyalgia, chronic back pain, sciatica, left L4 nerve root impingement, IBS, GERD, s/p gastric bypass, s/p abdominoplasty, presenting with acute worsening of chronic back pain for the past two weeks. Suspect exacerbation of lumbar radiculopathy vs worsening OA.
52F w/ hx of anxiety, depression/bipolar disorder (c/b multiple SA and 3 prior psychiatric hospitalizations), fibromyalgia, chronic back pain, sciatica, left L4 nerve root impingement, IBS, GERD, s/p gastric bypass, s/p abdominoplasty, presenting with acute worsening of chronic back pain for the past two weeks. Suspect exacerbation of lumbar radiculopathy vs worsening OA.

## 2023-03-15 NOTE — PROGRESS NOTE ADULT - PROBLEM SELECTOR PLAN 1
Presented with acute worsening of chronic back pain for the past two weeks, reportedly after using massage chair for 1.5 hrs. Also reports hx of multiple falls, most recently ~1 month ago. Exam with diffuse TTP across entire back, positive RLE straight leg raise test, chronic R>L foot/toes paresthesias. Prior MRI L-spine (Dec 2021) notable for spinal canal stenosis and left L4 nerve root impingement 2/2 herniated disc. Suspect presenting symptoms 2/2 exacerbation of lumbar radiculopathy vs worsening OA.  - c/w multimodal pain regimen, including short-course of oxycodone IR PRN/ IVP MSO4 prn for now as well as home meds (Valium, tylenol, lidocaine patch, methocarbamol PRN)  - may consider starting gabapentin, lyrica, or SNRI if pain uncontrolled (though caution as pt already on several psych meds)  - c/w bowel regimen while on opioid pain meds  - spine consult regarding C6-C7 focal left central disc protrusion deforms the left ventral cord   surface.
Presented with acute worsening of chronic back pain for the past two weeks, reportedly after using massage chair for 1.5 hrs. Also reports hx of multiple falls, most recently ~1 month ago. Exam with diffuse TTP across entire back, positive RLE straight leg raise test, chronic R>L foot/toes paresthesias. Prior MRI L-spine (Dec 2021) notable for spinal canal stenosis and left L4 nerve root impingement 2/2 herniated disc. Suspect presenting symptoms 2/2 exacerbation of lumbar radiculopathy vs worsening OA.  - c/w multimodal pain regimen, including short-course of oxycodone IR PRN/ IVP dilaudid prn for now as well as home meds (Valium, tylenol, lidocaine patch, methocarbamol PRN)  - may consider starting gabapentin, lyrica, or SNRI if pain uncontrolled (though caution as pt already on several psych meds)  - c/w bowel regimen while on opioid pain meds  - spine consult --> no surgical intervention  - pain management consult ordered
Presented with acute worsening of chronic back pain for the past two weeks, reportedly after using massage chair for 1.5 hrs. Also reports hx of multiple falls, most recently ~1 month ago. Exam with diffuse TTP across entire back, positive RLE straight leg raise test, chronic R>L foot/toes paresthesias. Prior MRI L-spine (Dec 2021) notable for spinal canal stenosis and left L4 nerve root impingement 2/2 herniated disc. Suspect presenting symptoms 2/2 exacerbation of lumbar radiculopathy vs worsening OA.  - c/w multimodal pain regimen, including short-course of oxycodone IR PRN/ IVP dilaudid prn for now as well as home meds (Valium, tylenol, lidocaine patch, methocarbamol PRN)  - may consider starting gabapentin, lyrica, or SNRI if pain uncontrolled (though caution as pt already on several psych meds)  - c/w bowel regimen while on opioid pain meds  - spine consult --> no surgical intervention  - pain management consulted  - pain management adjusted meds, dc IV dilaudid and started PO Morphine.  - Robaxin switched to Zanaflex.  - started decadron IV. side effects of steroids reviewed and pt eager to start to relieve her pain.   - MRI results reviewed.
Presented with acute worsening of chronic back pain for the past two weeks, reportedly after using massage chair for 1.5 hrs. Also reports hx of multiple falls, most recently ~1 month ago. Exam with diffuse TTP across entire back, positive RLE straight leg raise test, chronic R>L foot/toes paresthesias. Prior MRI L-spine (Dec 2021) notable for spinal canal stenosis and left L4 nerve root impingement 2/2 herniated disc. Suspect presenting symptoms 2/2 exacerbation of lumbar radiculopathy vs worsening OA.  - c/w multimodal pain regimen, including short-course of oxycodone IR PRN for mod-severe pain for now as well as home meds (Valium, tylenol, lidocaine patch, methocarbamol PRN)  - may consider starting gabapentin, lyrica, or SNRI if pain uncontrolled (though caution as pt already on several psych meds)  - c/w bowel regimen while on opioid pain meds  - f/u PT eval  - f/u XRs of T-spine, L-spine, and pelvis (given reported recent fall)  - pending MRI C-/T-/L-spine

## 2023-03-15 NOTE — PROGRESS NOTE ADULT - PROBLEM SELECTOR PLAN 2
Presented with anemia to Hgb 9.9 (baseline ~11 in 2021), MCV 85.8. Previously on B12 injects, hx of gastric bypass. No signs of overt active bleeding.  - monitor CBC  - likely combination of iron-deficiency and AOCD
Presented with anemia to Hgb 9.9 (baseline ~11 in 2021), MCV 85.8. Previously on B12 injects, hx of gastric bypass. No signs of overt active bleeding.  - monitor CBC  - likely combination of iron-deficiency and AOCD
Presented with anemia to Hgb 9.9 (baseline ~11 in 2021), MCV 85.8. Previously on B12 injects, hx of gastric bypass. No signs of overt active bleeding.  - monitor CBC  - f/u iron studies, b12, folate, LDH, haptoglobin, retic count
Presented with anemia to Hgb 9.9 (baseline ~11 in 2021), MCV 85.8. Previously on B12 injects, hx of gastric bypass. No signs of overt active bleeding.  - monitor CBC  - likely combination of iron-deficiency and AOCD

## 2023-03-15 NOTE — PROGRESS NOTE ADULT - NSPROGADDITIONALINFOA_GEN_ALL_CORE
d/w pt and the  at bedside.  d/w NP and RN at bedside.     - Dr. CONRAD Andujar ([facility])  - [phone] Declines

## 2023-03-15 NOTE — PROGRESS NOTE ADULT - PROBLEM SELECTOR PLAN 3
- c/w home Valium (though prescription may have recently ran out based on ISTOP#: [id number])
- c/w home Valium (though prescription may have recently ran out based on ISTOP#: [id number])
- c/w home Valium (though prescription may have recently ran out based on ISTOP#: [id number])
- c/w home Valium (though prescription may have recently ran out based on ISTOP#: [id number])

## 2023-03-15 NOTE — PROGRESS NOTE ADULT - SUBJECTIVE AND OBJECTIVE BOX
9/10 back pain radiating to legs  No relief from the MSO4 IVP    Vital Signs Last 24 Hrs  T(C): 36.6 (14 Mar 2023 08:30), Max: 36.9 (13 Mar 2023 13:34)  T(F): 97.9 (14 Mar 2023 08:30), Max: 98.5 (13 Mar 2023 13:34)  HR: 77 (14 Mar 2023 08:30) (70 - 77)  BP: 106/72 (14 Mar 2023 08:30) (102/67 - 116/75)  BP(mean): --  RR: 18 (14 Mar 2023 08:30) (18 - 18)  SpO2: 94% (14 Mar 2023 08:30) (94% - 97%)    I&O's Summary    03-13-23 @ 07:01  -  03-14-23 @ 07:00  --------------------------------------------------------  IN: 890 mL / OUT: 0 mL / NET: 890 mL            Gen: NAD  Head: NCAT, EOMI  Lungs: CTA b/l  Heart: RRR, nl S1/S2, no murmurs  Abd: soft, NTND, NABS3  NEUROLOGY: CN II-XII are grossly intact and symmetric; slight decreased sensation to light touch in b/l feet/toes (R>L); strength testing in RLE limited by pain/effort, otherwise grossly 5/5 in LLE and b/l UEs    LABS:            CAPILLARY BLOOD GLUCOSE                RADIOLOGY & ADDITIONAL TESTS:    Imaging Personally Reviewed:  [x] YES  [ ] NO    Case discussed with NPP:  [X] YES  [ ] NO
SUBJECTIVE/ OVERNIGHT EVENTS:  Pt seen and examined earlier today.   Still with back pain. appears comfortable  the  at bedside.   pain management adjusted meds, dc IV dilaudid and started PO Morphine.  Robaxin switched to Zanaflex.  started decadron IV. side effects of steroids reviewed and pt eager to start to relieve her pain.   MRI results reviewed.   no cp, no sob, no n/v/d.  no HA, no dizziness.   no abd pain     --------------------------------------------------------------------------------------------  LABS:                        10.2   6.38  )-----------( 206      ( 14 Mar 2023 09:25 )             33.5     03-14    140  |  104  |  21  ----------------------------<  92  4.2   |  27  |  0.95    Ca    9.4      14 Mar 2023 09:25        CAPILLARY BLOOD GLUCOSE                RADIOLOGY & ADDITIONAL TESTS:    Imaging Personally Reviewed:  [x] YES  [ ] NO    Consultant(s) Notes Reviewed:  [x] YES  [ ] NO    MEDICATIONS  (STANDING):  busPIRone 15 milliGRAM(s) Oral three times a day  dexAMETHasone  Injectable 4 milliGRAM(s) IV Push every 8 hours  enoxaparin Injectable 40 milliGRAM(s) SubCutaneous every 12 hours  lamoTRIgine 200 milliGRAM(s) Oral daily  lidocaine   4% Patch 1 Patch Transdermal daily  pantoprazole    Tablet 40 milliGRAM(s) Oral before breakfast  QUEtiapine 300 milliGRAM(s) Oral at bedtime  senna 2 Tablet(s) Oral at bedtime  sertraline 200 milliGRAM(s) Oral daily  sertraline 100 milliGRAM(s) Oral at bedtime  tiZANidine 2 milliGRAM(s) Oral every 8 hours    MEDICATIONS  (PRN):  acetaminophen     Tablet .. 975 milliGRAM(s) Oral every 8 hours PRN Temp greater or equal to 38C (100.4F), Mild Pain (1 - 3)  aluminum hydroxide/magnesium hydroxide/simethicone Suspension 30 milliLiter(s) Oral every 6 hours PRN Dyspepsia  diazepam    Tablet 5 milliGRAM(s) Oral every 6 hours PRN Anxiety  morphine  IR 15 milliGRAM(s) Oral every 6 hours PRN Severe Pain (7 - 10)  polyethylene glycol 3350 17 Gram(s) Oral daily PRN Constipation      Care Discussed with Consultants/Other Providers [x] YES  [ ] NO    Vital Signs Last 24 Hrs  T(C): 37.1 (15 Mar 2023 15:38), Max: 37.1 (15 Mar 2023 15:38)  T(F): 98.8 (15 Mar 2023 15:38), Max: 98.8 (15 Mar 2023 15:38)  HR: 78 (15 Mar 2023 15:38) (69 - 79)  BP: 108/71 (15 Mar 2023 15:38) (100/65 - 117/79)  BP(mean): --  RR: 18 (15 Mar 2023 15:38) (18 - 18)  SpO2: 93% (15 Mar 2023 15:38) (93% - 97%)    Parameters below as of 15 Mar 2023 15:38  Patient On (Oxygen Delivery Method): room air      I&O's Summary    14 Mar 2023 07:01  -  15 Mar 2023 07:00  --------------------------------------------------------  IN: 680 mL / OUT: 0 mL / NET: 680 mL    15 Mar 2023 07:01  -  15 Mar 2023 19:20  --------------------------------------------------------  IN: 380 mL / OUT: 0 mL / NET: 380 mL      PHYSICAL EXAM:  Gen: NAD, comfortable. sleepy at times  Head: NCAT  Eyes: EOMI PERRL, lazy eyes  CHEST/LUNG: mild decrease breath sounds bilaterally; No wheeze   Heart: RRR, nl S1/S2, no murmurs  Abd: soft, NTND, NABS3  NEUROLOGY: CN II-XII are grossly intact and symmetric; slight decreased sensation to light touch in b/l feet/toes (R>L); strength testing in RLE limited by pain/effort, otherwise grossly 5/5 in LLE and b/l UEs  SKIN: no rash, no lesions
7/10 back pain radiating downward to legs  No incontinence    Vital Signs Last 24 Hrs  T(C): 36.8 (12 Mar 2023 08:12), Max: 36.8 (12 Mar 2023 08:12)  T(F): 98.2 (12 Mar 2023 08:12), Max: 98.2 (12 Mar 2023 08:12)  HR: 68 (12 Mar 2023 08:12) (61 - 91)  BP: 98/63 (12 Mar 2023 08:12) (98/63 - 130/80)  BP(mean): 87 (11 Mar 2023 20:10) (87 - 87)  RR: 18 (12 Mar 2023 08:12) (16 - 18)  SpO2: 94% (12 Mar 2023 08:12) (94% - 98%)    I&O's Summary    03-11-23 @ 06:01  -  03-12-23 @ 07:00  --------------------------------------------------------  IN: 240 mL / OUT: 0 mL / NET: 240 mL        Gen: NAD  Head: NCAT, EOMI  Lungs: CTA b/l  Heart: RRR, nl S1/S2, no murmurs  Abd: soft, NTND, NABS3  NEUROLOGY: CN II-XII are grossly intact and symmetric; slight decreased sensation to light touch in b/l feet/toes (R>L); strength testing in RLE limited by pain/effort, otherwise grossly 5/5 in LLE and b/l UEs    LABS:                        9.7    5.08  )-----------( 195      ( 12 Mar 2023 06:51 )             31.5     03-12    142  |  107  |  20  ----------------------------<  80  4.1   |  23  |  0.99    Ca    9.2      12 Mar 2023 06:53  Phos  5.1     03-12  Mg     2.2     03-12    TPro  6.6  /  Alb  4.0  /  TBili  0.3  /  DBili  x   /  AST  16  /  ALT  10  /  AlkPhos  87  03-11      CAPILLARY BLOOD GLUCOSE                RADIOLOGY & ADDITIONAL TESTS:    Imaging Personally Reviewed:  [x] YES  [ ] NO    Case discussed with NPP:  [X] YES  [ ] NO
9/10 back pain radiating to legs  BM yesterday    Vital Signs Last 24 Hrs  T(C): 36.5 (13 Mar 2023 08:12), Max: 36.6 (13 Mar 2023 05:10)  T(F): 97.7 (13 Mar 2023 08:12), Max: 97.9 (13 Mar 2023 05:10)  HR: 68 (13 Mar 2023 08:12) (60 - 78)  BP: 116/80 (13 Mar 2023 08:12) (106/70 - 135/74)  BP(mean): --  RR: 18 (13 Mar 2023 08:12) (18 - 18)  SpO2: 93% (13 Mar 2023 08:12) (93% - 93%)    I&O's Summary    03-12-23 @ 07:01  -  03-13-23 @ 07:00  --------------------------------------------------------  IN: 1220 mL / OUT: 0 mL / NET: 1220 mL        Gen: NAD  Head: NCAT, EOMI  Lungs: CTA b/l  Heart: RRR, nl S1/S2, no murmurs  Abd: soft, NTND, NABS3  NEUROLOGY: CN II-XII are grossly intact and symmetric; slight decreased sensation to light touch in b/l feet/toes (R>L); strength testing in RLE limited by pain/effort, otherwise grossly 5/5 in LLE and b/l UEs    LABS:                        9.7    5.08  )-----------( 195      ( 12 Mar 2023 06:51 )             31.5     03-12    142  |  107  |  20  ----------------------------<  80  4.1   |  23  |  0.99    Ca    9.2      12 Mar 2023 06:53  Phos  5.1     03-12  Mg     2.2     03-12    TPro  6.6  /  Alb  4.0  /  TBili  0.3  /  DBili  x   /  AST  16  /  ALT  10  /  AlkPhos  87  03-11      CAPILLARY BLOOD GLUCOSE                RADIOLOGY & ADDITIONAL TESTS:    Imaging Personally Reviewed:  [x] YES  [ ] NO    Case discussed with NPP:  [X] YES  [ ] NO

## 2023-03-15 NOTE — PROGRESS NOTE ADULT - PROBLEM SELECTOR PROBLEM 1
Exacerbation of chronic back pain

## 2023-03-15 NOTE — PROGRESS NOTE ADULT - REASON FOR ADMISSION
ID Attending Progress Note      ASSESSMENT  -COVID-19 pneumonia  -Acute hypoxic respiratory failure sp trach   Improving oxygen requirements  Off pressors   -MSSA VAP sp therapy  -Fevers resolved sp cefepime   -Leukocytosis better   -Eosinophilia better   -Acute CVA   -HTN, DM    PLAN  -Sp course of  cefepime  -Central fever a possibility  -Monitor temp curve   -Sp dexa and remdesivir    -AC per ICU team   -Wean off oxygen as tolerated.  -Dw  ICU team  and pharmacy   ------------------------------------------------------------------------------------  Subjective:   Trach to vent    Off pressors   No fevers   No diarrhea      ROS:  Unable to obtain     Objective:    Vital signs in last 24 hours:  Temp:  [97.1 °F (36.2 °C)-98.7 °F (37.1 °C)] 98.7 °F (37.1 °C)  Heart Rate:  [] 95  Resp:  [21-53] 35  BP: (114-144)/() 124/75  FiO2 (%):  [50 %-100 %] 50 %    Physical Exam:  GENERAL: Normocephalic atraumatic, in NAD  Sp trach to vent   HEENT: Anicteric sclera  NECK: supple, trach site clean   HEART: RRR   LUNGS: GAE, non labored breathing   ABDOMEN: non distended , non tender to palpation  LOWER EXTREMITIES: no , cyanosis  Edema to upper extremities   SKIN: no rash  NEURO:sedated       MEDICATIONS   • cholecalciferol  25 mcg Per NG tube Daily   • furosemide  20 mg Intravenous BID   • bromocriptine  5 mg PEG Tube BID   • acetaZOLAMIDE  250 mg PEG Tube 4x Daily   • diazePAM  10 mg PEG Tube 4 times per day   • QUEtiapine  100 mg Oral 2 times per day   • enoxaparin  40 mg Subcutaneous Q24H   • scopolamine  1 patch Transdermal Q3 Days   • [Held by provider] amLODIPine  5 mg Per G Tube Daily   • clopidogrel  75 mg Oral Daily   • pantoprazole  40 mg OG Tube QHS   • albuterol  4 puff Inhalation Q4H Resp   • atorvastatin  80 mg PEG Tube Nightly   • docusate sodium-sennosides  2 tablet PEG Tube BID   • montelukast  10 mg PEG Tube Nightly   • potassium CHLORIDE  40 mEq PEG Tube Daily with breakfast   • multivitamin & 
mineral w/folic acid 1 mg-PRENATAL  1 tablet Oral Daily   • insulin glargine  12 Units Subcutaneous Nightly   • iohexol  100 mL Intravenous Once   • insulin lispro   Subcutaneous 4 times per day   • chlorhexidine gluconate  15 mL Swish & Spit 2 times per day   • petrolatum (white)-mineral oil  1 application Both Eyes 6 times per day   • sodium chloride (PF)  2 mL Intracatheter 2 times per day      atropine, hydrALAZINE, guaiFENesin-codeine, HYDROmorphone, acetaminophen **OR** acetaminophen, haloperidol, fentaNYL, [DISCONTINUED] fentaNYL (SUBLIMAZE) infusion **AND** fentaNYL, sodium chloride, sodium chloride, dextrose, dextrose, glucagon, dextrose, dextrose               Laboratory data reviewed    CBC  Recent Labs   Lab 03/03/21  0355 03/02/21  0307 03/01/21  0406   HGB 7.9* 7.9* 7.7*   HCT 26.4* 26.9* 26.4*    391 372   WBC 12.6* 12.1* 11.1*   RBC 2.90* 2.88* 2.81*   MCV 91.0 93.4 94.0     Recent Labs     03/01/21  0406  03/03/21  0355   ALBUMIN 1.8*  --   --    AST 33  --   --    CO2 28   < > 29   CALCIUM 8.1*   < > 8.6   CREATININE 0.36*   < > 0.36*   BUN 13   < > 17    < > = values in this interval not displayed.     Recent Labs     03/03/21  0355   CALCIUM 8.6   BUN 17   CREATININE 0.36*       Micro reviewed     Radiology reviewed     MD [facility]  [phone]    3/3/2021  7:55 PM  
acute on chronic back pain

## 2023-03-15 NOTE — CONSULT NOTE ADULT - SUBJECTIVE AND OBJECTIVE BOX
Dasia Alvarado  52F w/ extensive psych hx of anxiety, depression/bipolar (requiring hospitalization), fibromyalgia, IBS, p/w acute on chronic LBP (5 yr hx s/p epidurals, trigger point injections, oral steroids), with complaints of RLE pain for past 2 weeks. No recent trauma. Says pain started after sitting in massage chair for 1.5 hr. Her back pain is diffuse from top of back to coccyx, and any movement triggers pain. She c/o weakness but can ambulate w/ gonsalez. MRI C/T/L spine shows mild C6-7/C7-T1, R L2-3 and L L4-5 disc protrusions. No cord compression or major evidence of foraminal stenosis. No urinary/bowel incontinence.    --Anticoagulation--  enoxaparin Injectable 40 milliGRAM(s) SubCutaneous every 12 hours    T(C): 36.9 (03-13-23 @ 13:34), Max: 36.9 (03-13-23 @ 13:34)  HR: 73 (03-13-23 @ 13:34) (60 - 78)  BP: 116/75 (03-13-23 @ 13:34) (106/70 - 135/74)  RR: 18 (03-13-23 @ 13:34) (18 - 18)  SpO2: 97% (03-13-23 @ 13:34) (93% - 97%)  Wt(kg): --    Exam: obese, AO3, PERRL, BUE 5/5, RLE HF/KE/KF 2/5 (pain limited), R DF/PF 3/5, L HF 2/5 (pain limited), L KE 4/5, L KF 2/5, L DF/PF, diffuse pain from top of neck to coccyx, decreased sensation in b/l delts, and BLE     
Chief Complaint:  Patient is a 52y old  Female who presents with a chief complaint of acute on chronic back pain (14 Mar 2023 09:24)    HPI:  52F w/ hx of anxiety, depression/bipolar disorder (c/b multiple SA and 3 prior psychiatric hospitalizations), fibromyalgia, chronic back pain, sciatica, left L4 nerve root impingement, IBS, GERD,  s/p gastric bypass, s/p abdominoplasty, presenting with acute worsening of chronic back pain for the past two weeks. Has been dealing with back pain for >5 yrs, has good and bad days, but felt it has gotten much worse about 2 weeks ago after getting a massage in massage chair for 1.5 hrs. Also had a fall ~1 month ago on her back/bottom, but symptoms reported to have started more recently. She normally does not use a cane to ambulate, but over the past few days has been using one because it has been difficult for her to ambulate due to her severe pain. She has tried Valium,Tylenol extra strength 3 tabs q4-6h, methocarbamol, lidocaine patch, ice, arthritis creams, and a back brace without much relief. Pain is a "shooting pain" that mainly goes from mid back down to RLE. Reported having chronic numbness in both feet (R worse than L) and now having difficulty moving her RLE due to pain. Noted she has gotten multiple XRs from December to February which she was told her "arthritis has gotten very bad." Stated she tried to get an appointment with a rheumatologist (she believes she may have rheumatoid arthritis), but was unable to get one soon enough to help with the pain. Denied f/c/n/v, CP, SOB, dysuria, saddle anesthesia, or urinary or bowl incontinence. Has chronic abdominal pains due to her multiple abdominal surgeries and chronic intermittent constipation/diarrhea due to her IBS. In ED: Afebrile, HR 60s-90s, SBP 100s, RR 18, sating 97-98% on RA. Labs notable for Hgb 9.9. Given valium 5mg PO, ibuprofen 400mg PO, Percocet 5mg-325mg x2. Admitted to Medicine for further management. (11 Mar 2023 20:26)    Current out- patient pain regimen: Robaxin 750 mg BID, Tylenol    Out Patient Pain Management provider: Sees Ortho at [facility], displeased with them    [facility] Prescription Monitoring Program: Reference #[id number]    Opioid Risk Tool (ORT-OUD) Score: Moderate    Pain Score: 9/10    Pt seen sitting in chair reclined at bedside, appears comfortable, spouse at bedside. During most of interview patient's eyes would drift and close, and she would become irritated when asked detailed questions about pain characteristics. Pt states it is 2/2 lack of sleep, RN reports this is patient's baseline since admission. Pt c/o generalized back pain starting from mid back and radiating distally to anterior RLE knee. States pain is sharp, stabbing needle, burning, also endorses back spasms. This is same pain that she lives with but became much worse past 2 weeks. Has been to [facility] for years for this and has had GENOVEVA's, Trigger Point injections, and steroids in the past. Seen by NS and feel complaints incongruent with imaging findings. It's possible that MSK pain and edema 2/2 recent falls, combined with L2-L3 moderate right foraminal compromise, could be cause of low back pain and RLE pain however there could also by a psychological component as well as pt provided minimal effort during exam with all extremities. States she is unable to ambulate and there was zero effort for B/L plantar flexion and dorsiflexion, but then stated that she walks to the bathroom and has been seen ambulating in the halls. Minimal to no effort when assessing upper extremities as well. Unable to assess back. Reports she "just got over Covid", possible residual inflammation 2/2 Covid? Pt also reports taking Gabapentin in past causing AMS, tried Cymbalta in past with no effect. Additionally need to avoid NSAIDs and ER meds 2/2 GI hx, avoid Tramadol 2/2 on anti-depressants. Pt requesting to speak with NS as she says they told her they can do laparoscopic surgery, then asked when she can go home.    REVIEW OF SYSTEMS:  CONSTITUTIONAL: No fever, weight loss, fatigue, (+)falls  NEURO: No headaches, memory loss, (+) loss of strength  RESP: No shortness of breath, cough  CV: No chest pain, palpitations  GI: No abdominal pain, nausea, vomiting, diarrhea, constipation, incontinence  : No urinary incontinence/retention, endorses burning with urination  MSK: No joint pain or swelling; (+) upper and lower motor strength weakness, no saddle anesthesia   SKIN: No itching, burning, rashes, or lesions   PSYCHIATRIC: (+) depression, anxiety, difficulty sleeping      PHYSICAL EXAM  GENERAL: Seen at bedside, NAD, well-groomed, obese, appears stated age, toxic appearance  NEURO: Alert & Oriented X3, Good concentration; Follows commands; Sensory exam with pins and needles anterior right thigh distal to anterior foot (intact sole of foot), numb left  thigh distal to anterior foot (intact sole of foot).  HEENT: Head atraumatic, normocephalic; speech clear and fluent  GI: Appetite fair,  last BM yesterday  : Voiding  EXTREMITIES: 2+ Peripheral Pulses, moving all extremities and has been ambulating however minimal participation on physical exam  MSK: Motor Strength 3/5 B/L upper extremities; 2/5 B/L feet, 3/5 B/L knees, unable to asses back, ambulating to bathroom and in [place]  SKIN: No rashes or lesions, (+) tattoos  PSYCH: affect constricted; poor eye contact    PAST MEDICAL & SURGICAL HISTORY:  Depression      Post traumatic stress disorder      IBS (irritable bowel syndrome)      Anemia      Fibromyalgia      GERD (gastroesophageal reflux disease)      Anxiety      Abnormal uterine bleeding      Chronic lower back pain      H/O gastric bypass      H/O abdominoplasty      H/O  section  X3 (,,)      Bowel obstruction        H/O tubal ligation  , s/p ovarian ablation          FAMILY HISTORY:  FH: diabetes mellitus (Father)    FH: rheumatoid arthritis (Father)        Allergies    sulfa drugs (Anaphylaxis)    Intolerances        MEDICATIONS  (STANDING):  busPIRone 15 milliGRAM(s) Oral three times a day  enoxaparin Injectable 40 milliGRAM(s) SubCutaneous every 12 hours  lamoTRIgine 200 milliGRAM(s) Oral daily  lidocaine   4% Patch 1 Patch Transdermal daily  pantoprazole    Tablet 40 milliGRAM(s) Oral before breakfast  QUEtiapine 300 milliGRAM(s) Oral at bedtime  senna 2 Tablet(s) Oral at bedtime  sertraline 200 milliGRAM(s) Oral daily  sertraline 100 milliGRAM(s) Oral at bedtime  tiZANidine 2 milliGRAM(s) Oral every 8 hours    MEDICATIONS  (PRN):  acetaminophen     Tablet .. 975 milliGRAM(s) Oral every 8 hours PRN Temp greater or equal to 38C (100.4F), Mild Pain (1 - 3)  aluminum hydroxide/magnesium hydroxide/simethicone Suspension 30 milliLiter(s) Oral every 6 hours PRN Dyspepsia  diazepam    Tablet 5 milliGRAM(s) Oral every 6 hours PRN Anxiety  morphine  IR 15 milliGRAM(s) Oral every 6 hours PRN Severe Pain (7 - 10)  polyethylene glycol 3350 17 Gram(s) Oral daily PRN Constipation      Vital Signs:  T(C): 36.6 (03-15-23 @ 12:28)  HR: 69 (03-15-23 @ 12:28)  BP: 100/65 (03-15-23 @ 12:28)  RR: 18 (03-15-23 @ 12:28)  SpO2: 93% (03-15-23 @ 12:28)    Pertinent labs/radiology:  Reviewed                          10.2   6.38  )-----------( 206      ( 14 Mar 2023 09:25 )             33.5       03-14    140  |  104  |  21  ----------------------------<  92  4.2   |  27  |  0.95    Ca    9.4      14 Mar 2023 09:25      < from: MR Cervical Spine No Cont (23 @ 19:07) >  FINDINGS:    CERVICAL SPINE:    Cervical vertebral alignment demonstrates exaggeration of thoracolumbar   kyphosis. There is slight anterior listhesis C5 on C6 and C6 on C7. Facet   joints remain aligned.. Cervical vertebral body heights are maintained.   Marrow signal intensity within cervical vertebral bodies unremarkable. No   osseous expansion, epidural disease or paraspinal abnormality is found.    Cervical intervertebral discs demonstrate variable degeneration with disc   height loss and signal intensity loss on the long TR images, most   pronounced at C6-C7. At the cervical intervertebral disc levels C2-C3   through C5-C6 no significant degenerative central canal compromise is   recognized. Cervical neural foramina at these levels remain largely   patent.    At C6-C7 focal left central disc protrusion deforms the left ventral cord   surface. No cord signal intensity change results. The neural foramina   remain patent.    At C7-T1 focal left central disc protrusion appears to align with central   canal osteophytes deforming the ventral thecal sac but not directly   compromising the ventral cord surface. The neural foramina appear patent.    Cervical cord morphology demonstrates direct compromise by extrinsic disc   material as described above.    No cord signal intensity change is seen.    No intrinsic cord lesion is found.  No cord expansion or cord volume loss   is found. Small nerve root sleeve cysts are incidentally noted.    The cervicomedullary junction is intact.    THORACIC SPINE:    Thoracic vertebral alignment demonstrates slight anterolisthesis of T2 on   T3. There is similar slight anterior listhesis T8 on T9. A There is also   mild focal exaggeration of the thoracolumbar kyphosis. Idiopathic   dextroscoliosis has apex at the thoracic lumbar junction. Thoracic   vertebral body heights are maintained. Marrow signal intensity within   thoracic vertebral bodies is only mildly heterogeneous. Small sclerotic   foci within T11 appear to be a long-standing process.. No osseous   expansion, epidural disease or paraspinal abnormality is found.    Thoracic intervertebral discs demonstrate variable degeneration with disc   height, signal intensity loss on the long TR images. At T1-T2 with   central osteophytes deform the ventral thecal sac without direct   compromise of the thoracic cord. However, no high-grade central canal   compromise is recognized. Thoracic neural foramen appear patent.    The thoracic cord maintains intact morphology. No cord signal intensity   change is seen. No intrinsic cord lesion is found. No cord expansion or   cord volume loss is found.    LUMBAR SPINE:    Lumbar vertebral alignment demonstrates shallow idiopathic levoscoliosis,   apex in the mid lumbar spine. Lumbar vertebral body heights are   maintained. Marrow signal intensity within lumbar vertebral bodies is   mildly heterogeneous including degenerative endplate changes. Small   marginal osteophytes are present at the low thoracic and upper lumbar   levels, also at L5-S1.. No osseous expansion, epidural disease or   paraspinal abnormality is found.    Lumbar intervertebral discs is a variable disc degeneration. Degenerative   disc height loss may be greatest at L3-L4 and L5-S1. Degenerative signal   intensity loss in the long TR images is found throughoutthe mid lumbar   intervertebral disc levels. Disc bulge is present at variable degree at   each of the mid and lower lumbar intervertebral disc levels. This is   ultimately low-grade deformity of the ventral thecal sac. Throughout the   lumbar spine no high-grade degenerative central canal compromise is   recognized. At L2-L3 shallow right subarticular and foraminal disc   protrusion contributes with osteophytes to moderate right foraminal   compromise. At L4-L5 shallow left foraminal and lateraldisc protrusion   extends into and peripheral to the left neural foramen. At the remaining   mid and lower lumbar neural foramina disc bulging and facet arthropathy   result in degenerative foraminal compromise. This appears greatest in   magnitude to the left at L5-S1, moderate, low grade throughout the   remaining mid and lower lumbar neural foramina.    The distal cord maintains intact morphology and signal intensity. The   conus is borderline low in position at L1-L2.    Nerve roots of the cauda   equina appear intact.      IMPRESSION:    1. CERVICAL SPINE:   C6-C7 focal left central disc protrusion (disc   herniation)  causes ventral cord deformity. C7-T1 focal left central disc   protrusion  (disc herniation)    2. THORACIC SPINE:   Vertebral malalignment appears similar to previous.   No high-grade canal compromise has developed. A very    3. LUMBAR SPINE:   L2-L3 shallow right subarticular and foraminal disc   protrusion and L4-L5 shallow left foraminal and lateral disc protrusion   (disc herniations). Degenerative disc pathology in the lumbar spine may   be mildly progressed compared to     --- End of Report ---    < end of copied text >

## 2023-03-15 NOTE — PROGRESS NOTE ADULT - PROBLEM SELECTOR PROBLEM 4
Bipolar disorder with depression

## 2023-03-15 NOTE — PROGRESS NOTE ADULT - PROBLEM SELECTOR PLAN 6
- DVT ppx: Lovenox  - Diet: Regular  - Dispo: pending improvement and PT eval

## 2023-03-15 NOTE — CONSULT NOTE ADULT - ASSESSMENT
52F w/ hx of anxiety, depression/bipolar disorder (c/b multiple SA and 3 prior psychiatric hospitalizations), fibromyalgia, chronic back pain, sciatica, left L4 nerve root impingement, IBS, GERD, s/p gastric bypass, s/p abdominoplasty; chronic B/L foot numbness; has been seeing Chris Argueta for years for back pain and reports having GENOVEVA's, Trigger Point injections, and steroids in the past.     Admitted with acute worsening of chronic back pain for the past two weeks.   reports recently getting over Covid, recent falls and recent 1.5 hr chair massage.    Current out- patient pain regimen: Robaxin 750 mg BID, Tylenol  Out Patient Pain Management provider: Sees Ortho at [facility], displeased with them    During most of interview patient's eyes would drift and close, and she would become irritated when asked detailed questions about pain characteristics. Pt states it is 2/2 lack of sleep, RN reports this is patient's baseline since admission. Pt c/o generalized back pain starting from mid back and radiating distally to anterior RLE knee. States pain is sharp, stabbing needle, burning, also endorses back spasms. This is same pain that she lives with but became much worse past 2 weeks. Seen by NS and feel complaints incongruent with imaging findings. It's possible that MSK pain and edema 2/2 recent falls, combined with L2-L3 moderate right foraminal compromise, could be cause of low back pain and RLE pain however there could also by a psychological component as well as pt provided minimal effort during exam with all extremities. States she is unable to ambulate, and there was zero effort for B/L plantar flexion and dorsiflexion, but then stated that she walks to the bathroom and ambulates in halls. Minimal to no effort when assessing upper extremities as well. Unable to assess back. Residual inflammation 2/2 Covid? Pt also reports taking Gabapentin in past causing AMS, tried Cymbalta in past with no effect. Additionally need to avoid NSAIDs and ER meds 2/2 GI hx, avoid Tramadol 2/2 on anti-depressants. Pt requesting to speak with NS as she says they told her they can do laparoscopic surgery, then asked when she can go home.    Plan discussed with primary team:  Discontinue IV opioids, not indicated   Discontinue Oxycodone  Start Morphine 15 mg PO every 6 hours PRN severe pain x 2 days then taper off for discharge  Discontinue Robaxin (states no effect)  Start Tizanidine 2 mg every 8 hours, if good effect can change to PRN spasm on discharge  Consider Psychiatry eval  Requesting to speak with Neurosurgery to discuss options  Assess for possible UTI  Do not recommend opioids on discharge  Continue Lidoderm  Warm/cool packs for comfort  Bowel Regimen  Incentive Spirometer  PT per primary team  Monitor for sedation, respiratory depression  Follow up with Chris Argueta for continued pain management after discharge  Out-patient pain practice list can be provided if she wishes to find a new pain specialist  Narcan Rescue Kit if discharged with opioids (Naloxone 4 mg/0.1 ml nasal spray - 1 spray q 2-3 minutes alternating between nostrils)    Time spent on encounter:   55     Minutes    Chronic Pain Service  [phone] 52F w/ hx of anxiety, depression/bipolar disorder (c/b multiple SA and 3 prior psychiatric hospitalizations), fibromyalgia, chronic back pain, sciatica, left L4 nerve root impingement, IBS, GERD, s/p gastric bypass, s/p abdominoplasty; chronic B/L foot numbness; has been seeing Chris Argueta for years for back pain and reports having GENOVEVA's, Trigger Point injections, and steroids in the past.     Admitted with acute worsening of chronic back pain for the past two weeks.   reports recently getting over Covid, recent falls and recent 1.5 hr chair massage.    Current out- patient pain regimen: Robaxin 750 mg BID, Tylenol  Out Patient Pain Management provider: Sees Ortho at [facility], displeased with them    During most of interview patient's eyes would drift and close, and she would become irritated when asked detailed questions about pain characteristics. Pt states it is 2/2 lack of sleep, RN reports this is patient's baseline since admission. Pt c/o generalized back pain starting from mid back and radiating distally to anterior RLE knee. States pain is sharp, stabbing needle, burning, also endorses back spasms. This is same pain that she lives with but became much worse past 2 weeks. Seen by NS and feel complaints incongruent with imaging findings. It's possible that MSK pain and edema 2/2 recent falls, combined with L2-L3 moderate right foraminal compromise, could be cause of low back pain and RLE pain however there could also by a psychological component as well as pt provided minimal effort during exam with all extremities. States she is unable to ambulate, and there was zero effort for B/L plantar flexion and dorsiflexion, but then stated that she walks to the bathroom and ambulates in halls. Minimal to no effort when assessing upper extremities as well. Unable to assess back. Residual inflammation 2/2 Covid? Pt also reports taking Gabapentin in past causing AMS, tried Cymbalta in past with no effect. Additionally need to avoid NSAIDs and ER meds 2/2 GI hx, avoid Tramadol 2/2 on anti-depressants. Pt requesting to speak with NS as she says they told her they can do laparoscopic surgery, then asked when she can go home.    Plan discussed with primary team:  Discontinue IV opioids, not indicated   Discontinue Oxycodone  Start Morphine 15 mg PO every 6 hours PRN severe pain x 2 days then taper off for discharge  Discontinue Robaxin (states no effect)  Start Tizanidine 2 mg every 8 hours, if good effect can change to PRN spasm on discharge  Consider Psychiatry eval  Requesting to speak with Neurosurgery to discuss options  Assess for possible UTI  Consider steroids for inflammation  Do not recommend opioids on discharge  Continue Lidoderm  Warm/cool packs for comfort  Bowel Regimen  Incentive Spirometer  PT per primary team  Monitor for sedation, respiratory depression  Follow up with Chris Argueta for continued pain management after discharge  Out-patient pain practice list can be provided if she wishes to find a new pain specialist  Narcan Rescue Kit if discharged with opioids (Naloxone 4 mg/0.1 ml nasal spray - 1 spray q 2-3 minutes alternating between nostrils)    Time spent on encounter:   55     Minutes    Chronic Pain Service  [phone]

## 2023-03-15 NOTE — PROGRESS NOTE ADULT - PROBLEM SELECTOR PLAN 4
Hx of depression/bipolar disorder (c/b multiple SA and 3 prior psychiatric hospitalizations)  - c/w home Zoloft, Seroquel, Buspirone, and Lamotrigine

## 2023-03-16 ENCOUNTER — TRANSCRIPTION ENCOUNTER (OUTPATIENT)
Age: 53
End: 2023-03-16

## 2023-03-16 VITALS
DIASTOLIC BLOOD PRESSURE: 66 MMHG | TEMPERATURE: 98 F | RESPIRATION RATE: 18 BRPM | OXYGEN SATURATION: 95 % | HEART RATE: 85 BPM | SYSTOLIC BLOOD PRESSURE: 126 MMHG

## 2023-03-16 LAB
HCT VFR BLD CALC: 33.4 % — LOW (ref 34.5–45)
HGB BLD-MCNC: 10.7 G/DL — LOW (ref 11.5–15.5)
MCHC RBC-ENTMCNC: 27 PG — SIGNIFICANT CHANGE UP (ref 27–34)
MCHC RBC-ENTMCNC: 32 GM/DL — SIGNIFICANT CHANGE UP (ref 32–36)
MCV RBC AUTO: 84.1 FL — SIGNIFICANT CHANGE UP (ref 80–100)
NRBC # BLD: 0 /100 WBCS — SIGNIFICANT CHANGE UP (ref 0–0)
PLATELET # BLD AUTO: 211 K/UL — SIGNIFICANT CHANGE UP (ref 150–400)
RBC # BLD: 3.97 M/UL — SIGNIFICANT CHANGE UP (ref 3.8–5.2)
RBC # FLD: 14.6 % — HIGH (ref 10.3–14.5)
WBC # BLD: 6.31 K/UL — SIGNIFICANT CHANGE UP (ref 3.8–10.5)
WBC # FLD AUTO: 6.31 K/UL — SIGNIFICANT CHANGE UP (ref 3.8–10.5)

## 2023-03-16 PROCEDURE — 85045 AUTOMATED RETICULOCYTE COUNT: CPT

## 2023-03-16 PROCEDURE — 82746 ASSAY OF FOLIC ACID SERUM: CPT

## 2023-03-16 PROCEDURE — 85027 COMPLETE CBC AUTOMATED: CPT

## 2023-03-16 PROCEDURE — 72146 MRI CHEST SPINE W/O DYE: CPT

## 2023-03-16 PROCEDURE — 83010 ASSAY OF HAPTOGLOBIN QUANT: CPT

## 2023-03-16 PROCEDURE — 97116 GAIT TRAINING THERAPY: CPT

## 2023-03-16 PROCEDURE — 80048 BASIC METABOLIC PNL TOTAL CA: CPT

## 2023-03-16 PROCEDURE — 72148 MRI LUMBAR SPINE W/O DYE: CPT

## 2023-03-16 PROCEDURE — 72070 X-RAY EXAM THORAC SPINE 2VWS: CPT

## 2023-03-16 PROCEDURE — 86431 RHEUMATOID FACTOR QUANT: CPT

## 2023-03-16 PROCEDURE — 82607 VITAMIN B-12: CPT

## 2023-03-16 PROCEDURE — 97161 PT EVAL LOW COMPLEX 20 MIN: CPT

## 2023-03-16 PROCEDURE — 83036 HEMOGLOBIN GLYCOSYLATED A1C: CPT

## 2023-03-16 PROCEDURE — 97110 THERAPEUTIC EXERCISES: CPT

## 2023-03-16 PROCEDURE — 85025 COMPLETE CBC W/AUTO DIFF WBC: CPT

## 2023-03-16 PROCEDURE — 83540 ASSAY OF IRON: CPT

## 2023-03-16 PROCEDURE — 72141 MRI NECK SPINE W/O DYE: CPT

## 2023-03-16 PROCEDURE — 99285 EMERGENCY DEPT VISIT HI MDM: CPT

## 2023-03-16 PROCEDURE — 83735 ASSAY OF MAGNESIUM: CPT

## 2023-03-16 PROCEDURE — 80053 COMPREHEN METABOLIC PANEL: CPT

## 2023-03-16 PROCEDURE — 82728 ASSAY OF FERRITIN: CPT

## 2023-03-16 PROCEDURE — 84443 ASSAY THYROID STIM HORMONE: CPT

## 2023-03-16 PROCEDURE — 84100 ASSAY OF PHOSPHORUS: CPT

## 2023-03-16 PROCEDURE — 72100 X-RAY EXAM L-S SPINE 2/3 VWS: CPT

## 2023-03-16 PROCEDURE — 86200 CCP ANTIBODY: CPT

## 2023-03-16 PROCEDURE — 87637 SARSCOV2&INF A&B&RSV AMP PRB: CPT

## 2023-03-16 PROCEDURE — 36415 COLL VENOUS BLD VENIPUNCTURE: CPT

## 2023-03-16 PROCEDURE — 72170 X-RAY EXAM OF PELVIS: CPT

## 2023-03-16 PROCEDURE — 83550 IRON BINDING TEST: CPT

## 2023-03-16 PROCEDURE — 83615 LACTATE (LD) (LDH) ENZYME: CPT

## 2023-03-16 RX ORDER — QUETIAPINE FUMARATE 200 MG/1
1 TABLET, FILM COATED ORAL
Qty: 5 | Refills: 0
Start: 2023-03-16 | End: 2023-03-20

## 2023-03-16 RX ORDER — NALOXONE HYDROCHLORIDE 4 MG/.1ML
4 SPRAY NASAL
Qty: 1 | Refills: 0
Start: 2023-03-16

## 2023-03-16 RX ORDER — MORPHINE SULFATE 50 MG/1
1 CAPSULE, EXTENDED RELEASE ORAL
Qty: 16 | Refills: 0
Start: 2023-03-16 | End: 2023-03-19

## 2023-03-16 RX ORDER — TIZANIDINE 4 MG/1
1 TABLET ORAL
Qty: 15 | Refills: 0
Start: 2023-03-16 | End: 2023-03-20

## 2023-03-16 RX ORDER — ACETAMINOPHEN 500 MG
3 TABLET ORAL
Qty: 0 | Refills: 0 | DISCHARGE

## 2023-03-16 RX ORDER — SERTRALINE 25 MG/1
1 TABLET, FILM COATED ORAL
Qty: 10 | Refills: 0 | DISCHARGE
Start: 2023-03-16 | End: 2023-03-20

## 2023-03-16 RX ORDER — SERTRALINE 25 MG/1
2 TABLET, FILM COATED ORAL
Qty: 10 | Refills: 0
Start: 2023-03-16 | End: 2023-03-20

## 2023-03-16 RX ORDER — SENNA PLUS 8.6 MG/1
2 TABLET ORAL
Qty: 60 | Refills: 0
Start: 2023-03-16 | End: 2023-04-14

## 2023-03-16 RX ORDER — DEXAMETHASONE 0.5 MG/5ML
1 ELIXIR ORAL
Qty: 18 | Refills: 0
Start: 2023-03-16 | End: 2023-03-21

## 2023-03-16 RX ORDER — DIAZEPAM 5 MG
1 TABLET ORAL
Qty: 20 | Refills: 0
Start: 2023-03-16 | End: 2023-03-20

## 2023-03-16 RX ORDER — SERTRALINE 25 MG/1
1 TABLET, FILM COATED ORAL
Qty: 0 | Refills: 0 | DISCHARGE

## 2023-03-16 RX ORDER — SERTRALINE 25 MG/1
1 TABLET, FILM COATED ORAL
Qty: 5 | Refills: 0
Start: 2023-03-16 | End: 2023-03-20

## 2023-03-16 RX ORDER — POLYETHYLENE GLYCOL 3350 17 G/17G
17 POWDER, FOR SOLUTION ORAL
Qty: 510 | Refills: 0
Start: 2023-03-16 | End: 2023-04-14

## 2023-03-16 RX ORDER — QUETIAPINE FUMARATE 200 MG/1
1 TABLET, FILM COATED ORAL
Qty: 0 | Refills: 0 | DISCHARGE

## 2023-03-16 RX ORDER — LIDOCAINE 4 G/100G
1 CREAM TOPICAL
Qty: 30 | Refills: 0
Start: 2023-03-16 | End: 2023-04-14

## 2023-03-16 RX ORDER — ACETAMINOPHEN 500 MG
3 TABLET ORAL
Qty: 0 | Refills: 0 | DISCHARGE
Start: 2023-03-16

## 2023-03-16 RX ORDER — METHOCARBAMOL 500 MG/1
1 TABLET, FILM COATED ORAL
Qty: 0 | Refills: 0 | DISCHARGE

## 2023-03-16 RX ORDER — DIAZEPAM 5 MG
1 TABLET ORAL
Qty: 0 | Refills: 0 | DISCHARGE

## 2023-03-16 RX ORDER — MORPHINE SULFATE 50 MG/1
1 CAPSULE, EXTENDED RELEASE ORAL
Qty: 4 | Refills: 0
Start: 2023-03-16 | End: 2023-03-19

## 2023-03-16 RX ADMIN — Medication 975 MILLIGRAM(S): at 08:59

## 2023-03-16 RX ADMIN — ENOXAPARIN SODIUM 40 MILLIGRAM(S): 100 INJECTION SUBCUTANEOUS at 12:13

## 2023-03-16 RX ADMIN — PANTOPRAZOLE SODIUM 40 MILLIGRAM(S): 20 TABLET, DELAYED RELEASE ORAL at 06:05

## 2023-03-16 RX ADMIN — Medication 975 MILLIGRAM(S): at 09:45

## 2023-03-16 RX ADMIN — MORPHINE SULFATE 15 MILLIGRAM(S): 50 CAPSULE, EXTENDED RELEASE ORAL at 09:00

## 2023-03-16 RX ADMIN — MORPHINE SULFATE 15 MILLIGRAM(S): 50 CAPSULE, EXTENDED RELEASE ORAL at 09:45

## 2023-03-16 RX ADMIN — Medication 15 MILLIGRAM(S): at 13:31

## 2023-03-16 RX ADMIN — Medication 15 MILLIGRAM(S): at 06:05

## 2023-03-16 RX ADMIN — Medication 4 MILLIGRAM(S): at 06:05

## 2023-03-16 RX ADMIN — TIZANIDINE 2 MILLIGRAM(S): 4 TABLET ORAL at 06:05

## 2023-03-16 RX ADMIN — SERTRALINE 200 MILLIGRAM(S): 25 TABLET, FILM COATED ORAL at 12:13

## 2023-03-16 RX ADMIN — Medication 4 MILLIGRAM(S): at 13:31

## 2023-03-16 RX ADMIN — LAMOTRIGINE 200 MILLIGRAM(S): 25 TABLET, ORALLY DISINTEGRATING ORAL at 12:13

## 2023-03-16 RX ADMIN — TIZANIDINE 2 MILLIGRAM(S): 4 TABLET ORAL at 13:31

## 2023-03-16 NOTE — DISCHARGE NOTE NURSING/CASE MANAGEMENT/SOCIAL WORK - PATIENT PORTAL LINK FT
You can access the FollowMyHealth Patient Portal offered by [facility] by registering at the following website: [email or web address]. By joining [facility]’s FollowMyHealth portal, you will also be able to view your health information using other applications (apps) compatible with our system.

## 2023-03-16 NOTE — DISCHARGE NOTE PROVIDER - HOSPITAL COURSE
52 F w/ hx of anxiety, depression/bipolar disorder (c/b multiple SA and 3 prior psychiatric hospitalizations), fibromyalgia, chronic back pain, sciatica, left L4 nerve root impingement, IBS, GERD, s/p gastric bypass, s/p abdominoplasty, presenting with acute worsening of chronic back pain for the past two weeks. Suspect exacerbation of lumbar radiculopathy vs worsening OA.     Problem/Plan - 1:  ·  Problem: Exacerbation of chronic back pain.   ·  Plan: Presented with acute worsening of chronic back pain for the past two weeks, reportedly after using massage chair for 1.5 hrs. Also reports hx of multiple falls, most recently ~1 month ago. Exam with diffuse TTP across entire back, positive RLE straight leg raise test, chronic R>L foot/toes paresthesias. Prior MRI L-spine (Dec 2021) notable for spinal canal stenosis and left L4 nerve root impingement 2/2 herniated disc. Suspect presenting symptoms 2/2 exacerbation of lumbar radiculopathy vs worsening OA.  - c/w multimodal pain regimen, including short-course of oxycodone IR PRN/ IVP dilaudid prn for now as well as home meds (Valium, tylenol, lidocaine patch, methocarbamol PRN)  - may consider starting gabapentin, lyrica, or SNRI if pain uncontrolled (though caution as pt already on several psych meds)  - c/w bowel regimen while on opioid pain meds  - spine consult --> no surgical intervention  - pain management consulted  - pain management adjusted meds, dc IV dilaudid and started PO Morphine.  - Robaxin switched to Zanaflex.  - started decadron IV. side effects of steroids reviewed and pt eager to start to relieve her pain.   - MRI results reviewed.     Problem/Plan - 2:  ·  Problem: Anemia.   ·  Plan: Presented with anemia to Hgb 9.9 (baseline ~11 in 2021), MCV 85.8. Previously on B12 injects, hx of gastric bypass. No signs of overt active bleeding.  - monitor CBC  - likely combination of iron-deficiency and AOCD.     Problem/Plan - 3:  ·  Problem: Anxiety.   ·  Plan: - c/w home Valium (though prescription may have recently ran out based on ISTOP#: [id number]).     Problem/Plan - 4:  ·  Problem: Bipolar disorder with depression.   ·  Plan: Hx of depression/bipolar disorder (c/b multiple SA and 3 prior psychiatric hospitalizations)  - c/w home Zoloft, Seroquel, Buspirone, and Lamotrigine.     Problem/Plan - 5:  ·  Problem: GERD (gastroesophageal reflux disease).   ·  Plan: - c/w home pantoprazole.     Problem/Plan - 6:  ·  Problem: Prophylactic measure.   ·  Plan: - DVT ppx: Lovenox  - Diet: Regular  - Dispo: pending improvement and PT eval.       52 F w/ hx of anxiety, depression/bipolar disorder (c/b multiple SA and 3 prior psychiatric hospitalizations), fibromyalgia, chronic back pain, sciatica, left L4 nerve root impingement, IBS, GERD, s/p gastric bypass, s/p abdominoplasty, presenting with acute worsening of chronic back pain for the past two weeks. Suspect exacerbation of lumbar radiculopathy vs worsening OA.     Problem/Plan - 1:  ·  Problem: Exacerbation of chronic back pain.   ·  Plan: Presented with acute worsening of chronic back pain for the past two weeks, reportedly after using massage chair for 1.5 hrs. Also reports hx of multiple falls, most recently ~1 month ago. Exam with diffuse TTP across entire back, positive RLE straight leg raise test, chronic R>L foot/toes paresthesias. Prior MRI L-spine (Dec 2021) notable for spinal canal stenosis and left L4 nerve root impingement 2/2 herniated disc. Suspect presenting symptoms 2/2 exacerbation of lumbar radiculopathy vs worsening OA.  - c/w multimodal pain regimen, including short-course of oxycodone IR PRN/ IVP dilaudid prn for now as well as home meds (Valium, tylenol, lidocaine patch, methocarbamol PRN)  - may consider starting gabapentin, lyrica, or SNRI if pain uncontrolled (though caution as pt already on several psych meds)  - c/w bowel regimen while on opioid pain meds  - spine consult --> no surgical intervention  - pain management consulted  - pain management adjusted meds, dc IV dilaudid and started PO Morphine.  - Robaxin switched to Zanaflex.  - started decadron IV. side effects of steroids reviewed and pt eager to start to relieve her pain.   - MRI results reviewed.     Problem/Plan - 2:  ·  Problem: Anemia.   ·  Plan: Presented with anemia to Hgb 9.9 (baseline ~11 in 2021), MCV 85.8. Previously on B12 injects, hx of gastric bypass. No signs of overt active bleeding.  - monitor CBC  - likely combination of iron-deficiency and AOCD.     Problem/Plan - 3:  ·  Problem: Anxiety.   ·  Plan: - c/w home Valium (though prescription may have recently ran out based on ISTOP#: [id number]).     Problem/Plan - 4:  ·  Problem: Bipolar disorder with depression.   ·  Plan: Hx of depression/bipolar disorder (c/b multiple SA and 3 prior psychiatric hospitalizations)  - c/w home Zoloft, Seroquel, Buspirone, and Lamotrigine.     Problem/Plan - 5:  ·  Problem: GERD (gastroesophageal reflux disease).   ·  Plan: - c/w home pantoprazole.     Problem/Plan - 6:  ·  Problem: Prophylactic measure.   ·  Plan: - DVT ppx: Lovenox  - Diet: Regular  - Dispo: pending improvement and PT eval.      Attending Addendum:  Patient seen and examined by me on the discharge day. Medications reviewed.  pain is much better. currently 0/10 after starting steroids.  she wants to go home.   All questions answered in details. Follow up plan explained. will need PCP and outpt pain management follow up.   More than 30 mins were spent evaluating patient and coordinating care for discharge.  Discharge summary sent to pt's primary care physician at [facility].

## 2023-03-16 NOTE — DISCHARGE NOTE PROVIDER - NSDCFUSCHEDAPPT_GEN_ALL_CORE_FT
Alexandre Lang  [facility] Physician [facility]  [address]CPAINT [address]  Scheduled Appointment: 03/17/2023

## 2023-03-16 NOTE — DISCHARGE NOTE PROVIDER - NSDCCPCAREPLAN_GEN_ALL_CORE_FT
PRINCIPAL DISCHARGE DIAGNOSIS  Diagnosis: Back pain  Assessment and Plan of Treatment: Related to Radiculopathy or Osteoarthritis   Cont current medication   s/p MRI c6-c7 protrusion , no intervention      SECONDARY DISCHARGE DIAGNOSES  Diagnosis: Bipolar disorder with depression  Assessment and Plan of Treatment: Follow up with MD in 1 week

## 2023-03-16 NOTE — DISCHARGE NOTE PROVIDER - NSDCCONDITION_GEN_ALL_CORE
Patient Education     Understanding Hearing Loss    As you age, some hearing loss is normal. But long-term exposure to loud noise can speed up the loss. You lose more than the ability to hear how loud a sound is. You also lose the ability to hear certain types of sounds. For example, you might not be able to hear some of the high-pitched sounds of a child's voice.  Normal loss  With aging, tiny hair cells in the inner ear undergo changes. Nerve cells, also part of the inner ear, can also be affected. This is called presbycusis. Most people don't notice normal hearing loss until their middle years. Others might not notice it until late in their lives. It's most often a slow and painless process.  Accelerated loss  Exposure to loud noise may cause brief hearing loss and ringing in your ears called tinnitus. If your exposure was short, you may recover. But long-term exposure day after day can affect your hearing for life.  Noise hurts more than your hearing  Did you know that loud noises can affect your whole body? Loud noises can:  · Raise blood pressure  · Disrupt sleep patterns  · Cause muscle strain  · Harm digestion   Date Last Reviewed: 5/1/2017  © 1768-5549 [employer]. [address] [place] [address]. All rights reserved. This information is not intended as a substitute for professional medical care. Always follow your healthcare professional's instructions.             Temporomandibular Joint Pain    One of the most common causes of “ear” pain is inflammation in the joint where the lower jaw hinges on the skull (temporomandibular joint - TMJ).  This is one of the “busiest” joints in the body - the chewing joint.  The nerves that supply sensation to this joint are the same nerves that supply sensation to the ear, and TMJ pain is often thought to be ear pain.  Pain from the TMJ can be distributed to surrounding muscles which extend down the neck.      TMJ inflammation can arise from  misalignment of the teeth, from excessive gum chewing, grinding one’s teeth at night (bruxism), or from a visit to the dentist where one’s mouth is held open for a prolonged period of time.    Treatment for TMJ pain involves resting the joint and anti-inflammatory medication, as with any other sprained or sore joint.  Resting the TMJ means:   1. No gum chewing  2. Eating a soft diet  a. Avoid the following foods  i. Nuts  ii. Raw vegetables  iii. Apples  iv. Hero” or “poor boy” sandwiches  v. Steak, ribs, chicken - anything that needs chewing  b. The following foods require little chewing and are desirable:  i. Mashed potatoes  ii. Meatloaf  iii. Macaroni and cheese  iv. Grits  v. Jello    If your pain is not resolved after 3 weeks of a soft diet and anti-inflammatory medication, you should seek care with a dentist or an oral surgeon.      Revised 12/12/2018     Stable

## 2023-03-16 NOTE — DISCHARGE NOTE PROVIDER - NSDCFUADDAPPT_GEN_ALL_CORE_FT
APPTS ARE READY TO BE MADE: [ x] YES    Best Family or Patient Contact (if needed):    Additional Information about above appointments (if needed):    1:   2:   3:     Other comments or requests:    APPTS ARE READY TO BE MADE: [ x] YES    Best Family or Patient Contact (if needed):    Additional Information about above appointments (if needed):    1:   2:   3:     Other comments or requests:       Patient was provided with Dr. Andujar and was advised to call to schedule follow up within specified time frame. At this time patient declined scheduling assistance.

## 2023-03-16 NOTE — DISCHARGE NOTE PROVIDER - CARE PROVIDER_API CALL
Yumiko Andujar ()  Internal Medicine  [address] [place] [address]  Phone: [phone]  Fax: [phone]  Follow Up Time: 1 week

## 2023-03-16 NOTE — DISCHARGE NOTE NURSING/CASE MANAGEMENT/SOCIAL WORK - NSDCPEFALRISK_GEN_ALL_CORE
For information on Fall & Injury Prevention, visit: [email or web address] OR  [email or web address] OR  https://www.cdc.gov/steadi/patient.html

## 2023-03-16 NOTE — DISCHARGE NOTE PROVIDER - NSDCMRMEDTOKEN_GEN_ALL_CORE_FT
acetaminophen 325 mg oral tablet: 3 tab(s) orally every 8 hours, As needed, Temp greater or equal to 38C (100.4F), Mild Pain (1 - 3)  busPIRone 15 mg oral tablet: 1 tab(s) orally 3 times a day MDD:3 tabs daily  dexamethasone 4 mg oral tablet: 4mg q8hrs x 2 more days then 4mg q12hrs x 2 days then 4mg daily x 2 days then stop   diazePAM 5 mg oral tablet: 1 tab(s) orally every 6 hours, As Needed -Anxiety - for anxiety MDD:4 tabs daily  lamoTRIgine 200 mg oral tablet, extended release: 1 tab(s) orally once a day  lidocaine 4% topical film: Apply topically to affected area once a day   morphine 15 mg oral tablet: 1 tab(s) orally every 6 hours, As Needed -Severe Pain (7 - 10) - for severe pain MDD:60mg max a day  Narcan 4 mg/0.1 mL nasal spray: 4 milligram(s) intranasally once , prn for overdose   pantoprazole 40 mg oral delayed release tablet: 1 tab(s) orally once a day  polyethylene glycol 3350 oral powder for reconstitution: 17 gram(s) orally once a day, As needed, Constipation  senna leaf extract oral tablet: 2 tab(s) orally once a day (at bedtime)  SEROquel 300 mg oral tablet: 1 tab(s) orally once a day (at bedtime) MDD:1 tab at night  sertraline 100 mg oral tablet: 2 tab(s) orally once a day MDD:2 tabs daily  sertraline 100 mg oral tablet: 1 tab(s) orally once a day 4pm daily MDD:1 tab daily  tiZANidine 2 mg oral tablet: 1 tab(s) orally every 8 hours MDD:3 tabs daily   acetaminophen 325 mg oral tablet: 3 tab(s) orally every 8 hours, As needed, Temp greater or equal to 38C (100.4F), Mild Pain (1 - 3)  busPIRone 15 mg oral tablet: 1 tab(s) orally 3 times a day MDD:3 tabs daily  dexamethasone 4 mg oral tablet: 4mg q8hrs x 2 more days then 4mg q12hrs x 2 days then 4mg daily x 2 days then stop   diazePAM 5 mg oral tablet: 1 tab(s) orally every 6 hours, As Needed -Anxiety - for anxiety MDD:4 tabs daily  lamoTRIgine 200 mg oral tablet, extended release: 1 tab(s) orally once a day  lidocaine 4% topical film: Apply topically to affected area once a day   morphine 15 mg oral tablet: 1 tab(s) orally once a day (at bedtime), As Needed -Severe Pain (7 - 10)  - for severe pain MDD:60mg max a day   Narcan 4 mg/0.1 mL nasal spray: 4 milligram(s) intranasally once , prn for overdose   pantoprazole 40 mg oral delayed release tablet: 1 tab(s) orally once a day  polyethylene glycol 3350 oral powder for reconstitution: 17 gram(s) orally once a day, As needed, Constipation  Rolling Walker :   senna leaf extract oral tablet: 2 tab(s) orally once a day (at bedtime)  SEROquel 300 mg oral tablet: 1 tab(s) orally once a day (at bedtime) MDD:1 tab at night  sertraline 100 mg oral tablet: 2 tab(s) orally once a day MDD:2 tabs daily  sertraline 100 mg oral tablet: 1 tab(s) orally once a day 4pm daily MDD:1 tab daily  tiZANidine 2 mg oral tablet: 1 tab(s) orally every 8 hours MDD:3 tabs daily

## 2023-03-17 ENCOUNTER — APPOINTMENT (OUTPATIENT)
Dept: PAIN MANAGEMENT | Facility: CLINIC | Age: 53
End: 2023-03-17

## 2023-03-17 NOTE — CHART NOTE - NSCHARTNOTEFT_GEN_A_CORE
Confidential Drug Utilization Report  Search Terms: Dasia Alvarado, 1970Search Date: 03/11/2023 20:56:00 PM  Searching on behalf of: [address] [place] [address]  The Drug Utilization Report below displays all of the controlled substance prescriptions, if any, that your patient has filled in the last twelve months. The information displayed on this report is compiled from pharmacy submissions to the Department, and accurately reflects the information as submitted by the pharmacies.    This report was requested by: Wale Quinonez | Reference #: [id number]    Others' Prescriptions  Patient Name: Dasia AlvaradoBirth Date: 1970  Address: [address] [place] [id number]: Female  Rx Written	Rx Dispensed	Drug	Quantity	Days Supply	Prescriber Name	Prescriber Lizzy #	Payment Method	Dispenser  02/07/2023	02/10/2023	diazepam 5 mg tablet	120	30	Garyali, Romy	[id number]	Insurance	[facility] #[id number]  11/28/2022	12/09/2022	diazepam 5 mg tablet	120	30	Garyali, Romy	[id number]	Insurance	[facility] #[id number]  11/07/2022	11/08/2022	diazepam 5 mg tablet	120	30	Garyali, Romy	[id number]	Insurance	[facility] #[id number]  09/26/2022	10/08/2022	diazepam 5 mg tablet	120	30	Garyali, Romy	[id number]	Insurance	[facility] #[id number]  09/02/2022	09/06/2022	diazepam 5 mg tablet	120	30	Garyali, Romy	[id number]	Insurance	[facility] #[id number]  08/08/2022	08/08/2022	diazepam 5 mg tablet	120	30	Garyali, Romy	[id number]	Insurance	[facility] #[id number]  07/18/2022	07/19/2022	clonazepam 1 mg tablet	90	30	Garyali, Romy	[id number]	Insurance	[facility] #[id number]  06/06/2022	06/20/2022	clonazepam 1 mg tablet	90	30	Garyali, Romy	[id number]	Insurance	[facility] #[id number]    Patient Name: Dasia AlvaradoBirth Date: 1970  Address: [address] [place] [id number]: Female  Rx Written	Rx Dispensed	Drug	Quantity	Days Supply	Prescriber Name	Prescriber Lizzy #	Payment Method	Dispenser  03/28/2022	03/28/2022	clonazepam 1 mg tablet	45	15	Linda Broussard MD	[id number]	Insurance	[facility] #[id number]    Patient Name: Dasia AlvaradoBirth Date: 1970  Address: [address] [address]Oik: Female  Rx Written	Rx Dispensed	Drug	Quantity	Days Supply	Prescriber Name	Prescriber Lizzy #	Payment Method	Dispenser  05/18/2022	05/22/2022	clonazepam 1 mg tablet	90	30	Romy Lizarraga	[id number]	Insurance	[facility] #[id number]  04/21/2022	04/22/2022	clonazepam 1 mg tablet	90	30	NAYELY Hahn	[id number]	Insurance	[facility] #[id number]  * - Drugs marked with an asterisk are compound drugs. If the compound drug is made up of more than one controlled substance, then each controlled substance will be a separate row in the table.          †Click the ‘Report Suspicious Activity’ button to report information related to controlled substance suspicious activity to the [place] [employer].    ††Click the ‘Send Questions/Comments’ button to send questions about this report to the [place] [employer], or call 1-615.569.4806.    †††Click the ‘Substance Use Disorder Treatment’ button to go to the Office of Addiction Services and Supports website, www.[place].gov or call 1-982.151.2350.    © 2017 [facility] Department of Health -[place] [employer] [place] Iodipaibgxk75/11/2023 20:56  .
Patient needs a rolling walker for chronic back pain related to radiculopathy .
Left 1 message for the patient in regards to follow up care with callback information.

## 2023-03-28 ENCOUNTER — APPOINTMENT (OUTPATIENT)
Dept: ORTHOPEDIC SURGERY | Facility: CLINIC | Age: 53
End: 2023-03-28
Payer: COMMERCIAL

## 2023-03-28 VITALS — BODY MASS INDEX: 41.09 KG/M2 | WEIGHT: 287 LBS | HEIGHT: 70 IN

## 2023-03-28 DIAGNOSIS — M54.50 LOW BACK PAIN, UNSPECIFIED: ICD-10-CM

## 2023-03-28 DIAGNOSIS — F31.60 BIPOLAR DISORDER, CURRENT EPISODE MIXED, UNSPECIFIED: ICD-10-CM

## 2023-03-28 DIAGNOSIS — M79.7 FIBROMYALGIA: ICD-10-CM

## 2023-03-28 DIAGNOSIS — M54.16 RADICULOPATHY, LUMBAR REGION: ICD-10-CM

## 2023-03-28 PROCEDURE — 99072 ADDL SUPL MATRL&STAF TM PHE: CPT

## 2023-03-28 PROCEDURE — 99214 OFFICE O/P EST MOD 30 MIN: CPT

## 2023-03-28 NOTE — PHYSICAL EXAM
[Flexion] : flexion [Extension] : extension [Bending to left] : bending to left [Bending to right] : bending to right [] : ambulation with cane

## 2023-03-28 NOTE — ASSESSMENT
[FreeTextEntry1] : went through all of the MRIs; looked at nearly all of the images;   C T and L spine:  no focal pathology; as such no role for surgery;  care will be best managed by physiatry colleagues;

## 2023-03-28 NOTE — IMAGING
[de-identified] : - SLR\par - femoral stretch\par - spurling\par motor symmetric\par sensation grossly intact

## 2023-03-28 NOTE — HISTORY OF PRESENT ILLNESS
[Lower back] : lower back [10] : 10 [Dull/Aching] : dull/aching [Localized] : localized [Constant] : constant [Sleep] : sleep [Nothing helps with pain getting better] : Nothing helps with pain getting better [de-identified] : 3-28-23- Known chronic lumbar issues. Has been under the care of Dr Lang pain mgt.\par exacerbation of lumbar pain since using a massage chair on vacation in feb. states developed right ant thigh pain and weakness with difficulty ambulating. [facility] had in pt therapy. since dc she is set up with therapy has not yet started. presently ambulating with a cane\par \par MRI IMpression: lumbar from  12/2022: L1-2: Small bulge without stenosis or nerve root compression.\par L2-3: Small bulge without stenosis or nerve root compression. The right lateral recess disc herniation seen on \par the prior MRI has decreased in size and no longer compresses the right L3 nerve root.\par L3-4: Disc bulge and mild bilateral facet osteoarthrosis but no stenosis or nerve root compression.\par L4-5: Left foraminal disc herniation with mild compression of the left L4 nerve root that was not present on th\par prior MRI. Mild bilateral facet osteoarthrosis.\par L5-S1: Left-sided disc herniation and mild bilateral facet osteoarthrosis but no stenosis or nerve root \par compression.\par \par mri from hospital stay dated:3/13/23\par \par cervical c2-3 through 5-6 no significant central compromise is recognized.\par 6-7 focal left central protrusion with ventral cord deformity\par Lumbar- L2-3 shallow right foraminal disc protrusion and l4-5shallow left and lateral  disc protrusion.  [] : no [FreeTextEntry5] : VI PETERSON is a 52 year old female presenting with low back pain. Has scoliosis & RA. Sat in massage chair while on vacation 02/2023 when pain started. Had L Spine @ [facility].

## 2023-03-31 ENCOUNTER — APPOINTMENT (OUTPATIENT)
Dept: PAIN MANAGEMENT | Facility: CLINIC | Age: 53
End: 2023-03-31

## 2023-04-03 NOTE — ED ADULT NURSE NOTE - PRO INTERPRETER NEED 2
c/w trazodone 150 hs  pharmacy reports takes Benadryl 25 hs,  hold at this time
outpt records note was on Xtandi, Zoladex.
c/w trazodone 150 hs  pharmacy reports takes Benadryl 25 hs,  hold for now.
outpt records note was on Xtandi, Zoladex.
English

## 2023-04-10 ENCOUNTER — INPATIENT (INPATIENT)
Facility: HOSPITAL | Age: 53
LOS: 3 days | Discharge: HOME | DRG: 552 | End: 2023-04-14
Attending: STUDENT IN AN ORGANIZED HEALTH CARE EDUCATION/TRAINING PROGRAM | Admitting: STUDENT IN AN ORGANIZED HEALTH CARE EDUCATION/TRAINING PROGRAM
Payer: COMMERCIAL

## 2023-04-10 VITALS
TEMPERATURE: 98 F | HEIGHT: 70 IN | SYSTOLIC BLOOD PRESSURE: 129 MMHG | HEART RATE: 71 BPM | WEIGHT: 279.99 LBS | DIASTOLIC BLOOD PRESSURE: 85 MMHG | OXYGEN SATURATION: 92 % | RESPIRATION RATE: 16 BRPM

## 2023-04-10 DIAGNOSIS — Z98.51 TUBAL LIGATION STATUS: Chronic | ICD-10-CM

## 2023-04-10 DIAGNOSIS — Z98.89 OTHER SPECIFIED POSTPROCEDURAL STATES: Chronic | ICD-10-CM

## 2023-04-10 DIAGNOSIS — M54.9 DORSALGIA, UNSPECIFIED: ICD-10-CM

## 2023-04-10 DIAGNOSIS — K56.60 UNSPECIFIED INTESTINAL OBSTRUCTION: Chronic | ICD-10-CM

## 2023-04-10 LAB
ALBUMIN SERPL ELPH-MCNC: 4.2 G/DL — SIGNIFICANT CHANGE UP (ref 3.3–5)
ALP SERPL-CCNC: 91 U/L — SIGNIFICANT CHANGE UP (ref 40–120)
ALT FLD-CCNC: 13 U/L — SIGNIFICANT CHANGE UP (ref 10–45)
ANION GAP SERPL CALC-SCNC: 10 MMOL/L — SIGNIFICANT CHANGE UP (ref 5–17)
APTT BLD: 23.2 SEC — LOW (ref 27.5–35.5)
AST SERPL-CCNC: 16 U/L — SIGNIFICANT CHANGE UP (ref 10–40)
BASOPHILS # BLD AUTO: 0.02 K/UL — SIGNIFICANT CHANGE UP (ref 0–0.2)
BASOPHILS NFR BLD AUTO: 0.3 % — SIGNIFICANT CHANGE UP (ref 0–2)
BILIRUB SERPL-MCNC: 0.3 MG/DL — SIGNIFICANT CHANGE UP (ref 0.2–1.2)
BUN SERPL-MCNC: 16 MG/DL — SIGNIFICANT CHANGE UP (ref 7–23)
CALCIUM SERPL-MCNC: 9 MG/DL — SIGNIFICANT CHANGE UP (ref 8.4–10.5)
CHLORIDE SERPL-SCNC: 105 MMOL/L — SIGNIFICANT CHANGE UP (ref 96–108)
CO2 SERPL-SCNC: 27 MMOL/L — SIGNIFICANT CHANGE UP (ref 22–31)
CREAT SERPL-MCNC: 0.83 MG/DL — SIGNIFICANT CHANGE UP (ref 0.5–1.3)
EGFR: 86 ML/MIN/1.73M2 — SIGNIFICANT CHANGE UP
EOSINOPHIL # BLD AUTO: 0.02 K/UL — SIGNIFICANT CHANGE UP (ref 0–0.5)
EOSINOPHIL NFR BLD AUTO: 0.3 % — SIGNIFICANT CHANGE UP (ref 0–6)
GLUCOSE SERPL-MCNC: 88 MG/DL — SIGNIFICANT CHANGE UP (ref 70–99)
HCG SERPL-ACNC: <2 MIU/ML — SIGNIFICANT CHANGE UP
HCT VFR BLD CALC: 32.4 % — LOW (ref 34.5–45)
HGB BLD-MCNC: 10.1 G/DL — LOW (ref 11.5–15.5)
IMM GRANULOCYTES NFR BLD AUTO: 1.2 % — HIGH (ref 0–0.9)
INR BLD: 0.96 RATIO — SIGNIFICANT CHANGE UP (ref 0.88–1.16)
LYMPHOCYTES # BLD AUTO: 1.91 K/UL — SIGNIFICANT CHANGE UP (ref 1–3.3)
LYMPHOCYTES # BLD AUTO: 29 % — SIGNIFICANT CHANGE UP (ref 13–44)
MCHC RBC-ENTMCNC: 27 PG — SIGNIFICANT CHANGE UP (ref 27–34)
MCHC RBC-ENTMCNC: 31.2 GM/DL — LOW (ref 32–36)
MCV RBC AUTO: 86.6 FL — SIGNIFICANT CHANGE UP (ref 80–100)
MONOCYTES # BLD AUTO: 0.55 K/UL — SIGNIFICANT CHANGE UP (ref 0–0.9)
MONOCYTES NFR BLD AUTO: 8.4 % — SIGNIFICANT CHANGE UP (ref 2–14)
NEUTROPHILS # BLD AUTO: 4 K/UL — SIGNIFICANT CHANGE UP (ref 1.8–7.4)
NEUTROPHILS NFR BLD AUTO: 60.8 % — SIGNIFICANT CHANGE UP (ref 43–77)
NRBC # BLD: 0 /100 WBCS — SIGNIFICANT CHANGE UP (ref 0–0)
PLATELET # BLD AUTO: 194 K/UL — SIGNIFICANT CHANGE UP (ref 150–400)
POTASSIUM SERPL-MCNC: 4.4 MMOL/L — SIGNIFICANT CHANGE UP (ref 3.5–5.3)
POTASSIUM SERPL-SCNC: 4.4 MMOL/L — SIGNIFICANT CHANGE UP (ref 3.5–5.3)
PROT SERPL-MCNC: 6.5 G/DL — SIGNIFICANT CHANGE UP (ref 6–8.3)
PROTHROM AB SERPL-ACNC: 11 SEC — SIGNIFICANT CHANGE UP (ref 10.5–13.4)
RBC # BLD: 3.74 M/UL — LOW (ref 3.8–5.2)
RBC # FLD: 15.6 % — HIGH (ref 10.3–14.5)
SARS-COV-2 RNA SPEC QL NAA+PROBE: SIGNIFICANT CHANGE UP
SODIUM SERPL-SCNC: 142 MMOL/L — SIGNIFICANT CHANGE UP (ref 135–145)
WBC # BLD: 6.58 K/UL — SIGNIFICANT CHANGE UP (ref 3.8–10.5)
WBC # FLD AUTO: 6.58 K/UL — SIGNIFICANT CHANGE UP (ref 3.8–10.5)

## 2023-04-10 PROCEDURE — 72148 MRI LUMBAR SPINE W/O DYE: CPT | Mod: 26,MA

## 2023-04-10 PROCEDURE — 99291 CRITICAL CARE FIRST HOUR: CPT

## 2023-04-10 PROCEDURE — 72146 MRI CHEST SPINE W/O DYE: CPT | Mod: 26,MA

## 2023-04-10 RX ORDER — ACETAMINOPHEN 500 MG
1000 TABLET ORAL ONCE
Refills: 0 | Status: COMPLETED | OUTPATIENT
Start: 2023-04-10 | End: 2023-04-10

## 2023-04-10 RX ORDER — OXYCODONE HYDROCHLORIDE 5 MG/1
5 TABLET ORAL ONCE
Refills: 0 | Status: DISCONTINUED | OUTPATIENT
Start: 2023-04-10 | End: 2023-04-10

## 2023-04-10 RX ADMIN — OXYCODONE HYDROCHLORIDE 5 MILLIGRAM(S): 5 TABLET ORAL at 21:40

## 2023-04-10 RX ADMIN — OXYCODONE HYDROCHLORIDE 5 MILLIGRAM(S): 5 TABLET ORAL at 18:02

## 2023-04-10 RX ADMIN — OXYCODONE HYDROCHLORIDE 5 MILLIGRAM(S): 5 TABLET ORAL at 16:49

## 2023-04-10 NOTE — ED ADULT NURSE REASSESSMENT NOTE - NS ED NURSE REASSESS COMMENT FT1
Patient resting in stretcher, complaints of constant back pains, able to ambulate with walker which is patients baseline. Pending MRI results and admission vs. d/c.

## 2023-04-10 NOTE — ED PROVIDER NOTE - PHYSICAL EXAMINATION
Physical Exam:  Gen: ill appearing  Head: normal appearing  HEENT: normal conjunctiva, oral mucosa dry  Lung: no respiratory distress, cta throughout  CV: rr  Abd: soft, ND, NT  MSK: no visible deformities slr + ble neurovasc intact distally limited hip rom 2/2 pain in back   Neuro: No focal deficits limited le rom 2/2 pain,   no cspine ttp some tl midline junctional pain ttp no skin changes erythema warmth or induration   Skin: Warm  Psych: anxious  ~Gustavo Landry D.O. -ED Attending

## 2023-04-10 NOTE — ED PROVIDER NOTE - CRITICAL CARE ATTENDING CONTRIBUTION TO CARE
I, Gustavo Landry, performed a history and physical exam of the patient and discussed their management with the resident and/or advanced care provider. I reviewed the resident and/or advanced care provider's note and agree with the documented findings and plan of care. I was present and available for all procedures.    51 yo female brought into the ER by EMS for evaluation of lower back pain.  Pt awake alert oriented x3 states "I have chronic lower back pain and for the last 48 hours I have had bowel and bladder incontinence. I can not stand or walk due to the pain.   I was seen here last month for the same pain. I recently went to fill a rx for percocet and the pharmacy would not fill it because of the other medications I take for anxiety".  Pt unable to passively raise bilateral lower extremities and states pain moves from lower  back to right lower leg.  Pt reports numbness and tingling to groin area as well as  bilateral lower extremities.    see pe exam     Patient with severe back pain decreased lower extremity range of motion with new onset of incontinence needs emergent MRI evaluate for cord syndrome discussed with neurosurgery will evaluate patient pain medication as needed discussed with patient agreeable with plan unlikely ACS PE pneumothorax dissection AAA pneumonia, Disposition for admission

## 2023-04-10 NOTE — CONSULT NOTE ADULT - SUBJECTIVE AND OBJECTIVE BOX
Jenny Dasia  52F w/ extensive psych hx, fibromyalgia, IBS, recenty seen 3/13 for acute on CLBP, now here w/ similar LBP, but now endorsing 2 episodes of urine incontinence since Friday. Her MRI C/T/L from 3/13 spine showed mild C6-7/C7-T1, R L2-3 and L L4-5 disc protrusions w/o cord compression.    --Anticoagulation--    T(C): 36.6 (04-10-23 @ 12:47), Max: 36.6 (04-10-23 @ 12:47)  HR: 71 (04-10-23 @ 12:47) (71 - 71)  BP: 129/85 (04-10-23 @ 12:47) (129/85 - 129/85)  RR: 16 (04-10-23 @ 12:47) (16 - 16)  SpO2: 92% (04-10-23 @ 12:47) (92% - 92%)  Wt(kg): --    Exam (effort/pain limited): obese, AO3, PERRL, BUE 5/5, b/l HF/KE/KF would not move (pain limited), b/l DF/PF 3/5, paresthesias in b/l delts and BLE from groin down but still has sensation, unable to turn for rectal exam.

## 2023-04-10 NOTE — ED ADULT TRIAGE NOTE - PAIN: PRESENCE, MLM
Patient is a 42-year-old female with no significant past medical history who presents ambulatory to the [facility] emergency center with a chief complaint of a skin lesion of the right inner groin that is been occurring for the past 2 months off and on. She states that over the past week or so the pain increased and then it became more swollen and she started \"messing with it \"was able to express a small amount of pus. Since then, continued pain and she wanted to get it evaluated today. She states for the past 2 weeks she is also had right pelvic pain that she thought was due to the skin lesion. She notes similar symptoms 1 month ago with her menstrual period which she assumed was a ovarian cyst or appendicitis, however, this resolved spontaneously. For the past 2 weeks it has become more frequent, almost on a daily basis. Not associated with any fever, chills, appetite changes, vomiting, dysuria, hematuria. No previous abdominal surgeries. History reviewed. No pertinent past medical history. History reviewed. No pertinent surgical history. History reviewed. No pertinent family history.     Social History     Socioeconomic History    Marital status:      Spouse name: Not on file    Number of children: Not on file    Years of education: Not on file    Highest education level: Not on file   Occupational History    Not on file   Social Needs    Financial resource strain: Not on file    Food insecurity:     Worry: Not on file     Inability: Not on file    Transportation needs:     Medical: Not on file     Non-medical: Not on file   Tobacco Use    Smoking status: Never Smoker    Smokeless tobacco: Never Used   Substance and Sexual Activity    Alcohol use: No     Frequency: Never    Drug use: Not on file    Sexual activity: Not on file   Lifestyle    Physical activity:     Days per week: Not on file     Minutes per session: Not on file    Stress: Not on file   Relationships  Social connections:     Talks on phone: Not on file     Gets together: Not on file     Attends Yarsanism service: Not on file     Active member of club or organization: Not on file     Attends meetings of clubs or organizations: Not on file     Relationship status: Not on file    Intimate partner violence:     Fear of current or ex partner: Not on file     Emotionally abused: Not on file     Physically abused: Not on file     Forced sexual activity: Not on file   Other Topics Concern    Not on file   Social History Narrative    Not on file         ALLERGIES: Patient has no known allergies. Review of Systems   Constitutional: Negative for chills and fever. Respiratory: Negative for cough and shortness of breath. Cardiovascular: Negative for chest pain. Gastrointestinal: Negative for abdominal pain and vomiting. Genitourinary: Positive for pelvic pain. Negative for dysuria and hematuria. Musculoskeletal: Negative for back pain. Skin: Positive for rash (skin lesion on R inner groin). Neurological: Negative for headaches. All other systems reviewed and are negative. Vitals:    09/08/19 1300   BP: 128/85   Pulse: 84   Resp: 16   Temp: 98 °F (36.7 °C)   SpO2: 100%   Weight: 82.3 kg (181 lb 7 oz)            Physical Exam   Constitutional: She is oriented to person, place, and time. She appears well-developed. No distress. HENT:   Head: Normocephalic and atraumatic. Eyes: Conjunctivae and EOM are normal.   Neck: Normal range of motion. Neck supple. Cardiovascular: Normal rate, regular rhythm and normal heart sounds. Pulmonary/Chest: Effort normal and breath sounds normal. No respiratory distress. Abdominal: Soft. There is no tenderness. There is no guarding, no CVA tenderness, no tenderness at McBurney's point and negative Rivera's sign. Minimal TTP over R pelvis   Musculoskeletal: Normal range of motion. She exhibits no edema.    Neurological: She is alert and oriented to person, place, and time. She exhibits normal muscle tone. Skin: Skin is warm and dry. 0.5 area of folliculitis over R groin, no drainable fluid collection, no surrounding erythema or warmth. MDM       Procedures    Assessment and plan: This is a 16-year-old female with 2 months of a skin lesion on the right groin and 2 weeks of right pelvic discomfort. Will obtain pregnancy test to rule out ectopic pregnancy. Will also obtain urinalysis to screen for hematuria or UTI. This does not sound like acute appendicitis, ovarian torsion, PID. Vital signs reassuring. Plan to treat area of folliculitis with Bactroban. complains of pain/discomfort

## 2023-04-10 NOTE — CONSULT NOTE ADULT - ASSESSMENT
Dasia Alvarado  52F w/ extensive psych hx, fibromyalgia, IBS, recenty seen 3/13 for acute on CLBP, now here w/ similar LBP, but now endorsing 2 episodes of urine incontinence since Friday. Her MRI C/T/L from 3/13 spine showed mild C6-7/C7-T1, R L2-3 and L L4-5 disc protrusions w/o cord compression. Exam (effort/pain limited): obese, AO3, PERRL, BUE 5/5, b/l HF/KE/KF would not move (pain limited), b/l DF/PF 3/5, paresthesias in b/l delts and BLE from groin down but still has sensation, unable to turn for rectal exam.    -Given new c/o urine incontinence, obtain MR cord compression protocol  -pain control

## 2023-04-10 NOTE — ED PROVIDER NOTE - PROGRESS NOTE DETAILS
Haseeb Olmedo NP-C - Bladder scanner - 208  - neurosurgery team made aware Haseeb Olmedo NP-C- Pt able to get  oob ambulating to bathroom with  minimal assist. Pt awaiting MRI of spine Haseeb Olmedo NP-C - Pt's MRI complete, awaiting results. REsting comfortably Haseeb Omledo NP-C - Pt's MRI complete and resulted.  Spoke with neuro surgery resident.  No cord compression.  PT to be admitted to medicine for pain control.

## 2023-04-10 NOTE — ED ADULT NURSE NOTE - OBJECTIVE STATEMENT
c/o back pain. Atraumatic. c/o back pain. Atraumatic. Pt states she has chronic back pain but recently had  bowel urine incontinence. Pt has a hx: anxiety. No distress. Breathing easy and non labored. Pt unable to lift lower extremities due to pain. Pt refused to get dressed into a hospital gown.

## 2023-04-10 NOTE — ED PROVIDER NOTE - CLINICAL SUMMARY MEDICAL DECISION MAKING FREE TEXT BOX
pt with acute on chronic lower back pain, bowel and bladder incontinence- NS consult, labs , pain control,  imaging, MRI>>? pt with acute on chronic lower back pain, bowel and bladder incontinence- NS consult, labs , pain control,  imaging, MRI>>?    pettet attending- see attending attestation for my mdm

## 2023-04-10 NOTE — ED PROVIDER NOTE - OBJECTIVE STATEMENT
51 yo female brought into the ER by EMS for evaluation of lower back pain.  Pt awake alert oriented x3 states "I have chronic lower back pain and for the last 48 hours I have had bowel and bladder incontinence. I can not stand or walk due to the pain. 51 yo female brought into the ER by EMS for evaluation of lower back pain.  Pt awake alert oriented x3 states "I have chronic lower back pain and for the last 48 hours I have had bowel and bladder incontinence. I can not stand or walk due to the pain.   I was seen here last month for the same pain. I recently went to fill a rx for percocet and the pharmacy would not fill it because of the other medications I take for anxiety".  Pt unable to passively raise bilateral lower extremities and states pain moves from lower  back to right lower leg.  Pt reports numbness and tingling to groin area as well as  bilateral lower extremities.

## 2023-04-11 DIAGNOSIS — G89.4 CHRONIC PAIN SYNDROME: ICD-10-CM

## 2023-04-11 DIAGNOSIS — F41.9 ANXIETY DISORDER, UNSPECIFIED: ICD-10-CM

## 2023-04-11 DIAGNOSIS — M54.16 RADICULOPATHY, LUMBAR REGION: ICD-10-CM

## 2023-04-11 DIAGNOSIS — Z29.9 ENCOUNTER FOR PROPHYLACTIC MEASURES, UNSPECIFIED: ICD-10-CM

## 2023-04-11 DIAGNOSIS — Z86.59 PERSONAL HISTORY OF OTHER MENTAL AND BEHAVIORAL DISORDERS: ICD-10-CM

## 2023-04-11 LAB
ANION GAP SERPL CALC-SCNC: 11 MMOL/L — SIGNIFICANT CHANGE UP (ref 5–17)
BASOPHILS # BLD AUTO: 0.04 K/UL — SIGNIFICANT CHANGE UP (ref 0–0.2)
BASOPHILS NFR BLD AUTO: 0.7 % — SIGNIFICANT CHANGE UP (ref 0–2)
BUN SERPL-MCNC: 14 MG/DL — SIGNIFICANT CHANGE UP (ref 7–23)
CALCIUM SERPL-MCNC: 9.2 MG/DL — SIGNIFICANT CHANGE UP (ref 8.4–10.5)
CHLORIDE SERPL-SCNC: 105 MMOL/L — SIGNIFICANT CHANGE UP (ref 96–108)
CO2 SERPL-SCNC: 25 MMOL/L — SIGNIFICANT CHANGE UP (ref 22–31)
CREAT SERPL-MCNC: 0.89 MG/DL — SIGNIFICANT CHANGE UP (ref 0.5–1.3)
EGFR: 79 ML/MIN/1.73M2 — SIGNIFICANT CHANGE UP
EOSINOPHIL # BLD AUTO: 0.03 K/UL — SIGNIFICANT CHANGE UP (ref 0–0.5)
EOSINOPHIL NFR BLD AUTO: 0.5 % — SIGNIFICANT CHANGE UP (ref 0–6)
GLUCOSE SERPL-MCNC: 78 MG/DL — SIGNIFICANT CHANGE UP (ref 70–99)
HCT VFR BLD CALC: 32.8 % — LOW (ref 34.5–45)
HGB BLD-MCNC: 9.9 G/DL — LOW (ref 11.5–15.5)
IMM GRANULOCYTES NFR BLD AUTO: 1.1 % — HIGH (ref 0–0.9)
LYMPHOCYTES # BLD AUTO: 2.17 K/UL — SIGNIFICANT CHANGE UP (ref 1–3.3)
LYMPHOCYTES # BLD AUTO: 38.9 % — SIGNIFICANT CHANGE UP (ref 13–44)
MCHC RBC-ENTMCNC: 26.3 PG — LOW (ref 27–34)
MCHC RBC-ENTMCNC: 30.2 GM/DL — LOW (ref 32–36)
MCV RBC AUTO: 87 FL — SIGNIFICANT CHANGE UP (ref 80–100)
MONOCYTES # BLD AUTO: 0.57 K/UL — SIGNIFICANT CHANGE UP (ref 0–0.9)
MONOCYTES NFR BLD AUTO: 10.2 % — SIGNIFICANT CHANGE UP (ref 2–14)
NEUTROPHILS # BLD AUTO: 2.71 K/UL — SIGNIFICANT CHANGE UP (ref 1.8–7.4)
NEUTROPHILS NFR BLD AUTO: 48.6 % — SIGNIFICANT CHANGE UP (ref 43–77)
NRBC # BLD: 0 /100 WBCS — SIGNIFICANT CHANGE UP (ref 0–0)
PLATELET # BLD AUTO: 203 K/UL — SIGNIFICANT CHANGE UP (ref 150–400)
POTASSIUM SERPL-MCNC: 4.1 MMOL/L — SIGNIFICANT CHANGE UP (ref 3.5–5.3)
POTASSIUM SERPL-SCNC: 4.1 MMOL/L — SIGNIFICANT CHANGE UP (ref 3.5–5.3)
RBC # BLD: 3.77 M/UL — LOW (ref 3.8–5.2)
RBC # FLD: 15.6 % — HIGH (ref 10.3–14.5)
SODIUM SERPL-SCNC: 141 MMOL/L — SIGNIFICANT CHANGE UP (ref 135–145)
WBC # BLD: 5.58 K/UL — SIGNIFICANT CHANGE UP (ref 3.8–10.5)
WBC # FLD AUTO: 5.58 K/UL — SIGNIFICANT CHANGE UP (ref 3.8–10.5)

## 2023-04-11 PROCEDURE — 99222 1ST HOSP IP/OBS MODERATE 55: CPT

## 2023-04-11 RX ORDER — LAMOTRIGINE 25 MG/1
300 TABLET, ORALLY DISINTEGRATING ORAL DAILY
Refills: 0 | Status: DISCONTINUED | OUTPATIENT
Start: 2023-04-11 | End: 2023-04-14

## 2023-04-11 RX ORDER — MORPHINE SULFATE 50 MG/1
2 CAPSULE, EXTENDED RELEASE ORAL EVERY 6 HOURS
Refills: 0 | Status: DISCONTINUED | OUTPATIENT
Start: 2023-04-11 | End: 2023-04-12

## 2023-04-11 RX ORDER — DIAZEPAM 5 MG
5 TABLET ORAL EVERY 6 HOURS
Refills: 0 | Status: DISCONTINUED | OUTPATIENT
Start: 2023-04-11 | End: 2023-04-14

## 2023-04-11 RX ORDER — SENNA PLUS 8.6 MG/1
2 TABLET ORAL AT BEDTIME
Refills: 0 | Status: DISCONTINUED | OUTPATIENT
Start: 2023-04-11 | End: 2023-04-14

## 2023-04-11 RX ORDER — ACETAMINOPHEN 500 MG
1000 TABLET ORAL ONCE
Refills: 0 | Status: DISCONTINUED | OUTPATIENT
Start: 2023-04-11 | End: 2023-04-11

## 2023-04-11 RX ORDER — SERTRALINE 25 MG/1
200 TABLET, FILM COATED ORAL DAILY
Refills: 0 | Status: DISCONTINUED | OUTPATIENT
Start: 2023-04-11 | End: 2023-04-14

## 2023-04-11 RX ORDER — LIDOCAINE 4 G/100G
1 CREAM TOPICAL EVERY 24 HOURS
Refills: 0 | Status: DISCONTINUED | OUTPATIENT
Start: 2023-04-11 | End: 2023-04-14

## 2023-04-11 RX ORDER — POLYETHYLENE GLYCOL 3350 17 G/17G
17 POWDER, FOR SOLUTION ORAL DAILY
Refills: 0 | Status: DISCONTINUED | OUTPATIENT
Start: 2023-04-11 | End: 2023-04-12

## 2023-04-11 RX ORDER — ENOXAPARIN SODIUM 100 MG/ML
40 INJECTION SUBCUTANEOUS EVERY 12 HOURS
Refills: 0 | Status: DISCONTINUED | OUTPATIENT
Start: 2023-04-11 | End: 2023-04-14

## 2023-04-11 RX ORDER — ACETAMINOPHEN 500 MG
650 TABLET ORAL EVERY 6 HOURS
Refills: 0 | Status: DISCONTINUED | OUTPATIENT
Start: 2023-04-11 | End: 2023-04-14

## 2023-04-11 RX ORDER — QUETIAPINE FUMARATE 200 MG/1
300 TABLET, FILM COATED ORAL AT BEDTIME
Refills: 0 | Status: DISCONTINUED | OUTPATIENT
Start: 2023-04-11 | End: 2023-04-14

## 2023-04-11 RX ORDER — LAMOTRIGINE 25 MG/1
1 TABLET, ORALLY DISINTEGRATING ORAL
Qty: 0 | Refills: 0 | DISCHARGE

## 2023-04-11 RX ADMIN — Medication 15 MILLIGRAM(S): at 22:20

## 2023-04-11 RX ADMIN — MORPHINE SULFATE 2 MILLIGRAM(S): 50 CAPSULE, EXTENDED RELEASE ORAL at 16:39

## 2023-04-11 RX ADMIN — SENNA PLUS 2 TABLET(S): 8.6 TABLET ORAL at 22:20

## 2023-04-11 RX ADMIN — QUETIAPINE FUMARATE 300 MILLIGRAM(S): 200 TABLET, FILM COATED ORAL at 22:20

## 2023-04-11 RX ADMIN — Medication 650 MILLIGRAM(S): at 09:37

## 2023-04-11 RX ADMIN — MORPHINE SULFATE 2 MILLIGRAM(S): 50 CAPSULE, EXTENDED RELEASE ORAL at 04:32

## 2023-04-11 RX ADMIN — ENOXAPARIN SODIUM 40 MILLIGRAM(S): 100 INJECTION SUBCUTANEOUS at 06:26

## 2023-04-11 RX ADMIN — LIDOCAINE 1 PATCH: 4 CREAM TOPICAL at 14:07

## 2023-04-11 RX ADMIN — Medication 650 MILLIGRAM(S): at 22:20

## 2023-04-11 RX ADMIN — ENOXAPARIN SODIUM 40 MILLIGRAM(S): 100 INJECTION SUBCUTANEOUS at 17:11

## 2023-04-11 RX ADMIN — Medication 650 MILLIGRAM(S): at 16:39

## 2023-04-11 RX ADMIN — MORPHINE SULFATE 2 MILLIGRAM(S): 50 CAPSULE, EXTENDED RELEASE ORAL at 09:07

## 2023-04-11 RX ADMIN — MORPHINE SULFATE 2 MILLIGRAM(S): 50 CAPSULE, EXTENDED RELEASE ORAL at 09:37

## 2023-04-11 RX ADMIN — MORPHINE SULFATE 2 MILLIGRAM(S): 50 CAPSULE, EXTENDED RELEASE ORAL at 22:20

## 2023-04-11 RX ADMIN — LIDOCAINE 1 PATCH: 4 CREAM TOPICAL at 23:18

## 2023-04-11 RX ADMIN — Medication 5 MILLIGRAM(S): at 10:09

## 2023-04-11 RX ADMIN — SERTRALINE 200 MILLIGRAM(S): 25 TABLET, FILM COATED ORAL at 11:10

## 2023-04-11 RX ADMIN — LAMOTRIGINE 300 MILLIGRAM(S): 25 TABLET, ORALLY DISINTEGRATING ORAL at 11:10

## 2023-04-11 RX ADMIN — Medication 650 MILLIGRAM(S): at 09:07

## 2023-04-11 RX ADMIN — Medication 650 MILLIGRAM(S): at 23:18

## 2023-04-11 RX ADMIN — MORPHINE SULFATE 2 MILLIGRAM(S): 50 CAPSULE, EXTENDED RELEASE ORAL at 23:18

## 2023-04-11 RX ADMIN — MORPHINE SULFATE 2 MILLIGRAM(S): 50 CAPSULE, EXTENDED RELEASE ORAL at 02:19

## 2023-04-11 RX ADMIN — MORPHINE SULFATE 2 MILLIGRAM(S): 50 CAPSULE, EXTENDED RELEASE ORAL at 16:09

## 2023-04-11 RX ADMIN — Medication 15 MILLIGRAM(S): at 14:07

## 2023-04-11 RX ADMIN — Medication 650 MILLIGRAM(S): at 16:09

## 2023-04-11 RX ADMIN — Medication 15 MILLIGRAM(S): at 06:26

## 2023-04-11 RX ADMIN — LIDOCAINE 1 PATCH: 4 CREAM TOPICAL at 19:12

## 2023-04-11 NOTE — H&P ADULT - NSHPSOCIALHISTORY_GEN_ALL_CORE
No tobacco, ethanol or recreational substance use.    Single mother raising 3 children with an 17 y/o and 21 y/o older children.

## 2023-04-11 NOTE — H&P ADULT - NSHPADDITIONALINFOADULT_GEN_ALL_CORE
NIGHT HOSPITALIST:    Patient aware of course and agrees with plan/care as above.  Given patient's comorbidities, patient's long term prognosis is guarded.   Emotional support provided to patient.   Care reviewed with covering NP/PA for endorsement to the [facility] [employer].    Tank Bhakta MD  Available on [employer] Teams.

## 2023-04-11 NOTE — H&P ADULT - REASON FOR ADMISSION
Worsening low back pain with RIGHT gluteal and leg shooting pain past 3 days, urinary and bowel incontinence at home

## 2023-04-11 NOTE — H&P ADULT - NSHPSOURCEINFOTX_GEN_ALL_CORE
Reviewed [employer] with patient via patient recall from last discharge on Mar 16 2023.  [place] STOP # [id number] in the chart.

## 2023-04-11 NOTE — PATIENT PROFILE ADULT - FUNCTIONAL ASSESSMENT - BASIC MOBILITY 6.
1-calculated by average/Not able to assess (calculate score using [facility] averaging method)

## 2023-04-11 NOTE — PHYSICAL THERAPY INITIAL EVALUATION ADULT - PERTINENT HX OF CURRENT PROBLEM, REHAB EVAL
Pt is 52 y.o. F with PMH of fibromyalgia, IBS, BMI of 40.2--S/P discharge from [facility] on 3/16/23 for chronic anxiety disorder, depression on multiple medications. HPI: Pt presents with lumbar radiculopathy. Pt states that since last discharge she was trying to resume household tasks noting worsening low back pain with R gluteal and leg shooting pain for the last 3 days and notes urinary and bowel accidents (rather than incontinence). Hospital course: Pt received IV Tylenol with minimal relief and no relief with Oxycodone given in the ER. MRI C/T/L spine from 3/13 showed mild C6-7/C7-T1, R L2-3 and L L4-5 disc protrusions w/o cord compression.

## 2023-04-11 NOTE — H&P ADULT - NSHPREVIEWOFSYSTEMS_GEN_ALL_CORE
NO chest pain/pressure.  NO palpitations.  NO cough, no dyspnoea, no wheezing.   Poor exercise tolerance.  No breast symptoms.  NO abdominal pain, no red blood per rectum or melena.  No rash.    No weight loss or anorexia.  NO SI/HI>  NO thyroid symptoms.  NO node swelling.  NO visual complaints. NO chest pain/pressure.  NO palpitations.  NO cough, no dyspnoea, no wheezing.   Poor exercise tolerance.  No breast symptoms.  NO abdominal pain, no red blood per rectum or melena.  No rash.    No weight loss or anorexia.  NO suicidal or homicidal ideation.  NO thyroid symptoms.  NO node swelling.  NO visual complaints.

## 2023-04-11 NOTE — H&P ADULT - HISTORY OF PRESENT ILLNESS
NIGHT HOSPITALIST:   Patient UNKNOWN to me previously, assigned to me at this point via the ER and by Dr. Alexander of the [facility] [employer] to admit this 51 y/o F with BMI of 40.2--S/P discharge from [place] on 3/16/23 in the setting of patient with a history of chronic anxiety disorder on PRN Valium (see [place]), depression on multiple medications, including Buspar, Lamictal (now 300 mg daily-increased by her Neurologist above), Seroquel and Zoloft (patient no longer on tizanidine), with patient's last discharge with admission for lumbar radiculopathy, with patient reports that with discharge that the patient was trying to resume household tasks at home with her teenage children not assisting with above, with patient noting worsening low back pain with RIGHT gluteal and leg shooting pain for the last 3 days and notes urinary and bowel accidents (rather than incontinence).   Denies saddle anaesthesia.  No falls but patient noting worsening ADL at home.  Patient received IV Tylenol with minimal relief and no relief with Oxycodone given in the ER.  No fever, no chills, no rigors.  NO weight loss or anorexia.   No diaphoresis.  NO HA, no focal weakness.   NIGHT HOSPITALIST:   Patient UNKNOWN to me previously, assigned to me at this point via the ER and by Dr. Alexander of the [facility] [employer] [facility] to admit this 51 y/o F with BMI of 40.2--S/P discharge from [place] on 3/16/23 in the setting of patient with a history of chronic anxiety disorder on PRN Valium ([place]), depression on multiple medications, including Buspar, Lamictal (now 300 mg daily from her previous 200 mg daily-increased by her Neurologist above), Seroquel and Zoloft (patient no longer on tizanidine), with patient's last discharge with admission for lumbar radiculopathy, with patient reports that with discharge that the patient was trying to resume household tasks at home with her teenage children not assisting with above, with patient noting worsening low back pain with RIGHT gluteal and leg shooting pain for the last 3 days and notes urinary and bowel accidents (rather than incontinence).   Denies saddle anaesthesia.  No falls but patient noting worsening ADL at home.  Patient received IV Tylenol with minimal relief and no relief with Oxycodone given in the ER.  No fever, no chills, no rigors.  NO weight loss or anorexia.   No diaphoresis.  NO HA, no focal weakness.

## 2023-04-11 NOTE — PHYSICAL THERAPY INITIAL EVALUATION ADULT - ACTIVE RANGE OF MOTION EXAMINATION, REHAB EVAL
decreased BLE AROM secondary to pain./bilateral upper extremity Active ROM was WFL (within functional limits)

## 2023-04-11 NOTE — PHYSICAL THERAPY INITIAL EVALUATION ADULT - GAIT TRAINING, PT EVAL
GOAL: Patient will ambulate 300 feet independently with RW and will negotiate full flight of stairs with rail independently in 2 weeks.

## 2023-04-11 NOTE — H&P ADULT - ASSESSMENT
NIGHT HOSPITALIST:   NIGHT HOSPITALIST:    Complex history of chronic anxiety, depression, chronic pain syndrome with findings on MRI as above--seen by NS with no surgical intervention recommended with worsening lumbar radiculopathy.  NO relief with Oxycodone IR>>will provide trial of MSO4 2 mg IV Q6H PRN.  Would consider formal Pain Service evaluation in the AM.    On Buspar, Lamictal (now titrated to 300 mg daily by her Neurologist), and Seroquel, Zoloft.  Psychiatric axis presently compensated.    On Valium PRN as above for chronic anxiety disorder.   [place].    OOB to chair.  PT>    Patient agrees to pharmacologic DVT prophylaxis.

## 2023-04-11 NOTE — PHYSICAL THERAPY INITIAL EVALUATION ADULT - ADDITIONAL COMMENTS
Pt states she lives with  in 2 level home with 4 steps to enter with rail, uses staircase to basement with rail, states shes been using RW and SAC intermittently since pain and was fully independent prior.

## 2023-04-11 NOTE — H&P ADULT - NSHPLABSRESULTS_GEN_ALL_CORE
WBC 6.5  normal differential    Hgb 10.1    Platelets of 194K    INR 0.9    Random glucose of 88.    Cr 0.8  K+ 4.4    Hcg>>negative.    COVID-19 PCR>>negative.    MRI LS spine no contrast with preliminary with NO cauda equina compression.  No intraspinal mass or epidural collection.  RIGHT paracentral disc bulge at L2L3 with moderate segmental canal stenosis and effacement of the RIGHT lateral recess. WBC 6.5  normal differential    Hgb 10.1    Platelets of 194K    INR 0.9    Random glucose of 88.    Cr 0.8  K+ 4.4    Hcg>>negative.    COVID-19 PCR>>negative.    MRI LS spine no contrast with preliminary with NO cauda equina compression.  No intraspinal mass or epidural collection.  RIGHT paracentral disc bulge at L2L3 with moderate segmental canal stenosis and effacement of the RIGHT lateral recess.    Patient declined EKG.

## 2023-04-11 NOTE — PHYSICAL THERAPY INITIAL EVALUATION ADULT - TRANSFER TRAINING, PT EVAL
GOAL: Patient will perform sit to stand transfers independently at rolling walker with proper hand placement in 2 weeks.

## 2023-04-11 NOTE — H&P ADULT - PROBLEM SELECTOR PLAN 2
See above.   On multimodal therapy but with no relief.  MRI LS spine as above.    Would consider Pain Consult in the AM.

## 2023-04-12 LAB
ANION GAP SERPL CALC-SCNC: 13 MMOL/L — SIGNIFICANT CHANGE UP (ref 5–17)
BUN SERPL-MCNC: 18 MG/DL — SIGNIFICANT CHANGE UP (ref 7–23)
CALCIUM SERPL-MCNC: 9.6 MG/DL — SIGNIFICANT CHANGE UP (ref 8.4–10.5)
CHLORIDE SERPL-SCNC: 107 MMOL/L — SIGNIFICANT CHANGE UP (ref 96–108)
CO2 SERPL-SCNC: 24 MMOL/L — SIGNIFICANT CHANGE UP (ref 22–31)
CREAT SERPL-MCNC: 0.93 MG/DL — SIGNIFICANT CHANGE UP (ref 0.5–1.3)
EGFR: 75 ML/MIN/1.73M2 — SIGNIFICANT CHANGE UP
GLUCOSE SERPL-MCNC: 83 MG/DL — SIGNIFICANT CHANGE UP (ref 70–99)
HCT VFR BLD CALC: 33 % — LOW (ref 34.5–45)
HGB BLD-MCNC: 9.9 G/DL — LOW (ref 11.5–15.5)
MCHC RBC-ENTMCNC: 26.2 PG — LOW (ref 27–34)
MCHC RBC-ENTMCNC: 30 GM/DL — LOW (ref 32–36)
MCV RBC AUTO: 87.3 FL — SIGNIFICANT CHANGE UP (ref 80–100)
NRBC # BLD: 0 /100 WBCS — SIGNIFICANT CHANGE UP (ref 0–0)
PLATELET # BLD AUTO: 191 K/UL — SIGNIFICANT CHANGE UP (ref 150–400)
POTASSIUM SERPL-MCNC: 4.1 MMOL/L — SIGNIFICANT CHANGE UP (ref 3.5–5.3)
POTASSIUM SERPL-SCNC: 4.1 MMOL/L — SIGNIFICANT CHANGE UP (ref 3.5–5.3)
RBC # BLD: 3.78 M/UL — LOW (ref 3.8–5.2)
RBC # FLD: 15.6 % — HIGH (ref 10.3–14.5)
SODIUM SERPL-SCNC: 144 MMOL/L — SIGNIFICANT CHANGE UP (ref 135–145)
WBC # BLD: 5.32 K/UL — SIGNIFICANT CHANGE UP (ref 3.8–10.5)
WBC # FLD AUTO: 5.32 K/UL — SIGNIFICANT CHANGE UP (ref 3.8–10.5)

## 2023-04-12 PROCEDURE — 99232 SBSQ HOSP IP/OBS MODERATE 35: CPT

## 2023-04-12 RX ORDER — ONDANSETRON 8 MG/1
4 TABLET, FILM COATED ORAL ONCE
Refills: 0 | Status: COMPLETED | OUTPATIENT
Start: 2023-04-12 | End: 2023-04-12

## 2023-04-12 RX ORDER — POLYETHYLENE GLYCOL 3350 17 G/17G
17 POWDER, FOR SOLUTION ORAL
Refills: 0 | Status: DISCONTINUED | OUTPATIENT
Start: 2023-04-12 | End: 2023-04-14

## 2023-04-12 RX ORDER — MORPHINE SULFATE 50 MG/1
15 CAPSULE, EXTENDED RELEASE ORAL EVERY 6 HOURS
Refills: 0 | Status: DISCONTINUED | OUTPATIENT
Start: 2023-04-12 | End: 2023-04-14

## 2023-04-12 RX ORDER — OXYCODONE HYDROCHLORIDE 5 MG/1
5 TABLET ORAL EVERY 6 HOURS
Refills: 0 | Status: DISCONTINUED | OUTPATIENT
Start: 2023-04-12 | End: 2023-04-12

## 2023-04-12 RX ORDER — TIZANIDINE 4 MG/1
2 TABLET ORAL EVERY 8 HOURS
Refills: 0 | Status: DISCONTINUED | OUTPATIENT
Start: 2023-04-12 | End: 2023-04-14

## 2023-04-12 RX ADMIN — Medication 15 MILLIGRAM(S): at 11:54

## 2023-04-12 RX ADMIN — MORPHINE SULFATE 15 MILLIGRAM(S): 50 CAPSULE, EXTENDED RELEASE ORAL at 16:30

## 2023-04-12 RX ADMIN — Medication 650 MILLIGRAM(S): at 05:16

## 2023-04-12 RX ADMIN — LAMOTRIGINE 300 MILLIGRAM(S): 25 TABLET, ORALLY DISINTEGRATING ORAL at 11:54

## 2023-04-12 RX ADMIN — MORPHINE SULFATE 2 MILLIGRAM(S): 50 CAPSULE, EXTENDED RELEASE ORAL at 11:54

## 2023-04-12 RX ADMIN — Medication 650 MILLIGRAM(S): at 06:09

## 2023-04-12 RX ADMIN — POLYETHYLENE GLYCOL 3350 17 GRAM(S): 17 POWDER, FOR SOLUTION ORAL at 16:30

## 2023-04-12 RX ADMIN — MORPHINE SULFATE 2 MILLIGRAM(S): 50 CAPSULE, EXTENDED RELEASE ORAL at 06:09

## 2023-04-12 RX ADMIN — MORPHINE SULFATE 2 MILLIGRAM(S): 50 CAPSULE, EXTENDED RELEASE ORAL at 12:30

## 2023-04-12 RX ADMIN — OXYCODONE HYDROCHLORIDE 5 MILLIGRAM(S): 5 TABLET ORAL at 13:34

## 2023-04-12 RX ADMIN — LIDOCAINE 1 PATCH: 4 CREAM TOPICAL at 11:53

## 2023-04-12 RX ADMIN — TIZANIDINE 2 MILLIGRAM(S): 4 TABLET ORAL at 21:42

## 2023-04-12 RX ADMIN — OXYCODONE HYDROCHLORIDE 5 MILLIGRAM(S): 5 TABLET ORAL at 14:21

## 2023-04-12 RX ADMIN — Medication 15 MILLIGRAM(S): at 05:17

## 2023-04-12 RX ADMIN — SERTRALINE 200 MILLIGRAM(S): 25 TABLET, FILM COATED ORAL at 11:54

## 2023-04-12 RX ADMIN — LIDOCAINE 1 PATCH: 4 CREAM TOPICAL at 23:54

## 2023-04-12 RX ADMIN — Medication 15 MILLIGRAM(S): at 21:42

## 2023-04-12 RX ADMIN — Medication 650 MILLIGRAM(S): at 22:55

## 2023-04-12 RX ADMIN — MORPHINE SULFATE 15 MILLIGRAM(S): 50 CAPSULE, EXTENDED RELEASE ORAL at 23:51

## 2023-04-12 RX ADMIN — QUETIAPINE FUMARATE 300 MILLIGRAM(S): 200 TABLET, FILM COATED ORAL at 21:43

## 2023-04-12 RX ADMIN — ENOXAPARIN SODIUM 40 MILLIGRAM(S): 100 INJECTION SUBCUTANEOUS at 16:30

## 2023-04-12 RX ADMIN — MORPHINE SULFATE 2 MILLIGRAM(S): 50 CAPSULE, EXTENDED RELEASE ORAL at 05:16

## 2023-04-12 RX ADMIN — SENNA PLUS 2 TABLET(S): 8.6 TABLET ORAL at 21:42

## 2023-04-12 RX ADMIN — ONDANSETRON 4 MILLIGRAM(S): 8 TABLET, FILM COATED ORAL at 18:10

## 2023-04-12 RX ADMIN — MORPHINE SULFATE 15 MILLIGRAM(S): 50 CAPSULE, EXTENDED RELEASE ORAL at 22:55

## 2023-04-12 RX ADMIN — LIDOCAINE 1 PATCH: 4 CREAM TOPICAL at 19:22

## 2023-04-12 RX ADMIN — Medication 650 MILLIGRAM(S): at 12:30

## 2023-04-12 RX ADMIN — Medication 650 MILLIGRAM(S): at 11:53

## 2023-04-12 RX ADMIN — ENOXAPARIN SODIUM 40 MILLIGRAM(S): 100 INJECTION SUBCUTANEOUS at 05:21

## 2023-04-12 RX ADMIN — Medication 650 MILLIGRAM(S): at 23:51

## 2023-04-12 NOTE — CONSULT NOTE ADULT - ASSESSMENT
52F w/ hx of anxiety, depression/bipolar disorder (c/b multiple SA and 3 prior psychiatric hospitalizations), fibromyalgia, chronic back pain, sciatica, left L4 nerve root impingement, IBS, GERD, s/p gastric bypass, s/p abdominoplasty; chronic B/L foot numbness; has been seeing Chris Argueta for years for back pain and reports having GENOVEVA's, Trigger Point injections, and steroids in the past.     Admitted with acute worsening of chronic back pain for the past two weeks.   reports recently getting over Covid, recent falls and recent 1.5 hr chair massage.    Current out- patient pain regimen: Robaxin 750 mg BID, Tylenol  Out Patient Pain Management provider: Sees Ortho at [facility], displeased with them    During most of interview patient's eyes would drift and close, and she would become irritated when asked detailed questions about pain characteristics. Pt states it is 2/2 lack of sleep, RN reports this is patient's baseline since admission. Pt c/o generalized back pain starting from mid back and radiating distally to anterior RLE knee. States pain is sharp, stabbing needle, burning, also endorses back spasms. This is same pain that she lives with but became much worse past 2 weeks. Seen by NS and feel complaints incongruent with imaging findings. It's possible that MSK pain and edema 2/2 recent falls, combined with L2-L3 moderate right foraminal compromise, could be cause of low back pain and RLE pain however there could also by a psychological component as well as pt provided minimal effort during exam with all extremities. States she is unable to ambulate, and there was zero effort for B/L plantar flexion and dorsiflexion, but then stated that she walks to the bathroom and ambulates in halls. Minimal to no effort when assessing upper extremities as well. Unable to assess back. Residual inflammation 2/2 Covid? Pt also reports taking Gabapentin in past causing AMS, tried Cymbalta in past with no effect. Additionally need to avoid NSAIDs and ER meds 2/2 GI hx, avoid Tramadol 2/2 on anti-depressants. Pt requesting to speak with NS as she says they told her they can do laparoscopic surgery, then asked when she can go home.    Plan discussed with primary team:  Discontinue IV opioids, not indicated   Discontinue Oxycodone  Start Morphine 15 mg PO every 6 hours PRN severe pain x 2 days then taper off for discharge  Discontinue Robaxin (states no effect)  Start Tizanidine 2 mg every 8 hours, if good effect can change to PRN spasm on discharge  Consider Psychiatry eval  Requesting to speak with Neurosurgery to discuss options  Assess for possible UTI  Consider steroids for inflammation  Do not recommend opioids on discharge  Continue Lidoderm  Warm/cool packs for comfort  Bowel Regimen  Incentive Spirometer  PT per primary team  Monitor for sedation, respiratory depression  Follow up with Chris Argueta for continued pain management after discharge  Out-patient pain practice list can be provided if she wishes to find a new pain specialist  Narcan Rescue Kit if discharged with opioids (Naloxone 4 mg/0.1 ml nasal spray - 1 spray q 2-3 minutes alternating between nostrils)    Time spent on encounter:   55     Minutes    Chronic Pain Service  [phone] 52F known to our service from last admission. PMHx anxiety, depression/bipolar disorder (c/b multiple SA and 3 prior psychiatric hospitalizations), fibromyalgia, chronic back pain, sciatica, left L4 nerve root impingement, IBS, GERD, Covid, s/p gastric bypass, s/p abdominoplasty; chronic B/L foot numbness; has been seeing [facility] for years for back pain and reports having GENOVEVA's, Trigger Point injections, and steroids in the past. S/P discharge from [facility] on 3/16/23 for lumbar radiculopathy, seen by NS felt complaints incongruent with imaging findings.     returned to ED again w/ worsening low back pain radiating to RLE and urinary and bowel accidents (rather than incontinence).     Current out- patient pain regimen: Tramadol 50 mg TID  Out Patient Pain Management provider: Was seeing Ortho at [facility] on last admission, displeased with them; Tramadol Rx from Neuro/Psych Dr. Laura Schoenberg    Pt c/o generalized back pain starting from mid back (under bra strap) and radiating distally to coccyx anterior RLE knee. Demanding Epidural Injection, explained that this procedure is not performed in-house. Stated that she was fine last admission until she was discharged, ran out of meds, then pain started again. Explained to both, as discussed last time, that we control the patient's pain best as possible then discharge so patient can follow up with out-pt pain management and discuss options (ex. Interventional procedures). Inconsistencies in patient's recollections, presentation. During some of the interview patient's eyes would drift and close, but she would continue to speak. Pt exhibited similar behavior on last admission. Pt stated that the MS IV is not helping her and neither does Oxy, then requesting IV dose prior to PO.     Refusing Gabapentin 2/2 somnolence in past, and no effect with Cymbalta.   Avoid NSAIDs and Extended Release meds 2/2 GI hx.   Avoid Tramadol 2/2 on anti-depressants (risk of serotonin syndrome).    Plan discussed with primary team:  Restart previous regimen   Morphine 15 mg every 6 hours PRN severe pain  Tizanidine 2 mg every 8 hours, can change to PRN spasm on discharge (No effect with Robaxin in past)  Requesting to speak with Neurosurgery or Ortho for options  Discontinue IV opioids, not indicated   Discontinue Oxycodone  Consider Psychiatry eval  Lidoderm daily  Warm/cool packs for comfort  Bowel Regimen  Incentive Spirometer  PT per primary team  Monitor for sedation, respiratory depression  Follow up with Chris Argueta for continued pain management after discharge  Out-patient pain practice list can be provided if she wishes to find a new pain specialist  Narcan Rescue Kit if discharged with opioids (Naloxone 4 mg/0.1 ml nasal spray - 1 spray q 2-3 minutes alternating between nostrils)    Time spent on encounter:   33     Minutes    Chronic Pain Service  [phone]

## 2023-04-12 NOTE — PROGRESS NOTE ADULT - ASSESSMENT
51 y/o F with BMI of 40.2 PMHx chronic anxiety disorder, depression, chronic pain syndrome, S/P admission for lumbar radiculopathy, discharged from [facility] 3/16/23, presents with worsening low back pain with RIGHT gluteal and leg shooting pain for the last 3 days and notes urinary and bowel accidents (rather than incontinence).      #  Lumbar radiculopathy  MSO4 2 mg IV Q6H PRN.  Pain Service evaluation  OOB to chair.  PT  MRI shows worsening lumbar radiculopathy  no acute intervention per NSGY    # Chronic pain syndrome.   On multimodal therapy but with no relief    # Chronic anxiety  ON Valium PRN.  [place],    # History of depression.   Depression presently appears compensated on Buspar, Lamictal, Seroquel, Zoloft.    Patient agrees to pharmacologic DVT prophylaxis. 51 y/o F with BMI of 40.2 PMHx chronic anxiety disorder, depression, chronic pain syndrome, S/P admission for lumbar radiculopathy, discharged from [facility] 3/16/23, presents with worsening low back pain with RIGHT gluteal and leg shooting pain for the last 3 days and notes urinary and bowel accidents (rather than incontinence).      # Lumbar radiculopathy  # Chronic pain syndrome  MRI L-spine shows stable L4-5 disc bulge compared to 3/12/2023  no acute intervention per NSGY  MSO4 2 mg IV Q6H PRN severe pain  add oxycodone 5mg q6 prn for moderate pain  On multimodal therapy but with no relief, Pain consult pending  OOB to chair.  PT    # Chronic anxiety  ON Valium PRN.  [place],    # History of depression.   Depression presently appears compensated on Buspar, Lamictal, Seroquel, Zoloft.    dvt ppx lovenox    dw pt and  on phone    Please call [employer] with questions [phone].

## 2023-04-13 PROCEDURE — 99231 SBSQ HOSP IP/OBS SF/LOW 25: CPT

## 2023-04-13 RX ADMIN — LAMOTRIGINE 300 MILLIGRAM(S): 25 TABLET, ORALLY DISINTEGRATING ORAL at 12:13

## 2023-04-13 RX ADMIN — ENOXAPARIN SODIUM 40 MILLIGRAM(S): 100 INJECTION SUBCUTANEOUS at 17:26

## 2023-04-13 RX ADMIN — LIDOCAINE 1 PATCH: 4 CREAM TOPICAL at 12:14

## 2023-04-13 RX ADMIN — ENOXAPARIN SODIUM 40 MILLIGRAM(S): 100 INJECTION SUBCUTANEOUS at 06:21

## 2023-04-13 RX ADMIN — MORPHINE SULFATE 15 MILLIGRAM(S): 50 CAPSULE, EXTENDED RELEASE ORAL at 17:26

## 2023-04-13 RX ADMIN — MORPHINE SULFATE 15 MILLIGRAM(S): 50 CAPSULE, EXTENDED RELEASE ORAL at 12:13

## 2023-04-13 RX ADMIN — TIZANIDINE 2 MILLIGRAM(S): 4 TABLET ORAL at 21:58

## 2023-04-13 RX ADMIN — MORPHINE SULFATE 15 MILLIGRAM(S): 50 CAPSULE, EXTENDED RELEASE ORAL at 07:00

## 2023-04-13 RX ADMIN — Medication 15 MILLIGRAM(S): at 21:58

## 2023-04-13 RX ADMIN — QUETIAPINE FUMARATE 300 MILLIGRAM(S): 200 TABLET, FILM COATED ORAL at 21:59

## 2023-04-13 RX ADMIN — MORPHINE SULFATE 15 MILLIGRAM(S): 50 CAPSULE, EXTENDED RELEASE ORAL at 12:43

## 2023-04-13 RX ADMIN — MORPHINE SULFATE 15 MILLIGRAM(S): 50 CAPSULE, EXTENDED RELEASE ORAL at 06:21

## 2023-04-13 RX ADMIN — TIZANIDINE 2 MILLIGRAM(S): 4 TABLET ORAL at 13:28

## 2023-04-13 RX ADMIN — MORPHINE SULFATE 15 MILLIGRAM(S): 50 CAPSULE, EXTENDED RELEASE ORAL at 17:56

## 2023-04-13 RX ADMIN — Medication 15 MILLIGRAM(S): at 06:20

## 2023-04-13 RX ADMIN — SERTRALINE 200 MILLIGRAM(S): 25 TABLET, FILM COATED ORAL at 12:13

## 2023-04-13 RX ADMIN — TIZANIDINE 2 MILLIGRAM(S): 4 TABLET ORAL at 06:20

## 2023-04-13 RX ADMIN — SENNA PLUS 2 TABLET(S): 8.6 TABLET ORAL at 21:58

## 2023-04-13 RX ADMIN — POLYETHYLENE GLYCOL 3350 17 GRAM(S): 17 POWDER, FOR SOLUTION ORAL at 06:21

## 2023-04-13 RX ADMIN — Medication 5 MILLIGRAM(S): at 13:31

## 2023-04-13 RX ADMIN — Medication 15 MILLIGRAM(S): at 13:28

## 2023-04-13 NOTE — PROGRESS NOTE ADULT - ASSESSMENT
52F known to our service from last admission. PMHx anxiety, depression/bipolar disorder (c/b multiple SA and 3 prior psychiatric hospitalizations), fibromyalgia, chronic back pain, sciatica, left L4 nerve root impingement, IBS, GERD, Covid, s/p gastric bypass, s/p abdominoplasty; chronic B/L foot numbness; has been seeing Chris Argueta for years for back pain and reports having GENOVEVA's, Trigger Point injections, and steroids in the past. S/P discharge from [place] on 3/16/23 for lumbar radiculopathy, seen by NS felt complaints incongruent with imaging findings.     returned to ED again w/ worsening low back pain radiating to RLE and urinary and bowel accidents (rather than incontinence).     Current out- patient pain regimen: Tramadol 50 mg TID  Out Patient Pain Management provider: Was seeing Ortho at [facility] on last admission, displeased with them; Tramadol Rx from Neuro/Psych Dr. Laura Schoenberg    Pt c/o generalized back pain starting from mid back (under bra strap) and radiating distally to coccyx anterior RLE knee.   Demanding Epidural Injection, explained that this procedure is not performed in-house.   Stated that she was fine last admission until she was discharged, ran out of meds, then pain started again.   Explained again that we control the patient's pain best as possible then discharge so patient can follow up with out-pt pain management and discuss options (ex. Interventional procedures). Inconsistencies in patient's recollections, presentation. During interview patient's eyes would drift and close, but she would continue to speak. Pt exhibited similar behavior on last admission.      Refusing Gabapentin 2/2 somnolence in past, and no effect with Cymbalta.   Avoid NSAIDs and Extended Release meds 2/2 GI hx.   Avoid Tramadol 2/2 on anti-depressants (risk of serotonin syndrome).  No relief with Oxy IR.    Continue:   Morphine 15 mg every 6 hours PRN severe pain.  Tizanidine 2 mg every 8 hours, can change to PRN spasm on discharge.  Monitor for sedation, respiratory depression.  Bowel Regimen.  OOB/ PT per primary team.    Follow up with Chris Argueta for continued pain management after discharge.  Out-patient pain practice list can be provided if she wishes to find a new pain specialist- states she found a provider in [place] affiliated with [facility].  Narcan Rescue Kit if discharged with opioids (Naloxone 4 mg/0.1 ml nasal spray - 1 spray q 2-3 minutes alternating between nostrils).    Signing off.      Time spent on encounter:  15 minutes      Chronic Pain Service  [phone]

## 2023-04-13 NOTE — CHART NOTE - NSCHARTNOTEFT_GEN_A_CORE
MRI L-spine shows stable L4-5 disc bulge compared to 3/12/2023 scan. MRI-T spine without cord compression or significant disc herniations. MRI C-spine wasn’t done due to patient being full strength in BUE.   -No neurosurgical intervention
Patient does not have structural lesion/injury requiring neurosurgical intervention. Recommend continued pain control and physical/weight loss therapy

## 2023-04-13 NOTE — PROGRESS NOTE ADULT - ASSESSMENT
53 y/o F with BMI of 40.2 PMHx chronic anxiety disorder, depression, chronic pain syndrome, S/P admission for lumbar radiculopathy, discharged from [place] 3/16/23, presents with worsening low back pain with RIGHT gluteal and leg shooting pain for the last 3 days and notes urinary and bowel accidents (rather than incontinence).      # Lumbar radiculopathy  # Chronic pain syndrome  MRI L-spine shows stable L4-5 disc bulge compared to 3/12/2023  no acute intervention per NSGY  pt requesting to speak with NSGY  appreciate pain management recs, pt states without any improvement, pain management fu  PO morphine IR prn  tizanidine  OOB to chair.  PT    # Chronic anxiety  ON Valium PRN    # History of depression  on Buspar, Lamictal, Seroquel, Zoloft    dvt ppx lovenox    Please call [facility] with questions [phone].

## 2023-04-13 NOTE — PROGRESS NOTE ADULT - SUBJECTIVE AND OBJECTIVE BOX
Chief Complaint:  Patient is a 52y old  Female who presents with a chief complaint of worsening low back pain with RIGHT gluteal and leg shooting pain past 3 days, urinary and bowel incontinence at home (12 Apr 2023 08:57)    HPI:  52F, well known to our service from last admission. PMHx anxiety, depression/bipolar disorder (c/b multiple SA and 3 prior psychiatric hospitalizations), fibromyalgia, chronic back pain, sciatica, left L4 nerve root impingement, IBS, GERD, Covid, s/p gastric bypass, s/p abdominoplasty; chronic B/L foot numbness; has been seeing Chris [facility] for years for back pain and reports having GENOVEVA's, Trigger Point injections, and steroids in the past. S/P discharge from [place] on 3/16/23 for lumbar radiculopathy. Reports trying to resume household tasks at home with her teenage children not assisting, noted worsening low back pain with RIGHT gluteal and leg shooting pain for the last 3 days and notes urinary and bowel accidents (rather than incontinence). Denies saddle anesthesia.  No falls but patient noting worsening ADL at home.       Current out- patient pain regimen: Tramadol 50 mg TID  Out Patient Pain Management provider: Was seeing Ortho at [facility] on last admission, displeased with them; Tramadol Rx from Neuro/Psych Dr. Laura Schoenberg     Current pain scores 8/10 down to 0/10.      PHYSICAL EXAM  Seen at bedside; NAD, appears comfortable; toxic appearance  States "nothing is working' and again asking for an epidural to be done now  Alert & Oriented X3, poor concentration, falling asleep with eyes closed while answering questions; speech clear and fluent  Appetite fair; BM yesterday; voiding  DUNN; OOB to chair  Affect constricted; fair eye contact  T(C): 36.9 (04-13-23 @ 12:41)  HR: 74 (04-13-23 @ 12:41)  BP: 103/67 (04-13-23 @ 12:41)  RR: 18 (04-13-23 @ 12:41)  SpO2: 96% (04-13-23 @ 12:41)       Pertinent labs, radiology, additional studies:                        9.9    5.32  )-----------( 191      ( 12 Apr 2023 07:13 )             33.0   04-12    144  |  107  |  18  ----------------------------<  83  4.1   |  24  |  0.93    Ca    9.6      12 Apr 2023 07:12    from: MR Lumbar Spine No Cont (04.10.23 @ 21:24)   IMPRESSION:    1. THORACIC SPINE:   Low-grade degenerative disc disease not   substantially changed from 3/12/2023. Thoracic cord is intact.    2. LUMBAR SPINE:   Lumbar degenerative disc disease is intermediate grade   and not substantially changed from 3/12/2023. Distal cord, conus and   cauda equina remain intact.         
Patient is a 52y old  Female who presents with a chief complaint of Worsening low back pain with RIGHT gluteal and leg shooting pain past 3 days, urinary and bowel incontinence at home (12 Apr 2023 15:33)      SUBJECTIVE / OVERNIGHT EVENTS:    Patient seen and examined. pt requesting epiqural. explained this is not a routine inpt procedure. nml outpt. pt states pain not improved with pain medication change.      Vital Signs Last 24 Hrs  T(C): 36.9 (13 Apr 2023 12:41), Max: 36.9 (13 Apr 2023 12:41)  T(F): 98.5 (13 Apr 2023 12:41), Max: 98.5 (13 Apr 2023 12:41)  HR: 74 (13 Apr 2023 12:41) (64 - 88)  BP: 103/67 (13 Apr 2023 12:41) (98/66 - 114/74)  BP(mean): --  RR: 18 (13 Apr 2023 12:41) (18 - 18)  SpO2: 96% (13 Apr 2023 12:41) (94% - 96%)    Parameters below as of 13 Apr 2023 04:15  Patient On (Oxygen Delivery Method): room air      I&O's Summary      PE:  GENERAL: NAD, AAOx3  CHEST/LUNG: CTABL, No wheeze  HEART: Regular rate and rhythm; np murmur  ABDOMEN: Soft, Nontender, Nondistended; Bowel sounds present  EXTREMITIES:  2+ Peripheral Pulses, No  edema  NEURO: No focal deficits      LABS:                        9.9    5.32  )-----------( 191      ( 12 Apr 2023 07:13 )             33.0     04-12    144  |  107  |  18  ----------------------------<  83  4.1   |  24  |  0.93    Ca    9.6      12 Apr 2023 07:12        CAPILLARY BLOOD GLUCOSE                RADIOLOGY & ADDITIONAL TESTS:    Imaging Personally Reviewed:  [x] YES  [ ] NO    Consultant(s) Notes Reviewed:  [x] YES  [ ] NO    MEDICATIONS  (STANDING):  busPIRone 15 milliGRAM(s) Oral three times a day  enoxaparin Injectable 40 milliGRAM(s) SubCutaneous every 12 hours  lamoTRIgine  milliGRAM(s) Oral daily  lidocaine   4% Patch 1 Patch Transdermal every 24 hours  polyethylene glycol 3350 17 Gram(s) Oral two times a day  QUEtiapine 300 milliGRAM(s) Oral at bedtime  senna 2 Tablet(s) Oral at bedtime  sertraline 200 milliGRAM(s) Oral daily  tiZANidine 2 milliGRAM(s) Oral every 8 hours    MEDICATIONS  (PRN):  acetaminophen     Tablet .. 650 milliGRAM(s) Oral every 6 hours PRN Temp greater or equal to 38C (100.4F), Mild Pain (1 - 3)  diazepam    Tablet 5 milliGRAM(s) Oral every 6 hours PRN Anxiety  morphine  IR 15 milliGRAM(s) Oral every 6 hours PRN Severe Pain (7 - 10)      Care Discussed with Consultants/Other Providers [x] YES  [ ] NO    HEALTH ISSUES - PROBLEM Dx:  Lumbar radiculopathy    Chronic pain syndrome    Chronic anxiety    History of depression    Need for prophylactic measure        
Patient is a 52y old  Female who presents with a chief complaint of Worsening low back pain with RIGHT gluteal and leg shooting pain past 3 days, urinary and bowel incontinence at home (12 Apr 2023 08:57)      SUBJECTIVE / OVERNIGHT EVENTS:    Patient seen and examined.       Vital Signs Last 24 Hrs  T(C): 36.5 (12 Apr 2023 12:03), Max: 36.9 (12 Apr 2023 08:13)  T(F): 97.7 (12 Apr 2023 12:03), Max: 98.5 (12 Apr 2023 08:13)  HR: 81 (12 Apr 2023 12:03) (59 - 82)  BP: 124/79 (12 Apr 2023 12:03) (102/68 - 124/79)  BP(mean): --  RR: 18 (12 Apr 2023 12:03) (18 - 18)  SpO2: 95% (12 Apr 2023 12:03) (92% - 95%)    Parameters below as of 12 Apr 2023 12:03  Patient On (Oxygen Delivery Method): room air      I&O's Summary    11 Apr 2023 07:01  -  12 Apr 2023 07:00  --------------------------------------------------------  IN: 720 mL / OUT: 0 mL / NET: 720 mL        PE:  GENERAL: NAD, AAOx3  CHEST/LUNG: CTABL, No wheeze  HEART: Regular rate and rhythm; np murmur  ABDOMEN: Soft, Nontender, Nondistended; Bowel sounds present  EXTREMITIES:  2+ Peripheral Pulses, No  edema  NEURO: No focal deficits    LABS:                        9.9    5.32  )-----------( 191      ( 12 Apr 2023 07:13 )             33.0     04-12    144  |  107  |  18  ----------------------------<  83  4.1   |  24  |  0.93    Ca    9.6      12 Apr 2023 07:12    TPro  6.5  /  Alb  4.2  /  TBili  0.3  /  DBili  x   /  AST  16  /  ALT  13  /  AlkPhos  91  04-10    PT/INR - ( 10 Apr 2023 13:44 )   PT: 11.0 sec;   INR: 0.96 ratio         PTT - ( 10 Apr 2023 13:44 )  PTT:23.2 sec  CAPILLARY BLOOD GLUCOSE                RADIOLOGY & ADDITIONAL TESTS:    Imaging Personally Reviewed:  [x] YES  [ ] NO    Consultant(s) Notes Reviewed:  [x] YES  [ ] NO    MEDICATIONS  (STANDING):  busPIRone 15 milliGRAM(s) Oral three times a day  enoxaparin Injectable 40 milliGRAM(s) SubCutaneous every 12 hours  lamoTRIgine  milliGRAM(s) Oral daily  lidocaine   4% Patch 1 Patch Transdermal every 24 hours  QUEtiapine 300 milliGRAM(s) Oral at bedtime  senna 2 Tablet(s) Oral at bedtime  sertraline 200 milliGRAM(s) Oral daily    MEDICATIONS  (PRN):  acetaminophen     Tablet .. 650 milliGRAM(s) Oral every 6 hours PRN Temp greater or equal to 38C (100.4F), Mild Pain (1 - 3)  diazepam    Tablet 5 milliGRAM(s) Oral every 6 hours PRN Anxiety  morphine  - Injectable 2 milliGRAM(s) IV Push every 6 hours PRN Moderate Pain (4 - 6)  polyethylene glycol 3350 17 Gram(s) Oral daily PRN Constipation      Care Discussed with Consultants/Other Providers [x] YES  [ ] NO    HEALTH ISSUES - PROBLEM Dx:  Lumbar radiculopathy    Chronic pain syndrome    Chronic anxiety    History of depression    Need for prophylactic measure

## 2023-04-14 ENCOUNTER — TRANSCRIPTION ENCOUNTER (OUTPATIENT)
Age: 53
End: 2023-04-14

## 2023-04-14 VITALS
RESPIRATION RATE: 18 BRPM | SYSTOLIC BLOOD PRESSURE: 121 MMHG | TEMPERATURE: 98 F | OXYGEN SATURATION: 97 % | HEART RATE: 75 BPM | DIASTOLIC BLOOD PRESSURE: 85 MMHG

## 2023-04-14 LAB
HCT VFR BLD CALC: 31.6 % — LOW (ref 34.5–45)
HGB BLD-MCNC: 9.9 G/DL — LOW (ref 11.5–15.5)
MCHC RBC-ENTMCNC: 27 PG — SIGNIFICANT CHANGE UP (ref 27–34)
MCHC RBC-ENTMCNC: 31.3 GM/DL — LOW (ref 32–36)
MCV RBC AUTO: 86.3 FL — SIGNIFICANT CHANGE UP (ref 80–100)
NRBC # BLD: 0 /100 WBCS — SIGNIFICANT CHANGE UP (ref 0–0)
PLATELET # BLD AUTO: 206 K/UL — SIGNIFICANT CHANGE UP (ref 150–400)
RBC # BLD: 3.66 M/UL — LOW (ref 3.8–5.2)
RBC # FLD: 15.2 % — HIGH (ref 10.3–14.5)
WBC # BLD: 6.1 K/UL — SIGNIFICANT CHANGE UP (ref 3.8–10.5)
WBC # FLD AUTO: 6.1 K/UL — SIGNIFICANT CHANGE UP (ref 3.8–10.5)

## 2023-04-14 PROCEDURE — U0003: CPT

## 2023-04-14 PROCEDURE — 97116 GAIT TRAINING THERAPY: CPT

## 2023-04-14 PROCEDURE — 72146 MRI CHEST SPINE W/O DYE: CPT | Mod: MA

## 2023-04-14 PROCEDURE — 36415 COLL VENOUS BLD VENIPUNCTURE: CPT

## 2023-04-14 PROCEDURE — 97161 PT EVAL LOW COMPLEX 20 MIN: CPT

## 2023-04-14 PROCEDURE — 80053 COMPREHEN METABOLIC PANEL: CPT

## 2023-04-14 PROCEDURE — 72148 MRI LUMBAR SPINE W/O DYE: CPT | Mod: MA

## 2023-04-14 PROCEDURE — 85027 COMPLETE CBC AUTOMATED: CPT

## 2023-04-14 PROCEDURE — 99285 EMERGENCY DEPT VISIT HI MDM: CPT | Mod: 25

## 2023-04-14 PROCEDURE — 85610 PROTHROMBIN TIME: CPT

## 2023-04-14 PROCEDURE — U0005: CPT

## 2023-04-14 PROCEDURE — 80048 BASIC METABOLIC PNL TOTAL CA: CPT

## 2023-04-14 PROCEDURE — 85730 THROMBOPLASTIN TIME PARTIAL: CPT

## 2023-04-14 PROCEDURE — 96374 THER/PROPH/DIAG INJ IV PUSH: CPT

## 2023-04-14 PROCEDURE — 85025 COMPLETE CBC W/AUTO DIFF WBC: CPT

## 2023-04-14 PROCEDURE — 84702 CHORIONIC GONADOTROPIN TEST: CPT

## 2023-04-14 PROCEDURE — 97530 THERAPEUTIC ACTIVITIES: CPT

## 2023-04-14 RX ORDER — MORPHINE SULFATE 50 MG/1
1 CAPSULE, EXTENDED RELEASE ORAL
Qty: 12 | Refills: 0
Start: 2023-04-14 | End: 2023-04-16

## 2023-04-14 RX ORDER — ACETAMINOPHEN 500 MG
1000 TABLET ORAL ONCE
Refills: 0 | Status: COMPLETED | OUTPATIENT
Start: 2023-04-14 | End: 2023-04-14

## 2023-04-14 RX ORDER — TIZANIDINE 4 MG/1
1 TABLET ORAL
Qty: 90 | Refills: 0
Start: 2023-04-14 | End: 2023-05-13

## 2023-04-14 RX ADMIN — ENOXAPARIN SODIUM 40 MILLIGRAM(S): 100 INJECTION SUBCUTANEOUS at 06:24

## 2023-04-14 RX ADMIN — TIZANIDINE 2 MILLIGRAM(S): 4 TABLET ORAL at 06:24

## 2023-04-14 RX ADMIN — Medication 15 MILLIGRAM(S): at 06:24

## 2023-04-14 RX ADMIN — SERTRALINE 200 MILLIGRAM(S): 25 TABLET, FILM COATED ORAL at 11:37

## 2023-04-14 RX ADMIN — Medication 650 MILLIGRAM(S): at 11:44

## 2023-04-14 RX ADMIN — POLYETHYLENE GLYCOL 3350 17 GRAM(S): 17 POWDER, FOR SOLUTION ORAL at 06:24

## 2023-04-14 RX ADMIN — Medication 400 MILLIGRAM(S): at 04:27

## 2023-04-14 RX ADMIN — TIZANIDINE 2 MILLIGRAM(S): 4 TABLET ORAL at 13:26

## 2023-04-14 RX ADMIN — LAMOTRIGINE 300 MILLIGRAM(S): 25 TABLET, ORALLY DISINTEGRATING ORAL at 11:37

## 2023-04-14 RX ADMIN — LIDOCAINE 1 PATCH: 4 CREAM TOPICAL at 11:35

## 2023-04-14 RX ADMIN — Medication 15 MILLIGRAM(S): at 13:26

## 2023-04-14 NOTE — DISCHARGE NOTE PROVIDER - NSDCMRMEDTOKEN_GEN_ALL_CORE_FT
acetaminophen 325 mg oral tablet: 3 tab(s) orally every 8 hours, As needed, Temp greater or equal to 38C (100.4F), Mild Pain (1 - 3)  busPIRone 15 mg oral tablet: 1 tab(s) orally 3 times a day MDD:3 tabs daily  diazePAM 5 mg oral tablet: 1 tab(s) orally every 6 hours, As Needed -Anxiety - for anxiety MDD:4 tabs daily  lamoTRIgine 300 mg oral tablet, extended release: 1 orally once a day  lidocaine 4% topical film: Apply topically to affected area once a day   pantoprazole 40 mg oral delayed release tablet: 1 tab(s) orally once a day  Physical therapy: For strengthening, gait balance and training  polyethylene glycol 3350 oral powder for reconstitution: 17 gram(s) orally once a day, As needed, Constipation  senna leaf extract oral tablet: 2 tab(s) orally once a day (at bedtime)  SEROquel 300 mg oral tablet: 1 tab(s) orally once a day (at bedtime) MDD:1 tab at night  sertraline 100 mg oral tablet: 2 tab(s) orally once a day MDD:2 tabs daily   busPIRone 15 mg oral tablet: 1 tab(s) orally 3 times a day MDD:3 tabs daily  diazePAM 5 mg oral tablet: 1 tab(s) orally every 6 hours, As Needed -Anxiety - for anxiety MDD:4 tabs daily  lamoTRIgine 300 mg oral tablet, extended release: 1 orally once a day  lidocaine 4% topical film: Apply topically to affected area once a day   pantoprazole 40 mg oral delayed release tablet: 1 tab(s) orally once a day  Physical therapy: For strengthening, gait balance and training  polyethylene glycol 3350 oral powder for reconstitution: 17 gram(s) orally once a day, As needed, Constipation  senna leaf extract oral tablet: 2 tab(s) orally once a day (at bedtime)  SEROquel 300 mg oral tablet: 1 tab(s) orally once a day (at bedtime) MDD:1 tab at night  sertraline 100 mg oral tablet: 2 tab(s) orally once a day MDD:2 tabs daily  tiZANidine 2 mg oral tablet: 1 tab(s) orally every 8 hours   busPIRone 15 mg oral tablet: 1 tab(s) orally 3 times a day MDD:3 tabs daily  diazePAM 5 mg oral tablet: 1 tab(s) orally every 6 hours, As Needed -Anxiety - for anxiety MDD:4 tabs daily  lamoTRIgine 300 mg oral tablet, extended release: 1 orally once a day  lidocaine 4% topical film: Apply topically to affected area once a day   morphine 15 mg oral tablet: 1 tab(s) orally every 6 hours as needed for  severe pain MDD: 4 tablets  pantoprazole 40 mg oral delayed release tablet: 1 tab(s) orally once a day  Physical therapy: For strengthening, gait balance and training  polyethylene glycol 3350 oral powder for reconstitution: 17 gram(s) orally once a day, As needed, Constipation  senna leaf extract oral tablet: 2 tab(s) orally once a day (at bedtime)  SEROquel 300 mg oral tablet: 1 tab(s) orally once a day (at bedtime) MDD:1 tab at night  sertraline 100 mg oral tablet: 2 tab(s) orally once a day MDD:2 tabs daily  tiZANidine 2 mg oral tablet: 1 tab(s) orally every 8 hours

## 2023-04-14 NOTE — DISCHARGE NOTE PROVIDER - NSCORESITESY/N_GEN_A_CORE_RD
No Patient doing well. Results d/w pt and questions answered. Copies of all diagnostic tests provided to facilitate outpatient follow up.

## 2023-04-14 NOTE — DISCHARGE NOTE PROVIDER - COLLABORATE WITH
[address] Observation [address] [address]    Phone:  [phone]    Fax:  [phone]                                       After Visit Summary   3/31/2018    Rosa Mcclure    MRN: [id number]           After Visit Summary Signature Page     I have received my discharge instructions, and my questions have been answered. I have discussed any challenges I see with this plan with the nurse or doctor.    ..........................................................................................................................................  Patient/Patient Representative Signature      ..........................................................................................................................................  Patient Representative Print Name and Relationship to Patient    ..................................................               ................................................  Date                                            Time    ..........................................................................................................................................  Reviewed by Signature/Title    ...................................................              ..............................................  Date                                                            Time           ACP

## 2023-04-14 NOTE — DISCHARGE NOTE PROVIDER - CARE PROVIDER_API CALL
Trae Valencia AND RACHANA COBB [facility] OF [address], [facility] [address]  [place] [address]  Phone: [phone]  Fax: [phone]  Follow Up Time: 1-3 days

## 2023-04-14 NOTE — DISCHARGE NOTE PROVIDER - TIME SPENT: (MINUTES SPENT ON THE DISCHARGE SERVICE)
Relevant Problems   No relevant active problems       Physical Exam    Airway   Mallampati: II  TM distance: >3 FB  Neck ROM: full       Cardiovascular - normal exam  Rhythm: regular  Rate: normal  (-) murmur     Dental - normal exam         Pulmonary - normal exam  Breath sounds clear to auscultation     Abdominal    Neurological - normal exam                 Anesthesia Plan    ASA 1- EMERGENT   ASA physical status emergent criteria: displaced fracture with possible neurovascular compromise    Plan - general       Airway plan will be LMA        Induction: intravenous    Postoperative Plan: Postoperative administration of opioids is intended.    Pertinent diagnostic labs and testing reviewed    Informed Consent:    Anesthetic plan and risks discussed with patient.    Use of blood products discussed with: patient whom consented to blood products.         
60

## 2023-04-14 NOTE — DISCHARGE NOTE NURSING/CASE MANAGEMENT/SOCIAL WORK - NSDCPEFALRISK_GEN_ALL_CORE
For information on Fall & Injury Prevention, visit: [email or web address] OR  [email or web address] OR  https://www.cdc.gov/steadi/patient.html

## 2023-04-14 NOTE — DISCHARGE NOTE NURSING/CASE MANAGEMENT/SOCIAL WORK - PATIENT PORTAL LINK FT
You can access the FollowMyHealth Patient Portal offered by [facility] by registering at the following website: [email or web address]. By joining [facility]’s FollowMyHealth portal, you will also be able to view your health information using other applications (apps) compatible with our system.

## 2023-04-14 NOTE — DISCHARGE NOTE PROVIDER - ATTENDING DISCHARGE PHYSICAL EXAMINATION:
GENERAL: NAD, AAOx3  CHEST/LUNG: CTABL, No wheeze  HEART: Regular rate and rhythm; No murmur  ABDOMEN: Soft, Nontender, Nondistended; Bowel sounds present  EXTREMITIES:  2+ Peripheral Pulses, No edema

## 2023-04-14 NOTE — DISCHARGE NOTE PROVIDER - NSDCCPCAREPLAN_GEN_ALL_CORE_FT
PRINCIPAL DISCHARGE DIAGNOSIS  Diagnosis: Intractable back pain  Assessment and Plan of Treatment: No acute neurosurgical intervention indicated at this time. Continue physical therapy and taking medications as needed for pain.  Follow up with pain management.   HOME CARE INSTRUCTIONS  For many people, back pain returns. Since low back pain is rarely dangerous, it is often a condition that people can learn to manage on their own   Remain active. It is stressful on the back to sit or  one place. Do not sit, drive, or  one place for more than 30 minutes at a time. Take short walks on level surfaces as soon as pain allows. Try to increase the length of time you walk each day.  Do not stay in bed. Resting more than 1 or 2 days can delay your recovery.  Do not avoid exercise or work. Your body is made to move. It is not dangerous to be active, even though your back may hurt. Your back will likely heal faster if you return to being active before your pain is gone.   Only take over-the-counter or prescription medicines as directed by your caregiver. Over-the-counter medicines to reduce pain and inflammation are often the most helpful.  Your caregiver may prescribe muscle relaxant drugs. These medicines help dull your pain so you can more quickly return to your normal activities and healthy exercise.  Avoid feeling anxious or stressed. Stress increases muscle tension and can worsen back pain. It is important to recognize when you are anxious or stressed and learn ways to manage it. Exercise is a great option.  SEEK MEDICAL CARE IF:  You have pain that is not relieved with rest or medicine.  You have pain that does not improve in 1 week.  You have new symptoms.  You are generally not feeling well.  SEEK IMMEDIATE MEDICAL CARE IF:  You have pain that radiates from your back into your legs.  You develop new bowel or bladder control problems.  You have unusual weakness or numbness in your arms or legs.  You develop nausea or vomiting.  You develop abdominal pain.  You feel faint.        SECONDARY DISCHARGE DIAGNOSES  Diagnosis: Lumbar radiculopathy  Assessment and Plan of Treatment: Continue taking medications as needed for pain. Follow up with pain management.

## 2023-04-14 NOTE — DISCHARGE NOTE PROVIDER - HOSPITAL COURSE
51 y/o F with BMI of 40.2 PMHx chronic anxiety disorder, depression, chronic pain syndrome, S/P admission for lumbar radiculopathy, discharged from [place] 3/16/23, presents with worsening low back pain with RIGHT gluteal and leg shooting pain for the last 3 days and notes urinary and bowel accidents (rather than incontinence).      # Lumbar radiculopathy  # Chronic pain syndrome  MRI L-spine shows stable L4-5 disc bulge compared to 3/12/2023  no acute intervention per NSGY  pt requesting to speak with NSGY - Recommend continued pain control and physical/weight loss therapy.  appreciate pain management recs, pt states without any improvement, pain management fu  PO morphine IR 15 mg Q6H prn severe pain  tizanidine 2 mg Q8H, can change to PRN spasm on discharge (No effect with Robaxin in past)  OOB to chair.  PT    # Chronic anxiety  ON Valium PRN    # History of depression  on Buspar, Lamictal, Seroquel, Zoloft    dvt ppx lovenox    Pt medically cleared to be discharged home with outpatient PT. Discharge and med rec discussed with attending Dr. Alexander.    51 y/o F with BMI of 40.2 PMHx chronic anxiety disorder, depression, chronic pain syndrome, S/P admission for lumbar radiculopathy, discharged from [place] 3/16/23, presents with worsening low back pain with RIGHT gluteal and leg shooting pain for the last 3 days and notes urinary and bowel accidents (rather than incontinence).      # Lumbar radiculopathy  # Chronic pain syndrome  MRI L-spine shows stable L4-5 disc bulge compared to 3/12/2023  no acute intervention per NSGY  pt requesting to speak with NSGY - Recommend continued pain control and physical/weight loss therapy.  appreciate pain management recs  PO morphine IR 15 mg Q6H prn severe pain  tizanidine 2 mg Q8H, can change to PRN spasm on discharge (No effect with Robaxin in past)  OOB to chair.  PT    # Chronic anxiety  ON Valium PRN    # History of depression  on Buspar, Lamictal, Seroquel, Zoloft    dvt ppx lovenox    Pt medically cleared to be discharged home with outpatient PT. Discharge and med rec discussed with attending Dr. Alexander.

## 2023-05-16 ENCOUNTER — INPATIENT (INPATIENT)
Facility: HOSPITAL | Age: 53
LOS: 2 days | Discharge: HOME | DRG: 552 | End: 2023-05-19
Attending: STUDENT IN AN ORGANIZED HEALTH CARE EDUCATION/TRAINING PROGRAM | Admitting: STUDENT IN AN ORGANIZED HEALTH CARE EDUCATION/TRAINING PROGRAM
Payer: COMMERCIAL

## 2023-05-16 VITALS
DIASTOLIC BLOOD PRESSURE: 63 MMHG | HEIGHT: 70 IN | TEMPERATURE: 98 F | RESPIRATION RATE: 18 BRPM | SYSTOLIC BLOOD PRESSURE: 106 MMHG | HEART RATE: 83 BPM | WEIGHT: 278 LBS | OXYGEN SATURATION: 96 %

## 2023-05-16 DIAGNOSIS — F41.9 ANXIETY DISORDER, UNSPECIFIED: ICD-10-CM

## 2023-05-16 DIAGNOSIS — Z98.51 TUBAL LIGATION STATUS: Chronic | ICD-10-CM

## 2023-05-16 DIAGNOSIS — Z98.89 OTHER SPECIFIED POSTPROCEDURAL STATES: Chronic | ICD-10-CM

## 2023-05-16 DIAGNOSIS — M54.16 RADICULOPATHY, LUMBAR REGION: ICD-10-CM

## 2023-05-16 DIAGNOSIS — G89.4 CHRONIC PAIN SYNDROME: ICD-10-CM

## 2023-05-16 DIAGNOSIS — M54.9 DORSALGIA, UNSPECIFIED: ICD-10-CM

## 2023-05-16 DIAGNOSIS — Z29.9 ENCOUNTER FOR PROPHYLACTIC MEASURES, UNSPECIFIED: ICD-10-CM

## 2023-05-16 DIAGNOSIS — K56.60 UNSPECIFIED INTESTINAL OBSTRUCTION: Chronic | ICD-10-CM

## 2023-05-16 DIAGNOSIS — K58.9 IRRITABLE BOWEL SYNDROME WITHOUT DIARRHEA: ICD-10-CM

## 2023-05-16 LAB
ALBUMIN SERPL ELPH-MCNC: 4.3 G/DL — SIGNIFICANT CHANGE UP (ref 3.3–5)
ALP SERPL-CCNC: 94 U/L — SIGNIFICANT CHANGE UP (ref 40–120)
ALT FLD-CCNC: 11 U/L — SIGNIFICANT CHANGE UP (ref 10–45)
ANION GAP SERPL CALC-SCNC: 12 MMOL/L — SIGNIFICANT CHANGE UP (ref 5–17)
APTT BLD: 27.1 SEC — LOW (ref 27.5–35.5)
AST SERPL-CCNC: 23 U/L — SIGNIFICANT CHANGE UP (ref 10–40)
BASOPHILS # BLD AUTO: 0.03 K/UL — SIGNIFICANT CHANGE UP (ref 0–0.2)
BASOPHILS NFR BLD AUTO: 0.4 % — SIGNIFICANT CHANGE UP (ref 0–2)
BILIRUB SERPL-MCNC: 0.2 MG/DL — SIGNIFICANT CHANGE UP (ref 0.2–1.2)
BUN SERPL-MCNC: 16 MG/DL — SIGNIFICANT CHANGE UP (ref 7–23)
CALCIUM SERPL-MCNC: 8.9 MG/DL — SIGNIFICANT CHANGE UP (ref 8.4–10.5)
CHLORIDE SERPL-SCNC: 110 MMOL/L — HIGH (ref 96–108)
CO2 SERPL-SCNC: 20 MMOL/L — LOW (ref 22–31)
CREAT SERPL-MCNC: 1.11 MG/DL — SIGNIFICANT CHANGE UP (ref 0.5–1.3)
EGFR: 61 ML/MIN/1.73M2 — SIGNIFICANT CHANGE UP
EOSINOPHIL # BLD AUTO: 0.01 K/UL — SIGNIFICANT CHANGE UP (ref 0–0.5)
EOSINOPHIL NFR BLD AUTO: 0.1 % — SIGNIFICANT CHANGE UP (ref 0–6)
GLUCOSE SERPL-MCNC: 69 MG/DL — LOW (ref 70–99)
HCG SERPL-ACNC: <2 MIU/ML — SIGNIFICANT CHANGE UP
HCT VFR BLD CALC: 32.3 % — LOW (ref 34.5–45)
HGB BLD-MCNC: 10 G/DL — LOW (ref 11.5–15.5)
IMM GRANULOCYTES NFR BLD AUTO: 0.7 % — SIGNIFICANT CHANGE UP (ref 0–0.9)
INR BLD: 0.95 RATIO — SIGNIFICANT CHANGE UP (ref 0.88–1.16)
LYMPHOCYTES # BLD AUTO: 2.31 K/UL — SIGNIFICANT CHANGE UP (ref 1–3.3)
LYMPHOCYTES # BLD AUTO: 28.8 % — SIGNIFICANT CHANGE UP (ref 13–44)
MCHC RBC-ENTMCNC: 26.3 PG — LOW (ref 27–34)
MCHC RBC-ENTMCNC: 31 GM/DL — LOW (ref 32–36)
MCV RBC AUTO: 85 FL — SIGNIFICANT CHANGE UP (ref 80–100)
MONOCYTES # BLD AUTO: 0.67 K/UL — SIGNIFICANT CHANGE UP (ref 0–0.9)
MONOCYTES NFR BLD AUTO: 8.3 % — SIGNIFICANT CHANGE UP (ref 2–14)
NEUTROPHILS # BLD AUTO: 4.95 K/UL — SIGNIFICANT CHANGE UP (ref 1.8–7.4)
NEUTROPHILS NFR BLD AUTO: 61.7 % — SIGNIFICANT CHANGE UP (ref 43–77)
NRBC # BLD: 0 /100 WBCS — SIGNIFICANT CHANGE UP (ref 0–0)
PLATELET # BLD AUTO: 250 K/UL — SIGNIFICANT CHANGE UP (ref 150–400)
POTASSIUM SERPL-MCNC: 4 MMOL/L — SIGNIFICANT CHANGE UP (ref 3.5–5.3)
POTASSIUM SERPL-SCNC: 4 MMOL/L — SIGNIFICANT CHANGE UP (ref 3.5–5.3)
PROT SERPL-MCNC: 6.6 G/DL — SIGNIFICANT CHANGE UP (ref 6–8.3)
PROTHROM AB SERPL-ACNC: 10.9 SEC — SIGNIFICANT CHANGE UP (ref 10.5–13.4)
RBC # BLD: 3.8 M/UL — SIGNIFICANT CHANGE UP (ref 3.8–5.2)
RBC # FLD: 14.9 % — HIGH (ref 10.3–14.5)
SODIUM SERPL-SCNC: 142 MMOL/L — SIGNIFICANT CHANGE UP (ref 135–145)
WBC # BLD: 8.03 K/UL — SIGNIFICANT CHANGE UP (ref 3.8–10.5)
WBC # FLD AUTO: 8.03 K/UL — SIGNIFICANT CHANGE UP (ref 3.8–10.5)

## 2023-05-16 PROCEDURE — 99285 EMERGENCY DEPT VISIT HI MDM: CPT

## 2023-05-16 PROCEDURE — 72148 MRI LUMBAR SPINE W/O DYE: CPT | Mod: 26,MA

## 2023-05-16 PROCEDURE — 99223 1ST HOSP IP/OBS HIGH 75: CPT

## 2023-05-16 RX ORDER — ACETAMINOPHEN 500 MG
650 TABLET ORAL EVERY 6 HOURS
Refills: 0 | Status: DISCONTINUED | OUTPATIENT
Start: 2023-05-16 | End: 2023-05-19

## 2023-05-16 RX ORDER — LAMOTRIGINE 25 MG/1
1 TABLET, ORALLY DISINTEGRATING ORAL
Refills: 0 | DISCHARGE

## 2023-05-16 RX ORDER — MORPHINE SULFATE 50 MG/1
2 CAPSULE, EXTENDED RELEASE ORAL ONCE
Refills: 0 | Status: DISCONTINUED | OUTPATIENT
Start: 2023-05-16 | End: 2023-05-16

## 2023-05-16 RX ORDER — DEXAMETHASONE 0.5 MG/5ML
10 ELIXIR ORAL ONCE
Refills: 0 | Status: COMPLETED | OUTPATIENT
Start: 2023-05-16 | End: 2023-05-16

## 2023-05-16 RX ORDER — DIAZEPAM 5 MG
5 TABLET ORAL EVERY 6 HOURS
Refills: 0 | Status: DISCONTINUED | OUTPATIENT
Start: 2023-05-16 | End: 2023-05-19

## 2023-05-16 RX ORDER — QUETIAPINE FUMARATE 200 MG/1
300 TABLET, FILM COATED ORAL AT BEDTIME
Refills: 0 | Status: DISCONTINUED | OUTPATIENT
Start: 2023-05-16 | End: 2023-05-19

## 2023-05-16 RX ORDER — SERTRALINE 25 MG/1
100 TABLET, FILM COATED ORAL DAILY
Refills: 0 | Status: DISCONTINUED | OUTPATIENT
Start: 2023-05-16 | End: 2023-05-19

## 2023-05-16 RX ORDER — TIZANIDINE 4 MG/1
2 TABLET ORAL EVERY 8 HOURS
Refills: 0 | Status: DISCONTINUED | OUTPATIENT
Start: 2023-05-16 | End: 2023-05-19

## 2023-05-16 RX ORDER — PANTOPRAZOLE SODIUM 20 MG/1
40 TABLET, DELAYED RELEASE ORAL
Refills: 0 | Status: DISCONTINUED | OUTPATIENT
Start: 2023-05-16 | End: 2023-05-19

## 2023-05-16 RX ORDER — MORPHINE SULFATE 50 MG/1
4 CAPSULE, EXTENDED RELEASE ORAL ONCE
Refills: 0 | Status: DISCONTINUED | OUTPATIENT
Start: 2023-05-16 | End: 2023-05-16

## 2023-05-16 RX ORDER — DEXAMETHASONE 0.5 MG/5ML
10 ELIXIR ORAL EVERY 6 HOURS
Refills: 0 | Status: COMPLETED | OUTPATIENT
Start: 2023-05-16 | End: 2023-05-17

## 2023-05-16 RX ORDER — SENNA PLUS 8.6 MG/1
2 TABLET ORAL AT BEDTIME
Refills: 0 | Status: DISCONTINUED | OUTPATIENT
Start: 2023-05-16 | End: 2023-05-19

## 2023-05-16 RX ORDER — LAMOTRIGINE 25 MG/1
200 TABLET, ORALLY DISINTEGRATING ORAL DAILY
Refills: 0 | Status: DISCONTINUED | OUTPATIENT
Start: 2023-05-16 | End: 2023-05-19

## 2023-05-16 RX ORDER — MORPHINE SULFATE 50 MG/1
15 CAPSULE, EXTENDED RELEASE ORAL EVERY 6 HOURS
Refills: 0 | Status: DISCONTINUED | OUTPATIENT
Start: 2023-05-16 | End: 2023-05-18

## 2023-05-16 RX ORDER — POLYETHYLENE GLYCOL 3350 17 G/17G
17 POWDER, FOR SOLUTION ORAL
Refills: 0 | Status: DISCONTINUED | OUTPATIENT
Start: 2023-05-16 | End: 2023-05-19

## 2023-05-16 RX ORDER — ENOXAPARIN SODIUM 100 MG/ML
40 INJECTION SUBCUTANEOUS EVERY 24 HOURS
Refills: 0 | Status: DISCONTINUED | OUTPATIENT
Start: 2023-05-16 | End: 2023-05-19

## 2023-05-16 RX ORDER — DEXAMETHASONE 0.5 MG/5ML
10 ELIXIR ORAL ONCE
Refills: 0 | Status: DISCONTINUED | OUTPATIENT
Start: 2023-05-16 | End: 2023-05-16

## 2023-05-16 RX ADMIN — MORPHINE SULFATE 2 MILLIGRAM(S): 50 CAPSULE, EXTENDED RELEASE ORAL at 19:24

## 2023-05-16 RX ADMIN — Medication 102 MILLIGRAM(S): at 17:31

## 2023-05-16 RX ADMIN — PANTOPRAZOLE SODIUM 40 MILLIGRAM(S): 20 TABLET, DELAYED RELEASE ORAL at 23:24

## 2023-05-16 RX ADMIN — ENOXAPARIN SODIUM 40 MILLIGRAM(S): 100 INJECTION SUBCUTANEOUS at 23:28

## 2023-05-16 RX ADMIN — MORPHINE SULFATE 4 MILLIGRAM(S): 50 CAPSULE, EXTENDED RELEASE ORAL at 18:50

## 2023-05-16 RX ADMIN — MORPHINE SULFATE 4 MILLIGRAM(S): 50 CAPSULE, EXTENDED RELEASE ORAL at 15:25

## 2023-05-16 RX ADMIN — LAMOTRIGINE 200 MILLIGRAM(S): 25 TABLET, ORALLY DISINTEGRATING ORAL at 23:24

## 2023-05-16 RX ADMIN — MORPHINE SULFATE 4 MILLIGRAM(S): 50 CAPSULE, EXTENDED RELEASE ORAL at 14:09

## 2023-05-16 RX ADMIN — Medication 5 MILLIGRAM(S): at 23:24

## 2023-05-16 RX ADMIN — MORPHINE SULFATE 15 MILLIGRAM(S): 50 CAPSULE, EXTENDED RELEASE ORAL at 23:59

## 2023-05-16 NOTE — ED PROVIDER NOTE - OBJECTIVE STATEMENT
52F with pmh of fibromyalgia, chronic back pain, sciatica, left L4 nerve root impingement, IBS, GERD, Covid, s/p gastric bypass, s/p abdominoplasty  sent from her neurosurgeons office with concern for spinal cord compression.  Patient is noting complete urinary and fecal incontinence which is new for her and also numbness to bilateral legs. Denies fever, cp, sob.

## 2023-05-16 NOTE — H&P ADULT - PROBLEM SELECTOR PLAN 1
- MR 05/16 w/out evidence of compression of the distal cord or cauda equina. No epidural collection  - Evaluated by spine, recs appreciated  - c/w home regimen  - Start Morphine 15mg IR q6hrs PRN for severe pain  - Bowel regimen  - Monitor for s/s resp depression  - Decadron taper per ortho (first 24hrs ordered)  - Consider pain management c/s   - Pt requesting to speak w/ ortho further to discuss surgical options (discussed there's limited surgical intervention, but would still like to discuss further- f/u in AM)  - PT  - Fall precautions  - Given c/f incontinence- stool count ordered- monitor for now

## 2023-05-16 NOTE — H&P ADULT - NSHPREVIEWOFSYSTEMS_GEN_ALL_CORE
CONSTITUTIONAL: "Pain everywhere." No fever, weight loss  EYES: No eye pain, visual disturbances, or discharge  ENMT:  No difficulty hearing, tinnitus, vertigo; No sinus or throat pain  RESPIRATORY: No SOB. No cough, wheezing, chills or hemoptysis  CARDIOVASCULAR: No chest pain, palpitations, dizziness, or leg swelling  GASTROINTESTINAL: Diarrhea. No abdominal or epigastric pain. No nausea, vomiting, or hematemesis; No constipation. No melena or hematochezia.  GENITOURINARY: Incontinence. No dysuria, frequency, hematuria  NEUROLOGICAL: No headaches, memory loss, loss of strength, numbness, or tremors  SKIN: No itching, burning, rashes, or lesions   LYMPH NODES: No enlarged glands  ENDOCRINE: No heat or cold intolerance; No hair loss  MUSCULOSKELETAL: LBP. and b/l LE pain.  PSYCHIATRIC: Anxiety, depression. No mood swings, or difficulty sleeping  HEME/LYMPH: No easy bruising, or bleeding gums

## 2023-05-16 NOTE — H&P ADULT - NSHPLABSRESULTS_GEN_ALL_CORE
LABS:                      10.0   8.03  )-----------( 250      ( 16 May 2023 14:16 )             32.3     05-16    142  |  110<H>  |  16  ----------------------------<  69<L>  4.0   |  20<L>  |  1.11    Ca    8.9      16 May 2023 14:16    TPro  6.6  /  Alb  4.3  /  TBili  0.2  /  DBili  x   /  AST  23  /  ALT  11  /  AlkPhos  94  05-16    LIVER FUNCTIONS - ( 16 May 2023 14:16 )  Alb: 4.3 g/dL / Pro: 6.6 g/dL / ALK PHOS: 94 U/L / ALT: 11 U/L / AST: 23 U/L / GGT: x           PT/INR - ( 16 May 2023 14:16 )   PT: 10.9 sec;   INR: 0.95 ratio    PTT - ( 16 May 2023 14:16 )  PTT:27.1 sec    IMAGING:  MR Lumbar Spine No Cont (05.16.23 @ 16:49)  IMPRESSION:  - No evidence of compression of the distal cord or cauda equina. No epidural collection.  - Previously seen right paracentral disc protrusion at L2-3 has improved.  - Other mild to moderate multilevel degenerative changes are unchanged.    [X] Imaging personally reviewed by me  [X] No ECG performed on admission

## 2023-05-16 NOTE — ED PROVIDER NOTE - CLINICAL SUMMARY MEDICAL DECISION MAKING FREE TEXT BOX
Dr. Newby: 52F h/o chronic back pain, sciatica, left L4 nerve root impingement, IBS, GERD, h/o abdominoplasty, sent by her neurosurgeon for r/o cord compression. For one week pt has had urinary incontinence, diarrhea, BLE decreased sensation. Ambulates with cane. No trauma, no epidural injections since 2008, no back surgeries.    On exam pt is well appearing, minimal distress due to pain, RRR, CTAB, abdo soft/nt/nd, +TTP over bilateral lumbar paraspinous ttp, decreased sensation BLE, 4/5 strength RLE (limited due to pain), 5/5 strength LLE, chaperone Dr. Valdes, decreased sacral sensation and rectal tone.    Spoke to radiology emergently for stat MRI r/o cord compression. D/w neurosurg and then ortho who are on for spine, will f/u MRI. Decadron ordered.

## 2023-05-16 NOTE — ED ADULT TRIAGE NOTE - CHIEF COMPLAINT QUOTE
pain from neck to tailbone, describes having saddle anesthesia  hx of bulging discs, now having incontinence  able to move extremities on triage pain from neck to tailbone, endorses numbness to b/l feet  hx of bulging discs, now having incontinence  able to move extremities on triage

## 2023-05-16 NOTE — H&P ADULT - NSHPPHYSICALEXAM_GEN_ALL_CORE
Vital Signs Last 24 Hrs  T(C): 36.8 (16 May 2023 19:20), Max: 36.9 (16 May 2023 12:47)  T(F): 98.2 (16 May 2023 19:20), Max: 98.4 (16 May 2023 12:47)  HR: 63 (16 May 2023 19:20) (63 - 83)  BP: 131/78 (16 May 2023 19:20) (106/63 - 134/80)  RR: 18 (16 May 2023 19:20) (18 - 20)  SpO2: 99% (16 May 2023 19:20) (96% - 99%)    Parameters below as of 16 May 2023 19:20  Patient On (Oxygen Delivery Method): room air    CONSTITUTIONAL: Well-groomed, in no apparent distress  EYES: No conjunctival or scleral injection, non-icteric;   ENMT: No external nasal lesions; MMM  NECK: Trachea midline without palpable neck mass; thyroid not enlarged and non-tender  RESPIRATORY: Breathing comfortably; no dullness to percussion; lungs CTA without wheeze/rhonchi/rales  CARDIOVASCULAR: +S1S2, RRR, no M/G/R; pedal pulses full and symmetric; no lower extremity edema  GASTROINTESTINAL: No palpable masses or tenderness, +BS throughout, no rebound/guarding; no hepatosplenomegaly; no hernia palpated  LYMPHATIC: No cervical LAD or tenderness  SKIN: No rashes or ulcers noted  MSK: Midline ttp and paraspinal ttp. Able to hold b/l LE up against gravity, though limited d/t pain. Also reporting pain everywhere (including when I placed stethoscope on chest and abdomen)  RECTAL: Deferred given conducted by both ED and ortho  NEUROLOGIC: CN II-XII intact;   PSYCHIATRIC: A&Ox3; mood and affect appropriate; appropriate insight and judgment

## 2023-05-16 NOTE — ED ADULT NURSE REASSESSMENT NOTE - NS ED NURSE REASSESS COMMENT FT1
1520: pt in acute pain in MRI, unable to tolerate test. RN went to MRI to medicate pt as per EMAR.
Pharmacy called for Decadron IVPB that is not available on the unit. Spoke with pharmacist, states the medication will be sent to the area as soon as it is available. pending medication at this time.
Received report from RAMÓN [place]. Pt AOx4 with stable VS. Comfort care and safety measures provided. Pt endorses no change in pain, pain medication given

## 2023-05-16 NOTE — H&P ADULT - ASSESSMENT
51yo F w/ PMH of IBS, fibromyalgia, chronic lower back pain, chronic anxiety, GERD and s/p gastric bypass pw back pain and c/f compression, sent in by oupt spine now s/p MRI L spine w/out c/f compression, admitted for pain control.

## 2023-05-16 NOTE — ED PROVIDER NOTE - PHYSICAL EXAMINATION
PHYSICAL EXAM:  CONSTITUTIONAL: very uncomfortable appearing awake alert  HEAD: Atraumatic  EYES: Clear bilaterally, pupils equal, round and reactive to light.  ENMT: Airway patent, Nasal mucosa clear. Mouth with normal mucosa. Uvula is midline.   CARDIAC: Normal rate, regular rhythm. +S1/S2. No murmurs, rubs or gallops.  RESPIRATORY: Breathing unlabored. Breath sounds clear and equal bilaterally.  ABDOMEN:  Soft, nontender, nondistended. No rebound tenderness or guarding.  NEUROLOGICAL: Alert and oriented, no focal deficits, no motor or sensory deficits. CN2-12 intact. Sensation intact x4 extremities.  SKIN: Skin warm and dry. No evidence of rashes or lesions. PHYSICAL EXAM:  CONSTITUTIONAL: very uncomfortable appearing awake alert  HEAD: Atraumatic  EYES: Clear bilaterally, pupils equal, round and reactive to light.  ENMT: Airway patent, Nasal mucosa clear. Mouth with normal mucosa. Uvula is midline.   CARDIAC: Normal rate, regular rhythm. +S1/S2. No murmurs, rubs or gallops.  RESPIRATORY: Breathing unlabored. Breath sounds clear and equal bilaterally.  ABDOMEN:  Soft, nontender, nondistended. No rebound tenderness or guarding.  NEUROLOGICAL:  SKIN: Skin warm and dry. No evidence of rashes or lesions. PHYSICAL EXAM:  CONSTITUTIONAL: very uncomfortable appearing awake alert  HEAD: Atraumatic  EYES: Clear bilaterally, pupils equal, round and reactive to light.  ENMT: Airway patent, Nasal mucosa clear. Mouth with normal mucosa. Uvula is midline.   CARDIAC: Normal rate, regular rhythm. +S1/S2. No murmurs, rubs or gallops.  RESPIRATORY: Breathing unlabored. Breath sounds clear and equal bilaterally.  ABDOMEN:  Soft, nontender, nondistended. No rebound tenderness or guarding.  NEUROLOGICAL :decreased sensation BLE, 4/5 strength RLE (limited due to pain), 5/5 strength LLE.l   Rectal: no rectal tone   SKIN: Skin warm and dry. No evidence of rashes or lesions.

## 2023-05-16 NOTE — ED ADULT NURSE NOTE - NSFALLRISKINTERV_ED_ALL_ED

## 2023-05-16 NOTE — H&P ADULT - HISTORY OF PRESENT ILLNESS
Pt is a 53yo F w/ PMH of IBS, fibromyalgia, chronic lower back pain, chronic anxiety, GERD and s/p gastric bypass pw back pain and c/f compression, sent in by oupt spine for MRI.    States she has been having ongoing back pain for a long time which she has followed outpt for. New symptoms, however, include urinary incontinence over the past  2 weeks and some fecal incontinence over the past three days (though reports ability to hold until reaching toilet and diarrhea is also i/s/o hx of IBS) and numbness of b/l LE. Still able to ambulate, though reports difficulty after prolonged standing for thirty minutes.     In the ED VSS w/ labs w/ stable since last admission and MR w/out c/f compression. She was administered Decadron 10mg, Morphine 4mg IVP x3 and Morphine 2mg x1.

## 2023-05-16 NOTE — ED ADULT NURSE NOTE - CAS TRG GEN SKIN COLOR
3rd attempt to contact patient. When calling number listed, writer received message, \"the number you have dial is not a working number.\"    Unable to contact letter sent.     Normal for race

## 2023-05-16 NOTE — ED ADULT NURSE NOTE - OBJECTIVE STATEMENT
51 yo presents to the ED from home. A&Ox4, ambulatory with cane c/o severe back pain with incontinence x 1 week. history of fibromyalgia, chronic back pain, sciatica, left L4 nerve root impingement, IBS, GERD, Covid, s/p gastric bypass, s/p abdominoplasty. pt was at her neurosurgeons office today who sent pt to ED with concern for spinal cord compression. Patient is noting complete urinary and fecal incontinence which is new for her and also numbness to bilateral legs. Denies fever, cp, sob. denies new trauma. 20G in LAC. MD at bedside for eval. able ambulate. reports diarrhea. Patient undressed and placed into gown, call bell in hand and side rails up for safety. warm blanket provided, vital signs stable, pt in no acute distress.

## 2023-05-16 NOTE — ED PROVIDER NOTE - ATTENDING CONTRIBUTION TO CARE
Dr. Newby: I have personally performed a face to face bedside history and physical examination of this patient. I have discussed the history, examination, review of systems, assessment and plan of management with the resident. I have reviewed the electronic medical record and amended it to reflect my history, review of systems, physical exam, assessment and plan.    see mdm

## 2023-05-16 NOTE — H&P ADULT - PROBLEM SELECTOR PLAN 5
DVT ppx: Lovenox  Diet: DASH  Dispo: Pending clinical improvement    - Discussed w/  (Gabino) over the phone

## 2023-05-16 NOTE — ED ADULT NURSE NOTE - CHIEF COMPLAINT QUOTE
pain from neck to tailbone, endorses numbness to b/l feet  hx of bulging discs, now having incontinence  able to move extremities on triage

## 2023-05-17 LAB
ANION GAP SERPL CALC-SCNC: 14 MMOL/L — SIGNIFICANT CHANGE UP (ref 5–17)
BASOPHILS # BLD AUTO: 0 K/UL — SIGNIFICANT CHANGE UP (ref 0–0.2)
BASOPHILS NFR BLD AUTO: 0 % — SIGNIFICANT CHANGE UP (ref 0–2)
BUN SERPL-MCNC: 14 MG/DL — SIGNIFICANT CHANGE UP (ref 7–23)
CALCIUM SERPL-MCNC: 9.4 MG/DL — SIGNIFICANT CHANGE UP (ref 8.4–10.5)
CHLORIDE SERPL-SCNC: 106 MMOL/L — SIGNIFICANT CHANGE UP (ref 96–108)
CO2 SERPL-SCNC: 21 MMOL/L — LOW (ref 22–31)
CREAT SERPL-MCNC: 0.76 MG/DL — SIGNIFICANT CHANGE UP (ref 0.5–1.3)
EGFR: 95 ML/MIN/1.73M2 — SIGNIFICANT CHANGE UP
EOSINOPHIL # BLD AUTO: 0 K/UL — SIGNIFICANT CHANGE UP (ref 0–0.5)
EOSINOPHIL NFR BLD AUTO: 0 % — SIGNIFICANT CHANGE UP (ref 0–6)
GLUCOSE SERPL-MCNC: 137 MG/DL — HIGH (ref 70–99)
HCT VFR BLD CALC: 33.8 % — LOW (ref 34.5–45)
HGB BLD-MCNC: 10.6 G/DL — LOW (ref 11.5–15.5)
IMM GRANULOCYTES NFR BLD AUTO: 0.8 % — SIGNIFICANT CHANGE UP (ref 0–0.9)
LYMPHOCYTES # BLD AUTO: 0.75 K/UL — LOW (ref 1–3.3)
LYMPHOCYTES # BLD AUTO: 15.5 % — SIGNIFICANT CHANGE UP (ref 13–44)
MAGNESIUM SERPL-MCNC: 2.1 MG/DL — SIGNIFICANT CHANGE UP (ref 1.6–2.6)
MCHC RBC-ENTMCNC: 26 PG — LOW (ref 27–34)
MCHC RBC-ENTMCNC: 31.4 GM/DL — LOW (ref 32–36)
MCV RBC AUTO: 83 FL — SIGNIFICANT CHANGE UP (ref 80–100)
MONOCYTES # BLD AUTO: 0.04 K/UL — SIGNIFICANT CHANGE UP (ref 0–0.9)
MONOCYTES NFR BLD AUTO: 0.8 % — LOW (ref 2–14)
NEUTROPHILS # BLD AUTO: 4.01 K/UL — SIGNIFICANT CHANGE UP (ref 1.8–7.4)
NEUTROPHILS NFR BLD AUTO: 82.9 % — HIGH (ref 43–77)
NRBC # BLD: 0 /100 WBCS — SIGNIFICANT CHANGE UP (ref 0–0)
PHOSPHATE SERPL-MCNC: 3.4 MG/DL — SIGNIFICANT CHANGE UP (ref 2.5–4.5)
PLATELET # BLD AUTO: 256 K/UL — SIGNIFICANT CHANGE UP (ref 150–400)
POTASSIUM SERPL-MCNC: 4.4 MMOL/L — SIGNIFICANT CHANGE UP (ref 3.5–5.3)
POTASSIUM SERPL-SCNC: 4.4 MMOL/L — SIGNIFICANT CHANGE UP (ref 3.5–5.3)
RBC # BLD: 4.07 M/UL — SIGNIFICANT CHANGE UP (ref 3.8–5.2)
RBC # FLD: 14.6 % — HIGH (ref 10.3–14.5)
SODIUM SERPL-SCNC: 141 MMOL/L — SIGNIFICANT CHANGE UP (ref 135–145)
WBC # BLD: 4.84 K/UL — SIGNIFICANT CHANGE UP (ref 3.8–10.5)
WBC # FLD AUTO: 4.84 K/UL — SIGNIFICANT CHANGE UP (ref 3.8–10.5)

## 2023-05-17 RX ORDER — MORPHINE SULFATE 50 MG/1
2 CAPSULE, EXTENDED RELEASE ORAL ONCE
Refills: 0 | Status: DISCONTINUED | OUTPATIENT
Start: 2023-05-17 | End: 2023-05-17

## 2023-05-17 RX ADMIN — TIZANIDINE 2 MILLIGRAM(S): 4 TABLET ORAL at 23:07

## 2023-05-17 RX ADMIN — SENNA PLUS 2 TABLET(S): 8.6 TABLET ORAL at 22:27

## 2023-05-17 RX ADMIN — LAMOTRIGINE 200 MILLIGRAM(S): 25 TABLET, ORALLY DISINTEGRATING ORAL at 14:39

## 2023-05-17 RX ADMIN — Medication 5 MILLIGRAM(S): at 08:49

## 2023-05-17 RX ADMIN — Medication 650 MILLIGRAM(S): at 00:00

## 2023-05-17 RX ADMIN — MORPHINE SULFATE 2 MILLIGRAM(S): 50 CAPSULE, EXTENDED RELEASE ORAL at 01:37

## 2023-05-17 RX ADMIN — TIZANIDINE 2 MILLIGRAM(S): 4 TABLET ORAL at 04:24

## 2023-05-17 RX ADMIN — ENOXAPARIN SODIUM 40 MILLIGRAM(S): 100 INJECTION SUBCUTANEOUS at 23:11

## 2023-05-17 RX ADMIN — Medication 102 MILLIGRAM(S): at 16:48

## 2023-05-17 RX ADMIN — QUETIAPINE FUMARATE 300 MILLIGRAM(S): 200 TABLET, FILM COATED ORAL at 23:07

## 2023-05-17 RX ADMIN — Medication 650 MILLIGRAM(S): at 22:28

## 2023-05-17 RX ADMIN — Medication 102 MILLIGRAM(S): at 00:00

## 2023-05-17 RX ADMIN — PANTOPRAZOLE SODIUM 40 MILLIGRAM(S): 20 TABLET, DELAYED RELEASE ORAL at 05:49

## 2023-05-17 RX ADMIN — TIZANIDINE 2 MILLIGRAM(S): 4 TABLET ORAL at 14:39

## 2023-05-17 RX ADMIN — MORPHINE SULFATE 2 MILLIGRAM(S): 50 CAPSULE, EXTENDED RELEASE ORAL at 16:48

## 2023-05-17 RX ADMIN — Medication 15 MILLIGRAM(S): at 14:57

## 2023-05-17 RX ADMIN — SERTRALINE 100 MILLIGRAM(S): 25 TABLET, FILM COATED ORAL at 12:24

## 2023-05-17 RX ADMIN — MORPHINE SULFATE 15 MILLIGRAM(S): 50 CAPSULE, EXTENDED RELEASE ORAL at 09:19

## 2023-05-17 RX ADMIN — Medication 15 MILLIGRAM(S): at 22:27

## 2023-05-17 RX ADMIN — Medication 15 MILLIGRAM(S): at 05:48

## 2023-05-17 RX ADMIN — MORPHINE SULFATE 15 MILLIGRAM(S): 50 CAPSULE, EXTENDED RELEASE ORAL at 09:47

## 2023-05-17 RX ADMIN — Medication 102 MILLIGRAM(S): at 05:46

## 2023-05-17 RX ADMIN — Medication 5 MILLIGRAM(S): at 17:17

## 2023-05-17 RX ADMIN — Medication 102 MILLIGRAM(S): at 12:04

## 2023-05-17 NOTE — PHYSICAL THERAPY INITIAL EVALUATION ADULT - BED MOBILITY TRAINING, PT EVAL
Josefina Cordova    Your results show the adrenal nodule mass has gotten a little bit bigger. This is concerning for a tumor called pheochromocytoma. This tumor causes problems with hormones in your body and are benign (not cancerous). So to find out if this is what we are dealing with, we need more labs. I have ordered these. Please schedule a lab only appointment. I also put in a referral to see endocrinology about this. They should call you to schedule. They are likely booked out, so once I get your labs, I will submit an E-consult to see if there is anything else they want us to do in the meantime.     The results are attached for your review.       Donnie Mansfield PA-C   GOAL: pt will complete bed mob ind in 2wks.

## 2023-05-17 NOTE — PHYSICAL THERAPY INITIAL EVALUATION ADULT - ACTIVE RANGE OF MOTION EXAMINATION, REHAB EVAL
AAROM BLE WFL limited 2* pain and numbness, BUE WFL/Left UE Active ROM was WFL (within functional limits)/Right UE Active ROM was WFL (within functional limits)/Left LE Active ROM was WFL (within functional limits)/Right LE Active ROM was WFL (within functional limits)

## 2023-05-17 NOTE — PATIENT PROFILE ADULT - FALL HARM RISK - HARM RISK INTERVENTIONS

## 2023-05-17 NOTE — PHYSICAL THERAPY INITIAL EVALUATION ADULT - PERTINENT HX OF CURRENT PROBLEM, REHAB EVAL
Pt is a 51yo F admitted to [facility] on 5/16/23 w/ PMH of IBS, fibromyalgia, chronic lower back pain, chronic anxiety, GERD and s/p gastric bypass pw back pain and c/f compression, sent in by oupt spine for MRI. States she has been having ongoing back pain for a long time which she has followed outpt for. New symptoms, however, include urinary incontinence over the past  2 weeks and some fecal incontinence over the past three days (though reports ability to hold until reaching toilet and diarrhea is also i/s/o hx of IBS) and numbness of b/l LE. Still able to ambulate, though reports difficulty after prolonged standing for thirty minutes.  Hospital course: In the ED VSS w/ labs w/ stable since last admission and MR w/out c/f compression. She was administered Decadron 10mg, Morphine 4mg IVP x3 and Morphine 2mg x1. MR Lspine: No evidence of compression of the distal cord or cauda equina. No epidural collection. Previously seen right paracentral disc protrusion at L2-3 has improved. Other mild to moderate multilevel degenerative changes are unchanged. 5/16	seen by Ortho: Could potentially benefit from steroid taper; WBAT, no acute ortho intervention, +chronic pain consult.

## 2023-05-17 NOTE — PROGRESS NOTE ADULT - ASSESSMENT
51yo F w/ PMH of IBS, fibromyalgia, chronic lower back pain, chronic anxiety, GERD and s/p gastric bypass pw back pain and c/f compression, sent in by oupt spine now s/p MRI L spine w/out c/f compression, admitted for pain control.       # Lumbar Radiculopathy  - Pain control & Tizanidine  - Steroid Taper  - No acute Orthopedic Surgical Intervention per Ortho  -Follow up with  ortho outpatient surgeon when discharged  -C/w Bowel regimen  - PT  - Fall precautions  - Ortho following  - Pain management consult    #Anemia  -H/H 10.6/33.8  - Serial Cbc    #Anxiety  - C/w Buspar, Lamictal, Zoloft    #IBS  - monitor BM    #GI ppx  - PPI    # DVT ppx  - Lovenox    [facility]  [phone]   51yo F w/ PMH of IBS, fibromyalgia, chronic lower back pain, chronic anxiety, GERD and s/p gastric bypass pw back pain and c/f compression, sent in by oupt spine now s/p MRI L spine w/out c/f compression, admitted for pain control.       # Lumbar Radiculopathy  - Pain control   - No acute Orthopedic Surgical Intervention per Ortho  - Follow up with  ortho outpatient surgeon when discharged  - C/w Bowel regimen  - PT  - Fall precautions  -C/w Steroid taper, Pain meds and Tizanidine  - Ortho following  - Pain management consult    #Anemia  -H/H 10.6/33.8  - Serial Cbc    #Anxiety  - C/w Buspar, Lamictal, Zoloft    #IBS  - monitor BM    #GI ppx  - PPI & Mirilax    # DVT ppx  - Lovenox    [facility]  [phone]   51yo F w/ PMH of IBS, fibromyalgia, chronic lower back pain, chronic anxiety, GERD and s/p gastric bypass pw back pain and c/f compression, sent in by oupt spine now s/p MRI L spine w/out c/f compression, admitted for pain control.       # Lumbar Radiculopathy  - Pain control   - No acute Orthopedic Surgical Intervention per Ortho  - Follow up with  ortho outpatient surgeon when discharged  - C/w Bowel regimen  - PT  - Fall precautions  -C/w Pain meds and Tizanidine  - Ortho consult   - Pain management consult    #Anemia  -H/H 10.6/33.8  - Serial Cbc    #Anxiety  - C/w Buspar, Lamictal, Zoloft    #IBS  - monitor BM    #GI ppx  - PPI & Mirilax    # DVT ppx  - Lovenox    [facility]  [phone]

## 2023-05-17 NOTE — PHYSICAL THERAPY INITIAL EVALUATION ADULT - ADDITIONAL COMMENTS
Pt lives at home with spouse and 3 kids (youngest is 10 yo), +4 steps to enter home, amb with cane, owns 2 wheeled walker, +tub shower (no grabbars, no shower), spouse and youngest helps the most per pt, RH, able to feed self

## 2023-05-17 NOTE — PATIENT PROFILE ADULT - NSPROEXTENSIONSOFSELF_GEN_A_NUR
cellphone, , clothing, flip flops, red shopping bag, 3 costume bracelets, pair of earrings, 3 rings/cane

## 2023-05-17 NOTE — PHYSICAL THERAPY INITIAL EVALUATION ADULT - GENERAL OBSERVATIONS, REHAB EVAL
Pt a/w back pain, hx chronic pain. MRI reveals no cord compression/cauda equina, per ortho +steroid taper, WBAT, pain control. Pt received in ED on stretcher (holding de [place] 29.5), +IVL, +premedicated earlier per RAMÓN Damon (Valium), +R wrist brace, A&OX4, follows commands, eyes fluttering at times

## 2023-05-17 NOTE — PATIENT PROFILE ADULT - FUNCTIONAL ASSESSMENT - BASIC MOBILITY 6.
3-calculated by average/Not able to assess (calculate score using [facility] averaging method)

## 2023-05-17 NOTE — PHYSICAL THERAPY INITIAL EVALUATION ADULT - PLANNED THERAPY INTERVENTIONS, PT EVAL
stair neg: GOAL: pt will neg 4 steps 1 HR ind in 2wks./bed mobility training/gait training/strengthening/transfer training

## 2023-05-17 NOTE — PROVIDER CONTACT NOTE (OTHER) - ASSESSMENT
Pt A&Ox4. Pt asked to have her ice hot cream applied to back. Explained to patient that Rn could not apply a medication without order. Pt requested a patch to her lower back.

## 2023-05-18 VITALS
TEMPERATURE: 98 F | SYSTOLIC BLOOD PRESSURE: 111 MMHG | OXYGEN SATURATION: 98 % | DIASTOLIC BLOOD PRESSURE: 66 MMHG | HEART RATE: 58 BPM | RESPIRATION RATE: 18 BRPM

## 2023-05-18 LAB
ANION GAP SERPL CALC-SCNC: 14 MMOL/L — SIGNIFICANT CHANGE UP (ref 5–17)
BUN SERPL-MCNC: 21 MG/DL — SIGNIFICANT CHANGE UP (ref 7–23)
CALCIUM SERPL-MCNC: 9.3 MG/DL — SIGNIFICANT CHANGE UP (ref 8.4–10.5)
CHLORIDE SERPL-SCNC: 107 MMOL/L — SIGNIFICANT CHANGE UP (ref 96–108)
CO2 SERPL-SCNC: 21 MMOL/L — LOW (ref 22–31)
CREAT SERPL-MCNC: 0.79 MG/DL — SIGNIFICANT CHANGE UP (ref 0.5–1.3)
EGFR: 91 ML/MIN/1.73M2 — SIGNIFICANT CHANGE UP
GLUCOSE SERPL-MCNC: 102 MG/DL — HIGH (ref 70–99)
HCT VFR BLD CALC: 32.4 % — LOW (ref 34.5–45)
HGB BLD-MCNC: 10.2 G/DL — LOW (ref 11.5–15.5)
MCHC RBC-ENTMCNC: 26.4 PG — LOW (ref 27–34)
MCHC RBC-ENTMCNC: 31.5 GM/DL — LOW (ref 32–36)
MCV RBC AUTO: 83.9 FL — SIGNIFICANT CHANGE UP (ref 80–100)
NRBC # BLD: 0 /100 WBCS — SIGNIFICANT CHANGE UP (ref 0–0)
PLATELET # BLD AUTO: 272 K/UL — SIGNIFICANT CHANGE UP (ref 150–400)
POTASSIUM SERPL-MCNC: 4.2 MMOL/L — SIGNIFICANT CHANGE UP (ref 3.5–5.3)
POTASSIUM SERPL-SCNC: 4.2 MMOL/L — SIGNIFICANT CHANGE UP (ref 3.5–5.3)
RBC # BLD: 3.86 M/UL — SIGNIFICANT CHANGE UP (ref 3.8–5.2)
RBC # FLD: 14.7 % — HIGH (ref 10.3–14.5)
SODIUM SERPL-SCNC: 142 MMOL/L — SIGNIFICANT CHANGE UP (ref 135–145)
WBC # BLD: 10.23 K/UL — SIGNIFICANT CHANGE UP (ref 3.8–10.5)
WBC # FLD AUTO: 10.23 K/UL — SIGNIFICANT CHANGE UP (ref 3.8–10.5)

## 2023-05-18 PROCEDURE — 99233 SBSQ HOSP IP/OBS HIGH 50: CPT

## 2023-05-18 RX ORDER — HYDROMORPHONE HYDROCHLORIDE 2 MG/ML
2 INJECTION INTRAMUSCULAR; INTRAVENOUS; SUBCUTANEOUS ONCE
Refills: 0 | Status: DISCONTINUED | OUTPATIENT
Start: 2023-05-18 | End: 2023-05-18

## 2023-05-18 RX ORDER — HYDROMORPHONE HYDROCHLORIDE 2 MG/ML
2 INJECTION INTRAMUSCULAR; INTRAVENOUS; SUBCUTANEOUS EVERY 6 HOURS
Refills: 0 | Status: DISCONTINUED | OUTPATIENT
Start: 2023-05-18 | End: 2023-05-19

## 2023-05-18 RX ORDER — MORPHINE SULFATE 50 MG/1
2 CAPSULE, EXTENDED RELEASE ORAL ONCE
Refills: 0 | Status: DISCONTINUED | OUTPATIENT
Start: 2023-05-18 | End: 2023-05-18

## 2023-05-18 RX ADMIN — Medication 15 MILLIGRAM(S): at 21:37

## 2023-05-18 RX ADMIN — HYDROMORPHONE HYDROCHLORIDE 2 MILLIGRAM(S): 2 INJECTION INTRAMUSCULAR; INTRAVENOUS; SUBCUTANEOUS at 20:14

## 2023-05-18 RX ADMIN — QUETIAPINE FUMARATE 300 MILLIGRAM(S): 200 TABLET, FILM COATED ORAL at 21:37

## 2023-05-18 RX ADMIN — Medication 15 MILLIGRAM(S): at 05:20

## 2023-05-18 RX ADMIN — Medication 15 MILLIGRAM(S): at 10:58

## 2023-05-18 RX ADMIN — HYDROMORPHONE HYDROCHLORIDE 2 MILLIGRAM(S): 2 INJECTION INTRAMUSCULAR; INTRAVENOUS; SUBCUTANEOUS at 16:52

## 2023-05-18 RX ADMIN — LAMOTRIGINE 200 MILLIGRAM(S): 25 TABLET, ORALLY DISINTEGRATING ORAL at 13:00

## 2023-05-18 RX ADMIN — TIZANIDINE 2 MILLIGRAM(S): 4 TABLET ORAL at 05:20

## 2023-05-18 RX ADMIN — MORPHINE SULFATE 2 MILLIGRAM(S): 50 CAPSULE, EXTENDED RELEASE ORAL at 09:33

## 2023-05-18 RX ADMIN — Medication 5 MILLIGRAM(S): at 20:14

## 2023-05-18 RX ADMIN — Medication 5 MILLIGRAM(S): at 09:04

## 2023-05-18 RX ADMIN — MORPHINE SULFATE 2 MILLIGRAM(S): 50 CAPSULE, EXTENDED RELEASE ORAL at 09:03

## 2023-05-18 RX ADMIN — TIZANIDINE 2 MILLIGRAM(S): 4 TABLET ORAL at 13:00

## 2023-05-18 RX ADMIN — TIZANIDINE 2 MILLIGRAM(S): 4 TABLET ORAL at 21:37

## 2023-05-18 RX ADMIN — PANTOPRAZOLE SODIUM 40 MILLIGRAM(S): 20 TABLET, DELAYED RELEASE ORAL at 05:20

## 2023-05-18 RX ADMIN — SERTRALINE 100 MILLIGRAM(S): 25 TABLET, FILM COATED ORAL at 13:00

## 2023-05-18 NOTE — CONSULT NOTE ADULT - SUBJECTIVE AND OBJECTIVE BOX
ORTHOPAEDIC SURGERY SPINE CONSULT NOTE     Patient is a 52y Female who presents c/o worsening RLE pain x 1 day. Patient was seen by ortho surgeon Dr. Warner from [facility] a few weeks ago and was sent to get CT/MRI of her lumbar spine. She states that she called the office today because of worsening pain and they told her to come for MRI. States that she has been incontinent of urine for the past 11 days and of bowel for the past 3 days. States that she was able to walk today with a cane but pain got worse after that. Endorses numbness of both her feet.    Patient states the her previous ortho surgeon thought her symptoms were due to anxiety so looked for another surgeon.         PAST MEDICAL & SURGICAL HISTORY:  Anxiety    Depression    Post traumatic stress disorder    IBS (irritable bowel syndrome)    Anemia    Fibromyalgia    GERD (gastroesophageal reflux disease)    Anxiety    Abnormal uterine bleeding    Chronic lower back pain    Depression    H/O gastric bypass    H/O abdominoplasty    H/O  section  X3 (,,)    Bowel obstruction      H/O tubal ligation  , s/p ovarian ablation        Allergies    aspirin (Other)  sulfa drugs (Anaphylaxis)  NSAIDs (Other)    Intolerances                              10.0   8.03  )-----------( 250      ( 16 May 2023 14:16 )             32.3       16 May 2023 14:16    142    |  110    |  16     ----------------------------<  69     4.0     |  20     |  1.11     Ca    8.9        16 May 2023 14:16    TPro  6.6    /  Alb  4.3    /  TBili  0.2    /  DBili  x      /  AST  23     /  ALT  11     /  AlkPhos  94     16 May 2023 14:16      PT/INR - ( 16 May 2023 14:16 )   PT: 10.9 sec;   INR: 0.95 ratio         PTT - ( 16 May 2023 14:16 )  PTT:27.1 sec        Vital Signs Last 24 Hrs  T(C): 36.8 (23 @ 17:30), Max: 36.9 (23 @ 12:47)  T(F): 98.2 (23 @ 17:30), Max: 98.4 (23 @ 12:47)  HR: 74 (23 @ 17:30) (74 - 83)  BP: 134/80 (23 @ 17:30) (106/63 - 134/80)  BP(mean): --  RR: 20 (23 @ 17:30) (18 - 20)  SpO2: 99% (23 @ 17:30) (96% - 99%)    Physical Exam:  Gen: NAD  Resp: Non labored breathing  Spine:  PAIN AND EFFORT LIMITED     Skin intact  No gross deformity  No midline TTP C/T/L/S spine  No bony step offs  No paraspinal muscle ttp/hypertonicity   Negative clonus  Negative babinski  Negative tejeda    - rectal tone  no saddle anesthesia    Motor:              C5(Del/Bi)         C6(EF/WE)           C7 (EE/WF)           C8 (FDS)           T1 (FAbd)  R            5/5                    5/5                        5/5                           5/5                   5/5  L             5/5                    5/5                        5/5                            5/5                  5/5                L2 (IP)            L3 (Quad)            L4 (TA)               L5 (EHL)            S1(Gas)        R           5/5                       5/5                 5/5                       5/5                    5/5                L           5/5                       5/5                 3/5                       3/5                    2/5                   Sensory:            C5         C6         C7      C8       T1        (0=absent, 1=impaired, 2=normal, NT=not testable)  R         2            2           2        2         2  L          2            2           2        2         2               L2          L3         L4      L5       S1         (0=absent, 1=impaired, 2=normal, NT=not testable)  R         2            2            2        2        2  L          2            2           2        2         2    Imaging:    ACC: [id number] EXAM:  MR SPINE LUMBAR   ORDERED BY:  OPAL JARAMILLO     PROCEDURE DATE:  2023          INTERPRETATION:  MR LUMBAR SPINE    Clinical information: c/f cord compression- new LE numbness, weakness,   incontinence    ADDITIONAL CLINICAL INFORMATION: Not Applicable    A noncontrast MRI of the lumbar spine was performed.    TECHNIQUE:  In the sagittal plane T1, T2, and STIR imaging was performed.  In the   axial plane T1 and T2 weighted imaging was utilized. In the coronal plane   T1 weighted images were obtained. Sagittal imaging includes T9-S4.    COMPARISON:  Exam is compared to prior study of 4/10/2023    FINDINGS:  SPINAL COLUMN:  Mild to moderate levoscoliosis centered at L3.  No subluxation.  Disc desiccation is seen throughout. Varying degrees of disc space   narrowing greatest at the thoracolumbar junction with associated endplate   degenerative changes/osteophytes.    DISC LEVELS:  Lower thoracic spine: Mild osteophytes and mild facet degenerative   changes with mild degrees of canal narrowing.  T12-L1: Mild bulge/mild facet DJD. No significant spinal canal narrowing.   Mild neural foramina narrowing.  L1-2: Mild disc bulge. Mild facet degenerative changes. Mild canal and   bilateral neural foramina narrowing.  L2-3: Mild disc bulge. Previously seen right paracentral disc protrusion   has improved with small residual. Right paracentral/foraminal annular   fissure. Mild facet degenerative changes. Mild canal, moderate right and   mild left neural foramina narrowing.  L3-4: Mild disc bulge. Mild facet degenerative changes. Mild to moderate   canal and mild bilateral neural foramina narrowing  L4-5: Mild disc bulge. Moderate facet degenerative changes. Mild to   moderate canal and moderate bilateral neural foramina narrowing  L5-S1: Mild disc bulge. Moderate facet degenerative changes. Moderate   left neural foramina narrowing. Spinal canal and right neural foramina   are patent    CANAL CONTENTS:  No compression of the distal cord or cauda equina.  No epidural collection. The tip of the conus is at the L1-2 level    OTHER:  The visualized sacrum is unremarkable.    IMPRESSION:  No evidence of compression of the distal cord or cauda equina. No   epidural collection.  Previously seen right paracentral disc protrusion at L2-3 has improved.  Other mild to moderate multilevel degenerative changes are unchanged.    --- End of Report ---            ALMITA DUARTE MD; Attending Radiologist      
Chief Complaint:  Patient is a 52y old  Female who presents with a chief complaint of LBP (18 May 2023 10:45)    HPI:  Pt is a 51yo F, well known to our service from past admissions, w/ PMH of IBS, fibromyalgia, chronic lower back pain, chronic anxiety, GERD and s/p gastric bypass presents with back pain and concern for compression, sent in by outpt spine for MRI.  States she has been having ongoing back pain for a long time which she has followed outpt for. New symptoms, however, include urinary incontinence over the past  2 weeks and some fecal incontinence over the past three days (though reports ability to hold until reaching toilet) and numbness of b/l LE. Still able to ambulate, though reports difficulty after prolonged standing for thirty minutes.       Current Pain Score: 9/10    Current out- patient pain regimen: none    Out Patient Pain Management provider: none    [facility] Prescription Monitoring Program: Reference #[id number]    PAST MEDICAL & SURGICAL HISTORY:  Depression      Post traumatic stress disorder      IBS (irritable bowel syndrome)      Anemia      Fibromyalgia      GERD (gastroesophageal reflux disease)      Anxiety      Abnormal uterine bleeding      Chronic lower back pain      H/O gastric bypass      H/O abdominoplasty      H/O  section  X3 (,,)      Bowel obstruction        H/O tubal ligation  , s/p ovarian ablation        FAMILY HISTORY:  FH: diabetes mellitus (Father)    FH: rheumatoid arthritis (Father)      SOCIAL HISTORY:  Tobacco Use: denies  Alcohol Use: denies  Recreational Marijuana: denies  Illicit Drug Use: denies    Opioid Risk Tool (ORT-OUD) Score: low      Allergies    aspirin (Other)  sulfa drugs (Anaphylaxis)  NSAIDs (Other)    Intolerances    None known    REVIEW OF SYSTEMS:  CONSTITUTIONAL: No fever, weight loss, fatigue, falls  NEURO: No headaches, memory loss, tremors, dizziness or blurred vision  RESP: No shortness of breath, cough  CV: No chest pain, palpitations  GI: No abdominal pain, nausea, vomiting, diarrhea, constipation; pt states she had stool incontinence at home   : Pt states she had urinary incontinence at home; no retention, dysuria  MSK: No joint pain; + low back pain; no progressive upper or lower motor strength weakness; pt states she had saddle anesthesia at home  SKIN: No itching, burning, rashes    PSYCHIATRIC: No depression, anxiety, mood swings, or difficulty sleeping      MEDICATIONS  (STANDING):  busPIRone 15 milliGRAM(s) Oral three times a day  enoxaparin Injectable 40 milliGRAM(s) SubCutaneous every 24 hours  lamoTRIgine 200 milliGRAM(s) Oral daily  pantoprazole    Tablet 40 milliGRAM(s) Oral before breakfast  polyethylene glycol 3350 17 Gram(s) Oral two times a day  QUEtiapine 300 milliGRAM(s) Oral at bedtime  senna 2 Tablet(s) Oral at bedtime  sertraline 100 milliGRAM(s) Oral daily  tiZANidine 2 milliGRAM(s) Oral every 8 hours    MEDICATIONS  (PRN):  acetaminophen     Tablet .. 650 milliGRAM(s) Oral every 6 hours PRN Temp greater or equal to 38C (100.4F), Mild Pain (1 - 3)  diazepam    Tablet 5 milliGRAM(s) Oral every 6 hours PRN Anxiety  HYDROmorphone   Tablet 2 milliGRAM(s) Oral every 6 hours PRN Severe Pain (7 - 10)      PHYSICAL EXAM  GENERAL: seen at bedside; sleeping and difficult to arouse; NAD; well developed, well groomed   HEENT: head atraumatic, normocephalic; anicteric; speech clear, catastrophizing   NEURO: A + O X 3; good concentration; Cranial Nerves II- XII intact; SILT  GI: abdomen soft, non distended; + BM; appetite good  :  voiding  EXTREMITIES/ PV: DUNN equally; 2+ peripheral pulses; no cyanosis, edema or clubbing  MUSCULOSKELETAL: motor strength 5/5 B/L LE; ambulates with cane  SKIN: no rashes, lesions; endorsing itch without rash from opiates  PSYCH: affect broad; good eye contact; + anxiety  Vital Signs:  T(C): 36.3 (23 @ 12:35)  HR: 64 (23 @ 12:35)  BP: 145/85 (23 @ 12:35)  RR: 18 (23 @ 12:35)  SpO2: 100% (23 @ 12:35)    Pertinent labs/radiology:                        10.2   10.23 )-----------( 272      ( 18 May 2023 07:17 )             32.4       142  |  107  |  21  ----------------------------<  102<H>  4.2   |  21<L>  |  0.79    Ca    9.3      18 May 2023 07:17  Phos  3.4       Mg     2.1         from: MR Lumbar Spine No Cont (23 @ 16:49)   IMPRESSION:  No evidence of compression of the distal cord or cauda equina. No   epidural collection.  Previously seen right paracentral disc protrusion at L2-3 has improved.  Other mild to moderate multilevel degenerative changes are unchanged.    from: MR Thoracic Spine No Cont (04.10.23 @ 21:24)   IMPRESSION:    1. THORACIC SPINE:   Low-grade degenerative disc disease not   substantially changed from 3/12/2023. Thoracic cord is intact.    2. LUMBAR SPINE:   Lumbar degenerative disc disease is intermediate grade   and not substantially changed from 3/12/2023. Distal cord, conus and   cauda equina remain intact    3. Gadolinium-enhanced images may be considered given the clinical   indication    from: MR Lumbar Spine No Cont (23 @ 19:06)   IMPRESSION:    1. CERVICAL SPINE:   C6-C7 focal left central disc protrusion (disc   herniation)  causes ventral cord deformity. C7-T1 focal left central disc   protrusion      2. THORACIC SPINE:   Vertebral malalignment appears similar to previous.   No high-grade canal compromise     3. LUMBAR SPINE:   L2-L3 shallow right subarticular and foraminal disc   protrusion and L4-L5 shallow left foraminal and lateral disc protrusion   (disc herniations). Degenerative disc pathology in the lumbar spine may   be mildly progressed compared to

## 2023-05-18 NOTE — CONSULT NOTE ADULT - ASSESSMENT
Assessment and Plan:    Patient is a  52y y/o Female presents with back pain and subjective weakness. Exam pain and effort limited. Signs and symptoms not concerning for cauda equina syndrome. Imaging also not concerning for cauda equina syndrome. Patient currently hemodynamically stable.     - Pain control : multimodal  - Recommend chronic pain consult  - Could potentially benefit from steroid taper:    --10mg decadron IV q6h x24h (if patient is being discharged, may go directly to PO)    --6mg decadron PO q4h x24h    --4mg decadron PO q4h x24h    --3mg decadron PO q4h x24h    --2mg decadron PO q4h x24h    --1mg decadron PO q4h x24h  - WBAT with assistive devices as needed  - SCDs  - No acute orthopaedic surgical intervention  - Follow up with patient ortho surgeon when discharged  - Will discuss attending orthopaedic spine surgeon on call and advise if plans change    Orthopaedic Surgery  [facility] [phone]  J        [id number]  [facility]  [phone]/ [phone]    
Pt is a 51yo F, well known to our service from past admissions, w/ PMH of IBS, fibromyalgia, chronic lower back pain, chronic anxiety, GERD and s/p gastric bypass presents with back pain and concern for compression, sent in by outpt spine for MRI.  States she has been having ongoing back pain for a long time which she has followed outpt for. New symptoms, however, include urinary incontinence over the past  2 weeks and some fecal incontinence over the past three days (though reports ability to hold until reaching toilet) and numbness of b/l LE. Still able to ambulate, though reports difficulty after prolonged standing for thirty minutes.     Current out- patient pain regimen: none  Out Patient Pain Management provider: none    P with generalized back pain radiating distally to coccyx with bowel and bladder incontinence.  Pt seen in March 2023 and in April 2023 for similar symptoms.   In April, demanded GENOVEVA inhouse, explained that this procedure is performed outpatient.  Is now demanding lumbar surgical procedure, states she wants to be transferred to [facility] as "her doctors are there and they will do surgery".  Also informed me that she has been seen at [facility], [facility], and [facility], and all have told her no back surgery.  AS with past admissions, inconsistencies in patient's recollections and presentation; pt hard to arouse from sleep; during interview patient's eyes would drift and close, but she would continue to speak.       Refusing neurontin 2/2 somnolence in past, and no effect with Cymbalta in the past.  Avoid NSAIDs and Extended Release opioids 2/2 GI hx.   Avoid Tramadol 2/2 anti-depressant use (risk of serotonin syndrome).  States no relief with Oxy IR, morphine and tramadol in the past.    Discontinue morphine IR.  Start PO dilaudid 2 mg Q 6 hours PRN pain.  Continue tizanidine 2 mg every 8 hours, can change to PRN spasm on discharge.    Would avoid escalation of opiates and avoid IV opiates.    Monitor for sedation, respiratory depression.  OOB per primary team.    Follow up with Orthopedics after discharge.  Would discharge on PO dilaudid 1 mg TID PRN x 3 days only.  Discharge with a narcan rescue kit (naloxone 4 mg/ 0.1 ml nasal spray- 1 spray Q 2-3 minutes alternating between nostrils).      Minutes spent on total encounter: 45 minutes      Chronic Pain Service  [phone]

## 2023-05-18 NOTE — PROGRESS NOTE ADULT - ASSESSMENT
51yo F w/ PMH of IBS, fibromyalgia, chronic lower back pain, chronic anxiety, GERD and s/p gastric bypass pw back pain and c/f compression, sent in by oupt spine now s/p MRI L spine w/out c/f compression, admitted for pain control.       # Lumbar Radiculopathy  - Pain control   - No acute Orthopedic Surgical Intervention per Ortho  - Follow up with ortho outpatient surgeon when discharged  - C/w Bowel regimen  - PT ongoing  - Fall precautions  -C/w Pain meds, Steroids and Tizanidine  - House Pain management  consult pending    #Anemia  - Serial Cbc    #Anxiety  - C/w Valium prn, Buspar, Lamictal, Zoloft    #IBS  - monitor BM    #GI ppx  - PPI & Miralax    # DVT ppx  - Lovenox  - IPCs    [facility]  [phone]

## 2023-05-19 ENCOUNTER — TRANSCRIPTION ENCOUNTER (OUTPATIENT)
Age: 53
End: 2023-05-19

## 2023-05-19 PROCEDURE — 99285 EMERGENCY DEPT VISIT HI MDM: CPT | Mod: 25

## 2023-05-19 PROCEDURE — 85730 THROMBOPLASTIN TIME PARTIAL: CPT

## 2023-05-19 PROCEDURE — 36415 COLL VENOUS BLD VENIPUNCTURE: CPT

## 2023-05-19 PROCEDURE — 72148 MRI LUMBAR SPINE W/O DYE: CPT | Mod: MA

## 2023-05-19 PROCEDURE — 83735 ASSAY OF MAGNESIUM: CPT

## 2023-05-19 PROCEDURE — 82962 GLUCOSE BLOOD TEST: CPT

## 2023-05-19 PROCEDURE — 84100 ASSAY OF PHOSPHORUS: CPT

## 2023-05-19 PROCEDURE — 96375 TX/PRO/DX INJ NEW DRUG ADDON: CPT

## 2023-05-19 PROCEDURE — 80048 BASIC METABOLIC PNL TOTAL CA: CPT

## 2023-05-19 PROCEDURE — 85027 COMPLETE CBC AUTOMATED: CPT

## 2023-05-19 PROCEDURE — 80053 COMPREHEN METABOLIC PANEL: CPT

## 2023-05-19 PROCEDURE — 96376 TX/PRO/DX INJ SAME DRUG ADON: CPT

## 2023-05-19 PROCEDURE — 97162 PT EVAL MOD COMPLEX 30 MIN: CPT

## 2023-05-19 PROCEDURE — 84702 CHORIONIC GONADOTROPIN TEST: CPT

## 2023-05-19 PROCEDURE — 85025 COMPLETE CBC W/AUTO DIFF WBC: CPT

## 2023-05-19 PROCEDURE — 96374 THER/PROPH/DIAG INJ IV PUSH: CPT

## 2023-05-19 PROCEDURE — 85610 PROTHROMBIN TIME: CPT

## 2023-05-19 RX ORDER — DEXAMETHASONE 0.5 MG/5ML
1 ELIXIR ORAL
Qty: 50 | Refills: 0
Start: 2023-05-19 | End: 2023-05-23

## 2023-05-19 RX ORDER — HYDROMORPHONE HYDROCHLORIDE 2 MG/ML
1 INJECTION INTRAMUSCULAR; INTRAVENOUS; SUBCUTANEOUS
Qty: 12 | Refills: 0
Start: 2023-05-19 | End: 2023-05-21

## 2023-05-19 RX ORDER — SENNA PLUS 8.6 MG/1
2 TABLET ORAL
Qty: 0 | Refills: 0 | DISCHARGE
Start: 2023-05-19

## 2023-05-19 RX ORDER — NALOXONE HYDROCHLORIDE 4 MG/.1ML
4 SPRAY NASAL
Qty: 1 | Refills: 0
Start: 2023-05-19 | End: 2023-05-19

## 2023-05-19 RX ORDER — POLYETHYLENE GLYCOL 3350 17 G/17G
17 POWDER, FOR SOLUTION ORAL
Qty: 0 | Refills: 0 | DISCHARGE
Start: 2023-05-19

## 2023-05-19 RX ADMIN — Medication 15 MILLIGRAM(S): at 05:41

## 2023-05-19 RX ADMIN — Medication 5 MILLIGRAM(S): at 11:33

## 2023-05-19 RX ADMIN — TIZANIDINE 2 MILLIGRAM(S): 4 TABLET ORAL at 05:41

## 2023-05-19 NOTE — CHART NOTE - NSCHARTNOTEFT_GEN_A_CORE
Pt is a 51yo F, well known to our service from past admissions, w/ PMH of IBS, fibromyalgia, chronic lower back pain, chronic anxiety, GERD and s/p gastric bypass presents with back pain and concern for compression, sent in by outpt spine for MRI.  States she has been having ongoing back pain for a long time which she has followed outpt for. New symptoms, however, include urinary incontinence over the past  2 weeks and some fecal incontinence over the past three days (though reports ability to hold until reaching toilet) and numbness of b/l LE. Still able to ambulate, though reports difficulty after prolonged standing for thirty minutes.     Current out- patient pain regimen: none  Out Patient Pain Management provider: none    P with generalized back pain radiating distally to coccyx with bowel and bladder incontinence per pt.  Pt seen in March 2023 and in April 2023 for similar symptoms.   In April, demanded [facility]A inhouse, explained that this procedure is performed outpatient, and referrals made.  Is now demanding lumbar surgical procedure, states she wants to be transferred to [facility] as "her doctors are there and they will do surgery".  Also informed me that she has been seen at [facility], and [facility], and all have told her no back surgery.  As with past admissions, inconsistencies in patient's recollections and presentation; pt hard to arouse from sleep; during interview patient's eyes would drift and close, but she would continue to speak.       Refusing neurontin 2/2 somnolence in past, and no effect with Cymbalta in the past.  Avoid NSAIDs and Extended Release opioids 2/2 GI hx.   Avoid Tramadol 2/2 anti-depressant use (risk of serotonin syndrome).  States no relief with Oxy IR, morphine and tramadol in the past.    Preparing for discharge home.    Follow up with Orthopedics after discharge.  Would discharge on PO dilaudid 1 mg TID PRN x 3 days only; and tizanidine 2 mg every 8 hours PRN.  Discharge with a narcan rescue kit (naloxone 4 mg/ 0.1 ml nasal spray- 1 spray Q 2-3 minutes alternating between nostrils).    Signing off.      Chronic Pain Service  [phone] Pt is a 51yo F, well known to our service from past admissions, w/ PMH of IBS, fibromyalgia, chronic lower back pain, chronic anxiety, GERD and s/p gastric bypass presents with back pain and concern for compression, sent in by outpt spine for MRI.  States she has been having ongoing back pain for a long time which she has followed outpt for. New symptoms, however, include urinary incontinence over the past  2 weeks and some fecal incontinence over the past three days (though reports ability to hold until reaching toilet) and numbness of b/l LE. Still able to ambulate, though reports difficulty after prolonged standing for thirty minutes.     Current out- patient pain regimen: none  Out Patient Pain Management provider: none    P with generalized back pain radiating distally to coccyx with bowel and bladder incontinence per pt.  Pt seen in March 2023 and in April 2023 for similar symptoms.   In April, demanded [facility]A inhouse, explained that this procedure is performed outpatient, and referrals made.  Is now demanding lumbar surgical procedure, states she wants to be transferred to [facility] as "her doctors are there and they will do surgery".  Lumbar MRIs March, April and May 2023 without significant change.  Also informed me that she has been seen at [facility], [facility], and [facility], and all have told her no back surgery.  As with past admissions, inconsistencies in patient's recollections and presentation; pt hard to arouse from sleep; during interview patient's eyes would drift and close, but she would continue to speak.       Refusing neurontin 2/2 somnolence in past, and no effect with Cymbalta in the past.  Avoid NSAIDs and Extended Release opioids 2/2 GI hx.   Avoid Tramadol 2/2 anti-depressant use (risk of serotonin syndrome).  States no relief with Oxy IR, morphine and tramadol in the past.    Preparing for discharge home.    Follow up with Orthopedics after discharge.  Would discharge on PO dilaudid 1 mg TID PRN x 3 days only; and tizanidine 2 mg every 8 hours PRN.  Discharge with a narcan rescue kit (naloxone 4 mg/ 0.1 ml nasal spray- 1 spray Q 2-3 minutes alternating between nostrils).    Signing off.      Chronic Pain Service  [phone] Pt is a 53yo F, well known to our service from past admissions, w/ PMH of IBS, fibromyalgia, chronic lower back pain, chronic anxiety, GERD and s/p gastric bypass presents with back pain and concern for compression, sent in by outpt spine for MRI.  States she has been having ongoing back pain for a long time which she has followed outpt for. New symptoms, however, include urinary incontinence over the past  2 weeks and some fecal incontinence over the past three days (though reports ability to hold until reaching toilet) and numbness of b/l LE. Still able to ambulate, though reports difficulty after prolonged standing for thirty minutes.     Current out- patient pain regimen: none  Out Patient Pain Management provider: none    P with generalized back pain radiating distally to coccyx with bowel and bladder incontinence per pt.  Pt seen in March 2023 and in April 2023 for similar symptoms.   In April, demanded [facility]A inhouse, explained that this procedure is performed outpatient, and referrals made.  Is now demanding lumbar surgical procedure, states she wants to be transferred to [facility] as "her doctors are there and they will do surgery".  Lumbar MRIs March, April and May 2023 without significant change.  Also informed me that she has been seen at [facility], [facility], and [facility], and all have told her no back surgery.  As with past admissions, inconsistencies in patient's recollections and presentation; pt hard to arouse from sleep; during interview patient's eyes would drift and close, but she would continue to speak.       Refusing neurontin 2/2 somnolence in past, and no effect with Cymbalta in the past.  Avoid NSAIDs and Extended Release opioids 2/2 GI hx.   Avoid Tramadol 2/2 anti-depressant use (risk of serotonin syndrome).  States no relief with Oxy IR, morphine and tramadol in the past.    Preparing for discharge home.    Follow up with Orthopedics after discharge.  Would discharge on PO dilaudid 2 mg BID PRN x 3 days only; and tizanidine 2 mg every 8 hours PRN.  Discharge with a narcan rescue kit (naloxone 4 mg/ 0.1 ml nasal spray- 1 spray Q 2-3 minutes alternating between nostrils).    Signing off.      Chronic Pain Service  [phone]

## 2023-05-19 NOTE — DISCHARGE NOTE NURSING/CASE MANAGEMENT/SOCIAL WORK - HISTORY OF COVID-19 VACCINATION
Implemented All Universal Safety Interventions:  [place] to call system. Call bell, personal items and telephone within reach. Instruct patient to call for assistance. Room bathroom lighting operational. Non-slip footwear when patient is off stretcher. Physically safe environment: no spills, clutter or unnecessary equipment. Stretcher in lowest position, wheels locked, appropriate side rails in place. Yes

## 2023-05-19 NOTE — PROGRESS NOTE ADULT - SUBJECTIVE AND OBJECTIVE BOX
SUBJECTIVE/ OVERNIGHT EVENTS: Patient seen and examined at bedside.  Resting comfortably in bed          --------------------------------------------------------------------------------------------  LABS:                        10.2   10.23 )-----------( 272      ( 18 May 2023 07:17 )             32.4     05-18    142  |  107  |  21  ----------------------------<  102<H>  4.2   |  21<L>  |  0.79    Ca    9.3      18 May 2023 07:17  Phos  3.4     05-17  Mg     2.1     05-17    TPro  6.6  /  Alb  4.3  /  TBili  0.2  /  DBili  x   /  AST  23  /  ALT  11  /  AlkPhos  94  05-16    PT/INR - ( 16 May 2023 14:16 )   PT: 10.9 sec;   INR: 0.95 ratio         PTT - ( 16 May 2023 14:16 )  PTT:27.1 sec  CAPILLARY BLOOD GLUCOSE        RADIOLOGY & ADDITIONAL TESTS:  < from: MR Lumbar Spine No Cont (05.16.23 @ 16:49) >  IMPRESSION:  No evidence of compression of the distal cord or cauda equina. No   epidural collection.  Previously seen right paracentral disc protrusion at L2-3 has improved.  Other mild to moderate multilevel degenerative changes are unchanged.    < end of copied text >      Imaging Personally Reviewed:  [x] YES  [ ] NO    Consultant(s) Notes Reviewed:  [x] YES  [ ] NO    MEDICATIONS  (STANDING):  busPIRone 15 milliGRAM(s) Oral three times a day  enoxaparin Injectable 40 milliGRAM(s) SubCutaneous every 24 hours  lamoTRIgine 200 milliGRAM(s) Oral daily  pantoprazole    Tablet 40 milliGRAM(s) Oral before breakfast  polyethylene glycol 3350 17 Gram(s) Oral two times a day  QUEtiapine 300 milliGRAM(s) Oral at bedtime  senna 2 Tablet(s) Oral at bedtime  sertraline 100 milliGRAM(s) Oral daily  tiZANidine 2 milliGRAM(s) Oral every 8 hours    MEDICATIONS  (PRN):  acetaminophen     Tablet .. 650 milliGRAM(s) Oral every 6 hours PRN Temp greater or equal to 38C (100.4F), Mild Pain (1 - 3)  diazepam    Tablet 5 milliGRAM(s) Oral every 6 hours PRN Anxiety  morphine  IR 15 milliGRAM(s) Oral every 6 hours PRN Severe Pain (7 - 10)        Vital Signs Last 24 Hrs  T(C): 36.4 (18 May 2023 04:39), Max: 36.9 (17 May 2023 11:31)  T(F): 97.6 (18 May 2023 04:39), Max: 98.5 (17 May 2023 11:31)  HR: 61 (18 May 2023 04:39) (60 - 75)  BP: 108/72 (18 May 2023 04:39) (108/72 - 137/78)  BP(mean): --  RR: 18 (18 May 2023 04:39) (16 - 18)  SpO2: 96% (18 May 2023 04:39) (95% - 98%)    Parameters below as of 18 May 2023 04:39  Patient On (Oxygen Delivery Method): room air      I&O's Summary      PHYSICAL EXAM:  GENERAL: NAD, well-developed, comfortable  HEAD:  Atraumatic, Normocephalic  EYES: EOMI, PERRLA, conjunctiva and sclera clear  NECK: Supple, No JVD  CHEST/LUNG: Clear to auscultation bilaterally; No wheeze  HEART: Regular rate and rhythm; No murmurs, rubs, or gallops  ABDOMEN: Soft, Nontender, Nondistended; Bowel sounds present  NEURO: AAOx3, no focal weakness, decreased Sensation BLE, RUE weaker than the LUE  EXTREMITIES:  2+ Peripheral Pulses, No clubbing, cyanosis,   SKIN: No rashes or lesion  
SUBJECTIVE/ OVERNIGHT EVENTS: Patient seen and examined at bedside. Resting comfortably in bed          --------------------------------------------------------------------------------------------  LABS:                        10.6   4.84  )-----------( 256      ( 17 May 2023 07:04 )             33.8     05-17    141  |  106  |  14  ----------------------------<  137<H>  4.4   |  21<L>  |  0.76    Ca    9.4      17 May 2023 07:04  Phos  3.4     05-17  Mg     2.1     05-17    TPro  6.6  /  Alb  4.3  /  TBili  0.2  /  DBili  x   /  AST  23  /  ALT  11  /  AlkPhos  94  05-16    PT/INR - ( 16 May 2023 14:16 )   PT: 10.9 sec;   INR: 0.95 ratio         PTT - ( 16 May 2023 14:16 )  PTT:27.1 sec  CAPILLARY BLOOD GLUCOSE      POCT Blood Glucose.: 142 mg/dL (16 May 2023 23:31)            RADIOLOGY & ADDITIONAL TESTS:    < from: MR Lumbar Spine No Cont (05.16.23 @ 16:49) >  IMPRESSION:  No evidence of compression of the distal cord or cauda equina. No   epidural collection.  Previously seen right paracentral disc protrusion at L2-3 has improved.  Other mild to moderate multilevel degenerative changes are unchanged.    < end of copied text >    Imaging Personally Reviewed:  [x] YES  [ ] NO    Consultant(s) Notes Reviewed:  [x] YES  [ ] NO    MEDICATIONS  (STANDING):  busPIRone 15 milliGRAM(s) Oral three times a day  dexAMETHasone  IVPB 10 milliGRAM(s) IV Intermittent every 6 hours  enoxaparin Injectable 40 milliGRAM(s) SubCutaneous every 24 hours  lamoTRIgine 200 milliGRAM(s) Oral daily  pantoprazole    Tablet 40 milliGRAM(s) Oral before breakfast  polyethylene glycol 3350 17 Gram(s) Oral two times a day  QUEtiapine 300 milliGRAM(s) Oral at bedtime  senna 2 Tablet(s) Oral at bedtime  sertraline 100 milliGRAM(s) Oral daily  tiZANidine 2 milliGRAM(s) Oral every 8 hours    MEDICATIONS  (PRN):  acetaminophen     Tablet .. 650 milliGRAM(s) Oral every 6 hours PRN Temp greater or equal to 38C (100.4F), Mild Pain (1 - 3)  diazepam    Tablet 5 milliGRAM(s) Oral every 6 hours PRN Anxiety  morphine  IR 15 milliGRAM(s) Oral every 6 hours PRN Severe Pain (7 - 10)      Vital Signs Last 24 Hrs  T(C): 36.9 (17 May 2023 11:31), Max: 38 (16 May 2023 23:45)  T(F): 98.5 (17 May 2023 11:31), Max: 100.4 (16 May 2023 23:45)  HR: 60 (17 May 2023 11:31) (58 - 83)  BP: 124/75 (17 May 2023 11:31) (106/63 - 134/80)  BP(mean): --  RR: 16 (17 May 2023 11:31) (16 - 20)  SpO2: 95% (17 May 2023 11:31) (95% - 99%)    Parameters below as of 17 May 2023 11:31  Patient On (Oxygen Delivery Method): room air      I&O's Summary      PHYSICAL EXAM:  GENERAL: NAD, well-developed, comfortable, Obese  HEAD:  Atraumatic, Normocephalic  EYES: EOMI, PERRLA, conjunctiva and sclera clear  NECK: Supple, No JVD  CHEST/LUNG: Clear to auscultation bilaterally; No wheeze  HEART: Regular rate and rhythm; No murmurs, rubs, or gallops  ABDOMEN: Soft, Nontender, Nondistended; Bowel sounds present  NEURO: AAOx3, no focal weakness, decreased Sensation BLE  EXTREMITIES:  2+ Peripheral Pulses, No clubbing, cyanosis,   SKIN: No rashes or lesions  
SUBJECTIVE / OVERNIGHT EVENTS:    Patient seen and examined at bedside. No events noted overnight. Resting comfortably in bed. wants to leave ASAP, does not want to be here anymore.      --------------------------------------------------------------------------------------------  LABS:                        10.2   10.23 )-----------( 272      ( 18 May 2023 07:17 )             32.4     05-18    142  |  107  |  21  ----------------------------<  102<H>  4.2   |  21<L>  |  0.79    Ca    9.3      18 May 2023 07:17        CAPILLARY BLOOD GLUCOSE                RADIOLOGY & ADDITIONAL TESTS:      Imaging Personally Reviewed:  [x] YES  [ ] NO    Consultant(s) Notes Reviewed:  [x] YES  [ ] NO    MEDICATIONS  (STANDING):  busPIRone 15 milliGRAM(s) Oral three times a day  enoxaparin Injectable 40 milliGRAM(s) SubCutaneous every 24 hours  lamoTRIgine 200 milliGRAM(s) Oral daily  pantoprazole    Tablet 40 milliGRAM(s) Oral before breakfast  polyethylene glycol 3350 17 Gram(s) Oral two times a day  QUEtiapine 300 milliGRAM(s) Oral at bedtime  senna 2 Tablet(s) Oral at bedtime  sertraline 100 milliGRAM(s) Oral daily  tiZANidine 2 milliGRAM(s) Oral every 8 hours    MEDICATIONS  (PRN):  acetaminophen     Tablet .. 650 milliGRAM(s) Oral every 6 hours PRN Temp greater or equal to 38C (100.4F), Mild Pain (1 - 3)  diazepam    Tablet 5 milliGRAM(s) Oral every 6 hours PRN Anxiety  HYDROmorphone   Tablet 2 milliGRAM(s) Oral every 6 hours PRN Severe Pain (7 - 10)      Care Discussed with Consultants/Other Providers [x] YES  [ ] NO    Vital Signs Last 24 Hrs  T(C): 36.4 (18 May 2023 21:44), Max: 36.4 (18 May 2023 21:44)  T(F): 97.6 (18 May 2023 21:44), Max: 97.6 (18 May 2023 21:44)  HR: 58 (18 May 2023 21:44) (58 - 64)  BP: 111/66 (18 May 2023 21:44) (111/66 - 145/85)  BP(mean): --  RR: 18 (18 May 2023 21:44) (18 - 18)  SpO2: 98% (18 May 2023 21:44) (98% - 100%)    Parameters below as of 18 May 2023 21:44  Patient On (Oxygen Delivery Method): room air      I&O's Summary          PHYSICAL EXAM:  GENERAL: NAD, well-developed, comfortable  HEAD:  Atraumatic, Normocephalic  EYES: EOMI, PERRLA, conjunctiva and sclera clear  NECK: Supple, No JVD  CHEST/LUNG: Clear to auscultation bilaterally; No wheeze  HEART: Regular rate and rhythm; No murmurs, rubs, or gallops  ABDOMEN: Soft, Nontender, Nondistended; Bowel sounds present  NEURO: AAOx3, no focal weakness, decreased Sensation BLE, RUE weaker than the LUE  EXTREMITIES:  2+ Peripheral Pulses, No clubbing, cyanosis,   SKIN: No rashes or lesion

## 2023-05-19 NOTE — DISCHARGE NOTE PROVIDER - NSDCCPCAREPLAN_GEN_ALL_CORE_FT
PRINCIPAL DISCHARGE DIAGNOSIS  Diagnosis: Lumbar radiculopathy  Assessment and Plan of Treatment: No acuteorthopedic Surgical Interetion   Follow up with orthopedic outpatient  followed by pain managment c/w pain managment   c/w Bowel Regimen      SECONDARY DISCHARGE DIAGNOSES  Diagnosis: Chronic anxiety  Assessment and Plan of Treatment: c/w medications    Diagnosis: Irritable bowel syndrome (IBS)  Assessment and Plan of Treatment: c/w medications

## 2023-05-19 NOTE — DISCHARGE NOTE NURSING/CASE MANAGEMENT/SOCIAL WORK - NSDCPEFALRISK_GEN_ALL_CORE
For information on Fall & Injury Prevention, visit: [email or web address] OR  [email or web address] OR  https://www.cdc.gov/steadi/patient.html

## 2023-05-19 NOTE — DISCHARGE NOTE PROVIDER - NSDCMRMEDTOKEN_GEN_ALL_CORE_FT
busPIRone 15 mg oral tablet: 1 tab(s) orally 3 times a day MDD:3 tabs daily  diazePAM 5 mg oral tablet: 1 tab(s) orally every 6 hours, As Needed -Anxiety - for anxiety MDD:4 tabs daily  lamoTRIgine 200 mg oral tablet, extended release: 1 tab(s) orally once a day  pantoprazole 40 mg oral delayed release tablet: 1 tab(s) orally once a day  polyethylene glycol 3350 oral powder for reconstitution: 17 gram(s) orally 2 times a day  senna leaf extract oral tablet: 2 tab(s) orally once a day (at bedtime)  SEROquel 300 mg oral tablet: 1 tab(s) orally once a day (at bedtime) MDD:1 tab at night  sertraline 100 mg oral tablet: 1 tab(s) orally once a day MDD:2 tabs daily  tiZANidine 2 mg oral tablet: 1 tab(s) orally every 8 hours   busPIRone 15 mg oral tablet: 1 tab(s) orally 3 times a day MDD:3 tabs daily  dexAMETHasone 2 mg oral tablet: 1 tab(s) orally once a day Taper as follows :  6 MG PO Q 4 hours x 24 Hours  4 MG PO Q 4 hours x 24 Hours   3 MG PO Q 4 hours x 24 Hours   2 MG PO Q 4 hours x 24 Hours   1 MG PO Q 4 hours x 24 Hours  diazePAM 5 mg oral tablet: 1 tab(s) orally every 6 hours, As Needed -Anxiety - for anxiety MDD:4 tabs daily  Dilaudid 2 mg oral tablet: 1 tab(s) orally every 6 hours as needed for  severe pain MDD  3 tabs per day MDD: 3  lamoTRIgine 200 mg oral tablet, extended release: 1 tab(s) orally once a day  Narcan 4 mg/0.1 mL nasal spray: 4 milligram(s) intranasally once a day as needed for opoid overdose alternating nostrils every 2 to 3 minutes as need for opoid overdose  pantoprazole 40 mg oral delayed release tablet: 1 tab(s) orally once a day  polyethylene glycol 3350 oral powder for reconstitution: 17 gram(s) orally 2 times a day  senna leaf extract oral tablet: 2 tab(s) orally once a day (at bedtime)  SEROquel 300 mg oral tablet: 1 tab(s) orally once a day (at bedtime) MDD:1 tab at night  sertraline 100 mg oral tablet: 1 tab(s) orally once a day MDD:2 tabs daily  tiZANidine 2 mg oral tablet: 1 tab(s) orally every 8 hours

## 2023-05-19 NOTE — DISCHARGE NOTE NURSING/CASE MANAGEMENT/SOCIAL WORK - PATIENT PORTAL LINK FT
You can access the FollowMyHealth Patient Portal offered by [facility] by registering at the following website: [email or web address]. By joining [employer]’s FollowMyHealth portal, you will also be able to view your health information using other applications (apps) compatible with our system.

## 2023-05-19 NOTE — DISCHARGE NOTE PROVIDER - HOSPITAL COURSE
51yo F w/ PMH of IBS, fibromyalgia, chronic lower back pain, chronic anxiety, GERD and s/p gastric bypass pw back pain and c/f compression, sent in by oupt spine now s/p MRI L spine w/out c/f compression, admitted for pain control.       # Lumbar Radiculopathy  - Pain control   - No acute Orthopedic Surgical Intervention per Ortho  - Follow up with ortho outpatient surgeon when discharged  - C/w Bowel regimen  - PT ongoing  - Fall precautions  -C/w Pain meds, Steroids and Tizanidine  - House Pain management  consult pending    #Anemia  - Serial Cbc    #Anxiety  - C/w Valium prn, Buspar, Lamictal, Zoloft    #IBS  - monitor BM    #GI ppx  - PPI & Miralax    # DVT ppx  - Lovenox  - IPCs    [facility]           Problem/Plan - 1:  ·  Problem: Lumbar radiculopathy.      Problem/Plan - 2:  ·  Problem: Chronic pain syndrome.      Problem/Plan - 3:  ·  Problem: Chronic anxiety.      Problem/Plan - 4:  ·  Problem: Irritable bowel syndrome (IBS).      Problem/Plan - 5:  ·  Problem: Prophylactic measure. 51yo F w/ PMH of IBS, fibromyalgia, chronic lower back pain, chronic anxiety, GERD and s/p gastric bypass pw back pain and c/f compression, sent in by oupt spine now s/p MRI L spine w/out c/f compression, admitted for pain control.       # Lumbar Radiculopathy  - Pain control   - No acute Orthopedic Surgical Intervention per Ortho  - Follow up with ortho outpatient surgeon when discharged  - C/w Bowel regimen  - PT ongoing  - Fall precautions  -C/w Pain meds, Steroids and Tizanidine  Could potentially benefit from steroid taper:    --10mg decadron IV q6h x24h (if patient is being discharged, may go directly to PO)    --6mg decadron PO q4h x24h    --4mg decadron PO q4h x24h    --3mg decadron PO q4h x24h    --2mg decadron PO q4h x24h    --1mg decadron PO q4h x24h    - House Pain management  consult pending    Refusing neurontin 2/2 somnolence in past, and no effect with Cymbalta in the past.  Avoid NSAIDs and Extended Release opioids 2/2 GI hx.   Avoid Tramadol 2/2 anti-depressant use (risk of serotonin syndrome).  States no relief with Oxy IR, morphine and tramadol in the past.  Follow up with Orthopedics after discharge.  Would discharge on PO dilaudid 1 mg TID PRN x 3 days only; and tizanidine 2 mg every 8 hours PRN.  Discharge with a narcan rescue kit (naloxone 4 mg/ 0.1 ml nasal spray- 1 spray Q 2-3 minutes alternating between nostrils).    #Anemia  - Serial Cbc    #Anxiety  - C/w Valium prn, Buspar, Lamictal, Zoloft    #IBS  - monitor BM    #GI ppx  - PPI & Miralax    # DVT ppx  - Lovenox  - IPCs      Discharge/Dispo/Med rec discussed with attending. Patient medically cleared for discharge with outpatient follow up with PCP

## 2023-05-19 NOTE — PROGRESS NOTE ADULT - ASSESSMENT
53 yo F w/ PMH of IBS, fibromyalgia, chronic lower back pain, chronic anxiety, GERD and s/p gastric bypass pw back pain and c/f compression, sent in by oupt spine now s/p MRI L spine w/out c/f compression, admitted for pain control.       # Lumbar Radiculopathy  - Pain control per Pain mgmt -> seen 5/18  - No acute Orthopedic Surgical Intervention per Ortho  - C/w Bowel regimen  - PT ongoing  - Fall precautions  - Follow up with ortho outpatient surgeon when discharged    # Anemia  - Serial cbc's    # Anxiety  - C/w Valium prn, Buspar, Lamictal, Zoloft, Seroquel    # IBS  - Monitor BM    # GI ppx  - Protonix & Miralax    # DVT ppx  - Lovenox sq  - IPCs    Discharge planning    [facility]  [phone]

## 2023-05-19 NOTE — DISCHARGE NOTE PROVIDER - NSFOLLOWUPCLINICS_GEN_ALL_ED_FT
[facility]  Orthopedic Surgery  [address], 3rd & 4th [place] [address]  Phone: [phone]  Fax:

## 2023-05-19 NOTE — DISCHARGE NOTE PROVIDER - CARE PROVIDER_API CALL
Trae Valencia AND RACHANA COBB [facility] OF [address], [facility] [address]  [place] [address]  Phone: [phone]  Fax: [phone]  Follow Up Time:

## 2023-06-06 ENCOUNTER — TRANSCRIPTION ENCOUNTER (OUTPATIENT)
Age: 53
End: 2023-06-06

## 2023-06-06 ENCOUNTER — INPATIENT (INPATIENT)
Facility: HOSPITAL | Age: 53
LOS: 0 days | Discharge: HOME | DRG: 552 | End: 2023-06-06
Attending: STUDENT IN AN ORGANIZED HEALTH CARE EDUCATION/TRAINING PROGRAM | Admitting: STUDENT IN AN ORGANIZED HEALTH CARE EDUCATION/TRAINING PROGRAM
Payer: COMMERCIAL

## 2023-06-06 VITALS
RESPIRATION RATE: 18 BRPM | SYSTOLIC BLOOD PRESSURE: 99 MMHG | OXYGEN SATURATION: 97 % | DIASTOLIC BLOOD PRESSURE: 61 MMHG | WEIGHT: 287.04 LBS | TEMPERATURE: 99 F | HEIGHT: 70 IN | HEART RATE: 65 BPM

## 2023-06-06 VITALS — SYSTOLIC BLOOD PRESSURE: 121 MMHG | DIASTOLIC BLOOD PRESSURE: 67 MMHG | HEART RATE: 59 BPM

## 2023-06-06 DIAGNOSIS — Z98.51 TUBAL LIGATION STATUS: Chronic | ICD-10-CM

## 2023-06-06 DIAGNOSIS — Z98.89 OTHER SPECIFIED POSTPROCEDURAL STATES: Chronic | ICD-10-CM

## 2023-06-06 DIAGNOSIS — K56.60 UNSPECIFIED INTESTINAL OBSTRUCTION: Chronic | ICD-10-CM

## 2023-06-06 DIAGNOSIS — M54.9 DORSALGIA, UNSPECIFIED: ICD-10-CM

## 2023-06-06 LAB
ALBUMIN SERPL ELPH-MCNC: 4.1 G/DL — SIGNIFICANT CHANGE UP (ref 3.3–5)
ALP SERPL-CCNC: 94 U/L — SIGNIFICANT CHANGE UP (ref 40–120)
ALT FLD-CCNC: 11 U/L — SIGNIFICANT CHANGE UP (ref 10–45)
ANION GAP SERPL CALC-SCNC: 11 MMOL/L — SIGNIFICANT CHANGE UP (ref 5–17)
APTT BLD: 26.5 SEC — LOW (ref 27.5–35.5)
AST SERPL-CCNC: 16 U/L — SIGNIFICANT CHANGE UP (ref 10–40)
BASE EXCESS BLDV CALC-SCNC: 0.8 MMOL/L — SIGNIFICANT CHANGE UP (ref -2–3)
BASOPHILS # BLD AUTO: 0.02 K/UL — SIGNIFICANT CHANGE UP (ref 0–0.2)
BASOPHILS NFR BLD AUTO: 0.3 % — SIGNIFICANT CHANGE UP (ref 0–2)
BILIRUB SERPL-MCNC: 0.4 MG/DL — SIGNIFICANT CHANGE UP (ref 0.2–1.2)
BUN SERPL-MCNC: 12 MG/DL — SIGNIFICANT CHANGE UP (ref 7–23)
CA-I SERPL-SCNC: 1.27 MMOL/L — SIGNIFICANT CHANGE UP (ref 1.15–1.33)
CALCIUM SERPL-MCNC: 9 MG/DL — SIGNIFICANT CHANGE UP (ref 8.4–10.5)
CHLORIDE BLDV-SCNC: 107 MMOL/L — SIGNIFICANT CHANGE UP (ref 96–108)
CHLORIDE SERPL-SCNC: 108 MMOL/L — SIGNIFICANT CHANGE UP (ref 96–108)
CO2 BLDV-SCNC: 29 MMOL/L — HIGH (ref 22–26)
CO2 SERPL-SCNC: 22 MMOL/L — SIGNIFICANT CHANGE UP (ref 22–31)
CREAT SERPL-MCNC: 0.88 MG/DL — SIGNIFICANT CHANGE UP (ref 0.5–1.3)
EGFR: 80 ML/MIN/1.73M2 — SIGNIFICANT CHANGE UP
EOSINOPHIL # BLD AUTO: 0.02 K/UL — SIGNIFICANT CHANGE UP (ref 0–0.5)
EOSINOPHIL NFR BLD AUTO: 0.3 % — SIGNIFICANT CHANGE UP (ref 0–6)
GAS PNL BLDV: 139 MMOL/L — SIGNIFICANT CHANGE UP (ref 136–145)
GAS PNL BLDV: SIGNIFICANT CHANGE UP
GAS PNL BLDV: SIGNIFICANT CHANGE UP
GLUCOSE BLDV-MCNC: 84 MG/DL — SIGNIFICANT CHANGE UP (ref 70–99)
GLUCOSE SERPL-MCNC: 88 MG/DL — SIGNIFICANT CHANGE UP (ref 70–99)
HCO3 BLDV-SCNC: 28 MMOL/L — SIGNIFICANT CHANGE UP (ref 22–29)
HCT VFR BLD CALC: 33.5 % — LOW (ref 34.5–45)
HCT VFR BLDA CALC: 31 % — LOW (ref 34.5–46.5)
HGB BLD CALC-MCNC: 10.3 G/DL — LOW (ref 11.7–16.1)
HGB BLD-MCNC: 10.2 G/DL — LOW (ref 11.5–15.5)
IMM GRANULOCYTES NFR BLD AUTO: 0.7 % — SIGNIFICANT CHANGE UP (ref 0–0.9)
INR BLD: 0.9 RATIO — SIGNIFICANT CHANGE UP (ref 0.88–1.16)
LACTATE BLDV-MCNC: 1.2 MMOL/L — SIGNIFICANT CHANGE UP (ref 0.5–2)
LYMPHOCYTES # BLD AUTO: 1.91 K/UL — SIGNIFICANT CHANGE UP (ref 1–3.3)
LYMPHOCYTES # BLD AUTO: 26.7 % — SIGNIFICANT CHANGE UP (ref 13–44)
MCHC RBC-ENTMCNC: 26.2 PG — LOW (ref 27–34)
MCHC RBC-ENTMCNC: 30.4 GM/DL — LOW (ref 32–36)
MCV RBC AUTO: 85.9 FL — SIGNIFICANT CHANGE UP (ref 80–100)
MONOCYTES # BLD AUTO: 0.56 K/UL — SIGNIFICANT CHANGE UP (ref 0–0.9)
MONOCYTES NFR BLD AUTO: 7.8 % — SIGNIFICANT CHANGE UP (ref 2–14)
NEUTROPHILS # BLD AUTO: 4.59 K/UL — SIGNIFICANT CHANGE UP (ref 1.8–7.4)
NEUTROPHILS NFR BLD AUTO: 64.2 % — SIGNIFICANT CHANGE UP (ref 43–77)
NRBC # BLD: 0 /100 WBCS — SIGNIFICANT CHANGE UP (ref 0–0)
PCO2 BLDV: 55 MMHG — HIGH (ref 39–42)
PH BLDV: 7.31 — LOW (ref 7.32–7.43)
PLATELET # BLD AUTO: 238 K/UL — SIGNIFICANT CHANGE UP (ref 150–400)
PO2 BLDV: 26 MMHG — SIGNIFICANT CHANGE UP (ref 25–45)
POTASSIUM BLDV-SCNC: 4 MMOL/L — SIGNIFICANT CHANGE UP (ref 3.5–5.1)
POTASSIUM SERPL-MCNC: 4.1 MMOL/L — SIGNIFICANT CHANGE UP (ref 3.5–5.3)
POTASSIUM SERPL-SCNC: 4.1 MMOL/L — SIGNIFICANT CHANGE UP (ref 3.5–5.3)
PROT SERPL-MCNC: 6.6 G/DL — SIGNIFICANT CHANGE UP (ref 6–8.3)
PROTHROM AB SERPL-ACNC: 10.3 SEC — LOW (ref 10.5–13.4)
RBC # BLD: 3.9 M/UL — SIGNIFICANT CHANGE UP (ref 3.8–5.2)
RBC # FLD: 14.9 % — HIGH (ref 10.3–14.5)
SAO2 % BLDV: 35.4 % — LOW (ref 67–88)
SODIUM SERPL-SCNC: 141 MMOL/L — SIGNIFICANT CHANGE UP (ref 135–145)
WBC # BLD: 7.15 K/UL — SIGNIFICANT CHANGE UP (ref 3.8–10.5)
WBC # FLD AUTO: 7.15 K/UL — SIGNIFICANT CHANGE UP (ref 3.8–10.5)

## 2023-06-06 PROCEDURE — 83605 ASSAY OF LACTIC ACID: CPT

## 2023-06-06 PROCEDURE — 99285 EMERGENCY DEPT VISIT HI MDM: CPT

## 2023-06-06 PROCEDURE — 82803 BLOOD GASES ANY COMBINATION: CPT

## 2023-06-06 PROCEDURE — 84132 ASSAY OF SERUM POTASSIUM: CPT

## 2023-06-06 PROCEDURE — 99285 EMERGENCY DEPT VISIT HI MDM: CPT | Mod: 25

## 2023-06-06 PROCEDURE — 82947 ASSAY GLUCOSE BLOOD QUANT: CPT

## 2023-06-06 PROCEDURE — G1004: CPT

## 2023-06-06 PROCEDURE — 96374 THER/PROPH/DIAG INJ IV PUSH: CPT

## 2023-06-06 PROCEDURE — 85014 HEMATOCRIT: CPT

## 2023-06-06 PROCEDURE — 99283 EMERGENCY DEPT VISIT LOW MDM: CPT

## 2023-06-06 PROCEDURE — 80053 COMPREHEN METABOLIC PANEL: CPT

## 2023-06-06 PROCEDURE — 82330 ASSAY OF CALCIUM: CPT

## 2023-06-06 PROCEDURE — 74177 CT ABD & PELVIS W/CONTRAST: CPT | Mod: QQ

## 2023-06-06 PROCEDURE — 85730 THROMBOPLASTIN TIME PARTIAL: CPT

## 2023-06-06 PROCEDURE — 85610 PROTHROMBIN TIME: CPT

## 2023-06-06 PROCEDURE — 82435 ASSAY OF BLOOD CHLORIDE: CPT

## 2023-06-06 PROCEDURE — 84295 ASSAY OF SERUM SODIUM: CPT

## 2023-06-06 PROCEDURE — 72148 MRI LUMBAR SPINE W/O DYE: CPT | Mod: 26,MG

## 2023-06-06 PROCEDURE — 85025 COMPLETE CBC W/AUTO DIFF WBC: CPT

## 2023-06-06 PROCEDURE — 74177 CT ABD & PELVIS W/CONTRAST: CPT | Mod: 26

## 2023-06-06 PROCEDURE — 72148 MRI LUMBAR SPINE W/O DYE: CPT | Mod: MG

## 2023-06-06 PROCEDURE — 96375 TX/PRO/DX INJ NEW DRUG ADDON: CPT

## 2023-06-06 PROCEDURE — 36415 COLL VENOUS BLD VENIPUNCTURE: CPT

## 2023-06-06 PROCEDURE — 85018 HEMOGLOBIN: CPT

## 2023-06-06 RX ORDER — THIAMINE MONONITRATE (VIT B1) 100 MG
100 TABLET ORAL ONCE
Refills: 0 | Status: COMPLETED | OUTPATIENT
Start: 2023-06-06 | End: 2023-06-06

## 2023-06-06 RX ORDER — LAMOTRIGINE 25 MG/1
200 TABLET, ORALLY DISINTEGRATING ORAL DAILY
Refills: 0 | Status: DISCONTINUED | OUTPATIENT
Start: 2023-06-06 | End: 2023-06-06

## 2023-06-06 RX ORDER — ACETAMINOPHEN 500 MG
1000 TABLET ORAL ONCE
Refills: 0 | Status: COMPLETED | OUTPATIENT
Start: 2023-06-06 | End: 2023-06-06

## 2023-06-06 RX ORDER — ACETAMINOPHEN 500 MG
650 TABLET ORAL ONCE
Refills: 0 | Status: COMPLETED | OUTPATIENT
Start: 2023-06-06 | End: 2023-06-06

## 2023-06-06 RX ORDER — HYDROMORPHONE HYDROCHLORIDE 2 MG/ML
1 INJECTION INTRAMUSCULAR; INTRAVENOUS; SUBCUTANEOUS
Qty: 12 | Refills: 0
Start: 2023-06-06 | End: 2023-06-08

## 2023-06-06 RX ORDER — MORPHINE SULFATE 50 MG/1
4 CAPSULE, EXTENDED RELEASE ORAL ONCE
Refills: 0 | Status: DISCONTINUED | OUTPATIENT
Start: 2023-06-06 | End: 2023-06-06

## 2023-06-06 RX ORDER — PANTOPRAZOLE SODIUM 20 MG/1
1 TABLET, DELAYED RELEASE ORAL
Qty: 0 | Refills: 0 | DISCHARGE

## 2023-06-06 RX ORDER — LAMOTRIGINE 25 MG/1
1 TABLET, ORALLY DISINTEGRATING ORAL
Refills: 0 | DISCHARGE

## 2023-06-06 RX ORDER — QUETIAPINE FUMARATE 200 MG/1
300 TABLET, FILM COATED ORAL AT BEDTIME
Refills: 0 | Status: DISCONTINUED | OUTPATIENT
Start: 2023-06-06 | End: 2023-06-06

## 2023-06-06 RX ORDER — MORPHINE SULFATE 50 MG/1
15 CAPSULE, EXTENDED RELEASE ORAL ONCE
Refills: 0 | Status: DISCONTINUED | OUTPATIENT
Start: 2023-06-06 | End: 2023-06-06

## 2023-06-06 RX ORDER — HYDROMORPHONE HYDROCHLORIDE 2 MG/ML
2 INJECTION INTRAMUSCULAR; INTRAVENOUS; SUBCUTANEOUS EVERY 6 HOURS
Refills: 0 | Status: DISCONTINUED | OUTPATIENT
Start: 2023-06-06 | End: 2023-06-06

## 2023-06-06 RX ORDER — SERTRALINE 25 MG/1
100 TABLET, FILM COATED ORAL DAILY
Refills: 0 | Status: DISCONTINUED | OUTPATIENT
Start: 2023-06-06 | End: 2023-06-06

## 2023-06-06 RX ORDER — HYDROXYZINE HCL 10 MG
25 TABLET ORAL EVERY 6 HOURS
Refills: 0 | Status: DISCONTINUED | OUTPATIENT
Start: 2023-06-06 | End: 2023-06-06

## 2023-06-06 RX ORDER — DIAZEPAM 5 MG
5 TABLET ORAL EVERY 6 HOURS
Refills: 0 | Status: DISCONTINUED | OUTPATIENT
Start: 2023-06-06 | End: 2023-06-06

## 2023-06-06 RX ADMIN — Medication 5 MILLIGRAM(S): at 15:06

## 2023-06-06 RX ADMIN — SERTRALINE 100 MILLIGRAM(S): 25 TABLET, FILM COATED ORAL at 15:06

## 2023-06-06 RX ADMIN — MORPHINE SULFATE 4 MILLIGRAM(S): 50 CAPSULE, EXTENDED RELEASE ORAL at 10:16

## 2023-06-06 RX ADMIN — MORPHINE SULFATE 4 MILLIGRAM(S): 50 CAPSULE, EXTENDED RELEASE ORAL at 10:46

## 2023-06-06 RX ADMIN — LAMOTRIGINE 200 MILLIGRAM(S): 25 TABLET, ORALLY DISINTEGRATING ORAL at 16:55

## 2023-06-06 RX ADMIN — Medication 30 MILLIGRAM(S): at 16:55

## 2023-06-06 RX ADMIN — Medication 100 MILLIGRAM(S): at 08:10

## 2023-06-06 RX ADMIN — Medication 650 MILLIGRAM(S): at 18:29

## 2023-06-06 NOTE — CONSULT NOTE ADULT - SUBJECTIVE AND OBJECTIVE BOX
SURGERY CONSULT NOTE  --------------------------------------------------------------------------------------------  HPI:   52-year-old woman with a history of IBS, fibromyalgia, chronic low back pain, gastric bypass presenting for evaluation of acute on chronic fecal incontinence and urinary incontinence.  She also has ongoing chronic low back pain.  Patient states that she has numbness of both legs as well as the rectal area.  These are symptoms that mirrored presentation in 2023.  Review of notation from that admission reveals patient had MRI of lumbar spine on  which showed no evidence of cauda equina compression.  MRI did show multilevel mild disc bulges but no other acute findings.  Surgery consulted due to hx of jero-en-y gastric by pass. As per patient more than 12 episodes of diarrhea since .     In the ED, WBC 7, H/H 10.2/33.5. VS stable 123/78 HR 64, a febrile.      PAST MEDICAL & SURGICAL HISTORY:  Depression/  Post traumatic stress disorder  IBS (irritable bowel syndrome)  Anemia  Fibromyalgia  GERD (gastroesophageal reflux disease)  Anxiety  Abnormal uterine bleeding  Chronic lower back pain  H/O gastric bypass  H/O abdominoplasty  H/O  section  X3 (,,)  Bowel obstruction    H/O tubal ligation  , s/p ovarian ablation    FAMILY HISTORY:  FH: diabetes mellitus (Father)  FH: rheumatoid arthritis (Father)    [] Family history not pertinent as reviewed with the patient and family    SOCIAL HISTORY:  ***    ALLERGIES: aspirin (Other)  sulfa drugs (Anaphylaxis)  NSAIDs (Other)    HOME MEDICATIONS: ***    CURRENT MEDICATIONS  MEDICATIONS (STANDING):   MEDICATIONS (PRN):  --------------------------------------------------------------------------------------------    Vitals:   T(C): 36.9 (23 @ 06:35), Max: 37 (23 @ 06:12)  HR: 64 (23 @ 06:54) (64 - 69)  BP: 123/78 (23 @ 06:54) (99/61 - 133/73)  RR: 11 (23 @ 06:54) (11 - 18)  SpO2: 100% (23 @ 06:54) (97% - 100%)  CAPILLARY BLOOD GLUCOSE    Height (cm): 177.8 (:12)  ----------------------    LABS  CBC (:02)                              10.2<L>                         7.15    )----------------(  238        64.2  % Neutrophils, 26.7  % Lymphocytes, ANC: 4.59                                33.5<L>    BMP ( 07:02)             141     |  108     |  12    		Ca++ --      Ca 9.0                ---------------------------------( 88    		Mg --                 4.1     |  22      |  0.88  			Ph --        LFTs ( 07:02)      TPro 6.6 / Alb 4.1 / TBili 0.4 / DBili -- / AST 16 / ALT 11 / AlkPhos 94    Coags ( 07:02)  aPTT 26.5<L> / INR 0.90 / PT 10.3<L>        VBG (:33)     7.31<L> / 55<H> / 26 / 28 / 0.8 / 35.4<L>%     Lactate: 1.2    --------------------------------------------------------------------------------------------    IMAGING  Pending CT     --------------------------------------------------------------------------------------------     SURGERY CONSULT NOTE  --------------------------------------------------------------------------------------------  HPI:   52-year-old woman with a history of IBS, fibromyalgia, chronic low back pain, gastric bypass presenting for evaluation of acute on chronic fecal incontinence and urinary incontinence.  She also has ongoing chronic low back pain.  Patient states that she has numbness of both legs as well as the rectal area.  These are symptoms that mirrored presentation in 2023.  Review of notation from that admission reveals patient had MRI of lumbar spine on  which showed no evidence of cauda equina compression.  MRI did show multilevel mild disc bulges but no other acute findings.  Surgery consulted due to hx of jero-en-y gastric by pass>10y ago at [facility] with a retired surgeon the patient does not recall. As per patient more than 12 episodes of diarrhea since . Reports last seeing another bariatric surgeon in october, had unknown testing performed and was advised to follow up routinely.    In the ED, WBC 7, H/H 10.2/33.5. VS stable 123/78 HR 64, a febrile.      PAST MEDICAL & SURGICAL HISTORY:  Depression/  Post traumatic stress disorder  IBS (irritable bowel syndrome)  Anemia  Fibromyalgia  GERD (gastroesophageal reflux disease)  Anxiety  Abnormal uterine bleeding  Chronic lower back pain  H/O gastric bypass  H/O abdominoplasty  H/O  section  X3 (,,)  Bowel obstruction    H/O tubal ligation  , s/p ovarian ablation    FAMILY HISTORY:  FH: diabetes mellitus (Father)  FH: rheumatoid arthritis (Father)    [] Family history not pertinent as reviewed with the patient and family    SOCIAL HISTORY:  ***    ALLERGIES: aspirin (Other)  sulfa drugs (Anaphylaxis)  NSAIDs (Other)    HOME MEDICATIONS: ***    CURRENT MEDICATIONS  MEDICATIONS (STANDING):   MEDICATIONS (PRN):  --------------------------------------------------------------------------------------------    Vitals:   T(C): 36.9 (23 @ 06:35), Max: 37 (23 @ 06:12)  HR: 64 (23 @ 06:54) (64 - 69)  BP: 123/78 (23 @ 06:54) (99/61 - 133/73)  RR: 11 (23 @ 06:54) (11 - 18)  SpO2: 100% (23 @ 06:54) (97% - 100%)  CAPILLARY BLOOD GLUCOSE    Height (cm): 177.8 (:12)  ----------------------    LABS  CBC (:02)                              10.2<L>                         7.15    )----------------(  238        64.2  % Neutrophils, 26.7  % Lymphocytes, ANC: 4.59                                33.5<L>    BMP ( 07:02)             141     |  108     |  12    		Ca++ --      Ca 9.0                ---------------------------------( 88    		Mg --                 4.1     |  22      |  0.88  			Ph --        LFTs (:02)      TPro 6.6 / Alb 4.1 / TBili 0.4 / DBili -- / AST 16 / ALT 11 / AlkPhos 94    Coags ( 07:02)  aPTT 26.5<L> / INR 0.90 / PT 10.3<L>  VBG (:33)     7.31<L> / 55<H> / 26 / 28 / 0.8 / 35.4<L>%     Lactate: 1.2    --------------------------------------------------------------------------------------------  IMAGING    ACC: [id number] EXAM:  CT ABDOMEN AND PELVIS IC   ORDERED BY: QUIANA JORDAN     PROCEDURE DATE:  2023        INTERPRETATION:  CLINICAL INFORMATION: Gastric bypass, diarrhea,   abdominal pain.    COMPARISON: CT abdomen pelvis 2019.      FINDINGS:  LOWER CHEST: Within normal limits.    LIVER: Small hepatic cyst and additional subcentimeter hypodensity too   small to characterize.  BILE DUCTS: Mild intrahepatic and extrahepatic biliary ductal dilatation,   unchanged, which may be related to the postcholecystectomy state.  GALLBLADDER: Cholecystectomy.  SPLEEN: Within normal limits.  PANCREAS: Fatty infiltration.  ADRENALS: Within normal limits.  KIDNEYS/URETERS: Within normal limits.    BLADDER: Within normal limits.  REPRODUCTIVE ORGANS: Uterus and adnexa within normal limits.    BOWEL: Status post gastric bypass. No bowel obstruction or evidence of   bowel inflammation. Appendix is not visualized. No evidence of   inflammation in the pericecal region.  PERITONEUM: No ascites.  VESSELS: Within normal limits.  RETROPERITONEUM/LYMPH NODES: No lymphadenopathy.  ABDOMINAL WALL: Postsurgical changes.  BONES: Degenerative changes.    IMPRESSION:  No evidence of acute intra-abdominal pathology.    --- End of Report ---      VITOR AMADOR MD; Attending Radiologist  This document has been electronically signed. 2023  2:21PM  ------------------------------------------------------------------------------------  Physical Exam:  Gen: appears well groomed  Lungs: Non labored breathing  Abd: Soft, NT, ND   Ext: Moving all extremities spontaneously   Neuro: A&Ox3

## 2023-06-06 NOTE — ED PROVIDER NOTE - ATTENDING CONTRIBUTION TO CARE
Attending MD Salazar:  I personally have seen and examined this patient. I have performed a substantive portion of the visit including all aspects of the medical decision making.  Resident note reviewed and agree on plan of care and except where noted.      52-year-old woman with a history of IBS, fibromyalgia, chronic low back pain, gastric bypass presenting for evaluation of acute on chronic fecal incontinence and urinary incontinence.  She also has ongoing chronic low back pain.  Patient states that she has numbness of both legs as well as the rectal area.  These are symptoms that mirrored presentation in 5/2023.  Review of notation from that admission reveals patient had MRI of lumbar spine on 5/16 which showed no evidence of cauda equina compression.  MRI did show multilevel mild disc bulges but no other acute findings.    Patient on exam is awake and alert.  Patient has 4/5 strength to plantarflexion dorsiflexion as well as knee flexion and extension.  2+ patellar reflexes bilaterally 2+ Achilles reflexes bilaterally.  Rectal tone is normal.  Bladder scan 100.  No clonus bilaterally.  Patient endorses diminished sensation throughout bilateral lower extremities.    Patient presenting for evaluation of chronic low back pain as well as acute on chronic bowel and bladder incontinence.  These are not new findings and have been present before and patient had MRI in the setting of these bowel and bladder changes that was negative for any cauda equina structural disease.  Will attempt to contact patient's outpatient neurologist to see how we can best help patient today as it seems she has had visits in the past for similar and her pain has been very difficult to treat.  We will continue with multimodal analgesia and I would avoid IV opioids for patient's chronic pain syndrome at this time.  We will begin pain management in accordance with previous chronic pain notes.  For the time being urgent spinal imaging does not appear to be of much value at this point given recent spinal imaging on 5/16.            *The above represents an initial assessment/impression. Please refer to progress notes for potential changes in patient clinical course*

## 2023-06-06 NOTE — H&P ADULT - NSHPPHYSICALEXAM_GEN_ALL_CORE
PHYSICAL EXAM:  GENERAL: NAD, well-developed, Uncomfortable in pain  HEAD:  Atraumatic, Normocephalic  EYES: EOMI, PERRLA, conjunctiva and sclera clear  NECK: Supple, No JVD  CHEST/LUNG: Clear to auscultation bilaterally; No wheeze  HEART: Regular rate and rhythm; No murmurs, rubs, or gallops  ABDOMEN: Soft, Nontender, Nondistended; Bowel sounds present  NEURO: AAOx3, no focal weakness, 3/5 b/l LE extremity strength, b/l knee no arthritis, no effusion   EXTREMITIES:  2+ Peripheral Pulses, No clubbing, cyanosis, or edema  SKIN: No rashes or lesions

## 2023-06-06 NOTE — DISCHARGE NOTE PROVIDER - NSDCFUSCHEDAPPT_GEN_ALL_CORE_FT
Gissel Pinzon  [facility]  [facility] [address] [facility]  Scheduled Appointment: 06/26/2023

## 2023-06-06 NOTE — H&P ADULT - ASSESSMENT
This is a 52 year old female PMH: IBS, Fibromyalgia, Chronic back pain, Anxiety, GERD, S/p gastric bypass with recent admission for lower back pain. Presents to [facility] for ongoing lower back pain with associated abd pain, urinary and fecal incontinence     Plan:  INCOMPLETE      # Lumbar Radiculopathy w/ associated urinary/ fecal incontinence decreased LE sensation:  - With Hx of jero-en-y gastric by pass-> pending CT  - MRI L Spine with No spinal canal stenosis or cauda equina compression. L2-L3 right central disc protrusion with right lateral recess narrowing, Grossly stable. No fracture/marrow edema.  - UCx pending  - Pain mgmt prn  - Fall precautions  - Bariatrics following    # IBS:  - Monitor BM's    # Chronic anemia:  - Serial cbc's  - Transfuse prn    # Anxiety:  - C/w home Valium prn, Buspar, Lamictal, Zoloft, Seroquel    # GI ppx:  - C/w bowel regimen prn    # DVT ppx:  - IPC's    [facility]  [phone]       This is a 52 year old female PMH: IBS, Fibromyalgia, Chronic back pain, Anxiety, GERD, S/p gastric bypass with recent admission for lower back pain. Presents to [facility] for ongoing lower back pain with associated abd pain, urinary and fecal incontinence     Plan:    # Lumbar Radiculopathy w/ associated urinary/ fecal incontinence decreased LE sensation:  - With Hx of jero-en-y gastric by pass-> pending CT  - MRI L Spine with No spinal canal stenosis or cauda equina compression. L2-L3 right central disc protrusion with right lateral recess narrowing, Grossly stable. No fracture/marrow edema.  - UCx pending  - Pain mgmt prn  - Fall precautions  - Bariatrics following    # IBS:  - Monitor BM's    # Chronic anemia:  - Serial cbc's  - Transfuse prn    # Anxiety:  - C/w home Valium prn, Buspar, Lamictal, Zoloft, Seroquel    # GI ppx:  - C/w bowel regimen prn    # DVT ppx:  - IPC's    [facility]

## 2023-06-06 NOTE — DISCHARGE NOTE PROVIDER - HOSPITAL COURSE
This is a 52 year old female, PMH: anxiety, depression/bipolar disorder (c/b multiple SA and 3 prior psychiatric hospitalizations),IBS, Fibromyalgia, Chronic back pain, GERD, S/p gastric bypass with recent admissions for lower back pain. Presents to [facility] for ongoing lower back pain with associated abd pain, urinary and fecal incontinence for multiple weeks (?) was discharged from [facility] 5/19/2023. Also with diarrhea x 3 days. On May admission, MRI showed no cord compression. Pain currently is rated 8/10 and described as a sharp stabbing lower back pain that radiates to her BL LE extremities. Denied any known exacerbating or alleviating factors. Associated symptoms are BL LE numbness, fecal/ urinary incontinence decreased ROM and gait instability.  Initial radiological scans noted no acute finding (see below)  MR Lumbar Spine No Cont (06.06.23 @ 09:58)   IMPRESSION:  No spinal canal stenosis or cauda equina compression.  L2-L3 right central disc protrusion with right lateral recess narrowing,   grossly stable.  No fracture/marrow edema.   MR Lumbar Spine No Cont (05.16.23 @ 16:49)   IMPRESSION:  No evidence of compression of the distal cord or cauda equina. No   epidural collection.  Previously seen right paracentral disc protrusion at L2-3 has improved.  Other mild to moderate multilevel degenerative changes are unchanged.  Pain management team consulted to manage acute on chronic pain. Last seen in consultation 5/18/2023. (Recommendations appreciated)  At that time, patient stated she has been having ongoing back pain for a long time which she has followed outpt for. No new symptoms, however, reported urinary incontinence over the past  2 weeks and some fecal incontinence over the past three days (though reports ability to hold until reaching toilet), and numbness of b/l LE.         Refused neurontin 2/2 somnolence in past; and no effect with Cymbalta in the past.  Trialed lyrica in May outpatient without effect.  Avoid NSAIDs and Extended Release opioids 2/2 GI hx.   Avoid Tramadol 2/2 anti-depressant use (risk of serotonin syndrome).  States no relief with Oxy IR, morphine and tramadol in the past.    Would start PO dilaudid 2 mg Q 6 hours PRN pain.  Start tizanidine 2 mg every 8 hours, can change to PRN spasm on discharge.  Monitor for sedation, respiratory depression.  Pt is requesting to be d/c tonight    Follow up with Orthopedics after discharge.  As discussed multiple times in the past, needs to follow up with outpatient pain management.  Would discharge on PO dilaudid 2 mg BID PRN x 3 days only.  Discharge with a narcan rescue kit (naloxone 4 mg/ 0.1 ml nasal spray- 1 spray Q 2-3 minutes alternating between nostrils).    Pt placed on Complex Care for multiple hospital visits and non-compliance with outpatient follow up.  Follow up with Dr Jenkins (private Neurologist) in the community as scheduled.  Case d/w Dr. Wang agrees with plan

## 2023-06-06 NOTE — ED ADULT NURSE NOTE - PAIN: PRESENCE, MLM
Physically safe environment: no spills, clutter or unnecessary equipment/Stretcher in lowest position, wheels locked, appropriate side rails in place/Instruct patient to call for assistance
complains of pain/discomfort

## 2023-06-06 NOTE — ED PROVIDER NOTE - OBJECTIVE STATEMENT
53 yo F w/ PMHx IBS, fibromyalgia, chronic lower back pain, chronic anxiety, GERD and s/p gastric bypass, Recent admission for low back pain with concern for cord compression, MRI negative for cord compression, presentation suggestive of lumbar radiculopathy, status post discharge on May 19, presenting today for complaint of low back pain, fecal incontinence, urinary incontinence, abdominal pain.  States she has numbness in both legs.  Presentation similar to previous. No fevers or chills.  No recent spinal injections.  Patient followed by outpatient neurology for chronic pain.  States she was prescribed 3 days of Dilaudid to go home with after her recent admission, but is no longer taking this.  States she is still allergic to Dilaudid.

## 2023-06-06 NOTE — DISCHARGE NOTE PROVIDER - NSDCMRMEDTOKEN_GEN_ALL_CORE_FT
busPIRone 30 mg oral tablet: 1 tab(s) orally 3 times a day  diazePAM 5 mg oral tablet: 1 tab(s) orally every 6 hours, As Needed -Anxiety - for anxiety MDD:4 tabs daily  HYDROmorphone 2 mg oral tablet: 1 tab(s) orally every 6 hours as needed for Moderate Pain (4 - 6) 1 tablet orally every 6 hours as needed for moderate pain x 3 days MDD: 4  lamoTRIgine 200 mg oral tablet: 1 tab(s) orally once a day  Mobic 7.5 mg oral tablet: 1 tab(s) orally 2 times a day  pepcid: 1 tab as needed  polyethylene glycol 3350 oral powder for reconstitution: 17 gram(s) orally as needed  senna (sennosides) 8.6 mg oral tablet: 1 tab(s) orally as needed  SEROquel 300 mg oral tablet: 1 tab(s) orally once a day (at bedtime) MDD:1 tab at night  sertraline 100 mg oral tablet: 1 tab(s) orally once a day MDD:2 tabs daily  tiZANidine 2 mg oral tablet: 1 tab(s) orally as needed

## 2023-06-06 NOTE — ED ADULT NURSE NOTE - NSFALLRISKINTERV_ED_ALL_ED

## 2023-06-06 NOTE — ED ADULT TRIAGE NOTE - CCCP TRG CHIEF CMPLNT
----- Message from Eleanor Cantu sent at 2017  1:17 PM CST -----  [email or web address]  ArianneKacey Espinoza  1942  HOME: [phone]   WORK: N/A   CELL: [phone]       Patient is scheduled for Labs 17  Lab orders needed: NEED LAB ORDER FOR CBC    Please insure lab orders will be available by the date of service.    fecal incontinence

## 2023-06-06 NOTE — ED ADULT TRIAGE NOTE - CHIEF COMPLAINT QUOTE
fecal incontinence x 2 days;  more than 12 episodes of diarrhea since Sunday; rectal tone decreased x 3 weeks; known herniated discs in neck, lumbar and coccyx; seen here for similar symptoms 3 weeks ago; legs are week but pt ambulates slowly; c/o back pain

## 2023-06-06 NOTE — CONSULT NOTE ADULT - ASSESSMENT
ASSESSMENT: 52-year-old woman with a history of IBS, fibromyalgia, chronic low back pain, gastric bypass presenting for evaluation of acute on chronic fecal incontinence and urinary incontinence. Hx of jero-en-y gastric by pass.    PLAN:  - Pending CT for further eval   - Plan to be discussed with fellow Dr.David Salas, attending       ASSESSMENT: 52-year-old woman with a history of IBS, fibromyalgia, chronic low back pain, gastric bypass presenting for evaluation of acute on chronic fecal incontinence and urinary incontinence. Hx of jero-en-y gastric by pass.    PLAN:  - No intra abdominal pathology noted on CT   - Continue outpatient follow up with bariatric surgeon  - Reconsult if any change in status.    - Plan to be discussed with fellow Dr.David Salas, attending

## 2023-06-06 NOTE — CHART NOTE - NSCHARTNOTEFT_GEN_A_CORE
This is a 52 year old female, well known to our service from multiple previous admissions, PMH: anxiety, depression/bipolar disorder (c/b multiple SA and 3 prior psychiatric hospitalizations),IBS, Fibromyalgia, Chronic back pain, GERD, S/p gastric bypass with recent admissions for lower back pain. Presents to [facility] for ongoing lower back pain with associated abd pain, urinary and fecal incontinence for multiple weeks (?) was discharged from [facility] 5/19/2023. Also with diarrhea x 3 days. On May admission, MRI showed no cord compression. Pain currently is rated 8/10 and described as a sharp stabbing lower back pain that radiates to her BL LE extremities. Denies any known exacerbating or alleviating factors. Associated symptoms are BL LE numbness, fecal/ urinary incontinence decreased ROM and gait instability.  Presents to [facility] for further management.     Outpatient pain management regimen: denies  Outpatient pain management provider: elizabeth BURGOS [facility] reference #[id number]    from: MR Lumbar Spine No Cont (06.06.23 @ 09:58)   IMPRESSION:  No spinal canal stenosis or cauda equina compression.  L2-L3 right central disc protrusion with right lateral recess narrowing,   grossly stable.  No fracture/marrow edema.    from: MR Lumbar Spine No Cont (05.16.23 @ 16:49)   IMPRESSION:  No evidence of compression of the distal cord or cauda equina. No   epidural collection.  Previously seen right paracentral disc protrusion at L2-3 has improved.  Other mild to moderate multilevel degenerative changes are unchanged.    from: MR Lumbar Spine No Cont (04.10.23 @ 21:24)   IMPRESSION:  1. THORACIC SPINE:   Low-grade degenerative disc disease not   substantially changed from 3/12/2023. Thoracic cord is intact.  2. LUMBAR SPINE:   Lumbar degenerative disc disease is intermediate grade   and not substantially changed from 3/12/2023. Distal cord, conus and   cauda equina remain intact    from: MR Lumbar Spine No Cont (03.12.23 @ 19:06)   IMPRESSION:  1. CERVICAL SPINE:   C6-C7 focal left central disc protrusion (disc   herniation)  causes ventral cord deformity. C7-T1 focal left central disc   protrusion  (disc herniation)  2. THORACIC SPINE:   Vertebral malalignment appears similar to previous.   No high-grade canal compromise has developed. A very  3. LUMBAR SPINE:   L2-L3 shallow right subarticular and foraminal disc   protrusion and L4-L5 shallow left foraminal and lateral disc protrusion   (disc herniations). Degenerative disc pathology in the lumbar spine may   be mildly progressed compared to 2021      Last seen in consultation 5/18/2023.  At that time, patient stated she has been having ongoing back pain for a long time which she has followed outpt for. No new symptoms, however, reported urinary incontinence over the past  2 weeks and some fecal incontinence over the past three days (though reports ability to hold until reaching toilet), and numbness of b/l LE.      Now presenting with the same symptoms, no new findings on lumbar MRI today compared to May 2023, April 2023, and March 2023.  Alerted by Medicine team that pt is in the ED.    In April, demanded GENOVEVA inhouse, explained that this procedure is performed outpatient.  In May, demanded lumbar surgical procedure, states she wants to be transferred to [facility] as "her doctors are there and they will do surgery".  Also informed me that she has been seen at [facility], [facility], and [facility], and all have told her no back surgery.  On all past admissions this year there have been inconsistencies in patient's recollections and presentation as reported to the chronic pain service; pt hard to arouse from sleep; during interviews patient's eyes would drift and close, but she would continue to speak.       Has refused neurontin 2/2 somnolence in past; and no effect with Cymbalta in the past.  Trialed lyrica in May outpatient without effect.  Avoid NSAIDs and Extended Release opioids 2/2 GI hx.   Avoid Tramadol 2/2 anti-depressant use (risk of serotonin syndrome).  States no relief with Oxy IR, morphine and tramadol in the past.    Would avoid escalation of opiates and avoid IV opiates.  Would start PO dilaudid 2 mg Q 6 hours PRN pain.  Start tizanidine 2 mg every 8 hours, can change to PRN spasm on discharge.  Monitor for sedation, respiratory depression.  OOB per primary team.    Follow up with Orthopedics after discharge.  As discussed multiple times in the past, needs to follow up with outpatient pain management.  Would discharge on PO dilaudid 2 mg BID PRN x 3 days only.  Discharge with a narcan rescue kit (naloxone 4 mg/ 0.1 ml nasal spray- 1 spray Q 2-3 minutes alternating between nostrils).      Chronic Pain Service  [phone] This is a 52 year old female, well known to our service from multiple previous admissions, PMH: anxiety, depression/bipolar disorder (c/b multiple SA and 3 prior psychiatric hospitalizations),IBS, Fibromyalgia, Chronic back pain, GERD, S/p gastric bypass with recent admissions for lower back pain. Presents to [facility] for ongoing lower back pain with associated abd pain, urinary and fecal incontinence for multiple weeks (?) was discharged from [facility] 5/19/2023. Also with diarrhea x 3 days. On May admission, MRI showed no cord compression. Pain currently is rated 8/10 and described as a sharp stabbing lower back pain that radiates to her BL LE extremities. Denies any known exacerbating or alleviating factors. Associated symptoms are BL LE numbness, fecal/ urinary incontinence decreased ROM and gait instability.  Presents to [facility] for further management.     Outpatient pain management regimen: denies  Outpatient pain management provider: elizabeth BURGOS [facility] reference #[id number]    from: MR Lumbar Spine No Cont (06.06.23 @ 09:58)   IMPRESSION:  No spinal canal stenosis or cauda equina compression.  L2-L3 right central disc protrusion with right lateral recess narrowing,   grossly stable.  No fracture/marrow edema.    from: MR Lumbar Spine No Cont (05.16.23 @ 16:49)   IMPRESSION:  No evidence of compression of the distal cord or cauda equina. No   epidural collection.  Previously seen right paracentral disc protrusion at L2-3 has improved.  Other mild to moderate multilevel degenerative changes are unchanged.    from: MR Lumbar Spine No Cont (04.10.23 @ 21:24)   IMPRESSION:  1. THORACIC SPINE:   Low-grade degenerative disc disease not   substantially changed from 3/12/2023. Thoracic cord is intact.  2. LUMBAR SPINE:   Lumbar degenerative disc disease is intermediate grade   and not substantially changed from 3/12/2023. Distal cord, conus and   cauda equina remain intact    from: MR Lumbar Spine No Cont (03.12.23 @ 19:06)   IMPRESSION:  1. CERVICAL SPINE:   C6-C7 focal left central disc protrusion (disc   herniation)  causes ventral cord deformity. C7-T1 focal left central disc   protrusion  (disc herniation)  2. THORACIC SPINE:   Vertebral malalignment appears similar to previous.   No high-grade canal compromise has developed. A very  3. LUMBAR SPINE:   L2-L3 shallow right subarticular and foraminal disc   protrusion and L4-L5 shallow left foraminal and lateral disc protrusion   (disc herniations). Degenerative disc pathology in the lumbar spine may   be mildly progressed compared to 2021    Last seen in consultation 5/18/2023.  At that time, patient stated she has been having ongoing back pain for a long time which she has followed outpt for. No new symptoms, however, reported urinary incontinence over the past  2 weeks and some fecal incontinence over the past three days (though reports ability to hold until reaching toilet), and numbness of b/l LE.      Multiple [facility] admissions and ED visits in the past year for dorsalgia:  5/16/23  [facility]  4/10/23  [facility]  3/11/23  [facility]  2/16/23  [facility]  12/29/22  [facility]  12/17/22  [facility]  9/10/22  [facility]  7/29/22  [facility] ED  6/30/22 [facility] ED  5/8/22  [facility] ED    Now presenting with the same symptoms, no new findings on lumbar MRI today compared to May 2023, April 2023, and March 2023.  Alerted by Medicine team that pt is in the ED.    In April, demanded GENOVEVA inhouse, explained that this procedure is performed outpatient.  In May, demanded lumbar surgical procedure, states she wants to be transferred to [facility] as "her doctors are there and they will do surgery".  Also informed me that she has been seen at [facility], [facility], and [facility], and all have told her no back surgery.  On all past admissions this year there have been inconsistencies in patient's recollections and presentation as reported to the chronic pain service; pt hard to arouse from sleep; during interviews patient's eyes would drift and close, but she would continue to speak.       Has refused neurontin 2/2 somnolence in past; and no effect with Cymbalta in the past.  Trialed lyrica in May outpatient without effect.  Avoid NSAIDs and Extended Release opioids 2/2 GI hx.   Avoid Tramadol 2/2 anti-depressant use (risk of serotonin syndrome).  States no relief with Oxy IR, morphine and tramadol in the past.    Would avoid escalation of opiates and avoid IV opiates.  Would start PO dilaudid 2 mg Q 6 hours PRN pain.  Start tizanidine 2 mg every 8 hours, can change to PRN spasm on discharge.  Monitor for sedation, respiratory depression.  OOB per primary team.    Follow up with Orthopedics after discharge.  As discussed multiple times in the past, needs to follow up with outpatient pain management.  Would discharge on PO dilaudid 2 mg BID PRN x 3 days only.  Discharge with a narcan rescue kit (naloxone 4 mg/ 0.1 ml nasal spray- 1 spray Q 2-3 minutes alternating between nostrils).    Pt placed on Complex Care for multiple hospital visits and non-compliance with outpatient follow up.      Chronic Pain Service  [phone]

## 2023-06-06 NOTE — H&P ADULT - HISTORY OF PRESENT ILLNESS
This is a 52 year old female PMH: IBS, Fibromyalgia, Chronic back pain, Anxiety, GERD, S/p gastric bypass with recent admission for lower back pain. Presents to [facility] for ongoing lower back pain with associated abd pain, urinary and fecal incontinence     INCOMPLETE  This is a 52 year old female PMH: IBS, Fibromyalgia, Chronic back pain, Anxiety, GERD, S/p gastric bypass with recent admission for lower back pain. Presents to [facility] for ongoing lower back pain with associated abd pain, urinary and fecal incontinence for multiple weeks. Also with diarrhea x 3 days. S was recently admitted and MRI showed no cord compression, she was discharged on May 19. Pain currently is rated 8/10 and described as a sharp stabbing lower back pain that radiates to her BL LE extremities. Denies any known exacerbating or alleviating factors. Associated symptoms are BL LE numbness, fecal/ urinary incontinence decreased ROM and gait instability. Denies chest pain, palpitations, fever, chills, sob, headaches, dizziness or BRBPR. Presents to [facility] for further management.

## 2023-06-06 NOTE — ED ADULT NURSE NOTE - OBJECTIVE STATEMENT
51YO female with PMH of fibromyalgia, spinal disfunction, lumber and cervical discs herniations, disc bulges-coccyx, Gastric in 2005 and tummy tuck repair 2008 via wheeled in presenting with complaints of fecal incontinences. Pt states being seen 3 weeks ago in the hospital, during rectal exam pt had no sensation. Pt states on Sunday pt started to experience fecal and urinary incontinences with decrease sensation on b/l LE, weakness and chills. Pt Axox4, ambulates with cane, respirations even, & non-labored. pedal pulses strong and equal bilaterally. Skin warm, dry, and intact. Pt denies CP, SOB, or fevers. Pt placed in position of comfort. Pt educated on call bell system and provided call bell. Bed in lowest position, wheels locked, appropriate side rails raised. Pt denies needs at this time.

## 2023-06-06 NOTE — H&P ADULT - NSHPREVIEWOFSYSTEMS_GEN_ALL_CORE
GENERAL: +weakness, no fever/chills, no weight loss/gain  EYES/ENT: No visual changes, no vertigo or throat pain  NECK: +pain or stiffness   RESPIRATORY: no cough, no wheezing, no hemoptysis, no dyspnea, no shortness of breath  CARDIOVASCULAR: no chest pain or palpitations  GASTROINTESTINAL: + abdominal/ epigastric pain, + Dairrhea  GENITOURINARY: no dysuria, no frequency, no nocturia, no hematuria. + urinary incontinence.  MUSCULOSKELETAL: no trauma, no sprain/strain, no myalgias, no arthralgias, no fracture  NEUROLOGICAL: no HA, no dizziness, + weakness, +numbness BL LE  SKIN: No itching, rashes

## 2023-06-06 NOTE — ED PROVIDER NOTE - PROGRESS NOTE DETAILS
pt signed out to me pending admission mri late may no cauda equina w same symptoms --  will repeat consult spine strnght 4+/5 bl le with poor effort and subjective dec sensation -- as per prev tream nml rectal tone abd exam benign - order placed for mri and ct w ho gastric bypass

## 2023-06-06 NOTE — DISCHARGE NOTE PROVIDER - CARE PROVIDER_API CALL
Schoenberg, Laura Gray  Neurology  [address] [place] [address]  Phone: [phone]  Fax: [phone]  Established Patient  Follow Up Time: 1-3 days

## 2023-06-06 NOTE — DISCHARGE NOTE PROVIDER - NSDCCPCAREPLAN_GEN_ALL_CORE_FT
PRINCIPAL DISCHARGE DIAGNOSIS  Diagnosis: Back pain  Assessment and Plan of Treatment: Chronic- MRI no neqw findings- Dilaudid 2mm q 6 hrs as needed x3 days . Follow up with Dr. Schoenberg in the community as scheduled

## 2023-06-06 NOTE — H&P ADULT - NSHPLABSRESULTS_GEN_ALL_CORE
LABS:                        10.2   7.15  )-----------( 238      ( 06 Jun 2023 07:02 )             33.5     06-06    141  |  108  |  12  ----------------------------<  88  4.1   |  22  |  0.88    Ca    9.0      06 Jun 2023 07:02    TPro  6.6  /  Alb  4.1  /  TBili  0.4  /  DBili  x   /  AST  16  /  ALT  11  /  AlkPhos  94  06-06    PT/INR - ( 06 Jun 2023 07:02 )   PT: 10.3 sec;   INR: 0.90 ratio         PTT - ( 06 Jun 2023 07:02 )  PTT:26.5 sec  CAPILLARY BLOOD GLUCOSE        RADIOLOGY & ADDITIONAL TESTS:  < from: MR Lumbar Spine No Cont (06.06.23 @ 09:58) >    IMPRESSION:  No spinal canal stenosis or cauda equina compression.    L2-L3 right central disc protrusion with right lateral recess narrowing,   grossly stable.    No fracture/marrow edema.    --- End of Report ---    < end of copied text >        Imaging Personally Reviewed:  [x] YES  [ ] NO    Consultant(s) Notes Reviewed:  [x] YES  [ ] NO    MEDICATIONS  (STANDING):    MEDICATIONS  (PRN):      Care Discussed with Consultants/Other Providers [x] YES  [ ] NO    Vital Signs Last 24 Hrs  T(C): 36.7 (06 Jun 2023 11:40), Max: 37 (06 Jun 2023 06:12)  T(F): 98.1 (06 Jun 2023 11:40), Max: 98.6 (06 Jun 2023 06:12)  HR: 57 (06 Jun 2023 11:40) (57 - 69)  BP: 123/88 (06 Jun 2023 11:40) (99/61 - 133/73)  BP(mean): 89 (06 Jun 2023 10:15) (87 - 91)  RR: 18 (06 Jun 2023 11:40) (11 - 18)  SpO2: 99% (06 Jun 2023 11:40) (97% - 100%)    Parameters below as of 06 Jun 2023 11:40  Patient On (Oxygen Delivery Method): room air      I&O's Summary

## 2023-06-06 NOTE — DISCHARGE NOTE NURSING/CASE MANAGEMENT/SOCIAL WORK - PATIENT PORTAL LINK FT
You can access the FollowMyHealth Patient Portal offered by [facility] by registering at the following website: [email or web address]. By joining [facility]’s FollowMyHealth portal, you will also be able to view your health information using other applications (apps) compatible with our system.

## 2023-06-26 ENCOUNTER — APPOINTMENT (OUTPATIENT)
Dept: OBGYN | Facility: CLINIC | Age: 53
End: 2023-06-26
Payer: COMMERCIAL

## 2023-06-26 VITALS
BODY MASS INDEX: 41.09 KG/M2 | HEIGHT: 70 IN | DIASTOLIC BLOOD PRESSURE: 70 MMHG | SYSTOLIC BLOOD PRESSURE: 132 MMHG | WEIGHT: 287 LBS

## 2023-06-26 DIAGNOSIS — G43.909 MIGRAINE, UNSPECIFIED, NOT INTRACTABLE, W/OUT STATUS MIGRAINOSUS: ICD-10-CM

## 2023-06-26 DIAGNOSIS — Z78.9 OTHER SPECIFIED HEALTH STATUS: ICD-10-CM

## 2023-06-26 DIAGNOSIS — N89.8 OTHER SPECIFIED NONINFLAMMATORY DISORDERS OF VAGINA: ICD-10-CM

## 2023-06-26 DIAGNOSIS — Z01.411 ENCOUNTER FOR GYNECOLOGICAL EXAMINATION (GENERAL) (ROUTINE) WITH ABNORMAL FINDINGS: ICD-10-CM

## 2023-06-26 DIAGNOSIS — Z87.891 PERSONAL HISTORY OF NICOTINE DEPENDENCE: ICD-10-CM

## 2023-06-26 DIAGNOSIS — Z87.19 PERSONAL HISTORY OF OTHER DISEASES OF THE DIGESTIVE SYSTEM: ICD-10-CM

## 2023-06-26 DIAGNOSIS — Z11.3 ENCOUNTER FOR SCREENING FOR INFECTIONS WITH A PREDOMINANTLY SEXUAL MODE OF TRANSMISSION: ICD-10-CM

## 2023-06-26 PROCEDURE — 99396 PREV VISIT EST AGE 40-64: CPT

## 2023-06-26 RX ORDER — MULTIVITAMIN
TABLET ORAL
Refills: 0 | Status: DISCONTINUED | COMMUNITY
End: 2023-06-26

## 2023-06-26 RX ORDER — NAFTIFINE HYDROCHLORIDE 20 MG/G
2 CREAM TOPICAL
Qty: 60 | Refills: 0 | Status: DISCONTINUED | COMMUNITY
Start: 2021-04-15 | End: 2023-06-26

## 2023-06-26 RX ORDER — METHOCARBAMOL 750 MG/1
750 TABLET, FILM COATED ORAL TWICE DAILY
Qty: 60 | Refills: 0 | Status: DISCONTINUED | COMMUNITY
Start: 2023-02-28 | End: 2023-06-26

## 2023-06-26 RX ORDER — TRIAMCINOLONE ACETONIDE 1 MG/G
0.1 OINTMENT TOPICAL
Qty: 1 | Refills: 1 | Status: DISCONTINUED | COMMUNITY
Start: 2021-10-13 | End: 2023-06-26

## 2023-06-26 RX ORDER — PNV NO.95/FERROUS FUM/FOLIC AC 28MG-0.8MG
TABLET ORAL
Refills: 0 | Status: DISCONTINUED | COMMUNITY
End: 2023-06-26

## 2023-06-26 RX ORDER — CLONAZEPAM 0.5 MG/1
0.5 TABLET ORAL
Refills: 0 | Status: DISCONTINUED | COMMUNITY
End: 2023-06-26

## 2023-06-26 RX ORDER — DICYCLOMINE HYDROCHLORIDE 10 MG/1
CAPSULE ORAL
Refills: 0 | Status: DISCONTINUED | COMMUNITY
End: 2023-06-26

## 2023-06-26 RX ORDER — FLUCONAZOLE 150 MG/1
150 TABLET ORAL
Qty: 1 | Refills: 1 | Status: DISCONTINUED | COMMUNITY
Start: 2021-10-20 | End: 2023-06-26

## 2023-06-26 RX ORDER — MULTIPLE VITAMINS W/ MINERALS TAB 9MG-400MCG
TAB ORAL
Qty: 15 | Refills: 0 | Status: DISCONTINUED | COMMUNITY
Start: 2020-05-29 | End: 2023-06-26

## 2023-06-26 RX ORDER — METHYLPREDNISOLONE 4 MG/1
4 TABLET ORAL
Qty: 1 | Refills: 0 | Status: DISCONTINUED | COMMUNITY
Start: 2022-11-29 | End: 2023-06-26

## 2023-06-26 RX ORDER — DULOXETINE HYDROCHLORIDE 60 MG/1
60 CAPSULE, DELAYED RELEASE PELLETS ORAL
Qty: 30 | Refills: 0 | Status: DISCONTINUED | COMMUNITY
Start: 2020-01-31 | End: 2023-06-26

## 2023-06-26 RX ORDER — METHYLPREDNISOLONE 4 MG/1
4 TABLET ORAL
Qty: 21 | Refills: 0 | Status: DISCONTINUED | COMMUNITY
Start: 2021-04-14 | End: 2023-06-26

## 2023-06-26 RX ORDER — TOPIRAMATE 25 MG/1
25 TABLET, FILM COATED ORAL
Qty: 60 | Refills: 0 | Status: DISCONTINUED | COMMUNITY
Start: 2019-09-23 | End: 2023-06-26

## 2023-06-26 RX ORDER — METHYLPREDNISOLONE 4 MG/1
4 TABLET ORAL
Qty: 1 | Refills: 0 | Status: DISCONTINUED | COMMUNITY
Start: 2023-01-19 | End: 2023-06-26

## 2023-06-26 RX ORDER — PSYLLIUM HUSK 3.4 G/7G
5 POWDER ORAL
Qty: 30 | Refills: 0 | Status: DISCONTINUED | COMMUNITY
Start: 2021-02-09 | End: 2023-06-26

## 2023-06-30 PROBLEM — Z78.9 SOCIAL ALCOHOL USE: Status: ACTIVE | Noted: 2023-06-26

## 2023-06-30 PROBLEM — G43.909 MIGRAINES: Status: RESOLVED | Noted: 2023-06-26 | Resolved: 2023-06-30

## 2023-06-30 PROBLEM — Z87.19 HISTORY OF INTESTINAL OBSTRUCTION: Status: RESOLVED | Noted: 2023-06-30 | Resolved: 2023-06-30

## 2023-06-30 PROBLEM — Z87.891 FORMER SMOKER: Status: ACTIVE | Noted: 2023-06-26

## 2023-06-30 RX ORDER — LAMOTRIGINE 200 MG/1
200 TABLET ORAL
Refills: 0 | Status: ACTIVE | COMMUNITY

## 2023-06-30 RX ORDER — TRAMADOL HYDROCHLORIDE 25 MG/1
TABLET, COATED ORAL
Refills: 0 | Status: ACTIVE | COMMUNITY

## 2023-06-30 RX ORDER — SERTRALINE HYDROCHLORIDE 100 MG/1
100 TABLET, FILM COATED ORAL
Refills: 0 | Status: ACTIVE | COMMUNITY

## 2023-06-30 RX ORDER — BUSPIRONE HYDROCHLORIDE 30 MG/1
30 TABLET ORAL
Refills: 0 | Status: ACTIVE | COMMUNITY

## 2023-07-02 LAB
C TRACH RRNA SPEC QL NAA+PROBE: NOT DETECTED
CANDIDA VAG CYTO: NOT DETECTED
CYTOLOGY CVX/VAG DOC THIN PREP: ABNORMAL
G VAGINALIS+PREV SP MTYP VAG QL MICRO: NOT DETECTED
N GONORRHOEA RRNA SPEC QL NAA+PROBE: NOT DETECTED
SOURCE TP AMPLIFICATION: NORMAL
T VAGINALIS VAG QL WET PREP: NOT DETECTED

## 2023-07-03 NOTE — ED PROVIDER NOTE - CONSTITUTIONAL, MLM
Render Risk Assessment In Note?: no Additional Notes: Less scaling today but still flared areas. Advised to continue oil at hs, sal acid shampoo, betamethasone foam to worst areas daily Detail Level: Simple - - -

## 2023-07-11 ENCOUNTER — INPATIENT (INPATIENT)
Facility: HOSPITAL | Age: 53
LOS: 1 days | Discharge: HOME | End: 2023-07-13
Attending: HOSPITALIST | Admitting: HOSPITALIST
Payer: COMMERCIAL

## 2023-07-11 VITALS
TEMPERATURE: 98 F | SYSTOLIC BLOOD PRESSURE: 130 MMHG | HEART RATE: 74 BPM | OXYGEN SATURATION: 99 % | RESPIRATION RATE: 16 BRPM | DIASTOLIC BLOOD PRESSURE: 80 MMHG | HEIGHT: 70 IN

## 2023-07-11 DIAGNOSIS — Z98.51 TUBAL LIGATION STATUS: Chronic | ICD-10-CM

## 2023-07-11 DIAGNOSIS — Z98.89 OTHER SPECIFIED POSTPROCEDURAL STATES: Chronic | ICD-10-CM

## 2023-07-11 DIAGNOSIS — K56.60 UNSPECIFIED INTESTINAL OBSTRUCTION: Chronic | ICD-10-CM

## 2023-07-11 LAB
ALBUMIN SERPL ELPH-MCNC: 4.5 G/DL — SIGNIFICANT CHANGE UP (ref 3.3–5)
ALP SERPL-CCNC: 89 U/L — SIGNIFICANT CHANGE UP (ref 40–120)
ALT FLD-CCNC: 11 U/L — SIGNIFICANT CHANGE UP (ref 4–33)
ANION GAP SERPL CALC-SCNC: 9 MMOL/L — SIGNIFICANT CHANGE UP (ref 7–14)
AST SERPL-CCNC: 16 U/L — SIGNIFICANT CHANGE UP (ref 4–32)
BASOPHILS # BLD AUTO: 0.03 K/UL — SIGNIFICANT CHANGE UP (ref 0–0.2)
BASOPHILS NFR BLD AUTO: 0.4 % — SIGNIFICANT CHANGE UP (ref 0–2)
BILIRUB SERPL-MCNC: 0.4 MG/DL — SIGNIFICANT CHANGE UP (ref 0.2–1.2)
BUN SERPL-MCNC: 21 MG/DL — SIGNIFICANT CHANGE UP (ref 7–23)
CALCIUM SERPL-MCNC: 9.8 MG/DL — SIGNIFICANT CHANGE UP (ref 8.4–10.5)
CHLORIDE SERPL-SCNC: 104 MMOL/L — SIGNIFICANT CHANGE UP (ref 98–107)
CO2 SERPL-SCNC: 24 MMOL/L — SIGNIFICANT CHANGE UP (ref 22–31)
CREAT SERPL-MCNC: 0.96 MG/DL — SIGNIFICANT CHANGE UP (ref 0.5–1.3)
EGFR: 72 ML/MIN/1.73M2 — SIGNIFICANT CHANGE UP
EOSINOPHIL # BLD AUTO: 0.06 K/UL — SIGNIFICANT CHANGE UP (ref 0–0.5)
EOSINOPHIL NFR BLD AUTO: 0.9 % — SIGNIFICANT CHANGE UP (ref 0–6)
GLUCOSE SERPL-MCNC: 95 MG/DL — SIGNIFICANT CHANGE UP (ref 70–99)
HCT VFR BLD CALC: 34.9 % — SIGNIFICANT CHANGE UP (ref 34.5–45)
HGB BLD-MCNC: 11 G/DL — LOW (ref 11.5–15.5)
IANC: 4.39 K/UL — SIGNIFICANT CHANGE UP (ref 1.8–7.4)
IMM GRANULOCYTES NFR BLD AUTO: 0.6 % — SIGNIFICANT CHANGE UP (ref 0–0.9)
LYMPHOCYTES # BLD AUTO: 1.98 K/UL — SIGNIFICANT CHANGE UP (ref 1–3.3)
LYMPHOCYTES # BLD AUTO: 28.3 % — SIGNIFICANT CHANGE UP (ref 13–44)
MAGNESIUM SERPL-MCNC: 2.3 MG/DL — SIGNIFICANT CHANGE UP (ref 1.6–2.6)
MCHC RBC-ENTMCNC: 26.5 PG — LOW (ref 27–34)
MCHC RBC-ENTMCNC: 31.5 GM/DL — LOW (ref 32–36)
MCV RBC AUTO: 84.1 FL — SIGNIFICANT CHANGE UP (ref 80–100)
MONOCYTES # BLD AUTO: 0.49 K/UL — SIGNIFICANT CHANGE UP (ref 0–0.9)
MONOCYTES NFR BLD AUTO: 7 % — SIGNIFICANT CHANGE UP (ref 2–14)
NEUTROPHILS # BLD AUTO: 4.39 K/UL — SIGNIFICANT CHANGE UP (ref 1.8–7.4)
NEUTROPHILS NFR BLD AUTO: 62.8 % — SIGNIFICANT CHANGE UP (ref 43–77)
NRBC # BLD: 0 /100 WBCS — SIGNIFICANT CHANGE UP (ref 0–0)
NRBC # FLD: 0 K/UL — SIGNIFICANT CHANGE UP (ref 0–0)
PHOSPHATE SERPL-MCNC: 3.7 MG/DL — SIGNIFICANT CHANGE UP (ref 2.5–4.5)
PLATELET # BLD AUTO: 242 K/UL — SIGNIFICANT CHANGE UP (ref 150–400)
POTASSIUM SERPL-MCNC: 4.1 MMOL/L — SIGNIFICANT CHANGE UP (ref 3.5–5.3)
POTASSIUM SERPL-SCNC: 4.1 MMOL/L — SIGNIFICANT CHANGE UP (ref 3.5–5.3)
PROT SERPL-MCNC: 6.9 G/DL — SIGNIFICANT CHANGE UP (ref 6–8.3)
RBC # BLD: 4.15 M/UL — SIGNIFICANT CHANGE UP (ref 3.8–5.2)
RBC # FLD: 14.3 % — SIGNIFICANT CHANGE UP (ref 10.3–14.5)
SODIUM SERPL-SCNC: 137 MMOL/L — SIGNIFICANT CHANGE UP (ref 135–145)
WBC # BLD: 6.99 K/UL — SIGNIFICANT CHANGE UP (ref 3.8–10.5)
WBC # FLD AUTO: 6.99 K/UL — SIGNIFICANT CHANGE UP (ref 3.8–10.5)

## 2023-07-11 PROCEDURE — 72125 CT NECK SPINE W/O DYE: CPT | Mod: 26,MA

## 2023-07-11 PROCEDURE — 72131 CT LUMBAR SPINE W/O DYE: CPT | Mod: 26,MA

## 2023-07-11 PROCEDURE — 99285 EMERGENCY DEPT VISIT HI MDM: CPT

## 2023-07-11 PROCEDURE — 72128 CT CHEST SPINE W/O DYE: CPT | Mod: 26,MA

## 2023-07-11 RX ORDER — MORPHINE SULFATE 50 MG/1
4 CAPSULE, EXTENDED RELEASE ORAL ONCE
Refills: 0 | Status: DISCONTINUED | OUTPATIENT
Start: 2023-07-11 | End: 2023-07-11

## 2023-07-11 RX ORDER — MORPHINE SULFATE 50 MG/1
15 CAPSULE, EXTENDED RELEASE ORAL ONCE
Refills: 0 | Status: DISCONTINUED | OUTPATIENT
Start: 2023-07-11 | End: 2023-07-11

## 2023-07-11 RX ORDER — CYCLOBENZAPRINE HYDROCHLORIDE 10 MG/1
5 TABLET, FILM COATED ORAL ONCE
Refills: 0 | Status: COMPLETED | OUTPATIENT
Start: 2023-07-11 | End: 2023-07-11

## 2023-07-11 RX ORDER — HYDROMORPHONE HYDROCHLORIDE 2 MG/ML
2 INJECTION INTRAMUSCULAR; INTRAVENOUS; SUBCUTANEOUS ONCE
Refills: 0 | Status: DISCONTINUED | OUTPATIENT
Start: 2023-07-11 | End: 2023-07-11

## 2023-07-11 RX ORDER — FAMOTIDINE 10 MG/ML
20 INJECTION INTRAVENOUS ONCE
Refills: 0 | Status: COMPLETED | OUTPATIENT
Start: 2023-07-11 | End: 2023-07-11

## 2023-07-11 RX ADMIN — CYCLOBENZAPRINE HYDROCHLORIDE 5 MILLIGRAM(S): 10 TABLET, FILM COATED ORAL at 14:56

## 2023-07-11 RX ADMIN — MORPHINE SULFATE 4 MILLIGRAM(S): 50 CAPSULE, EXTENDED RELEASE ORAL at 17:36

## 2023-07-11 RX ADMIN — HYDROMORPHONE HYDROCHLORIDE 2 MILLIGRAM(S): 2 INJECTION INTRAMUSCULAR; INTRAVENOUS; SUBCUTANEOUS at 21:38

## 2023-07-11 RX ADMIN — FAMOTIDINE 20 MILLIGRAM(S): 10 INJECTION INTRAVENOUS at 18:46

## 2023-07-11 RX ADMIN — MORPHINE SULFATE 15 MILLIGRAM(S): 50 CAPSULE, EXTENDED RELEASE ORAL at 14:55

## 2023-07-11 RX ADMIN — MORPHINE SULFATE 4 MILLIGRAM(S): 50 CAPSULE, EXTENDED RELEASE ORAL at 18:33
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
## 2023-07-11 NOTE — ED ADULT NURSE NOTE - NSFALLRISKINTERV_ED_ALL_ED
Assistance OOB with selected safe patient handling equipment if applicable/Assistance with ambulation/Communicate fall risk and risk factors to all staff, patient, and family/Monitor gait and stability/Provide visual cue: yellow wristband, yellow gown, etc/Reinforce activity limits and safety measures with patient and family/Call bell, personal items and telephone in reach/Instruct patient to call for assistance before getting out of bed/chair/stretcher/Non-slip footwear applied when patient is off stretcher/[place] to call system/Physically safe environment - no spills, clutter or unnecessary equipment/Purposeful Proactive Rounding/Room/bathroom lighting operational, light cord in reach

## 2023-07-11 NOTE — ED ADULT NURSE REASSESSMENT NOTE - NS ED NURSE REASSESS COMMENT FT1
Break coverage RN: 20G IV placed in R ac, labs drawn and sent per orders. Pt reports having lower back pain at this time intermittently. Will maintain safety and continue to monitor.
Pt NAD, resp even and unlabored, a&ox4. Pt reporting 10/10 back pain, medicated per orders. Vitals as noted. Will maintain safety and continue to monitor.

## 2023-07-11 NOTE — ED PROVIDER NOTE - PROGRESS NOTE DETAILS
Amanda Carranza PGY-3: Patient reassessed.  Tearful, remains in pain.  Patient has received Flexeril 4 mg IV morphine and 50 mg MS Contin with no real relief.  Will trial 2 mg of IV Dilaudid, as per recent recommendations from chronic pain NP.  Patient declined CT scan at this time 2/2 pain. Plan still for admission.  Patient requesting pain management be consulted during admission.  TBA to [facility]. Lemuel, Attending  Patient signed out to me. CTs of C/T/LS spine performed, received multiple rounds of pain medication, hospitalist previously spoken with but waiting for CTs for admission. Pending CT reads at this time Lemuel, Attending   No acute trauma on CTs. Hospitalist paged.
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
## 2023-07-11 NOTE — ED PROVIDER NOTE - NSCAREINITIATED _GEN_ER
Addended by: CATALINA CONTI on: 10/25/2022 10:37 AM     Modules accepted: Orders, SmartSet    
Harpal Ferraro(Attending)

## 2023-07-11 NOTE — ED PROVIDER NOTE - NEURO NEGATIVE STATEMENT, MLM
no loss of consciousness, can't access gait abnormality, no headache, chronic sensory deficits, and can't accessweakness.

## 2023-07-11 NOTE — ED PROVIDER NOTE - CONSTITUTIONAL, MLM
normal... moderate appearing, awake, alert, oriented to person, place, time/situation and in no apparent distress.

## 2023-07-12 DIAGNOSIS — F32.9 MAJOR DEPRESSIVE DISORDER, SINGLE EPISODE, UNSPECIFIED: ICD-10-CM

## 2023-07-12 DIAGNOSIS — M54.9 DORSALGIA, UNSPECIFIED: ICD-10-CM

## 2023-07-12 DIAGNOSIS — M79.7 FIBROMYALGIA: ICD-10-CM

## 2023-07-12 DIAGNOSIS — Z29.9 ENCOUNTER FOR PROPHYLACTIC MEASURES, UNSPECIFIED: ICD-10-CM

## 2023-07-12 LAB
ANION GAP SERPL CALC-SCNC: 11 MMOL/L — SIGNIFICANT CHANGE UP (ref 7–14)
BUN SERPL-MCNC: 16 MG/DL — SIGNIFICANT CHANGE UP (ref 7–23)
CALCIUM SERPL-MCNC: 9.8 MG/DL — SIGNIFICANT CHANGE UP (ref 8.4–10.5)
CHLORIDE SERPL-SCNC: 103 MMOL/L — SIGNIFICANT CHANGE UP (ref 98–107)
CO2 SERPL-SCNC: 25 MMOL/L — SIGNIFICANT CHANGE UP (ref 22–31)
CREAT SERPL-MCNC: 0.85 MG/DL — SIGNIFICANT CHANGE UP (ref 0.5–1.3)
EGFR: 83 ML/MIN/1.73M2 — SIGNIFICANT CHANGE UP
GLUCOSE SERPL-MCNC: 102 MG/DL — HIGH (ref 70–99)
HCT VFR BLD CALC: 38.4 % — SIGNIFICANT CHANGE UP (ref 34.5–45)
HGB BLD-MCNC: 11.8 G/DL — SIGNIFICANT CHANGE UP (ref 11.5–15.5)
MAGNESIUM SERPL-MCNC: 2.1 MG/DL — SIGNIFICANT CHANGE UP (ref 1.6–2.6)
MCHC RBC-ENTMCNC: 25.2 PG — LOW (ref 27–34)
MCHC RBC-ENTMCNC: 30.7 GM/DL — LOW (ref 32–36)
MCV RBC AUTO: 82.1 FL — SIGNIFICANT CHANGE UP (ref 80–100)
NRBC # BLD: 0 /100 WBCS — SIGNIFICANT CHANGE UP (ref 0–0)
NRBC # FLD: 0 K/UL — SIGNIFICANT CHANGE UP (ref 0–0)
PHOSPHATE SERPL-MCNC: 4.4 MG/DL — SIGNIFICANT CHANGE UP (ref 2.5–4.5)
PLATELET # BLD AUTO: 238 K/UL — SIGNIFICANT CHANGE UP (ref 150–400)
POTASSIUM SERPL-MCNC: 4.3 MMOL/L — SIGNIFICANT CHANGE UP (ref 3.5–5.3)
POTASSIUM SERPL-SCNC: 4.3 MMOL/L — SIGNIFICANT CHANGE UP (ref 3.5–5.3)
RBC # BLD: 4.68 M/UL — SIGNIFICANT CHANGE UP (ref 3.8–5.2)
RBC # FLD: 14.2 % — SIGNIFICANT CHANGE UP (ref 10.3–14.5)
SODIUM SERPL-SCNC: 139 MMOL/L — SIGNIFICANT CHANGE UP (ref 135–145)
WBC # BLD: 6.47 K/UL — SIGNIFICANT CHANGE UP (ref 3.8–10.5)
WBC # FLD AUTO: 6.47 K/UL — SIGNIFICANT CHANGE UP (ref 3.8–10.5)

## 2023-07-12 PROCEDURE — 93010 ELECTROCARDIOGRAM REPORT: CPT

## 2023-07-12 PROCEDURE — 99223 1ST HOSP IP/OBS HIGH 75: CPT

## 2023-07-12 PROCEDURE — 71045 X-RAY EXAM CHEST 1 VIEW: CPT | Mod: 26

## 2023-07-12 RX ORDER — LAMOTRIGINE 25 MG/1
200 TABLET, ORALLY DISINTEGRATING ORAL DAILY
Refills: 0 | Status: DISCONTINUED | OUTPATIENT
Start: 2023-07-12 | End: 2023-07-13

## 2023-07-12 RX ORDER — HYDROMORPHONE HYDROCHLORIDE 2 MG/ML
2 INJECTION INTRAMUSCULAR; INTRAVENOUS; SUBCUTANEOUS ONCE
Refills: 0 | Status: DISCONTINUED | OUTPATIENT
Start: 2023-07-12 | End: 2023-07-12

## 2023-07-12 RX ORDER — TIZANIDINE 4 MG/1
1 TABLET ORAL
Refills: 0 | DISCHARGE

## 2023-07-12 RX ORDER — LIDOCAINE 4 G/100G
1 CREAM TOPICAL DAILY
Refills: 0 | Status: DISCONTINUED | OUTPATIENT
Start: 2023-07-12 | End: 2023-07-13

## 2023-07-12 RX ORDER — SENNA PLUS 8.6 MG/1
1 TABLET ORAL
Refills: 0 | DISCHARGE

## 2023-07-12 RX ORDER — HEPARIN SODIUM 5000 [USP'U]/ML
5000 INJECTION INTRAVENOUS; SUBCUTANEOUS EVERY 8 HOURS
Refills: 0 | Status: DISCONTINUED | OUTPATIENT
Start: 2023-07-12 | End: 2023-07-13

## 2023-07-12 RX ORDER — FAMOTIDINE 10 MG/ML
0 INJECTION INTRAVENOUS
Refills: 0 | DISCHARGE

## 2023-07-12 RX ORDER — DIAZEPAM 5 MG
5 TABLET ORAL EVERY 6 HOURS
Refills: 0 | Status: DISCONTINUED | OUTPATIENT
Start: 2023-07-12 | End: 2023-07-13

## 2023-07-12 RX ORDER — ACETAMINOPHEN 500 MG
650 TABLET ORAL EVERY 6 HOURS
Refills: 0 | Status: CANCELLED | OUTPATIENT
Start: 2023-07-15 | End: 2023-07-13

## 2023-07-12 RX ORDER — MELOXICAM 15 MG/1
1 TABLET ORAL
Refills: 0 | DISCHARGE

## 2023-07-12 RX ORDER — FAMOTIDINE 10 MG/ML
20 INJECTION INTRAVENOUS DAILY
Refills: 0 | Status: DISCONTINUED | OUTPATIENT
Start: 2023-07-12 | End: 2023-07-13

## 2023-07-12 RX ORDER — ACETAMINOPHEN 500 MG
650 TABLET ORAL EVERY 6 HOURS
Refills: 0 | Status: DISCONTINUED | OUTPATIENT
Start: 2023-07-12 | End: 2023-07-13

## 2023-07-12 RX ORDER — LANOLIN ALCOHOL/MO/W.PET/CERES
3 CREAM (GRAM) TOPICAL AT BEDTIME
Refills: 0 | Status: DISCONTINUED | OUTPATIENT
Start: 2023-07-12 | End: 2023-07-13

## 2023-07-12 RX ORDER — CYCLOBENZAPRINE HYDROCHLORIDE 10 MG/1
5 TABLET, FILM COATED ORAL EVERY 8 HOURS
Refills: 0 | Status: DISCONTINUED | OUTPATIENT
Start: 2023-07-12 | End: 2023-07-13

## 2023-07-12 RX ORDER — HYDROMORPHONE HYDROCHLORIDE 2 MG/ML
2 INJECTION INTRAMUSCULAR; INTRAVENOUS; SUBCUTANEOUS EVERY 6 HOURS
Refills: 0 | Status: DISCONTINUED | OUTPATIENT
Start: 2023-07-12 | End: 2023-07-13

## 2023-07-12 RX ORDER — ACETAMINOPHEN 500 MG
650 TABLET ORAL EVERY 6 HOURS
Refills: 0 | Status: DISCONTINUED | OUTPATIENT
Start: 2023-07-12 | End: 2023-07-12

## 2023-07-12 RX ORDER — TIZANIDINE 4 MG/1
2 TABLET ORAL EVERY 8 HOURS
Refills: 0 | Status: DISCONTINUED | OUTPATIENT
Start: 2023-07-12 | End: 2023-07-12

## 2023-07-12 RX ORDER — HEPARIN SODIUM 5000 [USP'U]/ML
5000 INJECTION INTRAVENOUS; SUBCUTANEOUS EVERY 12 HOURS
Refills: 0 | Status: DISCONTINUED | OUTPATIENT
Start: 2023-07-12 | End: 2023-07-12

## 2023-07-12 RX ORDER — OXYMETAZOLINE HYDROCHLORIDE 0.5 MG/ML
2 SPRAY NASAL
Refills: 0 | Status: DISCONTINUED | OUTPATIENT
Start: 2023-07-12 | End: 2023-07-13

## 2023-07-12 RX ORDER — SERTRALINE 25 MG/1
100 TABLET, FILM COATED ORAL DAILY
Refills: 0 | Status: DISCONTINUED | OUTPATIENT
Start: 2023-07-12 | End: 2023-07-13

## 2023-07-12 RX ORDER — POLYETHYLENE GLYCOL 3350 17 G/17G
17 POWDER, FOR SOLUTION ORAL
Refills: 0 | DISCHARGE

## 2023-07-12 RX ADMIN — Medication 650 MILLIGRAM(S): at 10:21

## 2023-07-12 RX ADMIN — Medication 650 MILLIGRAM(S): at 11:00

## 2023-07-12 RX ADMIN — Medication 650 MILLIGRAM(S): at 18:40

## 2023-07-12 RX ADMIN — FAMOTIDINE 20 MILLIGRAM(S): 10 INJECTION INTRAVENOUS at 18:40

## 2023-07-12 RX ADMIN — Medication 5 MILLIGRAM(S): at 20:07

## 2023-07-12 RX ADMIN — HYDROMORPHONE HYDROCHLORIDE 2 MILLIGRAM(S): 2 INJECTION INTRAMUSCULAR; INTRAVENOUS; SUBCUTANEOUS at 16:30

## 2023-07-12 RX ADMIN — HEPARIN SODIUM 5000 UNIT(S): 5000 INJECTION INTRAVENOUS; SUBCUTANEOUS at 22:13

## 2023-07-12 RX ADMIN — HYDROMORPHONE HYDROCHLORIDE 2 MILLIGRAM(S): 2 INJECTION INTRAMUSCULAR; INTRAVENOUS; SUBCUTANEOUS at 08:06

## 2023-07-12 RX ADMIN — HYDROMORPHONE HYDROCHLORIDE 2 MILLIGRAM(S): 2 INJECTION INTRAMUSCULAR; INTRAVENOUS; SUBCUTANEOUS at 07:36

## 2023-07-12 RX ADMIN — SERTRALINE 100 MILLIGRAM(S): 25 TABLET, FILM COATED ORAL at 10:21

## 2023-07-12 RX ADMIN — HEPARIN SODIUM 5000 UNIT(S): 5000 INJECTION INTRAVENOUS; SUBCUTANEOUS at 07:36

## 2023-07-12 RX ADMIN — Medication 5 MILLIGRAM(S): at 10:21

## 2023-07-12 RX ADMIN — LAMOTRIGINE 200 MILLIGRAM(S): 25 TABLET, ORALLY DISINTEGRATING ORAL at 12:49

## 2023-07-12 RX ADMIN — HYDROMORPHONE HYDROCHLORIDE 2 MILLIGRAM(S): 2 INJECTION INTRAMUSCULAR; INTRAVENOUS; SUBCUTANEOUS at 15:53

## 2023-07-12 RX ADMIN — OXYMETAZOLINE HYDROCHLORIDE 2 SPRAY(S): 0.5 SPRAY NASAL at 15:57

## 2023-07-12 RX ADMIN — HYDROMORPHONE HYDROCHLORIDE 2 MILLIGRAM(S): 2 INJECTION INTRAMUSCULAR; INTRAVENOUS; SUBCUTANEOUS at 01:16

## 2023-07-12 RX ADMIN — CYCLOBENZAPRINE HYDROCHLORIDE 5 MILLIGRAM(S): 10 TABLET, FILM COATED ORAL at 18:43

## 2023-07-12 RX ADMIN — HEPARIN SODIUM 5000 UNIT(S): 5000 INJECTION INTRAVENOUS; SUBCUTANEOUS at 15:54

## 2023-07-12 RX ADMIN — TIZANIDINE 2 MILLIGRAM(S): 4 TABLET ORAL at 13:06

## 2023-07-12 NOTE — ED ADULT TRIAGE NOTE - ADDITIONAL SAFETY/BANDS...
Additional Safety/Bands: Mustarde Flap Text: The defect edges were debeveled with a #15 scalpel blade.  Given the size, depth and location of the defect and the proximity to free margins a Mustarde flap was deemed most appropriate.  Using a sterile surgical marker, an appropriate flap was drawn incorporating the defect. The area thus outlined was incised with a #15 scalpel blade.  The skin margins were undermined to an appropriate distance in all directions utilizing iris scissors.

## 2023-07-12 NOTE — H&P ADULT - PROBLEM SELECTOR PLAN 3
- c/w sertraline  - hold Trazadone since she is already on sertraline, high risk for serotonin syndrome  - hold buspirone

## 2023-07-12 NOTE — PATIENT PROFILE ADULT - FALL HARM RISK - HARM RISK INTERVENTIONS

## 2023-07-12 NOTE — H&P ADULT - NSHPLABSRESULTS_GEN_ALL_CORE
11.0   6.99  )-----------( 242      ( 11 Jul 2023 15:40 )             34.9       07-11    137  |  104  |  21  ----------------------------<  95  4.1   |  24  |  0.96    Ca    9.8      11 Jul 2023 15:40  Phos  3.7     07-11  Mg     2.30     07-11    TPro  6.9  /  Alb  4.5  /  TBili  0.4  /  DBili  x   /  AST  16  /  ALT  11  /  AlkPhos  89  07-11                    Urinalysis Basic - ( 11 Jul 2023 15:40 )    Color: x / Appearance: x / SG: x / pH: x  Gluc: 95 mg/dL / Ketone: x  / Bili: x / Urobili: x   Blood: x / Protein: x / Nitrite: x   Leuk Esterase: x / RBC: x / WBC x   Sq Epi: x / Non Sq Epi: x / Bacteria: x            CXR: As per my read - pending, Will wait for prelim/official read    EKG: As per my read - pending

## 2023-07-12 NOTE — PATIENT PROFILE ADULT - FUNCTIONAL ASSESSMENT - BASIC MOBILITY 6.
2-calculated by average/Not able to assess (calculate score using [facility] averaging method)

## 2023-07-12 NOTE — PHYSICAL THERAPY INITIAL EVALUATION ADULT - ADDITIONAL COMMENTS
[Yes] : Yes [Patient reported PAP Smear was normal] : Patient reported PAP Smear was normal [With Family] : lives with family [Employed] : employed [] :  [] : No [de-identified] : rarely [MammogramDate] : Never [PapSmearDate] : 09/2020 Pt states she lives with family in a house with 4 steps to enter, bedroom and bathroom on the first floor. Prior to admission pt reports ambulating independently with a cane and was independent in ADLs, pt also states she owns a rolling walker.    Following evaluation, pt was left semireclined in bed in no distress, all lines in tact, call bell in reach.
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
## 2023-07-12 NOTE — H&P ADULT - NSHPPHYSICALEXAM_GEN_ALL_CORE
PHYSICAL EXAM:  VITALS: Vital Signs Last 24 Hrs  T(C): 36.8 (12 Jul 2023 02:34), Max: 37 (11 Jul 2023 17:27)  T(F): 98.2 (12 Jul 2023 02:34), Max: 98.6 (11 Jul 2023 17:27)  HR: 60 (12 Jul 2023 02:34) (60 - 74)  BP: 115/74 (12 Jul 2023 02:34) (113/68 - 130/80)  BP(mean): --  RR: 18 (12 Jul 2023 02:34) (16 - 19)  SpO2: 100% (12 Jul 2023 02:34) (99% - 100%)    Parameters below as of 12 Jul 2023 02:34  Patient On (Oxygen Delivery Method): room air      GENERAL: NAD, appears uncomfortable at bedside due to pain  HEAD:  Atraumatic, Normocephalic  EYES: EOMI, PERRL, conjunctiva and sclera clear  ENT: Moist Mucus Membranes present, no ulcers appreciated  NECK: Supple, No JVD  CHEST/LUNG: Clear to auscultation bilaterally; No wheezes, rales or rhonchi, no accessory muscle use  HEART: Regular rate and rhythm; No murmurs, rubs, or gallops, (+)S1, S2  ABDOMEN: Soft, Nontender, Nondistended; Normal Bowel sounds   EXTREMITIES: No clubbing, cyanosis, or edema, 2/5 strength in LE, difficult to flex due to pain, + severe tenderness to palpation with light touch only  PSYCH: laqbile mood and affect  NEUROLOGY: AAOx3, non-focal  SKIN: No rashes or lesions
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
## 2023-07-12 NOTE — H&P ADULT - PROBLEM SELECTOR PLAN 4
- heparin sub for dvt ppx - heparin sub for dvt ppx  - As per current hospital policy, this patient will be followed by the private attending and to take over the case in the morning, and assume complete responsibility by Dr. Andujar as listed on the attending of record as above on the chart.

## 2023-07-12 NOTE — H&P ADULT - NSHPREVIEWOFSYSTEMS_GEN_ALL_CORE
REVIEW OF SYSTEMS:    CONSTITUTIONAL: No weakness, fevers or chills  EYES/ENT: No visual changes;  No dysphagia; No sore throat; No rhinorrhea; No sinus pain/pressure  NECK: No pain or stiffness  RESPIRATORY: No cough, wheezing, hemoptysis; No shortness of breath  CARDIOVASCULAR: No chest pain or palpitations; No lower extremity edema  GASTROINTESTINAL: No abdominal or epigastric pain. No nausea, vomiting, or hematemesis; No diarrhea or constipation. No melena or hematochezia.  GENITOURINARY: No dysuria, frequency or hematuria  NEUROLOGICAL: No numbness or weakness  MSK: ambulates with cane. + severe back pain  SKIN: No itching, burning, rashes, or lesions   All other review of systems is negative unless indicated above.

## 2023-07-12 NOTE — PHYSICAL THERAPY INITIAL EVALUATION ADULT - MANUAL MUSCLE TESTING RESULTS, REHAB EVAL
Bilateral LE, bilateral elbow and wrist grossly >/= 3/5 throughout. Pt deferred shoulder ROM secondary to increased back pain when elevating UE.
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
## 2023-07-12 NOTE — PHYSICAL THERAPY INITIAL EVALUATION ADULT - GROSSLY INTACT, SENSORY
pt reports tingling in bilateral LE, left>right. Light touch sensation grossly intact./Left UE/Right UE/Grossly Intact
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
## 2023-07-13 ENCOUNTER — TRANSCRIPTION ENCOUNTER (OUTPATIENT)
Age: 53
End: 2023-07-13

## 2023-07-13 VITALS
DIASTOLIC BLOOD PRESSURE: 61 MMHG | TEMPERATURE: 98 F | SYSTOLIC BLOOD PRESSURE: 110 MMHG | OXYGEN SATURATION: 100 % | RESPIRATION RATE: 16 BRPM | HEART RATE: 60 BPM

## 2023-07-13 LAB
ANION GAP SERPL CALC-SCNC: 14 MMOL/L — SIGNIFICANT CHANGE UP (ref 7–14)
BUN SERPL-MCNC: 17 MG/DL — SIGNIFICANT CHANGE UP (ref 7–23)
CALCIUM SERPL-MCNC: 9.7 MG/DL — SIGNIFICANT CHANGE UP (ref 8.4–10.5)
CHLORIDE SERPL-SCNC: 101 MMOL/L — SIGNIFICANT CHANGE UP (ref 98–107)
CO2 SERPL-SCNC: 24 MMOL/L — SIGNIFICANT CHANGE UP (ref 22–31)
CREAT SERPL-MCNC: 0.86 MG/DL — SIGNIFICANT CHANGE UP (ref 0.5–1.3)
EGFR: 82 ML/MIN/1.73M2 — SIGNIFICANT CHANGE UP
GLUCOSE SERPL-MCNC: 88 MG/DL — SIGNIFICANT CHANGE UP (ref 70–99)
HCT VFR BLD CALC: 34.7 % — SIGNIFICANT CHANGE UP (ref 34.5–45)
HGB BLD-MCNC: 11 G/DL — LOW (ref 11.5–15.5)
MAGNESIUM SERPL-MCNC: 2 MG/DL — SIGNIFICANT CHANGE UP (ref 1.6–2.6)
MCHC RBC-ENTMCNC: 25.8 PG — LOW (ref 27–34)
MCHC RBC-ENTMCNC: 31.7 GM/DL — LOW (ref 32–36)
MCV RBC AUTO: 81.3 FL — SIGNIFICANT CHANGE UP (ref 80–100)
NRBC # BLD: 0 /100 WBCS — SIGNIFICANT CHANGE UP (ref 0–0)
NRBC # FLD: 0 K/UL — SIGNIFICANT CHANGE UP (ref 0–0)
PHOSPHATE SERPL-MCNC: 4.3 MG/DL — SIGNIFICANT CHANGE UP (ref 2.5–4.5)
PLATELET # BLD AUTO: 210 K/UL — SIGNIFICANT CHANGE UP (ref 150–400)
POTASSIUM SERPL-MCNC: 3.9 MMOL/L — SIGNIFICANT CHANGE UP (ref 3.5–5.3)
POTASSIUM SERPL-SCNC: 3.9 MMOL/L — SIGNIFICANT CHANGE UP (ref 3.5–5.3)
RBC # BLD: 4.27 M/UL — SIGNIFICANT CHANGE UP (ref 3.8–5.2)
RBC # FLD: 14.1 % — SIGNIFICANT CHANGE UP (ref 10.3–14.5)
SODIUM SERPL-SCNC: 139 MMOL/L — SIGNIFICANT CHANGE UP (ref 135–145)
WBC # BLD: 5.05 K/UL — SIGNIFICANT CHANGE UP (ref 3.8–10.5)
WBC # FLD AUTO: 5.05 K/UL — SIGNIFICANT CHANGE UP (ref 3.8–10.5)

## 2023-07-13 RX ORDER — CYCLOBENZAPRINE HYDROCHLORIDE 10 MG/1
1 TABLET, FILM COATED ORAL
Qty: 15 | Refills: 0
Start: 2023-07-13 | End: 2023-07-17

## 2023-07-13 RX ORDER — ACETAMINOPHEN 500 MG
2 TABLET ORAL
Qty: 8 | Refills: 0
Start: 2023-07-13 | End: 2023-07-13

## 2023-07-13 RX ORDER — HYDROMORPHONE HYDROCHLORIDE 2 MG/ML
1 INJECTION INTRAMUSCULAR; INTRAVENOUS; SUBCUTANEOUS
Qty: 1 | Refills: 0
Start: 2023-07-13 | End: 2023-07-14

## 2023-07-13 RX ORDER — TRAMADOL HYDROCHLORIDE 50 MG/1
1 TABLET ORAL
Refills: 0 | DISCHARGE

## 2023-07-13 RX ORDER — LIDOCAINE 4 G/100G
1 CREAM TOPICAL
Qty: 1 | Refills: 0
Start: 2023-07-13 | End: 2023-07-16

## 2023-07-13 RX ORDER — DIAZEPAM 5 MG
5 TABLET ORAL EVERY 6 HOURS
Refills: 0 | Status: DISCONTINUED | OUTPATIENT
Start: 2023-07-13 | End: 2023-07-13

## 2023-07-13 RX ORDER — HYDROMORPHONE HYDROCHLORIDE 2 MG/ML
2 INJECTION INTRAMUSCULAR; INTRAVENOUS; SUBCUTANEOUS EVERY 6 HOURS
Refills: 0 | Status: DISCONTINUED | OUTPATIENT
Start: 2023-07-13 | End: 2023-07-13

## 2023-07-13 RX ADMIN — LAMOTRIGINE 200 MILLIGRAM(S): 25 TABLET, ORALLY DISINTEGRATING ORAL at 12:28

## 2023-07-13 RX ADMIN — SERTRALINE 100 MILLIGRAM(S): 25 TABLET, FILM COATED ORAL at 12:28

## 2023-07-13 RX ADMIN — Medication 5 MILLIGRAM(S): at 12:27

## 2023-07-13 RX ADMIN — HYDROMORPHONE HYDROCHLORIDE 2 MILLIGRAM(S): 2 INJECTION INTRAMUSCULAR; INTRAVENOUS; SUBCUTANEOUS at 00:48

## 2023-07-13 RX ADMIN — HEPARIN SODIUM 5000 UNIT(S): 5000 INJECTION INTRAVENOUS; SUBCUTANEOUS at 06:51

## 2023-07-13 RX ADMIN — CYCLOBENZAPRINE HYDROCHLORIDE 5 MILLIGRAM(S): 10 TABLET, FILM COATED ORAL at 06:51

## 2023-07-13 RX ADMIN — HYDROMORPHONE HYDROCHLORIDE 2 MILLIGRAM(S): 2 INJECTION INTRAMUSCULAR; INTRAVENOUS; SUBCUTANEOUS at 00:18

## 2023-07-13 RX ADMIN — Medication 650 MILLIGRAM(S): at 12:28

## 2023-07-13 RX ADMIN — Medication 650 MILLIGRAM(S): at 06:52

## 2023-07-13 RX ADMIN — Medication 5 MILLIGRAM(S): at 07:11

## 2023-07-13 RX ADMIN — FAMOTIDINE 20 MILLIGRAM(S): 10 INJECTION INTRAVENOUS at 12:29

## 2023-07-13 RX ADMIN — CYCLOBENZAPRINE HYDROCHLORIDE 5 MILLIGRAM(S): 10 TABLET, FILM COATED ORAL at 14:13

## 2023-07-13 RX ADMIN — OXYMETAZOLINE HYDROCHLORIDE 2 SPRAY(S): 0.5 SPRAY NASAL at 12:33

## 2023-07-13 RX ADMIN — Medication 650 MILLIGRAM(S): at 00:02

## 2023-07-13 NOTE — DISCHARGE NOTE PROVIDER - PROVIDER TOKENS
FREE:[LAST:[Your primary care physician],PHONE:[(   )    -],FAX:[(   )    -],FOLLOWUP:[1 week]],PROVIDER:[TOKEN:[[id number]],FOLLOWUP:[1 week]]
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
## 2023-07-13 NOTE — DISCHARGE NOTE PROVIDER - NSDCCPCAREPLAN_GEN_ALL_CORE_FT
PRINCIPAL DISCHARGE DIAGNOSIS  Diagnosis: Back pain  Assessment and Plan of Treatment: You experienced acute on chronic back pain. We completed CT spine which was negative for fractures. Please continue the pain regimens as prescribed. Follow up with your chronic pain management Dr. Schoenberg or Dr. Montoya and follow up with your primary care physician.      SECONDARY DISCHARGE DIAGNOSES  Diagnosis: Fibromyalgia  Assessment and Plan of Treatment: Please continue to follow up with your primary care physician.    Diagnosis: Major depression  Assessment and Plan of Treatment: Continue your medications as directed and follow up with your primary care provider/psychiatrist for further management.   If you are ever in need of immediate psychiatric assistance you may reach out to the [facility] at [facility] ([address] [place] [address]) # [phone] operates M-F 9am-3pm, walk in or call for same-day/next-day appointment.

## 2023-07-13 NOTE — DISCHARGE NOTE PROVIDER - EXTENDED VTE YES NO FOR MLM ENOXAPARIN
Progress Note - Cardiology   Mona Bond 82 y o  male MRN: [id number]  Unit/Bed#: [place] [address] -01 Encounter: [id number]          Assessment / Plan  Hypoxic respiratory failure requiring mechanical ventilation               - Likely due to multifocal pneumonia with component of congestive heart failure and/or aspiration contributing              - Intubated 8/21-extubated 8/28  Multifocal pneumonia              - Completed a 5 day course of antibiotics  Acute diastolic CHF              - Echo 8/20/2021:  EF 50% (mild-moderate hypokinesia mid-apical wall segment) with grade 2 diastolic dysfunction  CKD, CANDIE - probable ATN in the setting of hypotension and sepsis              -  previous Baseline creatinine <1              - Signs of uremia requiring initiation of dialysis 9/1/2021--> not repeated with subsequent improvement in renal function        Paroxysmal atrial fibrillation and flutter              - Initiated on amiodarone 8/30/21              - Eliquis 2 5 mg b i d  Non MI troponin elevation              - Peak troponin 1 29 (8/20/2021)  Probable subclinical CAD                  - extensive coronary artery calcifications seen on chest CT and some ECG changes concerning for ischemia (biphasic and deeply inverted T-waves) in mid precordial leads during acute respiratory failure  Moderate aortic stenosis (echo 8/20/2021):  Peak velocity 3 3 centimeters/second with gradient of 22 mm)    Potassium 3 7, BUN 83 (improved), creatinine 1 76 (improved)    · Renal function continues to improve without recurrent need for dialysis as discussed above ~~> patient remains off diuretic (last was 8/26), presently with euvolemic exam with ongoing IVF managed per Nephrology and plan to remove dialysis catheter    · As detailed in Dr Dalton Steve' note of 9/3/2021, concern had previously been raised regarding the severity of the patient's aortic stenosis - moderate versus severe with prior echocardiogram done in the setting of sepsis and high output state possibly skewing data ~~>would consider repeat TTE now that patient no longer septic, and euvolemic with stable hemodynamics with particular attention to Doppler and structural assessment of aortic valve  However given the patient's recent agitation this would currently, again, probably proved to be insufficient examination for which there was also no immediate benefit either  We can continue to follow the patient with future consideration of same though again the clinical benefit of this may be limited in the foreseeable future  · As the patient is no longer requiring dialysis and showing signs of continued improvement in renal function pursuing cardiac catheterization as previously contemplated would, at this point, seem to carry more risk than benefit ~~> as such there is no imminent plan to pursue this either for evaluation of coronary arteries or aortic valve  · Continue guideline directed medical therapy for CAD its risk factor ~~> continue beta-blocker, statin, nitrate  · For the patient's atrial fibrillation he remains anticoagulated for stroke risk reduction with ongoing amiodarone and beta-blocker ~~>continue t i d  amiodarone through 9/8; 200mg/d  thereafter  · BP controlled on hydralazine 25 mg q 8, Isordil 10 mg t i d , metoprolol 12 5 mg b i d  ~~> continue same      Subjective:  Significant other at bedside reports patient has been restless/agitated having earlier pulled out his IV  Earlier he was deliriously speaking in [facility] without meaningful interactions today      Vitals:  Vitals:    09/04/21 0600 09/06/21 0543   Weight: 74 7 kg (164 lb 10 9 oz) 73 6 kg (162 lb 4 1 oz)   ,  Vitals:    09/06/21 0543 09/06/21 0730 09/06/21 1328 09/06/21 1458   BP:  119/57 116/57 111/58   BP Location:  Left arm  Left arm   Pulse:  87 94 95   Resp:  22  22   Temp:  97 9 °F (36 6 °C)  99 7 °F (37 6 °C)   TempSrc:  Oral  Oral   SpO2:  91% 90% (!) 86%   Weight: 73 6 kg (162 lb 4 1 oz)      Height:           Exam:  General:  Somewhat agitated and mumbling    Not able to cooperate with exam  Heart:  Currently regular with controlled rate  Lungs:  Clear bilaterally  Lower Limbs:  No edema    Medications:    Current Facility-Administered Medications:     Acetaminophen (TYLENOL) oral suspension 650 mg, 650 mg, Per NG Tube, Q6H PRN Max 4/day, ANATOLY Small, 650 mg at 21 0457    ALPRAZolam Octavio Linger) tablet 0 25 mg, 0 25 mg, Oral, HS PRN, ANATOLY Schulte, 0 25 mg at 21    [] amiodarone tablet 200 mg, 200 mg, Oral, TID With Meals, 200 mg at 21 1034 **FOLLOWED BY** amiodarone tablet 200 mg, 200 mg, Oral, BID With Meals **FOLLOWED BY** [START ON 2021] amiodarone tablet 200 mg, 200 mg, Oral, Daily With Breakfast, ANATOLY Small    apixaban Rikijoi Mcleod) tablet 2 5 mg, 2 5 mg, Oral, BID, ANATOLY Small, 2 5 mg at 21 1034    dextrose 5 % infusion, 75 mL/hr, Intravenous, Continuous, Ruchi So PA-C, Last Rate: 75 mL/hr at 21 1036, 75 mL/hr at 21 1036    hydrALAZINE (APRESOLINE) injection 10 mg, 10 mg, Intravenous, Q6H PRN, ANATOLY Doss, 10 mg at 21    hydrALAZINE (APRESOLINE) tablet 25 mg, 25 mg, Oral, Q8H [address], ANATOLY Small, 25 mg at 21 0542    isosorbide dinitrate (ISORDIL) tablet 10 mg, 10 mg, Oral, TID after meals, ANATOLY Small, 10 mg at 21 1034    lidocaine (LIDODERM) 5 % patch 1 patch, 1 patch, Topical, Daily, Min Bennett MD, 1 patch at 21 1035    metoprolol tartrate (LOPRESSOR) partial tablet 12 5 mg, 12 5 mg, Oral, Q12H [address], ANATOLY Small, 12 5 mg at 21 1034    omeprazole (PRILOSEC) suspension 2 mg/mL, 20 mg, Oral, Daily, ANATOLY Small, 20 mg at 21 1034    pravastatin (PRAVACHOL) tablet 40 mg, 40 mg, Oral, Daily With ANATOLY Mustafa, 40 mg at 21 1735    QUEtiapine (SEROquel) tablet 12 5 mg, 12 5 mg, Oral, HS, Dean Canchola MD    saccharomyces [facility]) capsule 250 mg, 250 mg, Oral, BID, Danish Reed MD, 250 mg at 09/06/21 1034    senna-docusate sodium (SENOKOT S) 8 6-50 mg per tablet 2 tablet, 2 tablet, Oral, BID PRN, Danish Reed MD      Labs/Data:        Results from last 7 days   Lab Units 09/04/21  0445 09/03/21  0456 09/02/21  0441   WBC Thousand/uL 21 00* 21 32* 23 76*   HEMOGLOBIN g/dL 10 2* 9 8* 9 5*   HEMATOCRIT % 36 1* 34 0* 32 8*   PLATELETS Thousands/uL 413* 426* 449*     Results from last 7 days   Lab Units 09/06/21  0923 09/05/21  0647 09/04/21  0445   POTASSIUM mmol/L 3 7 3 5 3 5   CHLORIDE mmol/L 112* 112* 108   CO2 mmol/L 26 26 23   BUN mg/dL 83* 114* 143* ,

## 2023-07-13 NOTE — DISCHARGE NOTE NURSING/CASE MANAGEMENT/SOCIAL WORK - PATIENT PORTAL LINK FT
You can access the FollowMyHealth Patient Portal offered by [facility] by registering at the following website: [email or web address]. By joining [facility]’s FollowMyHealth portal, you will also be able to view your health information using other applications (apps) compatible with our system.

## 2023-07-13 NOTE — DISCHARGE NOTE PROVIDER - CARE PROVIDER_API CALL
Your primary care physician,   Phone: (   )    -  Fax: (   )    -  Follow Up Time: 1 week    Ministerio Montoya  Pain Medicine  [address] [place] [address]  Phone: [phone]  Fax: [phone]  Follow Up Time: 1 week

## 2023-07-13 NOTE — DISCHARGE NOTE PROVIDER - NSDCMRMEDTOKEN_GEN_ALL_CORE_FT
acetaminophen 325 mg oral tablet: 2 tab(s) orally every 6 hours  busPIRone 30 mg oral tablet: 1 tab(s) orally 3 times a day  cyclobenzaprine 5 mg oral tablet: 1 tab(s) orally every 8 hours  diazePAM 5 mg oral tablet: 1 tab(s) orally every 6 hours, As Needed -Anxiety - for anxiety MDD:4 tabs daily  lamoTRIgine 200 mg oral tablet: 1 tab(s) orally once a day  sertraline 100 mg oral tablet: 1 tab(s) orally once a day MDD:2 tabs daily   acetaminophen 325 mg oral tablet: 2 tab(s) orally every 6 hours  busPIRone 30 mg oral tablet: 1 tab(s) orally 3 times a day  cyclobenzaprine 5 mg oral tablet: 1 tab(s) orally every 8 hours  diazePAM 5 mg oral tablet: 1 tab(s) orally every 6 hours, As Needed -Anxiety - for anxiety MDD:4 tabs daily  HYDROmorphone 2 mg oral tablet: 1 tab(s) orally every 6 hours as needed for Severe Pain (7 - 10) MDD: 4  lamoTRIgine 200 mg oral tablet: 1 tab(s) orally once a day  outpatient physical therapy: outpatient physical therapy  sertraline 100 mg oral tablet: 1 tab(s) orally once a day MDD:2 tabs daily

## 2023-08-23 NOTE — PROGRESS NOTE ADULT - REASON FOR ADMISSION
LBP
LVM for patient regarding scheduling patient in 6 week for RETURN appt with Dr. London. Per clinic okay to overbook if necessary. Left my direct line and call center number for scheduling.   
LBP
LBP

## 2023-08-24 ENCOUNTER — INPATIENT (INPATIENT)
Facility: HOSPITAL | Age: 53
LOS: 1 days | Discharge: HOME | End: 2023-08-26
Attending: INTERNAL MEDICINE | Admitting: INTERNAL MEDICINE
Payer: COMMERCIAL

## 2023-08-24 VITALS
HEIGHT: 70 IN | SYSTOLIC BLOOD PRESSURE: 136 MMHG | RESPIRATION RATE: 18 BRPM | OXYGEN SATURATION: 97 % | DIASTOLIC BLOOD PRESSURE: 89 MMHG | HEART RATE: 100 BPM | TEMPERATURE: 98 F | WEIGHT: 285.06 LBS

## 2023-08-24 DIAGNOSIS — K56.60 UNSPECIFIED INTESTINAL OBSTRUCTION: Chronic | ICD-10-CM

## 2023-08-24 DIAGNOSIS — Z98.51 TUBAL LIGATION STATUS: Chronic | ICD-10-CM

## 2023-08-24 DIAGNOSIS — Z98.89 OTHER SPECIFIED POSTPROCEDURAL STATES: Chronic | ICD-10-CM

## 2023-08-24 LAB
ALBUMIN SERPL ELPH-MCNC: 3.8 G/DL — SIGNIFICANT CHANGE UP (ref 3.3–5)
ALP SERPL-CCNC: 111 U/L — SIGNIFICANT CHANGE UP (ref 40–120)
ALT FLD-CCNC: 34 U/L — SIGNIFICANT CHANGE UP (ref 12–78)
ANION GAP SERPL CALC-SCNC: 4 MMOL/L — LOW (ref 5–17)
AST SERPL-CCNC: 58 U/L — HIGH (ref 15–37)
BASOPHILS # BLD AUTO: 0.04 K/UL — SIGNIFICANT CHANGE UP (ref 0–0.2)
BASOPHILS NFR BLD AUTO: 0.5 % — SIGNIFICANT CHANGE UP (ref 0–2)
BILIRUB SERPL-MCNC: 0.5 MG/DL — SIGNIFICANT CHANGE UP (ref 0.2–1.2)
BUN SERPL-MCNC: 16 MG/DL — SIGNIFICANT CHANGE UP (ref 7–23)
CALCIUM SERPL-MCNC: 9.1 MG/DL — SIGNIFICANT CHANGE UP (ref 8.5–10.1)
CHLORIDE SERPL-SCNC: 108 MMOL/L — SIGNIFICANT CHANGE UP (ref 96–108)
CO2 SERPL-SCNC: 27 MMOL/L — SIGNIFICANT CHANGE UP (ref 22–31)
CREAT SERPL-MCNC: 0.98 MG/DL — SIGNIFICANT CHANGE UP (ref 0.5–1.3)
EGFR: 70 ML/MIN/1.73M2 — SIGNIFICANT CHANGE UP
EOSINOPHIL # BLD AUTO: 0.09 K/UL — SIGNIFICANT CHANGE UP (ref 0–0.5)
EOSINOPHIL NFR BLD AUTO: 1.1 % — SIGNIFICANT CHANGE UP (ref 0–6)
GLUCOSE SERPL-MCNC: 92 MG/DL — SIGNIFICANT CHANGE UP (ref 70–99)
HCT VFR BLD CALC: 35.4 % — SIGNIFICANT CHANGE UP (ref 34.5–45)
HGB BLD-MCNC: 11.2 G/DL — LOW (ref 11.5–15.5)
IMM GRANULOCYTES NFR BLD AUTO: 1 % — HIGH (ref 0–0.9)
LYMPHOCYTES # BLD AUTO: 2.32 K/UL — SIGNIFICANT CHANGE UP (ref 1–3.3)
LYMPHOCYTES # BLD AUTO: 27.9 % — SIGNIFICANT CHANGE UP (ref 13–44)
MCHC RBC-ENTMCNC: 25.8 PG — LOW (ref 27–34)
MCHC RBC-ENTMCNC: 31.6 G/DL — LOW (ref 32–36)
MCV RBC AUTO: 81.6 FL — SIGNIFICANT CHANGE UP (ref 80–100)
MONOCYTES # BLD AUTO: 0.52 K/UL — SIGNIFICANT CHANGE UP (ref 0–0.9)
MONOCYTES NFR BLD AUTO: 6.3 % — SIGNIFICANT CHANGE UP (ref 2–14)
NEUTROPHILS # BLD AUTO: 5.27 K/UL — SIGNIFICANT CHANGE UP (ref 1.8–7.4)
NEUTROPHILS NFR BLD AUTO: 63.2 % — SIGNIFICANT CHANGE UP (ref 43–77)
NRBC # BLD: 0 /100 WBCS — SIGNIFICANT CHANGE UP (ref 0–0)
PLATELET # BLD AUTO: 270 K/UL — SIGNIFICANT CHANGE UP (ref 150–400)
POTASSIUM SERPL-MCNC: 4 MMOL/L — SIGNIFICANT CHANGE UP (ref 3.5–5.3)
POTASSIUM SERPL-SCNC: 4 MMOL/L — SIGNIFICANT CHANGE UP (ref 3.5–5.3)
PROT SERPL-MCNC: 7.3 GM/DL — SIGNIFICANT CHANGE UP (ref 6–8.3)
RBC # BLD: 4.34 M/UL — SIGNIFICANT CHANGE UP (ref 3.8–5.2)
RBC # FLD: 14.6 % — HIGH (ref 10.3–14.5)
SODIUM SERPL-SCNC: 139 MMOL/L — SIGNIFICANT CHANGE UP (ref 135–145)
WBC # BLD: 8.32 K/UL — SIGNIFICANT CHANGE UP (ref 3.8–10.5)
WBC # FLD AUTO: 8.32 K/UL — SIGNIFICANT CHANGE UP (ref 3.8–10.5)

## 2023-08-24 PROCEDURE — 72125 CT NECK SPINE W/O DYE: CPT | Mod: 26,MA

## 2023-08-24 PROCEDURE — 70450 CT HEAD/BRAIN W/O DYE: CPT | Mod: 26,MA

## 2023-08-24 PROCEDURE — 99222 1ST HOSP IP/OBS MODERATE 55: CPT

## 2023-08-24 PROCEDURE — 99285 EMERGENCY DEPT VISIT HI MDM: CPT

## 2023-08-24 RX ORDER — FAMOTIDINE 10 MG/ML
20 INJECTION INTRAVENOUS ONCE
Refills: 0 | Status: COMPLETED | OUTPATIENT
Start: 2023-08-24 | End: 2023-08-24

## 2023-08-24 RX ORDER — HYDROMORPHONE HYDROCHLORIDE 2 MG/ML
2 INJECTION INTRAMUSCULAR; INTRAVENOUS; SUBCUTANEOUS ONCE
Refills: 0 | Status: DISCONTINUED | OUTPATIENT
Start: 2023-08-24 | End: 2023-08-24

## 2023-08-24 RX ORDER — ACETAMINOPHEN 500 MG
975 TABLET ORAL ONCE
Refills: 0 | Status: COMPLETED | OUTPATIENT
Start: 2023-08-24 | End: 2023-08-24

## 2023-08-24 RX ORDER — LIDOCAINE 4 G/100G
1 CREAM TOPICAL EVERY 24 HOURS
Refills: 0 | Status: DISCONTINUED | OUTPATIENT
Start: 2023-08-25 | End: 2023-08-26

## 2023-08-24 RX ORDER — ACETAMINOPHEN 500 MG
650 TABLET ORAL EVERY 6 HOURS
Refills: 0 | Status: DISCONTINUED | OUTPATIENT
Start: 2023-08-24 | End: 2023-08-26

## 2023-08-24 RX ORDER — PSYLLIUM SEED (WITH DEXTROSE)
1 POWDER (GRAM) ORAL DAILY
Refills: 0 | Status: DISCONTINUED | OUTPATIENT
Start: 2023-08-24 | End: 2023-08-26

## 2023-08-24 RX ORDER — CYCLOBENZAPRINE HYDROCHLORIDE 10 MG/1
5 TABLET, FILM COATED ORAL ONCE
Refills: 0 | Status: COMPLETED | OUTPATIENT
Start: 2023-08-24 | End: 2023-08-24

## 2023-08-24 RX ORDER — HEPARIN SODIUM 5000 [USP'U]/ML
5000 INJECTION INTRAVENOUS; SUBCUTANEOUS EVERY 8 HOURS
Refills: 0 | Status: DISCONTINUED | OUTPATIENT
Start: 2023-08-24 | End: 2023-08-25

## 2023-08-24 RX ORDER — DIAZEPAM 5 MG
5 TABLET ORAL EVERY 8 HOURS
Refills: 0 | Status: DISCONTINUED | OUTPATIENT
Start: 2023-08-24 | End: 2023-08-26

## 2023-08-24 RX ORDER — CYCLOBENZAPRINE HYDROCHLORIDE 10 MG/1
10 TABLET, FILM COATED ORAL THREE TIMES A DAY
Refills: 0 | Status: DISCONTINUED | OUTPATIENT
Start: 2023-08-24 | End: 2023-08-26

## 2023-08-24 RX ORDER — LIDOCAINE 4 G/100G
1 CREAM TOPICAL ONCE
Refills: 0 | Status: COMPLETED | OUTPATIENT
Start: 2023-08-24 | End: 2023-08-24

## 2023-08-24 RX ORDER — LAMOTRIGINE 25 MG/1
1 TABLET, ORALLY DISINTEGRATING ORAL
Refills: 0 | DISCHARGE

## 2023-08-24 RX ORDER — FLUTICASONE PROPIONATE 50 MCG
1 SPRAY, SUSPENSION NASAL
Refills: 0 | Status: DISCONTINUED | OUTPATIENT
Start: 2023-08-24 | End: 2023-08-26

## 2023-08-24 RX ORDER — SERTRALINE 25 MG/1
100 TABLET, FILM COATED ORAL DAILY
Refills: 0 | Status: DISCONTINUED | OUTPATIENT
Start: 2023-08-24 | End: 2023-08-26

## 2023-08-24 RX ORDER — HYDROMORPHONE HYDROCHLORIDE 2 MG/ML
2 INJECTION INTRAMUSCULAR; INTRAVENOUS; SUBCUTANEOUS EVERY 6 HOURS
Refills: 0 | Status: DISCONTINUED | OUTPATIENT
Start: 2023-08-24 | End: 2023-08-25

## 2023-08-24 RX ADMIN — LIDOCAINE 1 PATCH: 4 CREAM TOPICAL at 21:56

## 2023-08-24 RX ADMIN — Medication 30 MILLIGRAM(S): at 21:26

## 2023-08-24 RX ADMIN — HYDROMORPHONE HYDROCHLORIDE 2 MILLIGRAM(S): 2 INJECTION INTRAMUSCULAR; INTRAVENOUS; SUBCUTANEOUS at 16:14

## 2023-08-24 RX ADMIN — HYDROMORPHONE HYDROCHLORIDE 2 MILLIGRAM(S): 2 INJECTION INTRAMUSCULAR; INTRAVENOUS; SUBCUTANEOUS at 13:30

## 2023-08-24 RX ADMIN — Medication 975 MILLIGRAM(S): at 12:25

## 2023-08-24 RX ADMIN — HYDROMORPHONE HYDROCHLORIDE 2 MILLIGRAM(S): 2 INJECTION INTRAMUSCULAR; INTRAVENOUS; SUBCUTANEOUS at 12:25

## 2023-08-24 RX ADMIN — Medication 975 MILLIGRAM(S): at 13:30

## 2023-08-24 RX ADMIN — HEPARIN SODIUM 5000 UNIT(S): 5000 INJECTION INTRAVENOUS; SUBCUTANEOUS at 21:26

## 2023-08-24 RX ADMIN — FAMOTIDINE 20 MILLIGRAM(S): 10 INJECTION INTRAVENOUS at 15:00

## 2023-08-24 RX ADMIN — HYDROMORPHONE HYDROCHLORIDE 2 MILLIGRAM(S): 2 INJECTION INTRAMUSCULAR; INTRAVENOUS; SUBCUTANEOUS at 22:00

## 2023-08-24 RX ADMIN — CYCLOBENZAPRINE HYDROCHLORIDE 5 MILLIGRAM(S): 10 TABLET, FILM COATED ORAL at 12:25

## 2023-08-24 RX ADMIN — LIDOCAINE 1 PATCH: 4 CREAM TOPICAL at 12:26

## 2023-08-24 RX ADMIN — HYDROMORPHONE HYDROCHLORIDE 2 MILLIGRAM(S): 2 INJECTION INTRAMUSCULAR; INTRAVENOUS; SUBCUTANEOUS at 21:07

## 2023-08-24 RX ADMIN — HYDROMORPHONE HYDROCHLORIDE 2 MILLIGRAM(S): 2 INJECTION INTRAMUSCULAR; INTRAVENOUS; SUBCUTANEOUS at 15:14
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
## 2023-08-24 NOTE — ED ADULT NURSE NOTE - NSFALLUNIVINTERV_ED_ALL_ED
Bed/Stretcher in lowest position, wheels locked, appropriate side rails in place/Call bell, personal items and telephone in reach/Instruct patient to call for assistance before getting out of bed/chair/stretcher/Non-slip footwear applied when patient is off stretcher/[place] to call system/Physically safe environment - no spills, clutter or unnecessary equipment/Purposeful proactive rounding/Room/bathroom lighting operational, light cord in reach
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
## 2023-08-24 NOTE — ED PROVIDER NOTE - PROGRESS NOTE DETAILS
Sim: Still endorsing severe pain.  Following pain management note recommendations from prior admissions.  States unable to leave 2/2 pain will admit

## 2023-08-24 NOTE — CHART NOTE - NSCHARTNOTEFT_GEN_A_CORE
Confidential Drug Utilization Report  Search Terms: milena alvarado, 1970Search Date: 08/24/2023 18:14:57 PM  The Drug Utilization Report below displays all of the controlled substance prescriptions, if any, that your patient has filled in the last twelve months. The information displayed on this report is compiled from pharmacy submissions to the Department, and accurately reflects the information as submitted by the pharmacies.    This report was requested by: Donato Ledesma | Reference #: [id number]    Practitioner Count: 3  Pharmacy Count: 1  Current Opioid Prescriptions: 0  Current Benzodiazepine Prescriptions: 1  Current Stimulant Prescriptions: 0      Patient Demographic Information (PDI)       PDI	First Name	Last Name	Birth Date	Gender	Street Address	[facility]	Zip Code  A	Milena Alvarado	1970	Female	[address] [facility]	[address]    Prescription Information      PDI Filter:    PDI	My Rx	Current Rx	Drug Type	Rx Written	Rx Dispensed	Drug	Quantity	Days Supply	Prescriber Name	Prescriber STEPHANI #	Payment Method	Dispenser  A	N	Y	B	08/14/2023	08/20/2023	diazepam 5 mg tablet	120	30	Romy Lizarraga	[id number]	Medicare	[id number]  A	N	N	B	07/14/2023	07/17/2023	diazepam 5 mg tablet	120	30	Romy Lizarraga	[id number]	Medicare	[id number]  A	N	N	O	06/22/2023	06/25/2023	tramadol hcl 50 mg tablet	60	30	Schoenberg, Laura	[id number]	Insurance	[id number]  A	N	N	O	06/10/2023	06/12/2023	tramadol hcl 50 mg tablet	12	3	Audra Squires	[id number]	Insurance	[id number]  A	N	N	B	05/15/2023	05/22/2023	diazepam 5 mg tablet	120	30	Romy Lizarraga	[id number]	Insurance	[facility][id number]  A	N	N	O	05/19/2023	05/22/2023	hydromorphone 2 mg tablet	12	3	Alexandra Mora	[id number]	Insurance	[id number]  A	N	N		04/28/2023	04/30/2023	pregabalin 50 mg capsule	69	30	Igor Talamantes A	[id number]	Insurance	[facility] #[id number]  A	N	N	O	03/31/2023	04/15/2023	tramadol hcl 50 mg tablet	90	30	Schoenberg, Laura	[id number]	Insurance	[id number]  A	N	N	O	04/14/2023	04/14/2023	morphine sulfate ir 15 mg tab	12	3	Nani Sahu (NP)	[id number]	Insurance	[facility]  A	N	N	B	04/04/2023	04/06/2023	diazepam 5 mg tablet	120	30	Garyali, Romy	[id number]	Insurance	[id number]  A	N	N	O	03/30/2023	03/31/2023	tramadol hcl 50 mg tablet	21	7	Schoenberg, Laura	[id number]	Insurance	[id number]  A	N	N	B	03/16/2023	03/16/2023	diazepam 5 mg tablet	20	5	Shallow, Yara NP	[id number]	Insurance	[id number]  A	N	N	O	03/16/2023	03/16/2023	morphine sulfate ir 15 mg tab	4	4	Shallow, Yara NP	[id number]	Insurance	[id number]  A	N	N	B	02/07/2023	02/10/2023	diazepam 5 mg tablet	120	30	Garyali, Romy	[id number]	Insurance	[id number]  A	N	N	B	11/28/2022	12/09/2022	diazepam 5 mg tablet	120	30	Garyali, Romy	[id number]	Insurance	[id number]  A	N	N	B	11/07/2022	11/08/2022	diazepam 5 mg tablet	120	30	Garyali, Romy	[id number]	Insurance	[id number]  A	N	N	B	09/26/2022	10/08/2022	diazepam 5 mg tablet	120	30	Garyali, Romy	[id number]	Insurance	[id number]  A	N	N	B	09/02/2022	09/06/2022	diazepam 5 mg tablet	120	30	Garyali, Romy	[id number]	Insurance	[id number]
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
## 2023-08-24 NOTE — ED PROVIDER NOTE - PHYSICAL EXAMINATION
General appearance: Nontoxic appearing, conversant, afebrile    Eyes: anicteric sclerae, LIAN, EOMI   HENT: Atraumatic; oropharynx clear, MMM and no ulcerations, no pharyngeal erythema or exudate   Neck: Trachea midline; Full range of motion, supple, diffuse ttp   Pulm: CTA bl, normal respiratory effort and no intercostal retractions, normal work of breathing   CV: RRR, No murmurs, rubs, or gallops   Abdomen: Soft, non-tender, non-distended; no guarding or rebound   Extremities: No peripheral edema, no gross deformities, FROM x4, 5/5 MS x4, gross sensation intact    Back: No midline ttp, step offs, deformities, no cvat, diffuse ttp   Skin: Dry, normal temperature, turgor and texture; no rash   Psych: Appropriate affect, cooperative

## 2023-08-24 NOTE — ED ADULT NURSE NOTE - ED STAT RN HANDOFF DETAILS
Assisting primary RN patricia. Report endorsed to RAMÓN Vee. Safety checks completed this shift. Safety rounds completed hourly.  IV sites checked Q2+remains WDL.  Fall & skin precautions in place. Any issues endorsed to RAMÓN Vee for follow up.
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
## 2023-08-24 NOTE — ED PROVIDER NOTE - IV ALTEPLASE DOOR HIDDEN
show Minoxidil Pregnancy And Lactation Text: This medication has not been assigned a Pregnancy Risk Category but animal studies failed to show danger with the topical medication. It is unknown if the medication is excreted in breast milk. Prednisone Pregnancy And Lactation Text: This medication is Pregnancy Category C and it isn't know if it is safe during pregnancy. This medication is excreted in breast milk. Fluconazole Counseling:  Patient counseled regarding adverse effects of fluconazole including but not limited to headache, diarrhea, nausea, upset stomach, liver function test abnormalities, taste disturbance, and stomach pain.  There is a rare possibility of liver failure that can occur when taking fluconazole.  The patient understands that monitoring of LFTs and kidney function test may be required, especially at baseline. The patient verbalized understanding of the proper use and possible adverse effects of fluconazole.  All of the patient's questions and concerns were addressed. Clofazimine Pregnancy And Lactation Text: This medication is Pregnancy Category C and isn't considered safe during pregnancy. It is excreted in breast milk. Infliximab Counseling:  I discussed with the patient the risks of infliximab including but not limited to myelosuppression, immunosuppression, autoimmune hepatitis, demyelinating diseases, lymphoma, and serious infections.  The patient understands that monitoring is required including a PPD at baseline and must alert us or the primary physician if symptoms of infection or other concerning signs are noted. Simponi Counseling:  I discussed with the patient the risks of golimumab including but not limited to myelosuppression, immunosuppression, autoimmune hepatitis, demyelinating diseases, lymphoma, and serious infections.  The patient understands that monitoring is required including a PPD at baseline and must alert us or the primary physician if symptoms of infection or other concerning signs are noted. Bactrim Counseling:  I discussed with the patient the risks of sulfa antibiotics including but not limited to GI upset, allergic reaction, drug rash, diarrhea, dizziness, photosensitivity, and yeast infections.  Rarely, more serious reactions can occur including but not limited to aplastic anemia, agranulocytosis, methemoglobinemia, blood dyscrasias, liver or kidney failure, lung infiltrates or desquamative/blistering drug rashes. Spironolactone Counseling: Patient advised regarding risks of diarrhea, abdominal pain, hyperkalemia, birth defects (for female patients), liver toxicity and renal toxicity. The patient may need blood work to monitor liver and kidney function and potassium levels while on therapy. The patient verbalized understanding of the proper use and possible adverse effects of spironolactone.  All of the patient's questions and concerns were addressed. Rifampin Counseling: I discussed with the patient the risks of rifampin including but not limited to liver damage, kidney damage, red-orange body fluids, nausea/vomiting and severe allergy. Cephalexin Counseling: I counseled the patient regarding use of cephalexin as an antibiotic for prophylactic and/or therapeutic purposes. Cephalexin (commonly prescribed under brand name Keflex) is a cephalosporin antibiotic which is active against numerous classes of bacteria, including most skin bacteria. Side effects may include nausea, diarrhea, gastrointestinal upset, rash, hives, yeast infections, and in rare cases, hepatitis, kidney disease, seizures, fever, confusion, neurologic symptoms, and others. Patients with severe allergies to penicillin medications are cautioned that there is about a 10% incidence of cross-reactivity with cephalosporins. When possible, patients with penicillin allergies should use alternatives to cephalosporins for antibiotic therapy. Picato Pregnancy And Lactation Text: This medication is Pregnancy Category C. It is unknown if this medication is excreted in breast milk. Griseofulvin Pregnancy And Lactation Text: This medication is Pregnancy Category X and is known to cause serious birth defects. It is unknown if this medication is excreted in breast milk but breast feeding should be avoided. Hydroxyzine Pregnancy And Lactation Text: This medication is not safe during pregnancy and should not be taken. It is also excreted in breast milk and breast feeding isn't recommended. Terbinafine Pregnancy And Lactation Text: This medication is Pregnancy Category B and is considered safe during pregnancy. It is also excreted in breast milk and breast feeding isn't recommended. Fluconazole Pregnancy And Lactation Text: This medication is Pregnancy Category C and it isn't know if it is safe during pregnancy. It is also excreted in breast milk. Doxepin Pregnancy And Lactation Text: This medication is Pregnancy Category C and it isn't known if it is safe during pregnancy. It is also excreted in breast milk and breast feeding isn't recommended. Include Pregnancy/Lactation Warning?: No Hydroxychloroquine Pregnancy And Lactation Text: This medication has been shown to cause fetal harm but it isn't assigned a Pregnancy Risk Category. There are small amounts excreted in breast milk. Minocycline Pregnancy And Lactation Text: This medication is Pregnancy Category D and not consider safe during pregnancy. It is also excreted in breast milk. Humira Counseling:  I discussed with the patient the risks of adalimumab including but not limited to myelosuppression, immunosuppression, autoimmune hepatitis, demyelinating diseases, lymphoma, and serious infections.  The patient understands that monitoring is required including a PPD at baseline and must alert us or the primary physician if symptoms of infection or other concerning signs are noted. Birth Control Pills Counseling: Birth Control Pill Counseling: I discussed with the patient the potential side effects of OCPs including but not limited to increased risk of stroke, heart attack, thrombophlebitis, deep venous thrombosis, hepatic adenomas, breast changes, GI upset, headaches, and depression.  The patient verbalized understanding of the proper use and possible adverse effects of OCPs. All of the patient's questions and concerns were addressed. Odomzo Pregnancy And Lactation Text: This medication is Pregnancy Category X and is absolutely contraindicated during pregnancy. It is unknown if it is excreted in breast milk. Cyclosporine Counseling:  I discussed with the patient the risks of cyclosporine including but not limited to hypertension, gingival hyperplasia,myelosuppression, immunosuppression, liver damage, kidney damage, neurotoxicity, lymphoma, and serious infections. The patient understands that monitoring is required including baseline blood pressure, CBC, CMP, lipid panel and uric acid, and then 1-2 times monthly CMP and blood pressure. Enbrel Pregnancy And Lactation Text: This medication is Pregnancy Category B and is considered safe during pregnancy. It is unknown if this medication is excreted in breast milk. Imiquimod Counseling:  I discussed with the patient the risks of imiquimod including but not limited to erythema, scaling, itching, weeping, crusting, and pain.  Patient understands that the inflammatory response to imiquimod is variable from person to person and was educated regarded proper titration schedule.  If flu-like symptoms develop, patient knows to discontinue the medication and contact us. High Dose Vitamin A Counseling: Side effects reviewed, pt to contact office should one occur. Azithromycin Counseling:  I discussed with the patient the risks of azithromycin including but not limited to GI upset, allergic reaction, drug rash, diarrhea, and yeast infections. Xolair Counseling:  Patient informed of potential adverse effects including but not limited to fever, muscle aches, rash and allergic reactions.  The patient verbalized understanding of the proper use and possible adverse effects of Xolair.  All of the patient's questions and concerns were addressed. Cellcept Pregnancy And Lactation Text: This medication is Pregnancy Category D and isn't considered safe during pregnancy. It is unknown if this medication is excreted in breast milk. Oxybutynin Pregnancy And Lactation Text: This medication is Pregnancy Category B and is considered safe during pregnancy. It is unknown if it is excreted in breast milk. Ivermectin Counseling:  Patient instructed to take medication on an empty stomach with a full glass of water.  Patient informed of potential adverse effects including but not limited to nausea, diarrhea, dizziness, itching, and swelling of the extremities or lymph nodes.  The patient verbalized understanding of the proper use and possible adverse effects of ivermectin.  All of the patient's questions and concerns were addressed. Terbinafine Counseling: Patient counseling regarding adverse effects of terbinafine including but not limited to headache, diarrhea, rash, upset stomach, liver function test abnormalities, itching, taste/smell disturbance, nausea, abdominal pain, and flatulence.  There is a rare possibility of liver failure that can occur when taking terbinafine.  The patient understands that a baseline LFT and kidney function test may be required. The patient verbalized understanding of the proper use and possible adverse effects of terbinafine.  All of the patient's questions and concerns were addressed. Metronidazole Pregnancy And Lactation Text: This medication is Pregnancy Category B and considered safe during pregnancy.  It is also excreted in breast milk. Minoxidil Counseling: Minoxidil is a topical medication which can increase blood flow where it is applied. It is uncertain how this medication increases hair growth. Side effects are uncommon and include stinging and allergic reactions. Nsaids Pregnancy And Lactation Text: These medications are considered safe up to 30 weeks gestation. It is excreted in breast milk. Glycopyrrolate Pregnancy And Lactation Text: This medication is Pregnancy Category B and is considered safe during pregnancy. It is unknown if it is excreted breast milk. Rituxan Counseling:  I discussed with the patient the risks of Rituxan infusions. Side effects can include infusion reactions, severe drug rashes including mucocutaneous reactions, reactivation of latent hepatitis and other infections and rarely progressive multifocal leukoencephalopathy.  All of the patient's questions and concerns were addressed. Odomzo Counseling- I discussed with the patient the risks of Odomzo including but not limited to nausea, vomiting, diarrhea, constipation, weight loss, changes in the sense of taste, decreased appetite, muscle spasms, and hair loss.  The patient verbalized understanding of the proper use and possible adverse effects of Odomzo.  All of the patient's questions and concerns were addressed. Isotretinoin Counseling: Patient should get monthly blood tests, not donate blood, not drive at night if vision affected, not share medication, and not undergo elective surgery for 6 months after tx completed. Side effects reviewed, pt to contact office should one occur. Topical Retinoid counseling:  Patient advised to apply a pea-sized amount only at bedtime and wait 30 minutes after washing their face before applying.  If too drying, patient may add a non-comedogenic moisturizer. The patient verbalized understanding of the proper use and possible adverse effects of retinoids.  All of the patient's questions and concerns were addressed. Doxepin Counseling:  Patient advised that the medication is sedating and not to drive a car after taking this medication. Patient informed of potential adverse effects including but not limited to dry mouth, urinary retention, and blurry vision.  The patient verbalized understanding of the proper use and possible adverse effects of doxepin.  All of the patient's questions and concerns were addressed. Zyclara Counseling:  I discussed with the patient the risks of imiquimod including but not limited to erythema, scaling, itching, weeping, crusting, and pain.  Patient understands that the inflammatory response to imiquimod is variable from person to person and was educated regarded proper titration schedule.  If flu-like symptoms develop, patient knows to discontinue the medication and contact us. Ivermectin Pregnancy And Lactation Text: This medication is Pregnancy Category C and it isn't known if it is safe during pregnancy. It is also excreted in breast milk. Methotrexate Counseling:  Patient counseled regarding adverse effects of methotrexate including but not limited to nausea, vomiting, abnormalities in liver function tests. Patients may develop mouth sores, rash, diarrhea, and abnormalities in blood counts. The patient understands that monitoring is required including LFT's and blood counts.  There is a rare possibility of scarring of the liver and lung problems that can occur when taking methotrexate. Persistent nausea, loss of appetite, pale stools, dark urine, cough, and shortness of breath should be reported immediately. Patient advised to discontinue methotrexate treatment at least three months before attempting to become pregnant.  I discussed the need for folate supplements while taking methotrexate.  These supplements can decrease side effects during methotrexate treatment. The patient verbalized understanding of the proper use and possible adverse effects of methotrexate.  All of the patient's questions and concerns were addressed. Azathioprine Counseling:  I discussed with the patient the risks of azathioprine including but not limited to myelosuppression, immunosuppression, hepatotoxicity, lymphoma, and infections.  The patient understands that monitoring is required including baseline LFTs, Creatinine, possible TPMP genotyping and weekly CBCs for the first month and then every 2 weeks thereafter.  The patient verbalized understanding of the proper use and possible adverse effects of azathioprine.  All of the patient's questions and concerns were addressed. Bactrim Pregnancy And Lactation Text: This medication is Pregnancy Category D and is known to cause fetal risk.  It is also excreted in breast milk. Protopic Counseling: Patient may experience a mild burning sensation during topical application. Protopic is not approved in children less than 2 years of age. There have been case reports of hematologic and skin malignancies in patients using topical calcineurin inhibitors although causality is questionable. Otezla Pregnancy And Lactation Text: This medication is Pregnancy Category C and it isn't known if it is safe during pregnancy. It is unknown if it is excreted in breast milk. SSKI Counseling:  I discussed with the patient the risks of SSKI including but not limited to thyroid abnormalities, metallic taste, GI upset, fever, headache, acne, arthralgias, paraesthesias, lymphadenopathy, easy bleeding, arrhythmias, and allergic reaction. Dupixent Counseling: I discussed with the patient the risks of dupilumab including but not limited to eye infection and irritation, cold sores, injection site reactions, worsening of asthma, allergic reactions and increased risk of parasitic infection.  Live vaccines should be avoided while taking dupilumab. Dupilumab will also interact with certain medications such as warfarin and cyclosporine. The patient understands that monitoring is required and they must alert us or the primary physician if symptoms of infection or other concerning signs are noted. Doxycycline Pregnancy And Lactation Text: This medication is Pregnancy Category D and not consider safe during pregnancy. It is also excreted in breast milk but is considered safe for shorter treatment courses. Eucrisa Counseling: Patient may experience a mild burning sensation during topical application. Eucrisa is not approved in children less than 2 years of age. Picato Counseling:  I discussed with the patient the risks of Picato including but not limited to erythema, scaling, itching, weeping, crusting, and pain. Ketoconazole Counseling:   Patient counseled regarding improving absorption with orange juice.  Adverse effects include but are not limited to breast enlargement, headache, diarrhea, nausea, upset stomach, liver function test abnormalities, taste disturbance, and stomach pain.  There is a rare possibility of liver failure that can occur when taking ketoconazole. The patient understands that monitoring of LFTs may be required, especially at baseline. The patient verbalized understanding of the proper use and possible adverse effects of ketoconazole.  All of the patient's questions and concerns were addressed. Prednisone Counseling:  I discussed with the patient the risks of prolonged use of prednisone including but not limited to weight gain, insomnia, osteoporosis, mood changes, diabetes, susceptibility to infection, glaucoma and high blood pressure.  In cases where prednisone use is prolonged, patients should be monitored with blood pressure checks, serum glucose levels and an eye exam.  Additionally, the patient may need to be placed on GI prophylaxis, PCP prophylaxis, and calcium and vitamin D supplementation and/or a bisphosphonate.  The patient verbalized understanding of the proper use and the possible adverse effects of prednisone.  All of the patient's questions and concerns were addressed. Dapsone Counseling: I discussed with the patient the risks of dapsone including but not limited to hemolytic anemia, agranulocytosis, rashes, methemoglobinemia, kidney failure, peripheral neuropathy, headaches, GI upset, and liver toxicity.  Patients who start dapsone require monitoring including baseline LFTs and weekly CBCs for the first month, then every month thereafter.  The patient verbalized understanding of the proper use and possible adverse effects of dapsone.  All of the patient's questions and concerns were addressed. Hydroxyzine Counseling: Patient advised that the medication is sedating and not to drive a car after taking this medication.  Patient informed of potential adverse effects including but not limited to dry mouth, urinary retention, and blurry vision.  The patient verbalized understanding of the proper use and possible adverse effects of hydroxyzine.  All of the patient's questions and concerns were addressed. Topical Sulfur Applications Pregnancy And Lactation Text: This medication is Pregnancy Category C and has an unknown safety profile during pregnancy. It is unknown if this topical medication is excreted in breast milk. Glycopyrrolate Counseling:  I discussed with the patient the risks of glycopyrrolate including but not limited to skin rash, drowsiness, dry mouth, difficulty urinating, and blurred vision. Xeljanz Counseling: I discussed with the patient the risks of Xeljanz therapy including increased risk of infection, liver issues, headache, diarrhea, or cold symptoms. Live vaccines should be avoided. They were instructed to call if they have any problems. Tazorac Pregnancy And Lactation Text: This medication is not safe during pregnancy. It is unknown if this medication is excreted in breast milk. Sski Pregnancy And Lactation Text: This medication is Pregnancy Category D and isn't considered safe during pregnancy. It is excreted in breast milk. Oxybutynin Counseling:  I discussed with the patient the risks of oxybutynin including but not limited to skin rash, drowsiness, dry mouth, difficulty urinating, and blurred vision. Xolair Pregnancy And Lactation Text: This medication is Pregnancy Category B and is considered safe during pregnancy. This medication is excreted in breast milk. Methotrexate Pregnancy And Lactation Text: This medication is Pregnancy Category X and is known to cause fetal harm. This medication is excreted in breast milk. Xelclintz Pregnancy And Lactation Text: This medication is Pregnancy Category D and is not considered safe during pregnancy.  The risk during breast feeding is also uncertain. Bexarotene Pregnancy And Lactation Text: This medication is Pregnancy Category X and should not be given to women who are pregnant or may become pregnant. This medication should not be used if you are breast feeding. Valtrex Counseling: I discussed with the patient the risks of valacyclovir including but not limited to kidney damage, nausea, vomiting and severe allergy.  The patient understands that if the infection seems to be worsening or is not improving, they are to call. Topical Clindamycin Counseling: Patient counseled that this medication may cause skin irritation or allergic reactions.  In the event of skin irritation, the patient was advised to reduce the amount of the drug applied or use it less frequently.   The patient verbalized understanding of the proper use and possible adverse effects of clindamycin.  All of the patient's questions and concerns were addressed. Dupixent Pregnancy And Lactation Text: This medication likely crosses the placenta but the risk for the fetus is uncertain. This medication is excreted in breast milk. Arava Counseling:  Patient counseled regarding adverse effects of Arava including but not limited to nausea, vomiting, abnormalities in liver function tests. Patients may develop mouth sores, rash, diarrhea, and abnormalities in blood counts. The patient understands that monitoring is required including LFTs and blood counts.  There is a rare possibility of scarring of the liver and lung problems that can occur when taking methotrexate. Persistent nausea, loss of appetite, pale stools, dark urine, cough, and shortness of breath should be reported immediately. Patient advised to discontinue Arava treatment and consult with a physician prior to attempting conception. The patient will have to undergo a treatment to eliminate Arava from the body prior to conception. Quinolones Counseling:  I discussed with the patient the risks of fluoroquinolones including but not limited to GI upset, allergic reaction, drug rash, diarrhea, dizziness, photosensitivity, yeast infections, liver function test abnormalities, tendonitis/tendon rupture. Enbrel Counseling:  I discussed with the patient the risks of etanercept including but not limited to myelosuppression, immunosuppression, autoimmune hepatitis, demyelinating diseases, lymphoma, and infections.  The patient understands that monitoring is required including a PPD at baseline and must alert us or the primary physician if symptoms of infection or other concerning signs are noted. Drysol Pregnancy And Lactation Text: This medication is considered safe during pregnancy and breast feeding. 5-Fu Pregnancy And Lactation Text: This medication is Pregnancy Category X and contraindicated in pregnancy and in women who may become pregnant. It is unknown if this medication is excreted in breast milk. Thalidomide Counseling: I discussed with the patient the risks of thalidomide including but not limited to birth defects, anxiety, weakness, chest pain, dizziness, cough and severe allergy. Albendazole Counseling:  I discussed with the patient the risks of albendazole including but not limited to cytopenia, kidney damage, nausea/vomiting and severe allergy.  The patient understands that this medication is being used in an off-label manner. Cimetidine Counseling:  I discussed with the patient the risks of Cimetidine including but not limited to gynecomastia, headache, diarrhea, nausea, drowsiness, arrhythmias, pancreatitis, skin rashes, psychosis, bone marrow suppression and kidney toxicity. Solaraze Counseling:  I discussed with the patient the risks of Solaraze including but not limited to erythema, scaling, itching, weeping, crusting, and pain. Simponi Pregnancy And Lactation Text: The risk during pregnancy and breastfeeding is uncertain with this medication. High Dose Vitamin A Pregnancy And Lactation Text: High dose vitamin A therapy is contraindicated during pregnancy and breast feeding. Dapsone Pregnancy And Lactation Text: This medication is Pregnancy Category C and is not considered safe during pregnancy or breast feeding. Drysol Counseling:  I discussed with the patient the risks of drysol/aluminum chloride including but not limited to skin rash, itching, irritation, burning. Cosentyx Counseling:  I discussed with the patient the risks of Cosentyx including but not limited to worsening of Crohn's disease, immunosuppression, allergic reactions and infections.  The patient understands that monitoring is required including a PPD at baseline and must alert us or the primary physician if symptoms of infection or other concerning signs are noted. Acitretin Pregnancy And Lactation Text: This medication is Pregnancy Category X and should not be given to women who are pregnant or may become pregnant in the future. This medication is excreted in breast milk. Taltz Counseling: I discussed with the patient the risks of ixekizumab including but not limited to immunosuppression, serious infections, worsening of inflammatory bowel disease and drug reactions.  The patient understands that monitoring is required including a PPD at baseline and must alert us or the primary physician if symptoms of infection or other concerning signs are noted. Carac Counseling:  I discussed with the patient the risks of Carac including but not limited to erythema, scaling, itching, weeping, crusting, and pain. Itraconazole Counseling:  I discussed with the patient the risks of itraconazole including but not limited to liver damage, nausea/vomiting, neuropathy, and severe allergy.  The patient understands that this medication is best absorbed when taken with acidic beverages such as non-diet cola or ginger ale.  The patient understands that monitoring is required including baseline LFTs and repeat LFTs at intervals.  The patient understands that they are to contact us or the primary physician if concerning signs are noted. Metronidazole Counseling:  I discussed with the patient the risks of metronidazole including but not limited to seizures, nausea/vomiting, a metallic taste in the mouth, nausea/vomiting and severe allergy. Erivedge Counseling- I discussed with the patient the risks of Erivedge including but not limited to nausea, vomiting, diarrhea, constipation, weight loss, changes in the sense of taste, decreased appetite, muscle spasms, and hair loss.  The patient verbalized understanding of the proper use and possible adverse effects of Erivedge.  All of the patient's questions and concerns were addressed. Doxycycline Counseling:  Patient counseled regarding possible photosensitivity and increased risk for sunburn.  Patient instructed to avoid sunlight, if possible.  When exposed to sunlight, patients should wear protective clothing, sunglasses, and sunscreen.  The patient was instructed to call the office immediately if the following severe adverse effects occur:  hearing changes, easy bruising/bleeding, severe headache, or vision changes.  The patient verbalized understanding of the proper use and possible adverse effects of doxycycline.  All of the patient's questions and concerns were addressed. Hydroxychloroquine Counseling:  I discussed with the patient that a baseline ophthalmologic exam is needed at the start of therapy and every year thereafter while on therapy. A CBC may also be warranted for monitoring.  The side effects of this medication were discussed with the patient, including but not limited to agranulocytosis, aplastic anemia, seizures, rashes, retinopathy, and liver toxicity. Patient instructed to call the office should any adverse effect occur.  The patient verbalized understanding of the proper use and possible adverse effects of Plaquenil.  All the patient's questions and concerns were addressed. Nsaids Counseling: NSAID Counseling: I discussed with the patient that NSAIDs should be taken with food. Prolonged use of NSAIDs can result in the development of stomach ulcers.  Patient advised to stop taking NSAIDs if abdominal pain occurs.  The patient verbalized understanding of the proper use and possible adverse effects of NSAIDs.  All of the patient's questions and concerns were addressed. Rifampin Pregnancy And Lactation Text: This medication is Pregnancy Category C and it isn't know if it is safe during pregnancy. It is also excreted in breast milk and should not be used if you are breast feeding. Rituxan Pregnancy And Lactation Text: This medication is Pregnancy Category C and it isn't know if it is safe during pregnancy. It is unknown if this medication is excreted in breast milk but similar antibodies are known to be excreted. Minocycline Counseling: Patient advised regarding possible photosensitivity and discoloration of the teeth, skin, lips, tongue and gums.  Patient instructed to avoid sunlight, if possible.  When exposed to sunlight, patients should wear protective clothing, sunglasses, and sunscreen.  The patient was instructed to call the office immediately if the following severe adverse effects occur:  hearing changes, easy bruising/bleeding, severe headache, or vision changes.  The patient verbalized understanding of the proper use and possible adverse effects of minocycline.  All of the patient's questions and concerns were addressed. Benzoyl Peroxide Counseling: Patient counseled that medicine may cause skin irritation and bleach clothing.  In the event of skin irritation, the patient was advised to reduce the amount of the drug applied or use it less frequently.   The patient verbalized understanding of the proper use and possible adverse effects of benzoyl peroxide.  All of the patient's questions and concerns were addressed. Detail Level: Simple Hydroquinone Counseling:  Patient advised that medication may result in skin irritation, lightening (hypopigmentation), dryness, and burning.  In the event of skin irritation, the patient was advised to reduce the amount of the drug applied or use it less frequently.  Rarely, spots that are treated with hydroquinone can become darker (pseudoochronosis).  Should this occur, patient instructed to stop medication and call the office. The patient verbalized understanding of the proper use and possible adverse effects of hydroquinone.  All of the patient's questions and concerns were addressed. Gabapentin Counseling: I discussed with the patient the risks of gabapentin including but not limited to dizziness, somnolence, fatigue and ataxia. Tremfya Counseling: I discussed with the patient the risks of guselkumab including but not limited to immunosuppression, serious infections, worsening of inflammatory bowel disease and drug reactions.  The patient understands that monitoring is required including a PPD at baseline and must alert us or the primary physician if symptoms of infection or other concerning signs are noted. Tetracycline Counseling: Patient counseled regarding possible photosensitivity and increased risk for sunburn.  Patient instructed to avoid sunlight, if possible.  When exposed to sunlight, patients should wear protective clothing, sunglasses, and sunscreen.  The patient was instructed to call the office immediately if the following severe adverse effects occur:  hearing changes, easy bruising/bleeding, severe headache, or vision changes.  The patient verbalized understanding of the proper use and possible adverse effects of tetracycline.  All of the patient's questions and concerns were addressed. Patient understands to avoid pregnancy while on therapy due to potential birth defects. Azithromycin Pregnancy And Lactation Text: This medication is considered safe during pregnancy and is also secreted in breast milk. Cephalexin Pregnancy And Lactation Text: This medication is Pregnancy Category B and considered safe during pregnancy.  It is also excreted in breast milk but can be used safely for shorter doses. Solaraze Pregnancy And Lactation Text: This medication is Pregnancy Category B and is considered safe. There is some data to suggest avoiding during the third trimester. It is unknown if this medication is excreted in breast milk. Elidel Counseling: Patient may experience a mild burning sensation during topical application. Elidel is not approved in children less than 2 years of age. There have been case reports of hematologic and skin malignancies in patients using topical calcineurin inhibitors although causality is questionable. Valtrex Pregnancy And Lactation Text: this medication is Pregnancy Category B and is considered safe during pregnancy. This medication is not directly found in breast milk but it's metabolite acyclovir is present. 5-Fu Counseling: 5-Fluorouracil Counseling:  I discussed with the patient the risks of 5-fluorouracil including but not limited to erythema, scaling, itching, weeping, crusting, and pain. Colchicine Counseling:  Patient counseled regarding adverse effects including but not limited to stomach upset (nausea, vomiting, stomach pain, or diarrhea).  Patient instructed to limit alcohol consumption while taking this medication.  Colchicine may reduce blood counts especially with prolonged use.  The patient understands that monitoring of kidney function and blood counts may be required, especially at baseline. The patient verbalized understanding of the proper use and possible adverse effects of colchicine.  All of the patient's questions and concerns were addressed. Ketoconazole Pregnancy And Lactation Text: This medication is Pregnancy Category C and it isn't know if it is safe during pregnancy. It is also excreted in breast milk and breast feeding isn't recommended. Birth Control Pills Pregnancy And Lactation Text: This medication should be avoided if pregnant and for the first 30 days post-partum. Erythromycin Pregnancy And Lactation Text: This medication is Pregnancy Category B and is considered safe during pregnancy. It is also excreted in breast milk. Bexarotene Counseling:  I discussed with the patient the risks of bexarotene including but not limited to hair loss, dry lips/skin/eyes, liver abnormalities, hyperlipidemia, pancreatitis, depression/suicidal ideation, photosensitivity, drug rash/allergic reactions, hypothyroidism, anemia, leukopenia, infection, cataracts, and teratogenicity.  Patient understands that they will need regular blood tests to check lipid profile, liver function tests, white blood cell count, thyroid function tests and pregnancy test if applicable. Clofazimine Counseling:  I discussed with the patient the risks of clofazimine including but not limited to skin and eye pigmentation, liver damage, nausea/vomiting, gastrointestinal bleeding and allergy. Stelara Counseling:  I discussed with the patient the risks of ustekinumab including but not limited to immunosuppression, malignancy, posterior leukoencephalopathy syndrome, and serious infections.  The patient understands that monitoring is required including a PPD at baseline and must alert us or the primary physician if symptoms of infection or other concerning signs are noted. Cellcept Counseling:  I discussed with the patient the risks of mycophenolate mofetil including but not limited to infection/immunosuppression, GI upset, hypokalemia, hypercholesterolemia, bone marrow suppression, lymphoproliferative disorders, malignancy, GI ulceration/bleed/perforation, colitis, interstitial lung disease, kidney failure, progressive multifocal leukoencephalopathy, and birth defects.  The patient understands that monitoring is required including a baseline creatinine and regular CBC testing. In addition, patient must alert us immediately if symptoms of infection or other concerning signs are noted. Protopic Pregnancy And Lactation Text: This medication is Pregnancy Category C. It is unknown if this medication is excreted in breast milk when applied topically. Spironolactone Pregnancy And Lactation Text: This medication can cause feminization of the male fetus and should be avoided during pregnancy. The active metabolite is also found in breast milk. Otezla Counseling: The side effects of Otezla were discussed with the patient, including but not limited to worsening or new depression, weight loss, diarrhea, nausea, upper respiratory tract infection, and headache. Patient instructed to call the office should any adverse effect occur.  The patient verbalized understanding of the proper use and possible adverse effects of Otezla.  All the patient's questions and concerns were addressed. Benzoyl Peroxide Pregnancy And Lactation Text: This medication is Pregnancy Category C. It is unknown if benzoyl peroxide is excreted in breast milk. Isotretinoin Pregnancy And Lactation Text: This medication is Pregnancy Category X and is considered extremely dangerous during pregnancy. It is unknown if it is excreted in breast milk. Griseofulvin Counseling:  I discussed with the patient the risks of griseofulvin including but not limited to photosensitivity, cytopenia, liver damage, nausea/vomiting and severe allergy.  The patient understands that this medication is best absorbed when taken with a fatty meal (e.g., ice cream or french fries). Tazorac Counseling:  Patient advised that medication is irritating and drying.  Patient may need to apply sparingly and wash off after an hour before eventually leaving it on overnight.  The patient verbalized understanding of the proper use and possible adverse effects of tazorac.  All of the patient's questions and concerns were addressed. Acitretin Counseling:  I discussed with the patient the risks of acitretin including but not limited to hair loss, dry lips/skin/eyes, liver damage, hyperlipidemia, depression/suicidal ideation, photosensitivity.  Serious rare side effects can include but are not limited to pancreatitis, pseudotumor cerebri, bony changes, clot formation/stroke/heart attack.  Patient understands that alcohol is contraindicated since it can result in liver toxicity and significantly prolong the elimination of the drug by many years. Topical Sulfur Applications Counseling: Topical Sulfur Counseling: Patient counseled that this medication may cause skin irritation or allergic reactions.  In the event of skin irritation, the patient was advised to reduce the amount of the drug applied or use it less frequently.   The patient verbalized understanding of the proper use and possible adverse effects of topical sulfur application.  All of the patient's questions and concerns were addressed. Erythromycin Counseling:  I discussed with the patient the risks of erythromycin including but not limited to GI upset, allergic reaction, drug rash, diarrhea, increase in liver enzymes, and yeast infections.
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
## 2023-08-24 NOTE — H&P ADULT - ASSESSMENT
52yo female with pmh anxiety, depression/bipolar disorder (c/b multiple SA and 3 prior psychiatric hospitalizations), IBS, fibromyalgia, chronic back pain, GERD, s/p gastric bypass presenting with neck pain.     #Neck pain  -CT cervical negative for fractures  -can do tylenol 650 mg Q6 prn mild pain, dilaudid 2 mg for moderate pain  -lidocaine patch prn     #Bipolar disorder  -can c/w buspirone 30 mg TID, sertraline 100 mg daily, lamotrigine 150 mg daily, and diazepam 5 mg Q8 prn anxiety (istop done)    FEN  F-none  E-replete prn  N-DASH/TLC diet  heparin subQ

## 2023-08-24 NOTE — H&P ADULT - NSHPLABSRESULTS_GEN_ALL_CORE
.  LABS:                         11.2   8.32  )-----------( 270      ( 24 Aug 2023 14:57 )             35.4     08-24    139  |  108  |  16  ----------------------------<  92  4.0   |  27  |  0.98    Ca    9.1      24 Aug 2023 14:57    TPro  7.3  /  Alb  3.8  /  TBili  0.5  /  DBili  x   /  AST  58<H>  /  ALT  34  /  AlkPhos  111  08-24      Urinalysis Basic - ( 24 Aug 2023 14:57 )    Color: x / Appearance: x / SG: x / pH: x  Gluc: 92 mg/dL / Ketone: x  / Bili: x / Urobili: x   Blood: x / Protein: x / Nitrite: x   Leuk Esterase: x / RBC: x / WBC x   Sq Epi: x / Non Sq Epi: x / Bacteria: x          < from: CT Head No Cont (08.24.23 @ 15:11) >    CT HEAD:  1.  No evidence of acute intracranial hemorrhage or midline shift.    CT CERVICAL SPINE:  1.  No evidence of acute osseous fracture or barbara dislocation.  2.  Mild multilevel degenerative spinal disease as described above.    < end of copied text >

## 2023-08-25 LAB
ALBUMIN SERPL ELPH-MCNC: 3.4 G/DL — SIGNIFICANT CHANGE UP (ref 3.3–5)
ALP SERPL-CCNC: 103 U/L — SIGNIFICANT CHANGE UP (ref 40–120)
ALT FLD-CCNC: 36 U/L — SIGNIFICANT CHANGE UP (ref 12–78)
ANION GAP SERPL CALC-SCNC: 4 MMOL/L — LOW (ref 5–17)
APTT BLD: 26.7 SEC — SIGNIFICANT CHANGE UP (ref 24.5–35.6)
AST SERPL-CCNC: 39 U/L — HIGH (ref 15–37)
BASOPHILS # BLD AUTO: 0.03 K/UL — SIGNIFICANT CHANGE UP (ref 0–0.2)
BASOPHILS NFR BLD AUTO: 0.5 % — SIGNIFICANT CHANGE UP (ref 0–2)
BILIRUB SERPL-MCNC: 0.6 MG/DL — SIGNIFICANT CHANGE UP (ref 0.2–1.2)
BLD GP AB SCN SERPL QL: SIGNIFICANT CHANGE UP
BUN SERPL-MCNC: 18 MG/DL — SIGNIFICANT CHANGE UP (ref 7–23)
CALCIUM SERPL-MCNC: 9 MG/DL — SIGNIFICANT CHANGE UP (ref 8.5–10.1)
CHLORIDE SERPL-SCNC: 107 MMOL/L — SIGNIFICANT CHANGE UP (ref 96–108)
CO2 SERPL-SCNC: 29 MMOL/L — SIGNIFICANT CHANGE UP (ref 22–31)
CREAT SERPL-MCNC: 0.83 MG/DL — SIGNIFICANT CHANGE UP (ref 0.5–1.3)
EGFR: 85 ML/MIN/1.73M2 — SIGNIFICANT CHANGE UP
EOSINOPHIL # BLD AUTO: 0.1 K/UL — SIGNIFICANT CHANGE UP (ref 0–0.5)
EOSINOPHIL NFR BLD AUTO: 1.8 % — SIGNIFICANT CHANGE UP (ref 0–6)
GLUCOSE SERPL-MCNC: 105 MG/DL — HIGH (ref 70–99)
HCG SERPL-ACNC: <1 MIU/ML — SIGNIFICANT CHANGE UP
HCT VFR BLD CALC: 31.9 % — LOW (ref 34.5–45)
HGB BLD-MCNC: 10.3 G/DL — LOW (ref 11.5–15.5)
IMM GRANULOCYTES NFR BLD AUTO: 0.9 % — SIGNIFICANT CHANGE UP (ref 0–0.9)
INR BLD: 0.88 RATIO — SIGNIFICANT CHANGE UP (ref 0.85–1.18)
LYMPHOCYTES # BLD AUTO: 1.12 K/UL — SIGNIFICANT CHANGE UP (ref 1–3.3)
LYMPHOCYTES # BLD AUTO: 19.8 % — SIGNIFICANT CHANGE UP (ref 13–44)
MAGNESIUM SERPL-MCNC: 2.2 MG/DL — SIGNIFICANT CHANGE UP (ref 1.6–2.6)
MCHC RBC-ENTMCNC: 25.9 PG — LOW (ref 27–34)
MCHC RBC-ENTMCNC: 32.3 G/DL — SIGNIFICANT CHANGE UP (ref 32–36)
MCV RBC AUTO: 80.2 FL — SIGNIFICANT CHANGE UP (ref 80–100)
MONOCYTES # BLD AUTO: 0.35 K/UL — SIGNIFICANT CHANGE UP (ref 0–0.9)
MONOCYTES NFR BLD AUTO: 6.2 % — SIGNIFICANT CHANGE UP (ref 2–14)
NEUTROPHILS # BLD AUTO: 4.02 K/UL — SIGNIFICANT CHANGE UP (ref 1.8–7.4)
NEUTROPHILS NFR BLD AUTO: 70.8 % — SIGNIFICANT CHANGE UP (ref 43–77)
NRBC # BLD: 0 /100 WBCS — SIGNIFICANT CHANGE UP (ref 0–0)
PHOSPHATE SERPL-MCNC: 4.1 MG/DL — SIGNIFICANT CHANGE UP (ref 2.5–4.5)
PLATELET # BLD AUTO: 214 K/UL — SIGNIFICANT CHANGE UP (ref 150–400)
POTASSIUM SERPL-MCNC: 4.1 MMOL/L — SIGNIFICANT CHANGE UP (ref 3.5–5.3)
POTASSIUM SERPL-SCNC: 4.1 MMOL/L — SIGNIFICANT CHANGE UP (ref 3.5–5.3)
PROT SERPL-MCNC: 6.8 GM/DL — SIGNIFICANT CHANGE UP (ref 6–8.3)
PROTHROM AB SERPL-ACNC: 10.6 SEC — SIGNIFICANT CHANGE UP (ref 9.5–13)
RBC # BLD: 3.98 M/UL — SIGNIFICANT CHANGE UP (ref 3.8–5.2)
RBC # FLD: 14.6 % — HIGH (ref 10.3–14.5)
SODIUM SERPL-SCNC: 140 MMOL/L — SIGNIFICANT CHANGE UP (ref 135–145)
T4 AB SER-ACNC: 7 UG/DL — SIGNIFICANT CHANGE UP (ref 4.6–12)
TSH SERPL-MCNC: 1.03 UU/ML — SIGNIFICANT CHANGE UP (ref 0.36–3.74)
WBC # BLD: 5.67 K/UL — SIGNIFICANT CHANGE UP (ref 3.8–10.5)
WBC # FLD AUTO: 5.67 K/UL — SIGNIFICANT CHANGE UP (ref 3.8–10.5)

## 2023-08-25 PROCEDURE — 71045 X-RAY EXAM CHEST 1 VIEW: CPT | Mod: 26

## 2023-08-25 PROCEDURE — 72074 X-RAY EXAM THORAC SPINE4/>VW: CPT | Mod: 26

## 2023-08-25 PROCEDURE — 72148 MRI LUMBAR SPINE W/O DYE: CPT | Mod: 26

## 2023-08-25 PROCEDURE — 72040 X-RAY EXAM NECK SPINE 2-3 VW: CPT | Mod: 26

## 2023-08-25 PROCEDURE — 72141 MRI NECK SPINE W/O DYE: CPT | Mod: 26

## 2023-08-25 PROCEDURE — 72146 MRI CHEST SPINE W/O DYE: CPT | Mod: 26

## 2023-08-25 PROCEDURE — 72100 X-RAY EXAM L-S SPINE 2/3 VWS: CPT | Mod: 26

## 2023-08-25 PROCEDURE — 99232 SBSQ HOSP IP/OBS MODERATE 35: CPT

## 2023-08-25 RX ORDER — ONDANSETRON 8 MG/1
4 TABLET, FILM COATED ORAL EVERY 8 HOURS
Refills: 0 | Status: DISCONTINUED | OUTPATIENT
Start: 2023-08-25 | End: 2023-08-26

## 2023-08-25 RX ORDER — TRAMADOL HYDROCHLORIDE 50 MG/1
25 TABLET ORAL EVERY 6 HOURS
Refills: 0 | Status: DISCONTINUED | OUTPATIENT
Start: 2023-08-25 | End: 2023-08-26

## 2023-08-25 RX ORDER — ACETAMINOPHEN 500 MG
650 TABLET ORAL ONCE
Refills: 0 | Status: COMPLETED | OUTPATIENT
Start: 2023-08-25 | End: 2023-08-25

## 2023-08-25 RX ADMIN — TRAMADOL HYDROCHLORIDE 25 MILLIGRAM(S): 50 TABLET ORAL at 22:01

## 2023-08-25 RX ADMIN — Medication 30 MILLIGRAM(S): at 05:14

## 2023-08-25 RX ADMIN — TRAMADOL HYDROCHLORIDE 25 MILLIGRAM(S): 50 TABLET ORAL at 16:32

## 2023-08-25 RX ADMIN — TRAMADOL HYDROCHLORIDE 25 MILLIGRAM(S): 50 TABLET ORAL at 10:47

## 2023-08-25 RX ADMIN — Medication 20 MILLIGRAM(S): at 16:55

## 2023-08-25 RX ADMIN — LIDOCAINE 1 PATCH: 4 CREAM TOPICAL at 00:55

## 2023-08-25 RX ADMIN — Medication 1 SPRAY(S): at 17:19

## 2023-08-25 RX ADMIN — Medication 30 MILLIGRAM(S): at 22:00

## 2023-08-25 RX ADMIN — TRAMADOL HYDROCHLORIDE 25 MILLIGRAM(S): 50 TABLET ORAL at 17:16

## 2023-08-25 RX ADMIN — TRAMADOL HYDROCHLORIDE 25 MILLIGRAM(S): 50 TABLET ORAL at 23:01

## 2023-08-25 RX ADMIN — TRAMADOL HYDROCHLORIDE 25 MILLIGRAM(S): 50 TABLET ORAL at 12:02

## 2023-08-25 RX ADMIN — HEPARIN SODIUM 5000 UNIT(S): 5000 INJECTION INTRAVENOUS; SUBCUTANEOUS at 05:13

## 2023-08-25 RX ADMIN — Medication 5 MILLIGRAM(S): at 05:13

## 2023-08-25 RX ADMIN — SERTRALINE 100 MILLIGRAM(S): 25 TABLET, FILM COATED ORAL at 16:55

## 2023-08-25 RX ADMIN — Medication 5 MILLIGRAM(S): at 17:19

## 2023-08-25 RX ADMIN — HYDROMORPHONE HYDROCHLORIDE 2 MILLIGRAM(S): 2 INJECTION INTRAMUSCULAR; INTRAVENOUS; SUBCUTANEOUS at 05:13

## 2023-08-25 RX ADMIN — Medication 20 MILLIGRAM(S): at 08:02

## 2023-08-25 RX ADMIN — ONDANSETRON 4 MILLIGRAM(S): 8 TABLET, FILM COATED ORAL at 09:46

## 2023-08-25 RX ADMIN — Medication 650 MILLIGRAM(S): at 10:41

## 2023-08-25 RX ADMIN — Medication 20 MILLIGRAM(S): at 12:07

## 2023-08-25 RX ADMIN — LIDOCAINE 1 PATCH: 4 CREAM TOPICAL at 18:00

## 2023-08-25 RX ADMIN — Medication 650 MILLIGRAM(S): at 11:40

## 2023-08-25 RX ADMIN — Medication 1 SPRAY(S): at 05:13

## 2023-08-25 RX ADMIN — LIDOCAINE 1 PATCH: 4 CREAM TOPICAL at 12:07

## 2023-08-25 RX ADMIN — Medication 30 MILLIGRAM(S): at 16:55

## 2023-08-25 NOTE — PROGRESS NOTE ADULT - SUBJECTIVE AND OBJECTIVE BOX
CHIEF COMPLAINT: Neck pain, abd pain and low back pain  faecal incontinence  no new focal weakness or numbness  no n/v/cp/sob/fever      PHYSICAL EXAM:    GENERAL: Morbidly obese, no acute distress   CHEST/LUNG:  No wheezing, no crackles   HEART: Regular rate and rhythm; No murmurs  ABDOMEN: Soft, Nontender, Nondistended; Bowel sounds present  EXTREMITIES:  No clubbing, cyanosis, or edema  Psychiatry: AAO x 3, mood is appropriate       OBJECTIVE DATA:   Vital Signs Last 24 Hrs  T(C): 36.7 (25 Aug 2023 11:57), Max: 37.1 (24 Aug 2023 23:16)  T(F): 98 (25 Aug 2023 11:57), Max: 98.7 (24 Aug 2023 23:16)  HR: 87 (25 Aug 2023 11:57) (71 - 87)  BP: 121/81 (25 Aug 2023 11:57) (112/78 - 121/81)  BP(mean): --  RR: 18 (25 Aug 2023 11:57) (18 - 18)  SpO2: 97% (25 Aug 2023 11:57) (94% - 99%)    Parameters below as of 25 Aug 2023 11:57  Patient On (Oxygen Delivery Method): room air               Daily     Daily Weight in k.2 (25 Aug 2023 05:22)  LABS:                        10.3   5.67  )-----------( 214      ( 25 Aug 2023 07:40 )             31.9             08-25    140  |  107  |  18  ----------------------------<  105<H>  4.1   |  29  |  0.83    Ca    9.0      25 Aug 2023 07:40  Phos  4.1     08-25  Mg     2.2     08-25    TPro  6.8  /  Alb  3.4  /  TBili  0.6  /  DBili  x   /  AST  39<H>  /  ALT  36  /  AlkPhos  103  08-25                Urinalysis Basic - ( 25 Aug 2023 07:40 )    Color: x / Appearance: x / SG: x / pH: x  Gluc: 105 mg/dL / Ketone: x  / Bili: x / Urobili: x   Blood: x / Protein: x / Nitrite: x   Leuk Esterase: x / RBC: x / WBC x   Sq Epi: x / Non Sq Epi: x / Bacteria: x     Interval Radiology studies: reviewed by me    < from: CT Head No Cont (23 @ 15:11) >    CT HEAD:  1.  No evidence of acute intracranial hemorrhage or midline shift.    CT CERVICAL SPINE:  1.  No evidence of acute osseous fracture or barbara dislocation.  2.  Mild multilevel degenerative spinal disease as described above.    < end of copied text >      MEDICATIONS  (STANDING):  busPIRone 30 milliGRAM(s) Oral every 8 hours  dicyclomine 20 milliGRAM(s) Oral three times a day before meals  fluticasone propionate 50 MICROgram(s)/spray Nasal Spray 1 Spray(s) Both Nostrils two times a day  lidocaine   4% Patch 1 Patch Transdermal every 24 hours  psyllium Powder 1 Packet(s) Oral daily  sertraline 100 milliGRAM(s) Oral daily    MEDICATIONS  (PRN):  acetaminophen     Tablet .. 650 milliGRAM(s) Oral every 6 hours PRN Temp greater or equal to 38C (100.4F), Mild Pain (1 - 3)  cyclobenzaprine 10 milliGRAM(s) Oral three times a day PRN Muscle Spasm  diazepam    Tablet 5 milliGRAM(s) Oral every 8 hours PRN anxiety  ondansetron Injectable 4 milliGRAM(s) IV Push every 8 hours PRN Nausea and/or Vomiting  traMADol 25 milliGRAM(s) Oral every 6 hours PRN Severe Pain (7 - 10)
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
## 2023-08-26 ENCOUNTER — TRANSCRIPTION ENCOUNTER (OUTPATIENT)
Age: 53
End: 2023-08-26

## 2023-08-26 VITALS
SYSTOLIC BLOOD PRESSURE: 118 MMHG | OXYGEN SATURATION: 95 % | HEART RATE: 79 BPM | RESPIRATION RATE: 18 BRPM | DIASTOLIC BLOOD PRESSURE: 74 MMHG | TEMPERATURE: 98 F

## 2023-08-26 PROCEDURE — 76700 US EXAM ABDOM COMPLETE: CPT | Mod: 26

## 2023-08-26 PROCEDURE — 93010 ELECTROCARDIOGRAM REPORT: CPT

## 2023-08-26 PROCEDURE — 99239 HOSP IP/OBS DSCHRG MGMT >30: CPT

## 2023-08-26 RX ADMIN — SERTRALINE 100 MILLIGRAM(S): 25 TABLET, FILM COATED ORAL at 11:04

## 2023-08-26 RX ADMIN — TRAMADOL HYDROCHLORIDE 25 MILLIGRAM(S): 50 TABLET ORAL at 04:47

## 2023-08-26 RX ADMIN — Medication 30 MILLIGRAM(S): at 13:56

## 2023-08-26 RX ADMIN — Medication 650 MILLIGRAM(S): at 04:47

## 2023-08-26 RX ADMIN — Medication 20 MILLIGRAM(S): at 11:03

## 2023-08-26 RX ADMIN — Medication 650 MILLIGRAM(S): at 13:56

## 2023-08-26 RX ADMIN — Medication 1 SPRAY(S): at 06:05

## 2023-08-26 RX ADMIN — LIDOCAINE 1 PATCH: 4 CREAM TOPICAL at 11:03

## 2023-08-26 RX ADMIN — Medication 20 MILLIGRAM(S): at 06:01

## 2023-08-26 RX ADMIN — Medication 650 MILLIGRAM(S): at 14:52

## 2023-08-26 RX ADMIN — Medication 5 MILLIGRAM(S): at 13:59

## 2023-08-26 RX ADMIN — Medication 30 MILLIGRAM(S): at 06:02

## 2023-08-26 NOTE — DISCHARGE NOTE NURSING/CASE MANAGEMENT/SOCIAL WORK - NSDCPEFALRISK_GEN_ALL_CORE
For information on Fall & Injury Prevention, visit: [email or web address] OR  [email or web address] OR  https://www.cdc.gov/steadi/patient.html

## 2023-08-26 NOTE — DISCHARGE NOTE PROVIDER - NSDCMRMEDTOKEN_GEN_ALL_CORE_FT
acetaminophen 325 mg oral tablet: 2 tab(s) orally every 6 hours  busPIRone 30 mg oral tablet: 1 tab(s) orally 3 times a day  diazePAM 5 mg oral tablet: 1 tab(s) orally every 6 hours, As Needed -Anxiety - for anxiety MDD:4 tabs daily  diclofenac gel:   Flexeril 10 mg oral tablet: 1 tab(s) orally 3 times a day  HYDROmorphone 2 mg oral tablet: 1 tab(s) orally every 6 hours as needed for Severe Pain (7 - 10) MDD: 4  lamoTRIgine 150 mg oral tablet: 1 tab(s) orally once a day  mometasone 50 mcg/inh nasal spray: 2 puff(s) intranasally once a day  outpatient PT: 2-3 x /week x 4-6 weeks MDD: 0  sertraline 100 mg oral tablet: 1 tab(s) orally once a day

## 2023-08-26 NOTE — DISCHARGE NOTE PROVIDER - NSDCFUADDINST_GEN_ALL_CORE_FT
1) It is important to see your primary physician as well as other necessary consultants within next 7-10 days to perform a comprehensive medical review.  Call their offices for an appointment as soon as you leave the hospital.  If you do not have a primary physician or unable to reach your PCP, contact the [facility] Physician Referral Service at [phone].  Your medical issues appear to be stable at this time, but if your symptoms recur or worsen, contact your physicians and/or return to the hospital if necessary.  If you encounter any issues or questions with your medication, call your physicians before stopping the medication.    2) Please access [facility] Patient portal (as instructed on the discharge paperwork) to access your medical records at any time after discharge.
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
## 2023-08-26 NOTE — PHYSICAL THERAPY INITIAL EVALUATION ADULT - GAIT TRAINING, PT EVAL
Pt will be able to ambulate using straight cane/rolling walker device up to 200 ft or more, be able to negotiate steps safely observing proper gait, posture and prevent falls.

## 2023-08-26 NOTE — DISCHARGE NOTE NURSING/CASE MANAGEMENT/SOCIAL WORK - PATIENT PORTAL LINK FT
You can access the FollowMyHealth Patient Portal offered by  by registering at the following website: [email or web address]. By joining [employer]’s FollowMyHealth portal, you will also be able to view your health information using other applications (apps) compatible with our system.

## 2023-08-26 NOTE — PHYSICAL THERAPY INITIAL EVALUATION ADULT - NSPTDMEREC_GEN_A_CORE
Pt reports to own an Rolling walker & straigth cane(reports straight cane is not in good working condition)
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
## 2023-08-26 NOTE — DISCHARGE NOTE NURSING/CASE MANAGEMENT/SOCIAL WORK - NSDCPETBCESMAN_GEN_ALL_CORE
Airway  Performed by: Berta Kennedy CRNA  Authorized by: Dwight Velez MD     Final Airway Type:  Endotracheal airway  Final Endotracheal Airway*:  ETT  ETT Size (mm)*:  8.0  Cuff*:  Regular  Technique Used for Successful ETT Placement:  Direct laryngoscopy  Devices/Methods Used in Placement*:  Mask  Intubation Procedure*:  Preoxygenation, ETCO2, Atraumatic, Dentition Unchanged and Phaynx Clear  Insertion Site:  Oral  Blade Type*:  MAC  Blade Size*:  3  Placement Verified by: auscultation and capnometry    Glottic View*:  2 - partial view of glottis  Attempts*:  1  Location:  OR  Urgency:  Elective  Difficult Airway: No    Indications for Airway Management:  Anesthesia  Mask Difficulty Assessment:  0 - not attempted  Performed By:  CRNA
If you are a smoker, it is important for your health to stop smoking. Please be aware that second hand smoke is also harmful.
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
## 2023-08-26 NOTE — DISCHARGE NOTE PROVIDER - NSDCCPCAREPLAN_GEN_ALL_CORE_FT
PRINCIPAL DISCHARGE DIAGNOSIS  Diagnosis: Neck pain  Assessment and Plan of Treatment:       SECONDARY DISCHARGE DIAGNOSES  Diagnosis: Back pain  Assessment and Plan of Treatment:

## 2023-08-30 DIAGNOSIS — Z88.6 ALLERGY STATUS TO ANALGESIC AGENT: ICD-10-CM

## 2023-08-30 DIAGNOSIS — M54.2 CERVICALGIA: ICD-10-CM

## 2023-08-30 DIAGNOSIS — M79.7 FIBROMYALGIA: ICD-10-CM

## 2023-08-30 DIAGNOSIS — E66.01 MORBID (SEVERE) OBESITY DUE TO EXCESS CALORIES: ICD-10-CM

## 2023-08-30 DIAGNOSIS — Z79.899 OTHER LONG TERM (CURRENT) DRUG THERAPY: ICD-10-CM

## 2023-08-30 DIAGNOSIS — F31.9 BIPOLAR DISORDER, UNSPECIFIED: ICD-10-CM

## 2023-08-30 DIAGNOSIS — K58.9 IRRITABLE BOWEL SYNDROME WITHOUT DIARRHEA: ICD-10-CM

## 2023-08-30 DIAGNOSIS — Z91.51 PERSONAL HISTORY OF SUICIDAL BEHAVIOR: ICD-10-CM

## 2023-08-30 DIAGNOSIS — Z88.2 ALLERGY STATUS TO SULFONAMIDES: ICD-10-CM

## 2023-08-30 DIAGNOSIS — F41.9 ANXIETY DISORDER, UNSPECIFIED: ICD-10-CM

## 2023-08-30 DIAGNOSIS — G89.29 OTHER CHRONIC PAIN: ICD-10-CM

## 2023-08-30 DIAGNOSIS — Z20.822 CONTACT WITH AND (SUSPECTED) EXPOSURE TO COVID-19: ICD-10-CM

## 2023-09-13 NOTE — ED PROVIDER NOTE - PATIENT PORTAL LINK FT
You can access the FollowMyHealth Patient Portal offered by [facility] by registering at the following website: [email or web address]. By joining [employer]’s FollowMyHealth portal, you will also be able to view your health information using other applications (apps) compatible with our system.
Her/She

## 2023-11-08 ENCOUNTER — EMERGENCY (EMERGENCY)
Facility: HOSPITAL | Age: 53
LOS: 0 days | Discharge: HOME | End: 2023-11-08
Attending: EMERGENCY MEDICINE
Payer: COMMERCIAL

## 2023-11-08 VITALS
OXYGEN SATURATION: 97 % | TEMPERATURE: 98 F | RESPIRATION RATE: 18 BRPM | DIASTOLIC BLOOD PRESSURE: 69 MMHG | SYSTOLIC BLOOD PRESSURE: 102 MMHG | HEART RATE: 88 BPM | WEIGHT: 278 LBS | HEIGHT: 70 IN

## 2023-11-08 VITALS
RESPIRATION RATE: 17 BRPM | DIASTOLIC BLOOD PRESSURE: 78 MMHG | HEART RATE: 72 BPM | SYSTOLIC BLOOD PRESSURE: 145 MMHG | OXYGEN SATURATION: 97 % | TEMPERATURE: 100 F

## 2023-11-08 DIAGNOSIS — R06.02 SHORTNESS OF BREATH: ICD-10-CM

## 2023-11-08 DIAGNOSIS — M79.10 MYALGIA, UNSPECIFIED SITE: ICD-10-CM

## 2023-11-08 DIAGNOSIS — K56.60 UNSPECIFIED INTESTINAL OBSTRUCTION: Chronic | ICD-10-CM

## 2023-11-08 DIAGNOSIS — J34.89 OTHER SPECIFIED DISORDERS OF NOSE AND NASAL SINUSES: ICD-10-CM

## 2023-11-08 DIAGNOSIS — N39.0 URINARY TRACT INFECTION, SITE NOT SPECIFIED: ICD-10-CM

## 2023-11-08 DIAGNOSIS — Z98.51 TUBAL LIGATION STATUS: Chronic | ICD-10-CM

## 2023-11-08 DIAGNOSIS — Z20.822 CONTACT WITH AND (SUSPECTED) EXPOSURE TO COVID-19: ICD-10-CM

## 2023-11-08 DIAGNOSIS — R07.9 CHEST PAIN, UNSPECIFIED: ICD-10-CM

## 2023-11-08 DIAGNOSIS — R09.81 NASAL CONGESTION: ICD-10-CM

## 2023-11-08 DIAGNOSIS — J02.9 ACUTE PHARYNGITIS, UNSPECIFIED: ICD-10-CM

## 2023-11-08 DIAGNOSIS — R05.9 COUGH, UNSPECIFIED: ICD-10-CM

## 2023-11-08 DIAGNOSIS — Z98.89 OTHER SPECIFIED POSTPROCEDURAL STATES: Chronic | ICD-10-CM

## 2023-11-08 LAB
ALBUMIN SERPL ELPH-MCNC: 3.6 G/DL — SIGNIFICANT CHANGE UP (ref 3.3–5)
ALBUMIN SERPL ELPH-MCNC: 3.6 G/DL — SIGNIFICANT CHANGE UP (ref 3.3–5)
ALP SERPL-CCNC: 92 U/L — SIGNIFICANT CHANGE UP (ref 40–120)
ALP SERPL-CCNC: 92 U/L — SIGNIFICANT CHANGE UP (ref 40–120)
ALT FLD-CCNC: 17 U/L — SIGNIFICANT CHANGE UP (ref 12–78)
ALT FLD-CCNC: 17 U/L — SIGNIFICANT CHANGE UP (ref 12–78)
ANION GAP SERPL CALC-SCNC: 3 MMOL/L — LOW (ref 5–17)
ANION GAP SERPL CALC-SCNC: 3 MMOL/L — LOW (ref 5–17)
APPEARANCE UR: CLEAR — SIGNIFICANT CHANGE UP
APPEARANCE UR: CLEAR — SIGNIFICANT CHANGE UP
AST SERPL-CCNC: 15 U/L — SIGNIFICANT CHANGE UP (ref 15–37)
AST SERPL-CCNC: 15 U/L — SIGNIFICANT CHANGE UP (ref 15–37)
BACTERIA # UR AUTO: ABNORMAL /HPF
BACTERIA # UR AUTO: ABNORMAL /HPF
BASOPHILS # BLD AUTO: 0.02 K/UL — SIGNIFICANT CHANGE UP (ref 0–0.2)
BASOPHILS # BLD AUTO: 0.02 K/UL — SIGNIFICANT CHANGE UP (ref 0–0.2)
BASOPHILS NFR BLD AUTO: 0.4 % — SIGNIFICANT CHANGE UP (ref 0–2)
BASOPHILS NFR BLD AUTO: 0.4 % — SIGNIFICANT CHANGE UP (ref 0–2)
BILIRUB SERPL-MCNC: 0.4 MG/DL — SIGNIFICANT CHANGE UP (ref 0.2–1.2)
BILIRUB SERPL-MCNC: 0.4 MG/DL — SIGNIFICANT CHANGE UP (ref 0.2–1.2)
BILIRUB UR-MCNC: NEGATIVE — SIGNIFICANT CHANGE UP
BILIRUB UR-MCNC: NEGATIVE — SIGNIFICANT CHANGE UP
BUN SERPL-MCNC: 16 MG/DL — SIGNIFICANT CHANGE UP (ref 7–23)
BUN SERPL-MCNC: 16 MG/DL — SIGNIFICANT CHANGE UP (ref 7–23)
CALCIUM SERPL-MCNC: 9 MG/DL — SIGNIFICANT CHANGE UP (ref 8.5–10.1)
CALCIUM SERPL-MCNC: 9 MG/DL — SIGNIFICANT CHANGE UP (ref 8.5–10.1)
CHLORIDE SERPL-SCNC: 110 MMOL/L — HIGH (ref 96–108)
CHLORIDE SERPL-SCNC: 110 MMOL/L — HIGH (ref 96–108)
CO2 SERPL-SCNC: 30 MMOL/L — SIGNIFICANT CHANGE UP (ref 22–31)
CO2 SERPL-SCNC: 30 MMOL/L — SIGNIFICANT CHANGE UP (ref 22–31)
COLOR SPEC: YELLOW — SIGNIFICANT CHANGE UP
COLOR SPEC: YELLOW — SIGNIFICANT CHANGE UP
CREAT SERPL-MCNC: 0.84 MG/DL — SIGNIFICANT CHANGE UP (ref 0.5–1.3)
CREAT SERPL-MCNC: 0.84 MG/DL — SIGNIFICANT CHANGE UP (ref 0.5–1.3)
DIFF PNL FLD: NEGATIVE — SIGNIFICANT CHANGE UP
DIFF PNL FLD: NEGATIVE — SIGNIFICANT CHANGE UP
EGFR: 84 ML/MIN/1.73M2 — SIGNIFICANT CHANGE UP
EGFR: 84 ML/MIN/1.73M2 — SIGNIFICANT CHANGE UP
EOSINOPHIL # BLD AUTO: 0.14 K/UL — SIGNIFICANT CHANGE UP (ref 0–0.5)
EOSINOPHIL # BLD AUTO: 0.14 K/UL — SIGNIFICANT CHANGE UP (ref 0–0.5)
EOSINOPHIL NFR BLD AUTO: 2.5 % — SIGNIFICANT CHANGE UP (ref 0–6)
EOSINOPHIL NFR BLD AUTO: 2.5 % — SIGNIFICANT CHANGE UP (ref 0–6)
FLUAV AG NPH QL: SIGNIFICANT CHANGE UP
FLUAV AG NPH QL: SIGNIFICANT CHANGE UP
FLUBV AG NPH QL: SIGNIFICANT CHANGE UP
FLUBV AG NPH QL: SIGNIFICANT CHANGE UP
GLUCOSE SERPL-MCNC: 101 MG/DL — HIGH (ref 70–99)
GLUCOSE SERPL-MCNC: 101 MG/DL — HIGH (ref 70–99)
GLUCOSE UR QL: NEGATIVE MG/DL — SIGNIFICANT CHANGE UP
GLUCOSE UR QL: NEGATIVE MG/DL — SIGNIFICANT CHANGE UP
HCG SERPL-ACNC: <1 MIU/ML — SIGNIFICANT CHANGE UP
HCG SERPL-ACNC: <1 MIU/ML — SIGNIFICANT CHANGE UP
HCT VFR BLD CALC: 32.9 % — LOW (ref 34.5–45)
HCT VFR BLD CALC: 32.9 % — LOW (ref 34.5–45)
HGB BLD-MCNC: 10.1 G/DL — LOW (ref 11.5–15.5)
HGB BLD-MCNC: 10.1 G/DL — LOW (ref 11.5–15.5)
IMM GRANULOCYTES NFR BLD AUTO: 0.5 % — SIGNIFICANT CHANGE UP (ref 0–0.9)
IMM GRANULOCYTES NFR BLD AUTO: 0.5 % — SIGNIFICANT CHANGE UP (ref 0–0.9)
KETONES UR-MCNC: NEGATIVE MG/DL — SIGNIFICANT CHANGE UP
KETONES UR-MCNC: NEGATIVE MG/DL — SIGNIFICANT CHANGE UP
LEUKOCYTE ESTERASE UR-ACNC: ABNORMAL
LEUKOCYTE ESTERASE UR-ACNC: ABNORMAL
LYMPHOCYTES # BLD AUTO: 1.23 K/UL — SIGNIFICANT CHANGE UP (ref 1–3.3)
LYMPHOCYTES # BLD AUTO: 1.23 K/UL — SIGNIFICANT CHANGE UP (ref 1–3.3)
LYMPHOCYTES # BLD AUTO: 21.7 % — SIGNIFICANT CHANGE UP (ref 13–44)
LYMPHOCYTES # BLD AUTO: 21.7 % — SIGNIFICANT CHANGE UP (ref 13–44)
MCHC RBC-ENTMCNC: 25.2 PG — LOW (ref 27–34)
MCHC RBC-ENTMCNC: 25.2 PG — LOW (ref 27–34)
MCHC RBC-ENTMCNC: 30.7 G/DL — LOW (ref 32–36)
MCHC RBC-ENTMCNC: 30.7 G/DL — LOW (ref 32–36)
MCV RBC AUTO: 82 FL — SIGNIFICANT CHANGE UP (ref 80–100)
MCV RBC AUTO: 82 FL — SIGNIFICANT CHANGE UP (ref 80–100)
MONOCYTES # BLD AUTO: 0.6 K/UL — SIGNIFICANT CHANGE UP (ref 0–0.9)
MONOCYTES # BLD AUTO: 0.6 K/UL — SIGNIFICANT CHANGE UP (ref 0–0.9)
MONOCYTES NFR BLD AUTO: 10.6 % — SIGNIFICANT CHANGE UP (ref 2–14)
MONOCYTES NFR BLD AUTO: 10.6 % — SIGNIFICANT CHANGE UP (ref 2–14)
NEUTROPHILS # BLD AUTO: 3.64 K/UL — SIGNIFICANT CHANGE UP (ref 1.8–7.4)
NEUTROPHILS # BLD AUTO: 3.64 K/UL — SIGNIFICANT CHANGE UP (ref 1.8–7.4)
NEUTROPHILS NFR BLD AUTO: 64.3 % — SIGNIFICANT CHANGE UP (ref 43–77)
NEUTROPHILS NFR BLD AUTO: 64.3 % — SIGNIFICANT CHANGE UP (ref 43–77)
NITRITE UR-MCNC: POSITIVE
NITRITE UR-MCNC: POSITIVE
NRBC # BLD: 0 /100 WBCS — SIGNIFICANT CHANGE UP (ref 0–0)
NRBC # BLD: 0 /100 WBCS — SIGNIFICANT CHANGE UP (ref 0–0)
NT-PROBNP SERPL-SCNC: 76 PG/ML — SIGNIFICANT CHANGE UP (ref 0–125)
NT-PROBNP SERPL-SCNC: 76 PG/ML — SIGNIFICANT CHANGE UP (ref 0–125)
PH UR: 5.5 — SIGNIFICANT CHANGE UP (ref 5–8)
PH UR: 5.5 — SIGNIFICANT CHANGE UP (ref 5–8)
PLATELET # BLD AUTO: 213 K/UL — SIGNIFICANT CHANGE UP (ref 150–400)
PLATELET # BLD AUTO: 213 K/UL — SIGNIFICANT CHANGE UP (ref 150–400)
POTASSIUM SERPL-MCNC: 4 MMOL/L — SIGNIFICANT CHANGE UP (ref 3.5–5.3)
POTASSIUM SERPL-MCNC: 4 MMOL/L — SIGNIFICANT CHANGE UP (ref 3.5–5.3)
POTASSIUM SERPL-SCNC: 4 MMOL/L — SIGNIFICANT CHANGE UP (ref 3.5–5.3)
POTASSIUM SERPL-SCNC: 4 MMOL/L — SIGNIFICANT CHANGE UP (ref 3.5–5.3)
PROT SERPL-MCNC: 6.8 GM/DL — SIGNIFICANT CHANGE UP (ref 6–8.3)
PROT SERPL-MCNC: 6.8 GM/DL — SIGNIFICANT CHANGE UP (ref 6–8.3)
PROT UR-MCNC: NEGATIVE MG/DL — SIGNIFICANT CHANGE UP
PROT UR-MCNC: NEGATIVE MG/DL — SIGNIFICANT CHANGE UP
RBC # BLD: 4.01 M/UL — SIGNIFICANT CHANGE UP (ref 3.8–5.2)
RBC # BLD: 4.01 M/UL — SIGNIFICANT CHANGE UP (ref 3.8–5.2)
RBC # FLD: 15.1 % — HIGH (ref 10.3–14.5)
RBC # FLD: 15.1 % — HIGH (ref 10.3–14.5)
RBC CASTS # UR COMP ASSIST: SIGNIFICANT CHANGE UP /HPF (ref 0–4)
RBC CASTS # UR COMP ASSIST: SIGNIFICANT CHANGE UP /HPF (ref 0–4)
SARS-COV-2 RNA SPEC QL NAA+PROBE: SIGNIFICANT CHANGE UP
SARS-COV-2 RNA SPEC QL NAA+PROBE: SIGNIFICANT CHANGE UP
SODIUM SERPL-SCNC: 143 MMOL/L — SIGNIFICANT CHANGE UP (ref 135–145)
SODIUM SERPL-SCNC: 143 MMOL/L — SIGNIFICANT CHANGE UP (ref 135–145)
SP GR SPEC: 1.01 — SIGNIFICANT CHANGE UP (ref 1–1.03)
SP GR SPEC: 1.01 — SIGNIFICANT CHANGE UP (ref 1–1.03)
TROPONIN I, HIGH SENSITIVITY RESULT: 4 NG/L — SIGNIFICANT CHANGE UP
TROPONIN I, HIGH SENSITIVITY RESULT: 4 NG/L — SIGNIFICANT CHANGE UP
UROBILINOGEN FLD QL: 0.2 MG/DL — SIGNIFICANT CHANGE UP (ref 0.2–1)
UROBILINOGEN FLD QL: 0.2 MG/DL — SIGNIFICANT CHANGE UP (ref 0.2–1)
WBC # BLD: 5.66 K/UL — SIGNIFICANT CHANGE UP (ref 3.8–10.5)
WBC # BLD: 5.66 K/UL — SIGNIFICANT CHANGE UP (ref 3.8–10.5)
WBC # FLD AUTO: 5.66 K/UL — SIGNIFICANT CHANGE UP (ref 3.8–10.5)
WBC # FLD AUTO: 5.66 K/UL — SIGNIFICANT CHANGE UP (ref 3.8–10.5)
WBC UR QL: SIGNIFICANT CHANGE UP /HPF (ref 0–5)
WBC UR QL: SIGNIFICANT CHANGE UP /HPF (ref 0–5)

## 2023-11-08 PROCEDURE — 71045 X-RAY EXAM CHEST 1 VIEW: CPT | Mod: 26

## 2023-11-08 PROCEDURE — 99285 EMERGENCY DEPT VISIT HI MDM: CPT

## 2023-11-08 PROCEDURE — 93010 ELECTROCARDIOGRAM REPORT: CPT

## 2023-11-08 RX ORDER — CEPHALEXIN 500 MG
500 CAPSULE ORAL EVERY 12 HOURS
Refills: 0 | Status: DISCONTINUED | OUTPATIENT
Start: 2023-11-08 | End: 2023-11-08

## 2023-11-08 RX ORDER — CEPHALEXIN 500 MG
1 CAPSULE ORAL
Qty: 10 | Refills: 0
Start: 2023-11-08 | End: 2023-11-12

## 2023-11-08 RX ORDER — BENZOCAINE AND MENTHOL 5; 1 G/100ML; G/100ML
1 LIQUID ORAL ONCE
Refills: 0 | Status: COMPLETED | OUTPATIENT
Start: 2023-11-08 | End: 2023-11-08

## 2023-11-08 RX ORDER — SODIUM CHLORIDE 9 MG/ML
1000 INJECTION INTRAMUSCULAR; INTRAVENOUS; SUBCUTANEOUS ONCE
Refills: 0 | Status: COMPLETED | OUTPATIENT
Start: 2023-11-08 | End: 2023-11-08

## 2023-11-08 RX ADMIN — SODIUM CHLORIDE 1000 MILLILITER(S): 9 INJECTION INTRAMUSCULAR; INTRAVENOUS; SUBCUTANEOUS at 09:00

## 2023-11-08 RX ADMIN — Medication 500 MILLIGRAM(S): at 11:05

## 2023-11-08 RX ADMIN — BENZOCAINE AND MENTHOL 1 LOZENGE: 5; 1 LIQUID ORAL at 07:54

## 2023-11-08 RX ADMIN — Medication 100 MILLIGRAM(S): at 09:02

## 2023-11-08 RX ADMIN — SODIUM CHLORIDE 1000 MILLILITER(S): 9 INJECTION INTRAMUSCULAR; INTRAVENOUS; SUBCUTANEOUS at 07:54

## 2023-11-08 NOTE — ED ADULT NURSE NOTE - OBJECTIVE STATEMENT
Pt presents to ED c/o generalized body ache, fatigue, productive cough, chest discomfort, chills, diarrhea and urinary incontinence x4 days. Pt reports her son was sick x 2 weeks ago who lives at home. Pt noted to be speaking in a hoarse voice, breathing unlabored, equal rise and fall of chest wall noted. Pt also reports yellow thick phlegm. Pt has a hx of bulging disk, which causes her urinary incontinence, vertigo ADHA, bipolar and anxiety. Pt reports a fall x 1 week ago due to vertigo but denies LOC or head trauma. Pt connected to cardiac monitor and purwick.
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
## 2023-11-08 NOTE — ED PROVIDER NOTE - OBJECTIVE STATEMENT
This patient is a 53 year old woman hx of fibromyalgia and IBS who presents to the ER c/o sore throat, chills, SOB, cough, rhinorrhea and nasal congestion x 4 days.  She denies recent travel.  + Sick contacts include her son who has been sick with nasal congestion for the pats 2 weeks but she believes is more due to his sinusitis and allergies.  She has been taking tylenol and dimetap without relief.  She was seen at urgent care 2 days ago and reports that she had a negative strep test.  Patient also reporting increase urination and loose stool which she states happens when her body and nerves are stressed it exacerbates her IBS and fibromyalgia.

## 2023-11-08 NOTE — ED ADULT NURSE NOTE - CHIEF COMPLAINT QUOTE
pt here for bodyaches, weakness, sore throat, chills and sob since sat.  Pt was seen at [facility] on monday, neg strep.  pt has been taking extra strength tylenol and dimetap with no relief.  Pts son is also sick for the past 2 weeks.   hx of fibromyalgia, herniated discs.   allergy to penicillin, sulfa meds, and amoxicillin.

## 2023-11-08 NOTE — ED PROVIDER NOTE - NSFOLLOWUPINSTRUCTIONS_ED_ALL_ED_FT
1) Take tylenol for pain and/or fever  2) Take prescription medication as instructed  3) Follow up with your primary care doctor  4) Return to the ER for worsening or concerning symptoms

## 2023-11-08 NOTE — ED PROVIDER NOTE - CLINICAL SUMMARY MEDICAL DECISION MAKING FREE TEXT BOX
Patient with cough and URI symptoms, body aches reported to be consistent with fibromyalgia pain as well as increase urination.  URI symptoms likely viral etiology r/o PNA, will check CXR, give medication for sore throat, body aches, IVF.  Unlikely ACS as cause of chest pain and tightness, more likely soreness from cough.  Increase urination will check UA; Re-eval.

## 2023-11-08 NOTE — ED ADULT NURSE NOTE - NSFALLRISKINTERV_ED_ALL_ED

## 2023-11-08 NOTE — ED PROVIDER NOTE - WET READ LAUNCH FT
[facility] is committed to your overall health.  To help you get the most out of each of your visits, we will review your information to make sure you are up to date on all of your recommended tests and/or procedures.       Your PCP    found that you may be due for:       Fecal Occult Blood Test (FOBT)/FitKit due on 06/17/2017  Foot Exam due on 02/06/2018  Hemoglobin A1c due on 03/08/2018        If you have had any of the above done at another facility, please bring the records or information with you so that your record at [facility] will be complete.  If you would like to schedule any of these, please contact me.     If you are currently taking medication, please bring it with you to your appointment for review.     Also, if you have any type of Advanced Directives, please bring them with you to your office visit so we may scan them into your chart.       Thank you for Choosing [facility] for your healthcare needs.        Additional Information  If you have questions, you can email [email or web address] or call [phone]  to talk to our [facility] staff. Remember, [employer] is NOT to be used for urgent needs. For medical emergencies, dial 911.       There are no Wet Read(s) to document.

## 2023-11-08 NOTE — ED ADULT TRIAGE NOTE - CHIEF COMPLAINT QUOTE
pt here for bodyaches, weakness, sore throat, chills and sob since sat.  Pt was seen at [facility] on monday, neg strep.  pt has been taking extra strength tylenol and dimetap with no relief.  Pts son is also sick for the past 2 weeks.   hx of fibromyalgia, herniated discs.   allergy to penicillin, sulfa meds, and amoxicillin.

## 2023-11-09 ENCOUNTER — EMERGENCY (EMERGENCY)
Facility: HOSPITAL | Age: 53
LOS: 0 days | Discharge: HOME | End: 2023-11-09
Attending: EMERGENCY MEDICINE
Payer: COMMERCIAL

## 2023-11-09 VITALS
DIASTOLIC BLOOD PRESSURE: 75 MMHG | RESPIRATION RATE: 18 BRPM | SYSTOLIC BLOOD PRESSURE: 113 MMHG | OXYGEN SATURATION: 98 % | TEMPERATURE: 98 F | HEART RATE: 73 BPM

## 2023-11-09 VITALS
TEMPERATURE: 98 F | HEIGHT: 70 IN | OXYGEN SATURATION: 94 % | DIASTOLIC BLOOD PRESSURE: 76 MMHG | HEART RATE: 89 BPM | SYSTOLIC BLOOD PRESSURE: 115 MMHG | WEIGHT: 274.92 LBS | RESPIRATION RATE: 18 BRPM

## 2023-11-09 DIAGNOSIS — Z87.19 PERSONAL HISTORY OF OTHER DISEASES OF THE DIGESTIVE SYSTEM: ICD-10-CM

## 2023-11-09 DIAGNOSIS — Z20.822 CONTACT WITH AND (SUSPECTED) EXPOSURE TO COVID-19: ICD-10-CM

## 2023-11-09 DIAGNOSIS — R05.9 COUGH, UNSPECIFIED: ICD-10-CM

## 2023-11-09 DIAGNOSIS — Z98.51 TUBAL LIGATION STATUS: Chronic | ICD-10-CM

## 2023-11-09 DIAGNOSIS — Z87.39 PERSONAL HISTORY OF OTHER DISEASES OF THE MUSCULOSKELETAL SYSTEM AND CONNECTIVE TISSUE: ICD-10-CM

## 2023-11-09 DIAGNOSIS — Z86.2 PERSONAL HISTORY OF DISEASES OF THE BLOOD AND BLOOD-FORMING ORGANS AND CERTAIN DISORDERS INVOLVING THE IMMUNE MECHANISM: ICD-10-CM

## 2023-11-09 DIAGNOSIS — Z88.6 ALLERGY STATUS TO ANALGESIC AGENT: ICD-10-CM

## 2023-11-09 DIAGNOSIS — Z98.89 OTHER SPECIFIED POSTPROCEDURAL STATES: Chronic | ICD-10-CM

## 2023-11-09 DIAGNOSIS — B34.9 VIRAL INFECTION, UNSPECIFIED: ICD-10-CM

## 2023-11-09 DIAGNOSIS — R06.02 SHORTNESS OF BREATH: ICD-10-CM

## 2023-11-09 DIAGNOSIS — R07.9 CHEST PAIN, UNSPECIFIED: ICD-10-CM

## 2023-11-09 DIAGNOSIS — Z88.2 ALLERGY STATUS TO SULFONAMIDES: ICD-10-CM

## 2023-11-09 DIAGNOSIS — F41.9 ANXIETY DISORDER, UNSPECIFIED: ICD-10-CM

## 2023-11-09 DIAGNOSIS — K56.60 UNSPECIFIED INTESTINAL OBSTRUCTION: Chronic | ICD-10-CM

## 2023-11-09 LAB
ALBUMIN SERPL ELPH-MCNC: 4 G/DL — SIGNIFICANT CHANGE UP (ref 3.3–5)
ALBUMIN SERPL ELPH-MCNC: 4 G/DL — SIGNIFICANT CHANGE UP (ref 3.3–5)
ALP SERPL-CCNC: 97 U/L — SIGNIFICANT CHANGE UP (ref 40–120)
ALP SERPL-CCNC: 97 U/L — SIGNIFICANT CHANGE UP (ref 40–120)
ALT FLD-CCNC: 17 U/L — SIGNIFICANT CHANGE UP (ref 12–78)
ALT FLD-CCNC: 17 U/L — SIGNIFICANT CHANGE UP (ref 12–78)
ANION GAP SERPL CALC-SCNC: 7 MMOL/L — SIGNIFICANT CHANGE UP (ref 5–17)
ANION GAP SERPL CALC-SCNC: 7 MMOL/L — SIGNIFICANT CHANGE UP (ref 5–17)
APPEARANCE UR: CLEAR — SIGNIFICANT CHANGE UP
APPEARANCE UR: CLEAR — SIGNIFICANT CHANGE UP
APTT BLD: 23.5 SEC — LOW (ref 24.5–35.6)
APTT BLD: 23.5 SEC — LOW (ref 24.5–35.6)
AST SERPL-CCNC: 15 U/L — SIGNIFICANT CHANGE UP (ref 15–37)
AST SERPL-CCNC: 15 U/L — SIGNIFICANT CHANGE UP (ref 15–37)
BACTERIA # UR AUTO: ABNORMAL /HPF
BACTERIA # UR AUTO: ABNORMAL /HPF
BASOPHILS # BLD AUTO: 0.03 K/UL — SIGNIFICANT CHANGE UP (ref 0–0.2)
BASOPHILS # BLD AUTO: 0.03 K/UL — SIGNIFICANT CHANGE UP (ref 0–0.2)
BASOPHILS NFR BLD AUTO: 0.4 % — SIGNIFICANT CHANGE UP (ref 0–2)
BASOPHILS NFR BLD AUTO: 0.4 % — SIGNIFICANT CHANGE UP (ref 0–2)
BILIRUB SERPL-MCNC: 0.4 MG/DL — SIGNIFICANT CHANGE UP (ref 0.2–1.2)
BILIRUB SERPL-MCNC: 0.4 MG/DL — SIGNIFICANT CHANGE UP (ref 0.2–1.2)
BILIRUB UR-MCNC: NEGATIVE — SIGNIFICANT CHANGE UP
BILIRUB UR-MCNC: NEGATIVE — SIGNIFICANT CHANGE UP
BUN SERPL-MCNC: 22 MG/DL — SIGNIFICANT CHANGE UP (ref 7–23)
BUN SERPL-MCNC: 22 MG/DL — SIGNIFICANT CHANGE UP (ref 7–23)
CALCIUM SERPL-MCNC: 8.8 MG/DL — SIGNIFICANT CHANGE UP (ref 8.5–10.1)
CALCIUM SERPL-MCNC: 8.8 MG/DL — SIGNIFICANT CHANGE UP (ref 8.5–10.1)
CHLORIDE SERPL-SCNC: 109 MMOL/L — HIGH (ref 96–108)
CHLORIDE SERPL-SCNC: 109 MMOL/L — HIGH (ref 96–108)
CO2 SERPL-SCNC: 27 MMOL/L — SIGNIFICANT CHANGE UP (ref 22–31)
CO2 SERPL-SCNC: 27 MMOL/L — SIGNIFICANT CHANGE UP (ref 22–31)
COLOR SPEC: YELLOW — SIGNIFICANT CHANGE UP
COLOR SPEC: YELLOW — SIGNIFICANT CHANGE UP
CREAT SERPL-MCNC: 0.91 MG/DL — SIGNIFICANT CHANGE UP (ref 0.5–1.3)
CREAT SERPL-MCNC: 0.91 MG/DL — SIGNIFICANT CHANGE UP (ref 0.5–1.3)
D DIMER BLD IA.RAPID-MCNC: <150 NG/ML DDU — SIGNIFICANT CHANGE UP
D DIMER BLD IA.RAPID-MCNC: <150 NG/ML DDU — SIGNIFICANT CHANGE UP
DIFF PNL FLD: ABNORMAL
DIFF PNL FLD: ABNORMAL
EGFR: 76 ML/MIN/1.73M2 — SIGNIFICANT CHANGE UP
EGFR: 76 ML/MIN/1.73M2 — SIGNIFICANT CHANGE UP
EOSINOPHIL # BLD AUTO: 0.17 K/UL — SIGNIFICANT CHANGE UP (ref 0–0.5)
EOSINOPHIL # BLD AUTO: 0.17 K/UL — SIGNIFICANT CHANGE UP (ref 0–0.5)
EOSINOPHIL NFR BLD AUTO: 2.5 % — SIGNIFICANT CHANGE UP (ref 0–6)
EOSINOPHIL NFR BLD AUTO: 2.5 % — SIGNIFICANT CHANGE UP (ref 0–6)
EPI CELLS # UR: SIGNIFICANT CHANGE UP
EPI CELLS # UR: SIGNIFICANT CHANGE UP
FLUAV AG NPH QL: SIGNIFICANT CHANGE UP
FLUAV AG NPH QL: SIGNIFICANT CHANGE UP
FLUBV AG NPH QL: SIGNIFICANT CHANGE UP
FLUBV AG NPH QL: SIGNIFICANT CHANGE UP
GLUCOSE SERPL-MCNC: 100 MG/DL — HIGH (ref 70–99)
GLUCOSE SERPL-MCNC: 100 MG/DL — HIGH (ref 70–99)
GLUCOSE UR QL: NEGATIVE MG/DL — SIGNIFICANT CHANGE UP
GLUCOSE UR QL: NEGATIVE MG/DL — SIGNIFICANT CHANGE UP
HCT VFR BLD CALC: 33.2 % — LOW (ref 34.5–45)
HCT VFR BLD CALC: 33.2 % — LOW (ref 34.5–45)
HGB BLD-MCNC: 10.4 G/DL — LOW (ref 11.5–15.5)
HGB BLD-MCNC: 10.4 G/DL — LOW (ref 11.5–15.5)
IMM GRANULOCYTES NFR BLD AUTO: 0.7 % — SIGNIFICANT CHANGE UP (ref 0–0.9)
IMM GRANULOCYTES NFR BLD AUTO: 0.7 % — SIGNIFICANT CHANGE UP (ref 0–0.9)
INR BLD: 0.83 RATIO — LOW (ref 0.85–1.18)
INR BLD: 0.83 RATIO — LOW (ref 0.85–1.18)
KETONES UR-MCNC: NEGATIVE MG/DL — SIGNIFICANT CHANGE UP
KETONES UR-MCNC: NEGATIVE MG/DL — SIGNIFICANT CHANGE UP
LEUKOCYTE ESTERASE UR-ACNC: ABNORMAL
LEUKOCYTE ESTERASE UR-ACNC: ABNORMAL
LYMPHOCYTES # BLD AUTO: 1.98 K/UL — SIGNIFICANT CHANGE UP (ref 1–3.3)
LYMPHOCYTES # BLD AUTO: 1.98 K/UL — SIGNIFICANT CHANGE UP (ref 1–3.3)
LYMPHOCYTES # BLD AUTO: 29.3 % — SIGNIFICANT CHANGE UP (ref 13–44)
LYMPHOCYTES # BLD AUTO: 29.3 % — SIGNIFICANT CHANGE UP (ref 13–44)
MCHC RBC-ENTMCNC: 25.4 PG — LOW (ref 27–34)
MCHC RBC-ENTMCNC: 25.4 PG — LOW (ref 27–34)
MCHC RBC-ENTMCNC: 31.3 G/DL — LOW (ref 32–36)
MCHC RBC-ENTMCNC: 31.3 G/DL — LOW (ref 32–36)
MCV RBC AUTO: 81 FL — SIGNIFICANT CHANGE UP (ref 80–100)
MCV RBC AUTO: 81 FL — SIGNIFICANT CHANGE UP (ref 80–100)
MONOCYTES # BLD AUTO: 0.84 K/UL — SIGNIFICANT CHANGE UP (ref 0–0.9)
MONOCYTES # BLD AUTO: 0.84 K/UL — SIGNIFICANT CHANGE UP (ref 0–0.9)
MONOCYTES NFR BLD AUTO: 12.4 % — SIGNIFICANT CHANGE UP (ref 2–14)
MONOCYTES NFR BLD AUTO: 12.4 % — SIGNIFICANT CHANGE UP (ref 2–14)
NEUTROPHILS # BLD AUTO: 3.68 K/UL — SIGNIFICANT CHANGE UP (ref 1.8–7.4)
NEUTROPHILS # BLD AUTO: 3.68 K/UL — SIGNIFICANT CHANGE UP (ref 1.8–7.4)
NEUTROPHILS NFR BLD AUTO: 54.7 % — SIGNIFICANT CHANGE UP (ref 43–77)
NEUTROPHILS NFR BLD AUTO: 54.7 % — SIGNIFICANT CHANGE UP (ref 43–77)
NITRITE UR-MCNC: NEGATIVE — SIGNIFICANT CHANGE UP
NITRITE UR-MCNC: NEGATIVE — SIGNIFICANT CHANGE UP
NRBC # BLD: 0 /100 WBCS — SIGNIFICANT CHANGE UP (ref 0–0)
NRBC # BLD: 0 /100 WBCS — SIGNIFICANT CHANGE UP (ref 0–0)
NT-PROBNP SERPL-SCNC: 42 PG/ML — SIGNIFICANT CHANGE UP (ref 0–125)
NT-PROBNP SERPL-SCNC: 42 PG/ML — SIGNIFICANT CHANGE UP (ref 0–125)
PH UR: 6 — SIGNIFICANT CHANGE UP (ref 5–8)
PH UR: 6 — SIGNIFICANT CHANGE UP (ref 5–8)
PLATELET # BLD AUTO: 247 K/UL — SIGNIFICANT CHANGE UP (ref 150–400)
PLATELET # BLD AUTO: 247 K/UL — SIGNIFICANT CHANGE UP (ref 150–400)
POTASSIUM SERPL-MCNC: 4 MMOL/L — SIGNIFICANT CHANGE UP (ref 3.5–5.3)
POTASSIUM SERPL-MCNC: 4 MMOL/L — SIGNIFICANT CHANGE UP (ref 3.5–5.3)
POTASSIUM SERPL-SCNC: 4 MMOL/L — SIGNIFICANT CHANGE UP (ref 3.5–5.3)
POTASSIUM SERPL-SCNC: 4 MMOL/L — SIGNIFICANT CHANGE UP (ref 3.5–5.3)
PROT SERPL-MCNC: 7.1 GM/DL — SIGNIFICANT CHANGE UP (ref 6–8.3)
PROT SERPL-MCNC: 7.1 GM/DL — SIGNIFICANT CHANGE UP (ref 6–8.3)
PROT UR-MCNC: NEGATIVE MG/DL — SIGNIFICANT CHANGE UP
PROT UR-MCNC: NEGATIVE MG/DL — SIGNIFICANT CHANGE UP
PROTHROM AB SERPL-ACNC: 10 SEC — SIGNIFICANT CHANGE UP (ref 9.5–13)
PROTHROM AB SERPL-ACNC: 10 SEC — SIGNIFICANT CHANGE UP (ref 9.5–13)
RBC # BLD: 4.1 M/UL — SIGNIFICANT CHANGE UP (ref 3.8–5.2)
RBC # BLD: 4.1 M/UL — SIGNIFICANT CHANGE UP (ref 3.8–5.2)
RBC # FLD: 15.1 % — HIGH (ref 10.3–14.5)
RBC # FLD: 15.1 % — HIGH (ref 10.3–14.5)
RBC CASTS # UR COMP ASSIST: SIGNIFICANT CHANGE UP /HPF (ref 0–4)
RBC CASTS # UR COMP ASSIST: SIGNIFICANT CHANGE UP /HPF (ref 0–4)
S PYO DNA THROAT QL NAA+PROBE: SIGNIFICANT CHANGE UP
S PYO DNA THROAT QL NAA+PROBE: SIGNIFICANT CHANGE UP
SARS-COV-2 RNA SPEC QL NAA+PROBE: SIGNIFICANT CHANGE UP
SARS-COV-2 RNA SPEC QL NAA+PROBE: SIGNIFICANT CHANGE UP
SODIUM SERPL-SCNC: 143 MMOL/L — SIGNIFICANT CHANGE UP (ref 135–145)
SODIUM SERPL-SCNC: 143 MMOL/L — SIGNIFICANT CHANGE UP (ref 135–145)
SP GR SPEC: 1.01 — SIGNIFICANT CHANGE UP (ref 1–1.03)
SP GR SPEC: 1.01 — SIGNIFICANT CHANGE UP (ref 1–1.03)
TROPONIN I, HIGH SENSITIVITY RESULT: 5 NG/L — SIGNIFICANT CHANGE UP
TROPONIN I, HIGH SENSITIVITY RESULT: 5 NG/L — SIGNIFICANT CHANGE UP
UROBILINOGEN FLD QL: 0.2 MG/DL — SIGNIFICANT CHANGE UP (ref 0.2–1)
UROBILINOGEN FLD QL: 0.2 MG/DL — SIGNIFICANT CHANGE UP (ref 0.2–1)
WBC # BLD: 6.75 K/UL — SIGNIFICANT CHANGE UP (ref 3.8–10.5)
WBC # BLD: 6.75 K/UL — SIGNIFICANT CHANGE UP (ref 3.8–10.5)
WBC # FLD AUTO: 6.75 K/UL — SIGNIFICANT CHANGE UP (ref 3.8–10.5)
WBC # FLD AUTO: 6.75 K/UL — SIGNIFICANT CHANGE UP (ref 3.8–10.5)
WBC UR QL: SIGNIFICANT CHANGE UP /HPF (ref 0–5)
WBC UR QL: SIGNIFICANT CHANGE UP /HPF (ref 0–5)

## 2023-11-09 PROCEDURE — 99285 EMERGENCY DEPT VISIT HI MDM: CPT

## 2023-11-09 PROCEDURE — 71045 X-RAY EXAM CHEST 1 VIEW: CPT | Mod: 26

## 2023-11-09 RX ORDER — SERTRALINE 25 MG/1
1 TABLET, FILM COATED ORAL
Refills: 0 | DISCHARGE

## 2023-11-09 RX ORDER — MOMETASONE FUROATE 50 UG/1
2 SPRAY NASAL
Refills: 0 | DISCHARGE

## 2023-11-09 RX ORDER — OXYCODONE HYDROCHLORIDE 5 MG/1
5 TABLET ORAL ONCE
Refills: 0 | Status: DISCONTINUED | OUTPATIENT
Start: 2023-11-09 | End: 2023-11-09

## 2023-11-09 RX ORDER — LAMOTRIGINE 25 MG/1
1 TABLET, ORALLY DISINTEGRATING ORAL
Refills: 0 | DISCHARGE

## 2023-11-09 RX ORDER — CYCLOBENZAPRINE HYDROCHLORIDE 10 MG/1
1 TABLET, FILM COATED ORAL
Refills: 0 | DISCHARGE

## 2023-11-09 RX ORDER — SODIUM CHLORIDE 9 MG/ML
1000 INJECTION INTRAMUSCULAR; INTRAVENOUS; SUBCUTANEOUS ONCE
Refills: 0 | Status: COMPLETED | OUTPATIENT
Start: 2023-11-09 | End: 2023-11-09

## 2023-11-09 RX ADMIN — OXYCODONE HYDROCHLORIDE 5 MILLIGRAM(S): 5 TABLET ORAL at 16:22

## 2023-11-09 RX ADMIN — OXYCODONE HYDROCHLORIDE 5 MILLIGRAM(S): 5 TABLET ORAL at 13:37

## 2023-11-09 RX ADMIN — SODIUM CHLORIDE 1000 MILLILITER(S): 9 INJECTION INTRAMUSCULAR; INTRAVENOUS; SUBCUTANEOUS at 18:16

## 2023-11-09 NOTE — ED PROVIDER NOTE - PATIENT PORTAL LINK FT
You can access the FollowMyHealth Patient Portal offered by [facility] by registering at the following website: [email or web address]. By joining [employer]’s FollowMyHealth portal, you will also be able to view your health information using other applications (apps) compatible with our system.

## 2023-11-09 NOTE — ED PROVIDER NOTE - NSFOLLOWUPINSTRUCTIONS_ED_ALL_ED_FT
Today you were seen in the ER for viral symptoms and shortness of breath.     Take Tylenol 650mg (Two 325 mg pills) every 4-6 hours as needed for pain or fevers.    Please see below for a copy of your results.     Shortness of breath    Shortness of breath (dyspnea) means you have trouble breathing and could indicate a medical problem. Causes include lung disease, heart disease, low amount of red blood cells (anemia), poor physical fitness, being overweight, smoking, etc. Your health care provider today may not be able to find a cause for your shortness of breath after your exam. In this case, it is important to have a follow-up exam with your primary care physician as instructed. If medicines were prescribed, take them as directed for the full length of time directed. Refrain from tobacco products.    SEEK IMMEDIATE MEDICAL CARE IF YOU HAVE ANY OF THE FOLLOWING SYMPTOMS: worsening shortness of breath, chest pain, back pain, abdominal pain, fever, coughing up blood, lightheadedness/dizziness.    Advance activity as tolerated.      Continue all previously prescribed medications as directed unless otherwise instructed.      Follow up with your primary care physician and pain management in 48-72 hours- bring copies of your results.

## 2023-11-09 NOTE — ED PROVIDER NOTE - CLINICAL SUMMARY MEDICAL DECISION MAKING FREE TEXT BOX
53y Female with past medical history of fibromyalgia, anemia, IBS, GERD, herniated disc, anxiety presents to the ER for multiple medical complaints - reports shortness/chest pain x 2 days, urinary incontinence x 5 days, burning eyes x 1 day, cough/sore throat x 5 days. Patient received call from her doctor telling her she has low iron level or hemoglobin and should come to ER. Denies active bleeding. Denies fever, chills, abdominal pain, n/v, +chronic diarrhea from IBS, +loss of taste. Denies LE swelling or tenderness. Vital signs stable, lungs clear, O2 stable, RR normal, abdomen soft, nontender nondistended, no focal neuro deficit, concern for viral syndrome and UTI, diagnosed with UTI yesterday on keflex, will give meds, labR, CXR, reassess. Likely dc with outpatient follow up.

## 2023-11-09 NOTE — ED PROVIDER NOTE - PROGRESS NOTE DETAILS
PRINCE Baltazar: all results reviewed with patient, no signs of PE, ACS or CHF, likely viral syndrome, iSTOP reviewed [id number] in addition to complex care note which did not provide exact plan, no indication for admission at this time, has abx for UTI, urine culture sent although it has antibiotics in it, will dc with outpatient pain management follow up. PRINCE Baltazar: all results reviewed with patient, no signs of PE, ACS or CHF, likely viral syndrome, iSTOP reviewed [id number] in addition to complex care note which did not provide exact plan, spoke with PCP regarding iron studies, states no one called the patient or told them to come to the ER for iron infusion, discussed following up with PMD or hematology for further management of iron, no indication for admission at this time, has abx for UTI, urine culture sent although it has antibiotics in it, will dc with outpatient pain management follow up.

## 2023-11-09 NOTE — ED ADULT NURSE NOTE - OBJECTIVE STATEMENT
Pt is a 54yo Female complaining of SOB, urinary incontinence, and severe (10/10) rib pain starting since Tuesday

## 2023-11-09 NOTE — ED PROVIDER NOTE - OBJECTIVE STATEMENT
53y Female with past medical history of fibromyalgia, anemia, IBS, GERD, herniated disc, anxiety presents to the ER for multiple medical complaints. Patient reports shortness/chest pain x 2 days, urinary incontinence x 5 days, burning eyes x 1 day, cough/sore throat x 5 days. Patient went to [facility] 3 days ago and was seen here yesterday for similar symptoms, neg flu/covid. Patient received call from her doctor telling her she has low iron level or hemoglobin and should come to ER. Denies active bleeding. Denies fever, chills, abdominal pain, n/v, +chronic diarrhea from IBS, +loss of taste. Denies LE swelling or tenderness.

## 2023-11-09 NOTE — ED ADULT TRIAGE NOTE - CHIEF COMPLAINT QUOTE
Patient BIB EMS from PCP for low hemoglobin, diarrhea, weakness and SOB. Patient states she's been sick since last week Thursday and worsen on Sunday. Currently on treatment for URI and Bronchitis. PMX: Anemia Catarino deficiency

## 2023-11-10 LAB
CULTURE RESULTS: SIGNIFICANT CHANGE UP
CULTURE RESULTS: SIGNIFICANT CHANGE UP
SPECIMEN SOURCE: SIGNIFICANT CHANGE UP
SPECIMEN SOURCE: SIGNIFICANT CHANGE UP

## 2023-11-12 ENCOUNTER — EMERGENCY (EMERGENCY)
Facility: HOSPITAL | Age: 53
LOS: 0 days | Discharge: HOME | End: 2023-11-12
Payer: MEDICARE

## 2023-11-12 VITALS
OXYGEN SATURATION: 98 % | TEMPERATURE: 99 F | SYSTOLIC BLOOD PRESSURE: 121 MMHG | HEART RATE: 88 BPM | DIASTOLIC BLOOD PRESSURE: 78 MMHG | RESPIRATION RATE: 17 BRPM

## 2023-11-12 VITALS
DIASTOLIC BLOOD PRESSURE: 60 MMHG | OXYGEN SATURATION: 98 % | SYSTOLIC BLOOD PRESSURE: 105 MMHG | TEMPERATURE: 98 F | HEART RATE: 85 BPM | RESPIRATION RATE: 18 BRPM | WEIGHT: 287.04 LBS | HEIGHT: 70 IN

## 2023-11-12 DIAGNOSIS — B34.8 OTHER VIRAL INFECTIONS OF UNSPECIFIED SITE: ICD-10-CM

## 2023-11-12 DIAGNOSIS — Z98.89 OTHER SPECIFIED POSTPROCEDURAL STATES: Chronic | ICD-10-CM

## 2023-11-12 DIAGNOSIS — R05.8 OTHER SPECIFIED COUGH: ICD-10-CM

## 2023-11-12 DIAGNOSIS — Z20.822 CONTACT WITH AND (SUSPECTED) EXPOSURE TO COVID-19: ICD-10-CM

## 2023-11-12 DIAGNOSIS — B34.1 ENTEROVIRUS INFECTION, UNSPECIFIED: ICD-10-CM

## 2023-11-12 DIAGNOSIS — Z98.51 TUBAL LIGATION STATUS: Chronic | ICD-10-CM

## 2023-11-12 DIAGNOSIS — K56.60 UNSPECIFIED INTESTINAL OBSTRUCTION: Chronic | ICD-10-CM

## 2023-11-12 DIAGNOSIS — R09.81 NASAL CONGESTION: ICD-10-CM

## 2023-11-12 DIAGNOSIS — R53.81 OTHER MALAISE: ICD-10-CM

## 2023-11-12 LAB
ALBUMIN SERPL ELPH-MCNC: 3.5 G/DL — SIGNIFICANT CHANGE UP (ref 3.3–5)
ALBUMIN SERPL ELPH-MCNC: 3.5 G/DL — SIGNIFICANT CHANGE UP (ref 3.3–5)
ALP SERPL-CCNC: 84 U/L — SIGNIFICANT CHANGE UP (ref 40–120)
ALP SERPL-CCNC: 84 U/L — SIGNIFICANT CHANGE UP (ref 40–120)
ALT FLD-CCNC: 16 U/L — SIGNIFICANT CHANGE UP (ref 12–78)
ALT FLD-CCNC: 16 U/L — SIGNIFICANT CHANGE UP (ref 12–78)
ANION GAP SERPL CALC-SCNC: 4 MMOL/L — LOW (ref 5–17)
ANION GAP SERPL CALC-SCNC: 4 MMOL/L — LOW (ref 5–17)
AST SERPL-CCNC: 17 U/L — SIGNIFICANT CHANGE UP (ref 15–37)
AST SERPL-CCNC: 17 U/L — SIGNIFICANT CHANGE UP (ref 15–37)
BASOPHILS # BLD AUTO: 0.03 K/UL — SIGNIFICANT CHANGE UP (ref 0–0.2)
BASOPHILS # BLD AUTO: 0.03 K/UL — SIGNIFICANT CHANGE UP (ref 0–0.2)
BASOPHILS NFR BLD AUTO: 0.4 % — SIGNIFICANT CHANGE UP (ref 0–2)
BASOPHILS NFR BLD AUTO: 0.4 % — SIGNIFICANT CHANGE UP (ref 0–2)
BILIRUB SERPL-MCNC: 0.4 MG/DL — SIGNIFICANT CHANGE UP (ref 0.2–1.2)
BILIRUB SERPL-MCNC: 0.4 MG/DL — SIGNIFICANT CHANGE UP (ref 0.2–1.2)
BUN SERPL-MCNC: 16 MG/DL — SIGNIFICANT CHANGE UP (ref 7–23)
BUN SERPL-MCNC: 16 MG/DL — SIGNIFICANT CHANGE UP (ref 7–23)
CALCIUM SERPL-MCNC: 8.6 MG/DL — SIGNIFICANT CHANGE UP (ref 8.5–10.1)
CALCIUM SERPL-MCNC: 8.6 MG/DL — SIGNIFICANT CHANGE UP (ref 8.5–10.1)
CHLORIDE SERPL-SCNC: 113 MMOL/L — HIGH (ref 96–108)
CHLORIDE SERPL-SCNC: 113 MMOL/L — HIGH (ref 96–108)
CO2 SERPL-SCNC: 26 MMOL/L — SIGNIFICANT CHANGE UP (ref 22–31)
CO2 SERPL-SCNC: 26 MMOL/L — SIGNIFICANT CHANGE UP (ref 22–31)
CREAT SERPL-MCNC: 0.82 MG/DL — SIGNIFICANT CHANGE UP (ref 0.5–1.3)
CREAT SERPL-MCNC: 0.82 MG/DL — SIGNIFICANT CHANGE UP (ref 0.5–1.3)
EGFR: 87 ML/MIN/1.73M2 — SIGNIFICANT CHANGE UP
EGFR: 87 ML/MIN/1.73M2 — SIGNIFICANT CHANGE UP
EOSINOPHIL # BLD AUTO: 0.08 K/UL — SIGNIFICANT CHANGE UP (ref 0–0.5)
EOSINOPHIL # BLD AUTO: 0.08 K/UL — SIGNIFICANT CHANGE UP (ref 0–0.5)
EOSINOPHIL NFR BLD AUTO: 1 % — SIGNIFICANT CHANGE UP (ref 0–6)
EOSINOPHIL NFR BLD AUTO: 1 % — SIGNIFICANT CHANGE UP (ref 0–6)
GLUCOSE SERPL-MCNC: 94 MG/DL — SIGNIFICANT CHANGE UP (ref 70–99)
GLUCOSE SERPL-MCNC: 94 MG/DL — SIGNIFICANT CHANGE UP (ref 70–99)
HCG SERPL-ACNC: <1 MIU/ML — SIGNIFICANT CHANGE UP
HCG SERPL-ACNC: <1 MIU/ML — SIGNIFICANT CHANGE UP
HCT VFR BLD CALC: 32.1 % — LOW (ref 34.5–45)
HCT VFR BLD CALC: 32.1 % — LOW (ref 34.5–45)
HGB BLD-MCNC: 9.9 G/DL — LOW (ref 11.5–15.5)
HGB BLD-MCNC: 9.9 G/DL — LOW (ref 11.5–15.5)
IMM GRANULOCYTES NFR BLD AUTO: 0.4 % — SIGNIFICANT CHANGE UP (ref 0–0.9)
IMM GRANULOCYTES NFR BLD AUTO: 0.4 % — SIGNIFICANT CHANGE UP (ref 0–0.9)
LIDOCAIN IGE QN: 17 U/L — SIGNIFICANT CHANGE UP (ref 13–75)
LIDOCAIN IGE QN: 17 U/L — SIGNIFICANT CHANGE UP (ref 13–75)
LYMPHOCYTES # BLD AUTO: 1.93 K/UL — SIGNIFICANT CHANGE UP (ref 1–3.3)
LYMPHOCYTES # BLD AUTO: 1.93 K/UL — SIGNIFICANT CHANGE UP (ref 1–3.3)
LYMPHOCYTES # BLD AUTO: 25.3 % — SIGNIFICANT CHANGE UP (ref 13–44)
LYMPHOCYTES # BLD AUTO: 25.3 % — SIGNIFICANT CHANGE UP (ref 13–44)
MAGNESIUM SERPL-MCNC: 2.1 MG/DL — SIGNIFICANT CHANGE UP (ref 1.6–2.6)
MAGNESIUM SERPL-MCNC: 2.1 MG/DL — SIGNIFICANT CHANGE UP (ref 1.6–2.6)
MCHC RBC-ENTMCNC: 25.1 PG — LOW (ref 27–34)
MCHC RBC-ENTMCNC: 25.1 PG — LOW (ref 27–34)
MCHC RBC-ENTMCNC: 30.8 G/DL — LOW (ref 32–36)
MCHC RBC-ENTMCNC: 30.8 G/DL — LOW (ref 32–36)
MCV RBC AUTO: 81.3 FL — SIGNIFICANT CHANGE UP (ref 80–100)
MCV RBC AUTO: 81.3 FL — SIGNIFICANT CHANGE UP (ref 80–100)
MONOCYTES # BLD AUTO: 0.51 K/UL — SIGNIFICANT CHANGE UP (ref 0–0.9)
MONOCYTES # BLD AUTO: 0.51 K/UL — SIGNIFICANT CHANGE UP (ref 0–0.9)
MONOCYTES NFR BLD AUTO: 6.7 % — SIGNIFICANT CHANGE UP (ref 2–14)
MONOCYTES NFR BLD AUTO: 6.7 % — SIGNIFICANT CHANGE UP (ref 2–14)
NEUTROPHILS # BLD AUTO: 5.04 K/UL — SIGNIFICANT CHANGE UP (ref 1.8–7.4)
NEUTROPHILS # BLD AUTO: 5.04 K/UL — SIGNIFICANT CHANGE UP (ref 1.8–7.4)
NEUTROPHILS NFR BLD AUTO: 66.2 % — SIGNIFICANT CHANGE UP (ref 43–77)
NEUTROPHILS NFR BLD AUTO: 66.2 % — SIGNIFICANT CHANGE UP (ref 43–77)
NRBC # BLD: 0 /100 WBCS — SIGNIFICANT CHANGE UP (ref 0–0)
NRBC # BLD: 0 /100 WBCS — SIGNIFICANT CHANGE UP (ref 0–0)
PLATELET # BLD AUTO: 232 K/UL — SIGNIFICANT CHANGE UP (ref 150–400)
PLATELET # BLD AUTO: 232 K/UL — SIGNIFICANT CHANGE UP (ref 150–400)
POTASSIUM SERPL-MCNC: 4.3 MMOL/L — SIGNIFICANT CHANGE UP (ref 3.5–5.3)
POTASSIUM SERPL-MCNC: 4.3 MMOL/L — SIGNIFICANT CHANGE UP (ref 3.5–5.3)
POTASSIUM SERPL-SCNC: 4.3 MMOL/L — SIGNIFICANT CHANGE UP (ref 3.5–5.3)
POTASSIUM SERPL-SCNC: 4.3 MMOL/L — SIGNIFICANT CHANGE UP (ref 3.5–5.3)
PROT SERPL-MCNC: 7.6 GM/DL — SIGNIFICANT CHANGE UP (ref 6–8.3)
PROT SERPL-MCNC: 7.6 GM/DL — SIGNIFICANT CHANGE UP (ref 6–8.3)
RAPID RVP RESULT: DETECTED
RAPID RVP RESULT: DETECTED
RBC # BLD: 3.95 M/UL — SIGNIFICANT CHANGE UP (ref 3.8–5.2)
RBC # BLD: 3.95 M/UL — SIGNIFICANT CHANGE UP (ref 3.8–5.2)
RBC # FLD: 15.1 % — HIGH (ref 10.3–14.5)
RBC # FLD: 15.1 % — HIGH (ref 10.3–14.5)
RV+EV RNA SPEC QL NAA+PROBE: DETECTED
RV+EV RNA SPEC QL NAA+PROBE: DETECTED
SARS-COV-2 RNA SPEC QL NAA+PROBE: SIGNIFICANT CHANGE UP
SARS-COV-2 RNA SPEC QL NAA+PROBE: SIGNIFICANT CHANGE UP
SODIUM SERPL-SCNC: 143 MMOL/L — SIGNIFICANT CHANGE UP (ref 135–145)
SODIUM SERPL-SCNC: 143 MMOL/L — SIGNIFICANT CHANGE UP (ref 135–145)
TROPONIN I, HIGH SENSITIVITY RESULT: 4.3 NG/L — SIGNIFICANT CHANGE UP
TROPONIN I, HIGH SENSITIVITY RESULT: 4.3 NG/L — SIGNIFICANT CHANGE UP
WBC # BLD: 7.62 K/UL — SIGNIFICANT CHANGE UP (ref 3.8–10.5)
WBC # BLD: 7.62 K/UL — SIGNIFICANT CHANGE UP (ref 3.8–10.5)
WBC # FLD AUTO: 7.62 K/UL — SIGNIFICANT CHANGE UP (ref 3.8–10.5)
WBC # FLD AUTO: 7.62 K/UL — SIGNIFICANT CHANGE UP (ref 3.8–10.5)

## 2023-11-12 PROCEDURE — 71046 X-RAY EXAM CHEST 2 VIEWS: CPT | Mod: 26

## 2023-11-12 PROCEDURE — 93010 ELECTROCARDIOGRAM REPORT: CPT

## 2023-11-12 PROCEDURE — 99285 EMERGENCY DEPT VISIT HI MDM: CPT

## 2023-11-12 RX ORDER — METOCLOPRAMIDE HCL 10 MG
1 TABLET ORAL
Qty: 3 | Refills: 0
Start: 2023-11-12 | End: 2023-11-14

## 2023-11-12 RX ORDER — ACETAMINOPHEN 500 MG
1000 TABLET ORAL ONCE
Refills: 0 | Status: COMPLETED | OUTPATIENT
Start: 2023-11-12 | End: 2023-11-12

## 2023-11-12 RX ORDER — METOCLOPRAMIDE HCL 10 MG
10 TABLET ORAL ONCE
Refills: 0 | Status: COMPLETED | OUTPATIENT
Start: 2023-11-12 | End: 2023-11-12

## 2023-11-12 RX ADMIN — Medication 1000 MILLIGRAM(S): at 14:04

## 2023-11-12 RX ADMIN — Medication 400 MILLIGRAM(S): at 11:14

## 2023-11-12 RX ADMIN — Medication 10 MILLIGRAM(S): at 14:37

## 2023-11-12 RX ADMIN — Medication 1000 MILLIGRAM(S): at 11:30

## 2023-11-12 NOTE — ED ADULT NURSE NOTE - NSFALLHARMRISKINTERV_ED_ALL_ED

## 2023-11-12 NOTE — ED PROVIDER NOTE - PATIENT PORTAL LINK FT
You can access the FollowMyHealth Patient Portal offered by [facility] by registering at the following website: [email or web address]. By joining [employer]’s FollowMyHealth portal, you will also be able to view your health information using other applications (apps) compatible with our system.

## 2023-11-12 NOTE — ED ADULT TRIAGE NOTE - CHIEF COMPLAINT QUOTE
pt c/o cough, fatigue, body ache, congestion, chills and sore throat for 2 week. pt was seen in the ED 3 days ago for similar symptoms. history of anxiety, depression and IBS. denies SI or HI

## 2023-11-12 NOTE — ED ADULT NURSE NOTE - OBJECTIVE STATEMENT
Assisting primary RN 53y Female with past medical history of fibromyalgia, anemia, IBS, GERD, herniated disc, anxiety presents to the ER for multiple medical complaints. Patient reports cough/sore throat. pt c/o cough, fatigue, body ache, congestion, chills and sore throat for 2 week. pt was seen in the ED 3 days ago for similar symptoms. history of anxiety, depression and IBS. denies SI or HI neg flu/covid. Patient received call from her doctor telling her she has low iron level or hemoglobin and should come to ER. Denies active bleeding. Denies fever, chills, abdominal pain, n/v, +chronic diarrhea from IBS, +loss of taste. Denies LE swelling or tenderness

## 2023-11-12 NOTE — ED PROVIDER NOTE - PHYSICAL EXAMINATION
PHYSICAL EXAM:    GENERAL: Alert, appears stated age, well appearing, non-toxic  SKIN: Warm, and dry. MMM.   HEAD: NC, AT  EYE: Normal lids/conjunctiva, PERRL, EOMI  ENT: Normal hearing, patent oropharynx without erythema or exudate, uvula midline  NECK: +supple. No meningismus, or JVD  Pulm: Bilateral BS, normal resp effort, no wheezes, stridor, or retractions; speaking in complete sentences. +diffuse ttp to chest wall-- no rash.   CV: RRR, no M/R/G, 2+and = radial pulses  Abd: soft, non-tender, non-distended, no rebound/guarding.   Mskel: no erythema, cyanosis, edema. no calf tenderness  Neuro: AAOx3,  normal gait.

## 2023-11-12 NOTE — ED PROVIDER NOTE - CLINICAL SUMMARY MEDICAL DECISION MAKING FREE TEXT BOX
heart score 1.   trop neg/ekg nonischemic.   +rhino/entero  ddimer neg 2 days ago-- no sob.   Reviewed all results and necessity for follow up. Counseled on red flags and to return for them.  Patient appears well on discharge.

## 2023-11-12 NOTE — ED PROVIDER NOTE - OBJECTIVE STATEMENT
53-year-old female with PMH fibromyalgia, depression, anxiety, anemia, IBS, presents with diffuse body aches including to chest and back cough, congestion, runny nose, general malaise x days.  No palliating/provoking factors.  Was here 2 days ago with same sxs and negative troponin, nonischemic EKG, negative D-dimer; was also here 3 days ago with neg trop/nonischemic ekg.   No trauma, fall, shortness of breath, fever, sweats, chills, abdominal pain, n/v/d, rash.

## 2023-11-28 ENCOUNTER — INPATIENT (INPATIENT)
Facility: HOSPITAL | Age: 53
LOS: 1 days | Discharge: LEFT BEFORE DISCHARGE | End: 2023-11-30
Attending: HOSPITALIST | Admitting: HOSPITALIST
Payer: COMMERCIAL

## 2023-11-28 VITALS
RESPIRATION RATE: 15 BRPM | HEIGHT: 70 IN | DIASTOLIC BLOOD PRESSURE: 90 MMHG | OXYGEN SATURATION: 100 % | HEART RATE: 73 BPM | TEMPERATURE: 98 F | SYSTOLIC BLOOD PRESSURE: 145 MMHG

## 2023-11-28 DIAGNOSIS — Z98.89 OTHER SPECIFIED POSTPROCEDURAL STATES: Chronic | ICD-10-CM

## 2023-11-28 DIAGNOSIS — K56.60 UNSPECIFIED INTESTINAL OBSTRUCTION: Chronic | ICD-10-CM

## 2023-11-28 DIAGNOSIS — Z98.51 TUBAL LIGATION STATUS: Chronic | ICD-10-CM

## 2023-11-28 LAB
ANION GAP SERPL CALC-SCNC: 9 MMOL/L — SIGNIFICANT CHANGE UP (ref 7–14)
ANION GAP SERPL CALC-SCNC: 9 MMOL/L — SIGNIFICANT CHANGE UP (ref 7–14)
BASOPHILS # BLD AUTO: 0.05 K/UL — SIGNIFICANT CHANGE UP (ref 0–0.2)
BASOPHILS # BLD AUTO: 0.05 K/UL — SIGNIFICANT CHANGE UP (ref 0–0.2)
BASOPHILS NFR BLD AUTO: 0.7 % — SIGNIFICANT CHANGE UP (ref 0–2)
BASOPHILS NFR BLD AUTO: 0.7 % — SIGNIFICANT CHANGE UP (ref 0–2)
BUN SERPL-MCNC: 18 MG/DL — SIGNIFICANT CHANGE UP (ref 7–23)
BUN SERPL-MCNC: 18 MG/DL — SIGNIFICANT CHANGE UP (ref 7–23)
CALCIUM SERPL-MCNC: 9 MG/DL — SIGNIFICANT CHANGE UP (ref 8.4–10.5)
CALCIUM SERPL-MCNC: 9 MG/DL — SIGNIFICANT CHANGE UP (ref 8.4–10.5)
CHLORIDE SERPL-SCNC: 107 MMOL/L — SIGNIFICANT CHANGE UP (ref 98–107)
CHLORIDE SERPL-SCNC: 107 MMOL/L — SIGNIFICANT CHANGE UP (ref 98–107)
CO2 SERPL-SCNC: 26 MMOL/L — SIGNIFICANT CHANGE UP (ref 22–31)
CO2 SERPL-SCNC: 26 MMOL/L — SIGNIFICANT CHANGE UP (ref 22–31)
CREAT SERPL-MCNC: 0.81 MG/DL — SIGNIFICANT CHANGE UP (ref 0.5–1.3)
CREAT SERPL-MCNC: 0.81 MG/DL — SIGNIFICANT CHANGE UP (ref 0.5–1.3)
EGFR: 88 ML/MIN/1.73M2 — SIGNIFICANT CHANGE UP
EGFR: 88 ML/MIN/1.73M2 — SIGNIFICANT CHANGE UP
EOSINOPHIL # BLD AUTO: 0.14 K/UL — SIGNIFICANT CHANGE UP (ref 0–0.5)
EOSINOPHIL # BLD AUTO: 0.14 K/UL — SIGNIFICANT CHANGE UP (ref 0–0.5)
EOSINOPHIL NFR BLD AUTO: 2 % — SIGNIFICANT CHANGE UP (ref 0–6)
EOSINOPHIL NFR BLD AUTO: 2 % — SIGNIFICANT CHANGE UP (ref 0–6)
GLUCOSE SERPL-MCNC: 93 MG/DL — SIGNIFICANT CHANGE UP (ref 70–99)
GLUCOSE SERPL-MCNC: 93 MG/DL — SIGNIFICANT CHANGE UP (ref 70–99)
HCT VFR BLD CALC: 33.2 % — LOW (ref 34.5–45)
HCT VFR BLD CALC: 33.2 % — LOW (ref 34.5–45)
HGB BLD-MCNC: 10 G/DL — LOW (ref 11.5–15.5)
HGB BLD-MCNC: 10 G/DL — LOW (ref 11.5–15.5)
IANC: 3.23 K/UL — SIGNIFICANT CHANGE UP (ref 1.8–7.4)
IANC: 3.23 K/UL — SIGNIFICANT CHANGE UP (ref 1.8–7.4)
IMM GRANULOCYTES NFR BLD AUTO: 0.3 % — SIGNIFICANT CHANGE UP (ref 0–0.9)
IMM GRANULOCYTES NFR BLD AUTO: 0.3 % — SIGNIFICANT CHANGE UP (ref 0–0.9)
LYMPHOCYTES # BLD AUTO: 2.9 K/UL — SIGNIFICANT CHANGE UP (ref 1–3.3)
LYMPHOCYTES # BLD AUTO: 2.9 K/UL — SIGNIFICANT CHANGE UP (ref 1–3.3)
LYMPHOCYTES # BLD AUTO: 42.1 % — SIGNIFICANT CHANGE UP (ref 13–44)
LYMPHOCYTES # BLD AUTO: 42.1 % — SIGNIFICANT CHANGE UP (ref 13–44)
MCHC RBC-ENTMCNC: 24.7 PG — LOW (ref 27–34)
MCHC RBC-ENTMCNC: 24.7 PG — LOW (ref 27–34)
MCHC RBC-ENTMCNC: 30.1 GM/DL — LOW (ref 32–36)
MCHC RBC-ENTMCNC: 30.1 GM/DL — LOW (ref 32–36)
MCV RBC AUTO: 82 FL — SIGNIFICANT CHANGE UP (ref 80–100)
MCV RBC AUTO: 82 FL — SIGNIFICANT CHANGE UP (ref 80–100)
MONOCYTES # BLD AUTO: 0.55 K/UL — SIGNIFICANT CHANGE UP (ref 0–0.9)
MONOCYTES # BLD AUTO: 0.55 K/UL — SIGNIFICANT CHANGE UP (ref 0–0.9)
MONOCYTES NFR BLD AUTO: 8 % — SIGNIFICANT CHANGE UP (ref 2–14)
MONOCYTES NFR BLD AUTO: 8 % — SIGNIFICANT CHANGE UP (ref 2–14)
NEUTROPHILS # BLD AUTO: 3.23 K/UL — SIGNIFICANT CHANGE UP (ref 1.8–7.4)
NEUTROPHILS # BLD AUTO: 3.23 K/UL — SIGNIFICANT CHANGE UP (ref 1.8–7.4)
NEUTROPHILS NFR BLD AUTO: 46.9 % — SIGNIFICANT CHANGE UP (ref 43–77)
NEUTROPHILS NFR BLD AUTO: 46.9 % — SIGNIFICANT CHANGE UP (ref 43–77)
NRBC # BLD: 0 /100 WBCS — SIGNIFICANT CHANGE UP (ref 0–0)
NRBC # BLD: 0 /100 WBCS — SIGNIFICANT CHANGE UP (ref 0–0)
NRBC # FLD: 0 K/UL — SIGNIFICANT CHANGE UP (ref 0–0)
NRBC # FLD: 0 K/UL — SIGNIFICANT CHANGE UP (ref 0–0)
NT-PROBNP SERPL-SCNC: 152 PG/ML — SIGNIFICANT CHANGE UP
NT-PROBNP SERPL-SCNC: 152 PG/ML — SIGNIFICANT CHANGE UP
PLATELET # BLD AUTO: 266 K/UL — SIGNIFICANT CHANGE UP (ref 150–400)
PLATELET # BLD AUTO: 266 K/UL — SIGNIFICANT CHANGE UP (ref 150–400)
POTASSIUM SERPL-MCNC: 4.2 MMOL/L — SIGNIFICANT CHANGE UP (ref 3.5–5.3)
POTASSIUM SERPL-MCNC: 4.2 MMOL/L — SIGNIFICANT CHANGE UP (ref 3.5–5.3)
POTASSIUM SERPL-SCNC: 4.2 MMOL/L — SIGNIFICANT CHANGE UP (ref 3.5–5.3)
POTASSIUM SERPL-SCNC: 4.2 MMOL/L — SIGNIFICANT CHANGE UP (ref 3.5–5.3)
RBC # BLD: 4.05 M/UL — SIGNIFICANT CHANGE UP (ref 3.8–5.2)
RBC # BLD: 4.05 M/UL — SIGNIFICANT CHANGE UP (ref 3.8–5.2)
RBC # FLD: 14.8 % — HIGH (ref 10.3–14.5)
RBC # FLD: 14.8 % — HIGH (ref 10.3–14.5)
SODIUM SERPL-SCNC: 142 MMOL/L — SIGNIFICANT CHANGE UP (ref 135–145)
SODIUM SERPL-SCNC: 142 MMOL/L — SIGNIFICANT CHANGE UP (ref 135–145)
TROPONIN T, HIGH SENSITIVITY RESULT: <6 NG/L — SIGNIFICANT CHANGE UP
TROPONIN T, HIGH SENSITIVITY RESULT: <6 NG/L — SIGNIFICANT CHANGE UP
WBC # BLD: 6.89 K/UL — SIGNIFICANT CHANGE UP (ref 3.8–10.5)
WBC # BLD: 6.89 K/UL — SIGNIFICANT CHANGE UP (ref 3.8–10.5)
WBC # FLD AUTO: 6.89 K/UL — SIGNIFICANT CHANGE UP (ref 3.8–10.5)
WBC # FLD AUTO: 6.89 K/UL — SIGNIFICANT CHANGE UP (ref 3.8–10.5)

## 2023-11-28 PROCEDURE — 99285 EMERGENCY DEPT VISIT HI MDM: CPT

## 2023-11-28 RX ORDER — ACETAMINOPHEN 500 MG
1000 TABLET ORAL ONCE
Refills: 0 | Status: COMPLETED | OUTPATIENT
Start: 2023-11-28 | End: 2023-11-28

## 2023-11-28 RX ORDER — OXYCODONE HYDROCHLORIDE 5 MG/1
5 TABLET ORAL ONCE
Refills: 0 | Status: DISCONTINUED | OUTPATIENT
Start: 2023-11-28 | End: 2023-11-28

## 2023-11-28 RX ORDER — DIAZEPAM 5 MG
5 TABLET ORAL ONCE
Refills: 0 | Status: DISCONTINUED | OUTPATIENT
Start: 2023-11-28 | End: 2023-11-28

## 2023-11-28 RX ORDER — LIDOCAINE 4 G/100G
1 CREAM TOPICAL ONCE
Refills: 0 | Status: COMPLETED | OUTPATIENT
Start: 2023-11-28 | End: 2023-11-28

## 2023-11-28 RX ADMIN — OXYCODONE HYDROCHLORIDE 5 MILLIGRAM(S): 5 TABLET ORAL at 22:59

## 2023-11-28 NOTE — ED ADULT NURSE NOTE - DRUG PRE-SCREENING (DAST -1)
Arterial Line  Performed by: Lalitha Strauss MD  Authorized by: Lalitha Strauss MD     Patient Location:  OR  Indication*:  Continuous blood pressure monitoring  Laterality*:  Left  Site*:  Radial  Max Sterile Barrier Technique*:  Sterile gloves, Sterile dressing applied and Cap/Mask  Local Anesthetic:  Injectable  Prep*:  Chlorhexidine gluconate (CHG)  Catheter Size*:  20 G  Catheter Length*:  1 3/4 in  Catheter Type:  Arrow  Ultrasound-Guided*: No    Seldinger Technique: No    Line Secured*:  Tape and Transparent dressing  Events: patient tolerated procedure well with no complications and multiple attempts    Attempts*:  2  Performed By:  Anesthesiologist  Anesthesiologist:  Lalitha Strauss MD   First attempt with arrow kit, radial artery noted to be atherosclerotic distally. 2nd attempt more proximal with 20g IV easy. Catheter passed smoothtly        
Statement Selected

## 2023-11-28 NOTE — ED ADULT NURSE NOTE - OBJECTIVE STATEMENT
Pt arrives to the ED aaox4, ambulatory with cane, breathing even and unlabored in bed. Pt states that she has chronic back pain that has been worsening over the past few days. Pt states she is also experiencing a lot of stress and now has midsternal chest tightness and neck pains. Pt denies SOB, dizziness, headache, blurry vision, chills. Bed in lowest position, call bell within reach. #20G IV placed in left AC, labs drawn and sent. Pt refused meds and states she would like oxy as "that is the only thing that works for me." PRINCE Salazar made aware.

## 2023-11-28 NOTE — ED ADULT TRIAGE NOTE - CHIEF COMPLAINT QUOTE
Pt c/o worsening chest tightness, sob and nosebleeds x3 days. Hx. Anxiety. Depression. PTSD. IBS. GERD. Fibromyalgia. Chronic back pain. Pt able to speak in full sentences. Pt denies SI/HI, n/v, fevers. Pt tearful in triage. Awaiting EKG.

## 2023-11-28 NOTE — ED PROVIDER NOTE - OBJECTIVE STATEMENT
Pt is a 52 YO F with PMH Chronic back pain, Fibromyalgia, IBS, GERD who presented to ED with pain. Pt reports for past 3 days she has had a persistent headache, neck pain (from punching herself in her neck), rib pain and back pain. Pt reports she normally follows with pain management but has been unable to follow up in the past 2 months, requesting pain medication. This is pt 4th ED visit this month. Damian fevers, chills, loss of sensation, chest pain, shortness of breath. Denies SI/HI/AVH.

## 2023-11-28 NOTE — ED PROVIDER NOTE - CLINICAL SUMMARY MEDICAL DECISION MAKING FREE TEXT BOX
Pt is a 54 YO F with PMH Chronic back pain, Fibromyalgia, IBS, GERD who presented to ED with pain. Pt is a complex care patient, pain management note reviewed and number called, however, no response. Will check labs, treat pain and reassess Pt is a 54 YO F with PMH Chronic back pain, Fibromyalgia, IBS, GERD who presented to ED with pain. Pt is a complex care patient, pain management note reviewed and number called, however, no response. Will check labs, treat pain and reassess    Reassessment: pt refusing to ambulate, will require admission for PT and pain control

## 2023-11-29 ENCOUNTER — TRANSCRIPTION ENCOUNTER (OUTPATIENT)
Age: 53
End: 2023-11-29

## 2023-11-29 DIAGNOSIS — M54.9 DORSALGIA, UNSPECIFIED: ICD-10-CM

## 2023-11-29 LAB
B PERT DNA SPEC QL NAA+PROBE: SIGNIFICANT CHANGE UP
B PERT DNA SPEC QL NAA+PROBE: SIGNIFICANT CHANGE UP
B PERT+PARAPERT DNA PNL SPEC NAA+PROBE: SIGNIFICANT CHANGE UP
B PERT+PARAPERT DNA PNL SPEC NAA+PROBE: SIGNIFICANT CHANGE UP
BORDETELLA PARAPERTUSSIS (RAPRVP): SIGNIFICANT CHANGE UP
BORDETELLA PARAPERTUSSIS (RAPRVP): SIGNIFICANT CHANGE UP
C PNEUM DNA SPEC QL NAA+PROBE: SIGNIFICANT CHANGE UP
C PNEUM DNA SPEC QL NAA+PROBE: SIGNIFICANT CHANGE UP
FLUAV SUBTYP SPEC NAA+PROBE: SIGNIFICANT CHANGE UP
FLUAV SUBTYP SPEC NAA+PROBE: SIGNIFICANT CHANGE UP
FLUBV RNA SPEC QL NAA+PROBE: SIGNIFICANT CHANGE UP
FLUBV RNA SPEC QL NAA+PROBE: SIGNIFICANT CHANGE UP
HADV DNA SPEC QL NAA+PROBE: SIGNIFICANT CHANGE UP
HADV DNA SPEC QL NAA+PROBE: SIGNIFICANT CHANGE UP
HCOV 229E RNA SPEC QL NAA+PROBE: SIGNIFICANT CHANGE UP
HCOV 229E RNA SPEC QL NAA+PROBE: SIGNIFICANT CHANGE UP
HCOV HKU1 RNA SPEC QL NAA+PROBE: SIGNIFICANT CHANGE UP
HCOV HKU1 RNA SPEC QL NAA+PROBE: SIGNIFICANT CHANGE UP
HCOV NL63 RNA SPEC QL NAA+PROBE: SIGNIFICANT CHANGE UP
HCOV NL63 RNA SPEC QL NAA+PROBE: SIGNIFICANT CHANGE UP
HCOV OC43 RNA SPEC QL NAA+PROBE: SIGNIFICANT CHANGE UP
HCOV OC43 RNA SPEC QL NAA+PROBE: SIGNIFICANT CHANGE UP
HMPV RNA SPEC QL NAA+PROBE: SIGNIFICANT CHANGE UP
HMPV RNA SPEC QL NAA+PROBE: SIGNIFICANT CHANGE UP
HPIV1 RNA SPEC QL NAA+PROBE: SIGNIFICANT CHANGE UP
HPIV1 RNA SPEC QL NAA+PROBE: SIGNIFICANT CHANGE UP
HPIV2 RNA SPEC QL NAA+PROBE: SIGNIFICANT CHANGE UP
HPIV2 RNA SPEC QL NAA+PROBE: SIGNIFICANT CHANGE UP
HPIV3 RNA SPEC QL NAA+PROBE: SIGNIFICANT CHANGE UP
HPIV3 RNA SPEC QL NAA+PROBE: SIGNIFICANT CHANGE UP
HPIV4 RNA SPEC QL NAA+PROBE: SIGNIFICANT CHANGE UP
HPIV4 RNA SPEC QL NAA+PROBE: SIGNIFICANT CHANGE UP
M PNEUMO DNA SPEC QL NAA+PROBE: SIGNIFICANT CHANGE UP
M PNEUMO DNA SPEC QL NAA+PROBE: SIGNIFICANT CHANGE UP
RAPID RVP RESULT: SIGNIFICANT CHANGE UP
RAPID RVP RESULT: SIGNIFICANT CHANGE UP
RSV RNA SPEC QL NAA+PROBE: SIGNIFICANT CHANGE UP
RSV RNA SPEC QL NAA+PROBE: SIGNIFICANT CHANGE UP
RV+EV RNA SPEC QL NAA+PROBE: SIGNIFICANT CHANGE UP
RV+EV RNA SPEC QL NAA+PROBE: SIGNIFICANT CHANGE UP
SARS-COV-2 RNA SPEC QL NAA+PROBE: SIGNIFICANT CHANGE UP
SARS-COV-2 RNA SPEC QL NAA+PROBE: SIGNIFICANT CHANGE UP

## 2023-11-29 PROCEDURE — 72100 X-RAY EXAM L-S SPINE 2/3 VWS: CPT | Mod: 26

## 2023-11-29 PROCEDURE — 71046 X-RAY EXAM CHEST 2 VIEWS: CPT | Mod: 26

## 2023-11-29 RX ORDER — LAMOTRIGINE 25 MG/1
150 TABLET, ORALLY DISINTEGRATING ORAL DAILY
Refills: 0 | Status: DISCONTINUED | OUTPATIENT
Start: 2023-11-29 | End: 2023-11-30

## 2023-11-29 RX ORDER — TRAMADOL HYDROCHLORIDE 50 MG/1
25 TABLET ORAL ONCE
Refills: 0 | Status: DISCONTINUED | OUTPATIENT
Start: 2023-11-29 | End: 2023-11-29

## 2023-11-29 RX ORDER — DIAZEPAM 5 MG
5 TABLET ORAL ONCE
Refills: 0 | Status: DISCONTINUED | OUTPATIENT
Start: 2023-11-29 | End: 2023-11-29

## 2023-11-29 RX ORDER — METOCLOPRAMIDE HCL 10 MG
10 TABLET ORAL DAILY
Refills: 0 | Status: DISCONTINUED | OUTPATIENT
Start: 2023-11-29 | End: 2023-11-30

## 2023-11-29 RX ORDER — SERTRALINE 25 MG/1
100 TABLET, FILM COATED ORAL DAILY
Refills: 0 | Status: DISCONTINUED | OUTPATIENT
Start: 2023-11-29 | End: 2023-11-30

## 2023-11-29 RX ORDER — ACETAMINOPHEN 500 MG
1000 TABLET ORAL ONCE
Refills: 0 | Status: COMPLETED | OUTPATIENT
Start: 2023-11-29 | End: 2023-11-29

## 2023-11-29 RX ORDER — HEPARIN SODIUM 5000 [USP'U]/ML
5000 INJECTION INTRAVENOUS; SUBCUTANEOUS EVERY 8 HOURS
Refills: 0 | Status: DISCONTINUED | OUTPATIENT
Start: 2023-11-29 | End: 2023-11-30

## 2023-11-29 RX ORDER — DIAZEPAM 5 MG
5 TABLET ORAL EVERY 6 HOURS
Refills: 0 | Status: DISCONTINUED | OUTPATIENT
Start: 2023-11-29 | End: 2023-11-30

## 2023-11-29 RX ORDER — SODIUM CHLORIDE 9 MG/ML
1000 INJECTION INTRAMUSCULAR; INTRAVENOUS; SUBCUTANEOUS ONCE
Refills: 0 | Status: COMPLETED | OUTPATIENT
Start: 2023-11-29 | End: 2023-11-29

## 2023-11-29 RX ORDER — METOCLOPRAMIDE HCL 10 MG
10 TABLET ORAL ONCE
Refills: 0 | Status: COMPLETED | OUTPATIENT
Start: 2023-11-29 | End: 2023-11-29

## 2023-11-29 RX ADMIN — TRAMADOL HYDROCHLORIDE 25 MILLIGRAM(S): 50 TABLET ORAL at 17:34

## 2023-11-29 RX ADMIN — Medication 1000 MILLIGRAM(S): at 02:00

## 2023-11-29 RX ADMIN — HEPARIN SODIUM 5000 UNIT(S): 5000 INJECTION INTRAVENOUS; SUBCUTANEOUS at 22:01

## 2023-11-29 RX ADMIN — Medication 1000 MILLIGRAM(S): at 22:25

## 2023-11-29 RX ADMIN — Medication 400 MILLIGRAM(S): at 22:01

## 2023-11-29 RX ADMIN — Medication 10 MILLIGRAM(S): at 21:59

## 2023-11-29 RX ADMIN — SERTRALINE 100 MILLIGRAM(S): 25 TABLET, FILM COATED ORAL at 14:19

## 2023-11-29 RX ADMIN — Medication 30 MILLIGRAM(S): at 21:59

## 2023-11-29 RX ADMIN — Medication 10 MILLIGRAM(S): at 01:12

## 2023-11-29 RX ADMIN — Medication 30 MILLIGRAM(S): at 17:35

## 2023-11-29 RX ADMIN — Medication 1000 MILLIGRAM(S): at 09:35

## 2023-11-29 RX ADMIN — SODIUM CHLORIDE 1000 MILLILITER(S): 9 INJECTION INTRAMUSCULAR; INTRAVENOUS; SUBCUTANEOUS at 01:12

## 2023-11-29 RX ADMIN — Medication 400 MILLIGRAM(S): at 09:05

## 2023-11-29 RX ADMIN — Medication 400 MILLIGRAM(S): at 01:13

## 2023-11-29 RX ADMIN — Medication 5 MILLIGRAM(S): at 21:59

## 2023-11-29 RX ADMIN — LAMOTRIGINE 150 MILLIGRAM(S): 25 TABLET, ORALLY DISINTEGRATING ORAL at 14:19

## 2023-11-29 NOTE — ED ADULT NURSE REASSESSMENT NOTE - NS ED NURSE REASSESS COMMENT FT1
Pt appears to be resting comfortably, NAD, respirations are even and unlabored, VS noted as per flowsheet. Pt endorsing pain to ribs area, endorses pain 7/10, denies any chest pain, sob, dizziness, lightheadedness, n/v/d, fever, chills, cough, HA. ACP made aware, spoke with Lisa Spears NP awaiting for further interventions. Denies any other complaints, Safety precautions implemented as per protocol, awaiting further MD orders, plan of care ongoing.

## 2023-11-29 NOTE — H&P ADULT - NSHPLABSRESULTS_GEN_ALL_CORE
LABS:                        10.0   6.89  )-----------( 266      ( 28 Nov 2023 22:37 )             33.2     11-28    142  |  107  |  18  ----------------------------<  93  4.2   |  26  |  0.81    Ca    9.0      28 Nov 2023 22:37        CAPILLARY BLOOD GLUCOSE            Urinalysis Basic - ( 28 Nov 2023 22:37 )    Color: x / Appearance: x / SG: x / pH: x  Gluc: 93 mg/dL / Ketone: x  / Bili: x / Urobili: x   Blood: x / Protein: x / Nitrite: x   Leuk Esterase: x / RBC: x / WBC x   Sq Epi: x / Non Sq Epi: x / Bacteria: x        RADIOLOGY & ADDITIONAL TESTS:    Imaging Personally Reviewed:  [x] YES  [ ] NO    Case discussed with NPP:  [X] YES  [ ] NO

## 2023-11-29 NOTE — H&P ADULT - NSHPREVIEWOFSYSTEMS_GEN_ALL_CORE
REVIEW OF SYSTEMS:    CONSTITUTIONAL: (-) dizziness (-) weakness (-) fevers (-) chills (-) weight loss (-) weight gain (-) sweats (-) poor appetite (-) falls (-) syncope  HEENT: (-) visual changes (+) HA (-) vertigo (-) rhinorrhea (-) epistaxis (-) swelling (-) hearing changes (-) sore throat (-) hoarseness  NECK: (-) stiffness (+) pain  RESPIRATORY: (-) cough (-) SOB (-) MIRANDA (-) hemoptysis   CARDIOVASCULAR: (-) chest pain (-) palpitations (-) PND (-) orthopnea  GASTROINTESTINAL: (-) abd pain (-) nausea (-) vomiting (-) diarrhea (-) constipation (-) hematemesis (-) melena (-) BRBPR (-) dysphagia (-) odynophagia (-) incontinence (-) bloatedness  GENITOURINARY: (-) dysuria  (-) frequency (-) hematuria (-) incontinence (-) abnormal discharge (-) incomplete emptying (-) flank pain  NEUROLOGICAL: (-) confusion (-) slurred speech (-) focal weakness (-) tingling/numbness (-) difficulty walking (-) tremors  MUSCULOSKELETAL: (+) back/rib pain (-) joint pain (-) joint swelling (-) reduced ROM (-) extremity pain (-) extremity swelling  PSYCH: (-) anxious (-) depressed (-) aural hallucinations (-) visual hallucinations  SKIN: (-) itching (-) burning (-) rashes (-) swelling (-) bruising (-) pain  All other review of systems is negative unless indicated above.

## 2023-11-29 NOTE — H&P ADULT - NSHPPHYSICALEXAM_GEN_ALL_CORE
Vital Signs Last 24 Hrs  T(C): 36.6 (29 Nov 2023 13:23), Max: 36.8 (29 Nov 2023 08:13)  T(F): 97.9 (29 Nov 2023 13:23), Max: 98.2 (29 Nov 2023 08:13)  HR: 60 (29 Nov 2023 13:23) (60 - 73)  BP: 126/71 (29 Nov 2023 13:23) (126/71 - 145/90)  BP(mean): --  RR: 16 (29 Nov 2023 13:23) (15 - 17)  SpO2: 100% (29 Nov 2023 13:23) (100% - 100%)    I&O's Summary    GENERAL: NAD  HEAD:  Atraumatic, Normocephalic  EYES: EOMI, PERRLA, conjunctiva and sclera clear  NECK: Supple, No JVD, No LAD  CHEST/LUNG: Clear to auscultation bilaterally; Normal respiratory effort w/o intercostal retractions  HEART: Regular rate and rhythm; nl S1/S2; No murmurs, rubs, or gallops  ABDOMEN: Soft, Nontender, Nondistended; Bowel sounds present; No hepatosplenomegaly  EXTREMITIES:  2+ Peripheral Pulses; No clubbing, cyanosis, or edema b/l; Nl ROM  NEURO: A+O x 3; nonfocal CN/motor/sensory/reflexes; speech + comprehension are intact  PSYCH: Nl affect; no delirium or agitation; no suicidal/homicidal ideation

## 2023-11-29 NOTE — H&P ADULT - HISTORY OF PRESENT ILLNESS
54 YO F with PMH Chronic back pain, Fibromyalgia, IBS, GERD who presented to ED with pain. Pt reports for past 3 days she has had a persistent headache, neck pain (from punching herself in her neck), rib pain and back pain. Pt reports she normally follows with pain management but has been unable to follow up in the past 2 months, requesting pain medication. This is pt 4th ED visit this month. Damian fevers, chills, loss of sensation, chest pain, shortness of breath. Denies SI/HI/AVH.    This report was requested by: Froylan Johnson | Reference #: [id number]    Practitioner Count: 1  Pharmacy Count: 1  Current Opioid Prescriptions: 0  Current Benzodiazepine Prescriptions: 0  Current Stimulant Prescriptions: 0      Patient Demographic Information (PDI)       PDI	First Name	Last Name	Birth Date	Gender	Street Address	[facility]	Zip Code  NAYELY Alvarado	1970	Female	[address]	[address]    Prescription Information      PDI Filter:    PDI	My Rx	Current Rx	Drug Type	Rx Written	Rx Dispensed	Drug	Quantity	Days Supply	Prescriber Name	Prescriber STEPHANI #	Payment Method	Dispenser  A	N	N	B	10/16/2023	10/24/2023	diazepam 5 mg tablet	120	30	Garyali, Romy	[id number]	Medicare	[facility] #[id number]  A	N	N	B	09/18/2023	09/21/2023	diazepam 5 mg tablet	120	30	Garyali, Romy	[id number]	Medicare	[facility] #[id number]  A	N	N	B	08/14/2023	08/20/2023	diazepam 5 mg tablet	120	30	Garyali, Romy	[id number]	Medicare	[facility] #[id number]  A	N	N	B	07/14/2023	07/17/2023	diazepam 5 mg tablet	120	30	Garyali, Ormy	[id number]	Medicare	[facility] #[id number]  A	N	N	O	06/22/2023	06/25/2023	tramadol hcl 50 mg tablet	60	30	Schoenberg, Laura	[id number]	[facility] #[id number]  A	N	N	O	06/10/2023	06/12/2023	tramadol hcl 50 mg tablet	12	3	OdindeclanAudra	[id number]	[facility] #[id number]  A	N	N	B	05/15/2023	05/22/2023	diazepam 5 mg tablet	120	30	Garyali, Romy	[id number]	Insurance	[facility] #[id number]  A	N	N	O	05/19/2023	05/22/2023	hydromorphone 2 mg tablet	12	3	Alexandra Mora	[id number]	Insurance	[facility] #[id number]  A	N	N		04/28/2023	04/30/2023	pregabalin 50 mg capsule	69	30	Igor Talamantes A	[id number]	Insurance	[facility] #[id number]  A	N	N	O	03/31/2023	04/15/2023	tramadol hcl 50 mg tablet	90	30	Schoenberg, Laura	[id number]	Insurance	[facility] #[id number]  A	N	N	O	04/14/2023	04/14/2023	morphine sulfate ir 15 mg tab	12	3	Nani Sahu (NP)	[id number]	Insurance	[facility]  A	N	N	B	04/04/2023	04/06/2023	diazepam 5 mg tablet	120	30	Garyali, Romy	[id number]	Insurance	[facility] #[id number]  A	N	N	O	03/30/2023	03/31/2023	tramadol hcl 50 mg tablet	21	7	Schoenberg, Laura	[id number]	Insurance	[facility] #[id number]  A	N	N	B	03/16/2023	03/16/2023	diazepam 5 mg tablet	20	5	Shallow, Yara NP	[id number]	Insurance	[facility] #[id number]  A	N	N	O	03/16/2023	03/16/2023	morphine sulfate ir 15 mg tab	4	4	Shallow, Yara NP	[id number]	Insurance	[facility] #[id number]  A	N	N	B	02/07/2023	02/10/2023	diazepam 5 mg tablet	120	30	Garyali, Romy	[id number]	Insurance	[facility] #[id number]  A	N	N	B	11/28/2022	12/09/2022	diazepam 5 mg tablet	120	30	Garyali, Romy	[id number]	Insurance	[facility] #[id number]    * - Details of Drug Type : O = Opioid, B = Benzodiazepine, S = Stimulant

## 2023-11-29 NOTE — ED ADULT NURSE REASSESSMENT NOTE - NS ED NURSE REASSESS COMMENT FT1
Received patient in Intake 7, admitted to medicine waiting for bed assignment. Patient is A&OX4, airway patent, breathing unlabored and even. Will give medication as per MD's order. Side rails up and safety maintained. Fall precaution in place. Call bells within reach. Received patient in Intake 7, admitted to medicine waiting for bed assignment. Patient is A&OX4, airway patent, breathing unlabored and even. Will wait for further orders. Side rails up and safety maintained. Fall precaution in place. Call bells within reach.

## 2023-11-29 NOTE — H&P ADULT - ASSESSMENT
52 year old female, pmhx of anxiety, depression/bipolar disorder (c/b multiple SA and 3 prior psychiatric hospitalizations),IBS, Fibromyalgia, Chronic back pain, GERD, S/p gastric bypass with recurrent admissions for lower back pain.        Problem/Plan - 1:  ·  Problem: Dorsalgia  - patient has had multiple MRIs in the past which were negative for acute spinal cord compression  - she was also seen by orthopedics at [facility] in August 2023 who assessed her as not meeting criteria for acute orthopedic intervention  - acetaminophen 1000 mg IV received at 1:13 AM and 9:05 AM on 11/29/23  - acetaminophen 650 mg PO q6h prn mild pain  - consider tramadol 25 mg PO q6h prn moderate pain     Problem/Plan - 2:  ·  Problem: Fibromyalgia.   - monitor for now.     Problem/Plan - 3:  ·  Problem: Major depression.   - c/w sertraline  - cont buspirone.     Problem/Plan - 4:  ·  Problem: Prophylactic measure.   - heparin sub for dvt ppx

## 2023-11-30 VITALS
RESPIRATION RATE: 16 BRPM | TEMPERATURE: 98 F | DIASTOLIC BLOOD PRESSURE: 79 MMHG | OXYGEN SATURATION: 100 % | HEART RATE: 64 BPM | SYSTOLIC BLOOD PRESSURE: 118 MMHG

## 2023-11-30 RX ORDER — TRAMADOL HYDROCHLORIDE 50 MG/1
25 TABLET ORAL ONCE
Refills: 0 | Status: DISCONTINUED | OUTPATIENT
Start: 2023-11-30 | End: 2023-11-30

## 2023-11-30 NOTE — DISCHARGE NOTE PROVIDER - NSDCFUSCHEDAPPT_GEN_ALL_CORE_FT
Mitzi Ba  [facility]  [address]  Scheduled Appointment: 12/21/2023     Mitzi Ba  [facility]  [address] [facility]  Scheduled Appointment: 12/21/2023     Mitzi Ba  [facility]  [address] [facility]  Scheduled Appointment: 12/21/2023

## 2023-11-30 NOTE — DISCHARGE NOTE NURSING/CASE MANAGEMENT/SOCIAL WORK - NSDCPEFALRISK_GEN_ALL_CORE
For information on Fall & Injury Prevention, visit: [email or web address] OR  [email or web address] OR  https://www.cdc.gov/steadi/patient.html

## 2023-11-30 NOTE — DISCHARGE NOTE PROVIDER - HOSPITAL COURSE
Dorsalgia  - patient has had multiple MRIs in the past which were negative for acute spinal cord compression  - she was also seen by orthopedics at [facility] in August 2023 who assessed her as not meeting criteria for acute orthopedic intervention  - acetaminophen 1000 mg IV received at 1:13 AM and 9:05 AM on 11/29/23  - acetaminophen 650 mg PO q6h prn mild pain  - consider tramadol 25 mg PO q6h prn moderate pain    Fibromyalgia.   - monitored during admission    Major depression - continued with home regimen  - c/w sertraline  - cont buspirone.     Called by nurse to evaluate patient who wishes to leave the hospital against medical advice. Patient is A&O x3 and has full capacity to make his or her own medical decisions. Pt was informed of their medical conditions, benefits, and alternatives to treatment as well as the risks of refusing treatment and the seriousness of leaving against medical advice such as the risks of heart attack, stroke, seizure, and even death were fully explained to the patient.  After expressing full understanding, patient then signs out against medical advice witnessed by the nurse.  Attending Dr. Ledesma made aware.   Dorsalgia  - patient has had multiple MRIs in the past which were negative for acute spinal cord compression  - she was also seen by orthopedics at [facility] in August 2023 who assessed her as not meeting criteria for acute orthopedic intervention  - acetaminophen 1000 mg IV received at 1:13 AM and 9:05 AM on 11/29/23  - acetaminophen 650 mg PO q6h prn mild pain  - consider tramadol 25 mg PO q6h prn moderate pain    Fibromyalgia.   - monitored during admission    Major depression - continued with home regimen  - c/w sertraline  - cont buspirone.     Called by nurse to evaluate patient who wishes to leave the hospital against medical advice. Patient is A&O x3 and has full capacity to make his or her own medical decisions. Pt was informed of their medical conditions, benefits, and alternatives to treatment as well as the risks of refusing treatment and the seriousness of leaving against medical advice such as the risks of heart attack, stroke, seizure, and even death were fully explained to the patient.  After expressing full understanding, patient then signs out against medical advice witnessed by the nurse.  Attending Dr. Ledesma made aware.   Dorsalgia  - patient has had multiple MRIs in the past which were negative for acute spinal cord compression  - she was also seen by orthopedics at [facility] in August 2023 who assessed her as not meeting criteria for acute orthopedic intervention  - acetaminophen 1000 mg IV received at 1:13 AM and 9:05 AM on 11/29/23  - acetaminophen 650 mg PO q6h prn mild pain  - consider tramadol 25 mg PO q6h prn moderate pain    Fibromyalgia.   - monitored during admission    Major depression - continued with home regimen  - c/w sertraline  - cont buspirone.     Called by nurse to evaluate patient who wishes to leave the hospital against medical advice. Patient is A&O x3 and has full capacity to make his or her own medical decisions. Pt was informed of their medical conditions, benefits, and alternatives to treatment as well as the risks of refusing treatment and the seriousness of leaving against medical advice such as the risks of heart attack, stroke, seizure, and even death were fully explained to the patient.  After expressing full understanding, patient then signs out against medical advice witnessed by the nurse.  Attending Dr. Ledesma made aware.

## 2023-11-30 NOTE — DISCHARGE NOTE PROVIDER - NSDCADMDATE_GEN_ALL_CORE_FT
Inpatient Behavioral Health Initial Evaluation    Patient: Jo Ann Holliday 74 year old  MRN#: [id number]  Date of Service: 3/26/2020  Primary Provider: Olinda Alvarez DO    Chief Complaint: Depression    Informants: The patient, who is considered a poor historian, as well the patient's medical record.    History of Present Illness: Pt is a 74 year old female who presented to the ED via EMS from [facility] re[facility] on 2/3/[phone] with any questions.    Anabel Gonzalez MD, FAPA, FACLP  Dept of Psychiatry  [facility]  Ph : [phone]              29-Nov-2023 01:39

## 2023-11-30 NOTE — DISCHARGE NOTE PROVIDER - NSDCCPCAREPLAN_GEN_ALL_CORE_FT
PRINCIPAL DISCHARGE DIAGNOSIS  Diagnosis: Back pain  Assessment and Plan of Treatment: Continue medications. Follow up with your PCP for further evaluation and management. Please call to make an appointment within 1-2 weeks of discharge.

## 2023-11-30 NOTE — DISCHARGE NOTE NURSING/CASE MANAGEMENT/SOCIAL WORK - PATIENT PORTAL LINK FT
You can access the FollowMyHealth Patient Portal offered by [facility] by registering at the following website: [email or web address]. By joining [facility]’s FollowMyHealth portal, you will also be able to view your health information using other applications (apps) compatible with our system.

## 2023-12-21 ENCOUNTER — APPOINTMENT (OUTPATIENT)
Dept: OTOLARYNGOLOGY | Facility: CLINIC | Age: 53
End: 2023-12-21
Payer: MEDICARE

## 2023-12-21 VITALS
HEIGHT: 70 IN | HEART RATE: 80 BPM | DIASTOLIC BLOOD PRESSURE: 77 MMHG | BODY MASS INDEX: 39.37 KG/M2 | SYSTOLIC BLOOD PRESSURE: 122 MMHG | WEIGHT: 275 LBS

## 2023-12-21 DIAGNOSIS — H61.23 IMPACTED CERUMEN, BILATERAL: ICD-10-CM

## 2023-12-21 DIAGNOSIS — J34.89 OTHER SPECIFIED DISORDERS OF NOSE AND NASAL SINUSES: ICD-10-CM

## 2023-12-21 PROCEDURE — 99214 OFFICE O/P EST MOD 30 MIN: CPT | Mod: 25

## 2023-12-21 PROCEDURE — 31231 NASAL ENDOSCOPY DX: CPT

## 2023-12-21 PROCEDURE — 69210 REMOVE IMPACTED EAR WAX UNI: CPT

## 2023-12-21 NOTE — ASSESSMENT
[FreeTextEntry1] : Nasal septal perforation 1.5 cm - would rec increasing ointment to daily use and can use Ayr saline gel during the day for crusting - we discussed that I do not perform surgery on perforations of this size but that there are specialists that do - will refer to ENT/FP fellow clinic [facility] in [facility] Dr Arnulfo Barrera, other names also given for septal perf repair but likely do not take insurance.  - photos printed for pt of septal perf - advised to limit use of vaso constricting agents such as current spray  Ear clogging - bilateral cerumen removed today - pt declines audio today  f/u as needed

## 2023-12-21 NOTE — HISTORY OF PRESENT ILLNESS
[de-identified] : Ms. PETERSON is a 53 year female referred by PCP for sinus infection, seen last week and he put her on Levmetamfetamine nasal inhaler, which she has been using a few times per day. she is complaining of bloody crusting, she uses ointment in both nasal cavities as needed, not currently using sinus rinse.  pt has a h/o migraines and feels nasal symptoms are worsening her headaches previously seen by Rheum and no further workup was suggested for nasal perforation in 2021 she has had nasal cautery in the past pt is interested in surgical repair of of septal perforation

## 2023-12-21 NOTE — CONSULT LETTER
[Dear  ___] : Dear  [unfilled], [Consult Letter:] : I had the pleasure of evaluating your patient, [unfilled]. [Sincerely,] : Sincerely, [FreeTextEntry3] : Mitzi Ba MD Otolaryngology- Facial Plastics  [address] [place] [id number] (P) - [phone] (F) - [phone]

## 2023-12-21 NOTE — PHYSICAL EXAM
[Nasal Endoscopy Performed] : nasal endoscopy was performed, see procedure section for findings [Normal] : external appearance is normal [de-identified] : bilateral cerumen L > R [de-identified] : 1.5 cm nasal septal perforation [de-identified] : crusting with no active bleeding

## 2023-12-21 NOTE — END OF VISIT
[FreeTextEntry3] : I personally saw and examined VI PETERSON in detail.  I spoke to MATEUS Adams regarding the assessment and plan of care. I performed the procedures and relevant physical exam.  I have reviewed the above assessment and plan of care and I agree.  I have made changes to the body of the note wherever necessary and appropriate. [Time Spent: ___ minutes] : I have spent [unfilled] minutes of time on the encounter.

## 2023-12-21 NOTE — PROCEDURE
[FreeTextEntry3] : After informed verbal consent is obtained, cerumen is removed from the b/l ear canal with a curette & suction after soak. Pt tolerated well, no residual ear canal irritation.   [FreeTextEntry6] : reason for exam: anterior rhinoscopy insufficient for symptom evaluation   Fiberoptic nasal endoscopy was performed.  R/b/a of procedure was explained to the patient and they agreed to proceed with procedure.  normal mucosa, normal b/l inferior, middle and superior turbinates, inferior, middle and superior meati and sphenoethmoidal recess.  No nasal polyps.  Septum  1.5 cm Nasal septal perforation  scope #:  [id number]

## 2023-12-29 ENCOUNTER — EMERGENCY (EMERGENCY)
Facility: HOSPITAL | Age: 53
LOS: 1 days | Discharge: HOME | End: 2023-12-29
Attending: EMERGENCY MEDICINE
Payer: COMMERCIAL

## 2023-12-29 VITALS
RESPIRATION RATE: 18 BRPM | HEIGHT: 70 IN | OXYGEN SATURATION: 98 % | TEMPERATURE: 98 F | WEIGHT: 278 LBS | HEART RATE: 77 BPM | SYSTOLIC BLOOD PRESSURE: 97 MMHG | DIASTOLIC BLOOD PRESSURE: 65 MMHG

## 2023-12-29 VITALS
TEMPERATURE: 98 F | HEART RATE: 69 BPM | RESPIRATION RATE: 20 BRPM | SYSTOLIC BLOOD PRESSURE: 128 MMHG | DIASTOLIC BLOOD PRESSURE: 77 MMHG | OXYGEN SATURATION: 100 %

## 2023-12-29 DIAGNOSIS — Z98.51 TUBAL LIGATION STATUS: Chronic | ICD-10-CM

## 2023-12-29 DIAGNOSIS — K56.60 UNSPECIFIED INTESTINAL OBSTRUCTION: Chronic | ICD-10-CM

## 2023-12-29 DIAGNOSIS — Z98.89 OTHER SPECIFIED POSTPROCEDURAL STATES: Chronic | ICD-10-CM

## 2023-12-29 PROCEDURE — 99284 EMERGENCY DEPT VISIT MOD MDM: CPT | Mod: 25

## 2023-12-29 PROCEDURE — 93005 ELECTROCARDIOGRAM TRACING: CPT

## 2023-12-29 PROCEDURE — 99284 EMERGENCY DEPT VISIT MOD MDM: CPT

## 2023-12-29 NOTE — ED PROVIDER NOTE - OBJECTIVE STATEMENT
-year-old female presenting for plastic surgery to evaluate her nasal septum.  States she went to ENT last week where they diagnosed her with a deviated nasal septum that they said require plastic surgery.  States she had one 5-minute bleeding episode last night.  After discussing plan to establish follow-up with plastic surgery patient states that she has intermittent midsternal chest pain. Denies fever, chills, SOB, nausea, vomiting, abdominal pain, numbness, tingling, weakness of her extremities, or any other symptoms at this time. 53-year-old female presenting after wanting plastic surgery to evaluate her nasal septum.  States she went to ENT last week where they diagnosed her with a deviated nasal septum that they said require plastic surgery.  States she had one 5-minute bleeding episode last night.  After discussing plan to establish follow-up with plastic surgery patient states that she has intermittent midsternal chest pain, unknown for how long, no associated symptoms, has had this before. Denies fever, chills, SOB, nausea, vomiting, abdominal pain, numbness, tingling, weakness of her extremities, or any other symptoms at this time.

## 2023-12-29 NOTE — ED PROVIDER NOTE - NSFOLLOWUPCLINICS_GEN_ALL_ED_FT
[facility]  Plastic & Reconstructive Surgery  [address] [place] [address]  Phone: [phone]  Fax:     [facility] [place]  Plastic Surgery  [address]  [place] [address]  Phone: [phone]  Fax:      [facility]  Plastic & Reconstructive Surgery  [address] [place] [address]  Phone: [phone]  Fax:     [facility] [place]  Plastic Surgery  [address]  [place] [address]  Phone: [phone]  Fax:

## 2023-12-29 NOTE — ED ADULT TRIAGE NOTE - SOURCE OF INFORMATION
----- Message from Guadalupe Rodríguez sent at 3/30/2017  8:50 AM PDT -----  Regarding: wants copies of paperwork faxed to his FLY  AA/David         Patient said Clotilde had faxed papers to his [employer], he wants to know if he can get copies of the paperwork she faxed over. He can be reached at [phone]         Patient

## 2023-12-29 NOTE — ED ADULT NURSE NOTE - OBJECTIVE STATEMENT
Patient is a 53 year old female complaining of needing to see plastic surgery to evaluate her nasal septum. Patient reports she went to ENT last week where they diagnosed her with a deviated nasal septum that they said require plastic surgery - had one 5-minute bleeding episode last night then went to urgent care today for body aches and cough. Urgent care tested her for FLU/COVID/RSV which all came back negative and advised her to come to the ER. On assessment patient is A&Ox4, breathing comfortably on room air, no accessory muscle use, no jvd, no edema, abdomen soft nontender, skin warm and normal for race. PMH anxiety, GERD, IBS, fibromyalgia, chronic back pain, depression.

## 2023-12-29 NOTE — ED PROVIDER NOTE - CLINICAL SUMMARY MEDICAL DECISION MAKING FREE TEXT BOX
54 yo female with History of multiple ED visits and a complex care note for pain control.  Patient states primary reason for coming to the ED was for plastic surgery to fix her nasal septum and for a 5-minute nosebleed that happened yesterday at 2 AM.  Discussed need for plastic surgery follow-up outpatient.  When leaving room patient stated she had chest pain.  Performed EKG which was normal sinus rhythm.  Patient has no cardiac history,.  DC with primary care f/u for referral to plastic surgery follow-up.  Discharge home. 54 yo female with History of multiple ED visits and a complex care note for pain control.  Patient states primary reason for coming to the ED was for plastic surgery to fix her nasal septum and for a 5-minute nosebleed that happened yesterday at 2 AM.  Discussed need for plastic surgery follow-up outpatient.  When leaving room patient stated she had chest pain.  Performed EKG which was normal sinus rhythm.  Patient has no cardiac history,.  DC with primary care f/u for referral to plastic surgery follow-up.  Discharge home.    Christin Triplett MD - Attending Physician: 52 yo female with History of multiple ED visits and a complex care note for pain control.  Patient states primary reason for coming to the ED was for plastic surgery to fix her nasal septum and for a 5-minute nosebleed that happened yesterday at 2 AM.  Discussed need for plastic surgery follow-up outpatient.  When leaving room patient stated she had chest pain.  Performed EKG which was normal sinus rhythm.  Patient has no cardiac history,.  DC with primary care f/u for referral to plastic surgery follow-up.  Discharge home.    Christin Triplett MD - Attending Physician: 52 yo female with History of multiple ED visits and a complex care note for pain control.  Patient states primary reason for coming to the ED was for plastic surgery to fix her nasal septum and for a 5-minute nosebleed that happened yesterday at 2 AM.  Discussed need for plastic surgery follow-up outpatient.  When leaving room patient stated she had chest pain.  Performed EKG which was normal sinus rhythm.  Patient has no cardiac history,.  DC with primary care f/u for referral to plastic surgery follow-up.  Discharge home.    Christin Triplett MD - Attending Physician: 53-year-old female well-known to this department here requesting plastic surgery to fix her nasal septum.  No nasal pain.  No recent trauma.  Did have 1 brief episode of epistaxis.  No difficulty breathing.  Did briefly no chest pain during visit but inconsistent history.  No risk factors.  No concerning history.  No concern for ACS.  EKG follow-up outpatient. 54 yo female with History of multiple ED visits and a complex care note for pain control.  Patient states primary reason for coming to the ED was for plastic surgery to fix her nasal septum and for a 5-minute nosebleed that happened yesterday at 2 AM.  Discussed need for plastic surgery follow-up outpatient.  When leaving room patient stated she had chest pain.  Performed EKG which was normal sinus rhythm.  Patient has no cardiac history,.  DC with primary care f/u for referral to plastic surgery follow-up.  Discharge home.    Christin Triplett MD - Attending Physician: 53-year-old female well-known to this department here requesting plastic surgery to fix her nasal septum.  No nasal pain.  No recent trauma.  Did have 1 brief episode of epistaxis.  No difficulty breathing.  Did briefly no chest pain during visit but inconsistent history.  No risk factors.  No concerning history.  No concern for ACS.  EKG follow-up outpatient.

## 2023-12-29 NOTE — ED PROVIDER NOTE - ATTENDING CONTRIBUTION TO CARE
Christin Triplett MD - Attending Physician: See MDM hCristin Triplett MD - Attending Physician: See MDM

## 2023-12-29 NOTE — ED PROVIDER NOTE - PATIENT PORTAL LINK FT
You can access the FollowMyHealth Patient Portal offered by [facility] by registering at the following website: [email or web address]. By joining [facility]’s FollowMyHealth portal, you will also be able to view your health information using other applications (apps) compatible with our system. You can access the FollowMyHealth Patient Portal offered by [facility] by registering at the following website: [email or web address]. By joining [facility]’s FollowMyHealth portal, you will also be able to view your health information using other applications (apps) compatible with our system.

## 2023-12-29 NOTE — ED ADULT NURSE NOTE - BIRTH SEX
Jemma drank 4 bottles of water today and she cannot urinate.  She said she only gets out a couple of drops and that's it when she tries to go.  She said it just started today.  She denies pain but does have bloating and pressure.     She did start on the Robinul on August 8th after Dr Martinez prescribed it and it has worked well for her for her symptoms.       Female

## 2023-12-29 NOTE — ED PROVIDER NOTE - NSFOLLOWUPCLINICSTOKEN_GEN_ALL_ED_FT
204731: || ||00\01||False;[id number]: || ||00\01||False; [id number]: || ||00\01||False;[id number]: || ||00\01||False;

## 2023-12-29 NOTE — ED PROVIDER NOTE - PHYSICAL EXAMINATION
Suzanne Brown DO (PGY1)   Physical Exam:    Gen: NAD, AOx3, non-toxic appearing, able to ambulate without assistance  Lung: CTAB, no respiratory distress, no wheezes/rhonchi/rales B/L  CV: RRR, no murmurs, rubs or gallops  Abd: soft, NT, ND  Neuro: No focal sensory or motor deficits  Skin: NO calf swelling, erythema, or ttp

## 2023-12-29 NOTE — ED PROVIDER NOTE - NSFOLLOWUPINSTRUCTIONS_ED_ALL_ED_FT
You came to the emergency department due to nosebleed and your nasal septum bothering you.  We assessed you your nose is little longer bleeding your vital signs are stable.  We assessed your EKG which was normal and did not show any signs of cardiac dysrhythmia.  You are safe to go home.    Please follow-up with your primary care doctor to receive a referral to a plastic surgeon for your nasal septum.  Please follow-up with your primary care doctor if you continue to have chest pain for cardiology follow-up.    Please return to the emergency department if you have new or worsening pain, or unable to keep food and fluids down due to nausea and vomiting, worsening calf pain or swelling, shortness of breath, or chest pain. You came to the emergency department due to nosebleed and your nasal septum bothering you.  We assessed you your nose is little longer bleeding your vital signs are stable.  We assessed your EKG which was normal and did not show any signs of cardiac dysrhythmia.  You are safe to go home.    Please follow-up with your primary care doctor to receive a referral to a plastic surgeon for your nasal septum.  Please follow-up with your primary care doctor if you continue to have chest pain for cardiology follow-up.    Please return to the emergency department if you have new or worsening pain, or unable to keep food and fluids down due to nausea and vomiting, worsening calf pain or swelling, shortness of breath, or chest pain.    Plastic surgery offices  [facility]  Plastic & Reconstructive Surgery  [address] [place] [address]  Phone: [phone]    [facility] [place]  Plastic Surgery  [address], [address]  [place] [address]  Phone: [phone] You came to the emergency department due to nosebleed and your nasal septum bothering you.  We assessed you your nose is little longer bleeding your vital signs are stable.  We assessed your EKG which was normal and did not show any signs of cardiac dysrhythmia.  You are safe to go home.    Please follow-up with your primary care doctor to receive a referral to a plastic surgeon for your nasal septum.  Please follow-up with your primary care doctor if you continue to have chest pain for cardiology follow-up.    Please return to the emergency department if you have new or worsening pain, or unable to keep food and fluids down due to nausea and vomiting, worsening calf pain or swelling, shortness of breath, or chest pain.    Plastic surgery offices  [facility]  Plastic & Reconstructive Surgery  [address] [place] [address]  Phone: [phone]    [facility] [place]  Plastic Surgery  [address], [address]  [place] [address]  Phone: [phone]

## 2023-12-29 NOTE — ED ADULT NURSE NOTE - NSFALLUNIVINTERV_ED_ALL_ED
Bed/Stretcher in lowest position, wheels locked, appropriate side rails in place/Call bell, personal items and telephone in reach/Instruct patient to call for assistance before getting out of bed/chair/stretcher/Non-slip footwear applied when patient is off stretcher/[place] to call system/Physically safe environment - no spills, clutter or unnecessary equipment/Purposeful proactive rounding/Room/bathroom lighting operational, light cord in reach Bed/Stretcher in lowest position, wheels locked, appropriate side rails in place/Call bell, personal items and telephone in reach/Instruct patient to call for assistance before getting out of bed/chair/stretcher/Non-slip footwear applied when patient is off stretcher/[place] to call system/Physically safe environment - no spills, clutter or unnecessary equipment/Purposeful proactive rounding/Room/bathroom lighting operational, light cord in reach

## 2024-01-09 ENCOUNTER — APPOINTMENT (OUTPATIENT)
Dept: OTOLARYNGOLOGY | Facility: CLINIC | Age: 54
End: 2024-01-09

## 2024-01-12 ENCOUNTER — APPOINTMENT (OUTPATIENT)
Dept: OTOLARYNGOLOGY | Facility: CLINIC | Age: 54
End: 2024-01-12
Payer: COMMERCIAL

## 2024-01-12 VITALS — TEMPERATURE: 96.5 F | HEIGHT: 70 IN | BODY MASS INDEX: 39.37 KG/M2 | WEIGHT: 275 LBS

## 2024-01-12 DIAGNOSIS — J34.89 OTHER SPECIFIED DISORDERS OF NOSE AND NASAL SINUSES: ICD-10-CM

## 2024-01-12 DIAGNOSIS — H93.293 OTHER ABNORMAL AUDITORY PERCEPTIONS, BILATERAL: ICD-10-CM

## 2024-01-12 DIAGNOSIS — M26.629 ARTHRALGIA OF TEMPOROMANDIBULAR JOINT,: ICD-10-CM

## 2024-01-12 DIAGNOSIS — H92.03 OTALGIA, BILATERAL: ICD-10-CM

## 2024-01-12 DIAGNOSIS — R09.81 NASAL CONGESTION: ICD-10-CM

## 2024-01-12 DIAGNOSIS — H93.8X3 OTHER SPECIFIED DISORDERS OF EAR, BILATERAL: ICD-10-CM

## 2024-01-12 PROCEDURE — 99214 OFFICE O/P EST MOD 30 MIN: CPT | Mod: 25

## 2024-01-12 PROCEDURE — 31231 NASAL ENDOSCOPY DX: CPT

## 2024-01-12 PROCEDURE — 92557 COMPREHENSIVE HEARING TEST: CPT

## 2024-01-12 PROCEDURE — 92567 TYMPANOMETRY: CPT

## 2024-01-12 NOTE — ASSESSMENT
[FreeTextEntry1] : Patient with ear pain and discomfort was put on some antibiotics follows up feels that her entire head is just not feeling well and pressure and discomfort on examination ears are perfectly normal there is significant amount of TMJ in both of her temporomandibular joints on examination is evidence of grinding of her teeth she should go see her dentist and get a bite block she also feels that her hearing has been affected so we did a hearing test that showed that everything appears to be stable.  Endoscopically her perforation is still clean and encouraged her to continue to use her nose moistened with AYA R gel and saline no evidence of any bleeding from her septal perforation.  Follow-up as needed.

## 2024-01-12 NOTE — HISTORY OF PRESENT ILLNESS
[de-identified] : Patient has been having burning in the ears and recurrent infections in the ears since the end of November. She reports hearing a swishing noise and feeling like she is in a tunnel in regards to her hearing. She is on an antibiotic for a double ear infection and sinus infection from her PCP. She reports feeling a burning in the left ear right now and a stabbing pain. THe right ear feels muffled and clogged. She has still been having facial pain and pressure as well and it wakes her up at night. She has pressure in the teeth and the forehead. Her equilibrium has been off as well

## 2024-01-12 NOTE — REVIEW OF SYSTEMS
[Ear Pain] : ear pain [Hearing Loss] : hearing loss [Dizziness] : dizziness [Ear Noises] : ear noises [As Noted in HPI] : as noted in HPI [Nasal Congestion] : nasal congestion [Sinus Pain] : sinus pain [Sinus Pressure] : sinus pressure [Negative] : Heme/Lymph

## 2024-01-24 ENCOUNTER — APPOINTMENT (OUTPATIENT)
Dept: OTOLARYNGOLOGY | Facility: CLINIC | Age: 54
End: 2024-01-24

## 2024-01-29 NOTE — ED PROVIDER NOTE - PATIENT PORTAL LINK FT
Xray at bedside.    You can access the FollowMyHealth Patient Portal offered by [facility] by registering at the following website: [email or web address]. By joining [facility]’s FollowMyHealth portal, you will also be able to view your health information using other applications (apps) compatible with our system.

## 2024-02-01 ENCOUNTER — APPOINTMENT (OUTPATIENT)
Dept: OTOLARYNGOLOGY | Facility: CLINIC | Age: 54
End: 2024-02-01

## 2024-02-05 ENCOUNTER — EMERGENCY (EMERGENCY)
Facility: HOSPITAL | Age: 54
LOS: 0 days | Discharge: HOME | End: 2024-02-05
Attending: STUDENT IN AN ORGANIZED HEALTH CARE EDUCATION/TRAINING PROGRAM
Payer: COMMERCIAL

## 2024-02-05 VITALS
HEIGHT: 70 IN | SYSTOLIC BLOOD PRESSURE: 125 MMHG | DIASTOLIC BLOOD PRESSURE: 82 MMHG | RESPIRATION RATE: 18 BRPM | TEMPERATURE: 97 F | WEIGHT: 274.92 LBS | HEART RATE: 74 BPM | OXYGEN SATURATION: 97 %

## 2024-02-05 VITALS
SYSTOLIC BLOOD PRESSURE: 146 MMHG | OXYGEN SATURATION: 99 % | DIASTOLIC BLOOD PRESSURE: 86 MMHG | HEART RATE: 72 BPM | RESPIRATION RATE: 18 BRPM | TEMPERATURE: 98 F

## 2024-02-05 DIAGNOSIS — Z88.6 ALLERGY STATUS TO ANALGESIC AGENT: ICD-10-CM

## 2024-02-05 DIAGNOSIS — Z98.89 OTHER SPECIFIED POSTPROCEDURAL STATES: Chronic | ICD-10-CM

## 2024-02-05 DIAGNOSIS — Z88.0 ALLERGY STATUS TO PENICILLIN: ICD-10-CM

## 2024-02-05 DIAGNOSIS — R19.7 DIARRHEA, UNSPECIFIED: ICD-10-CM

## 2024-02-05 DIAGNOSIS — K58.9 IRRITABLE BOWEL SYNDROME WITHOUT DIARRHEA: ICD-10-CM

## 2024-02-05 DIAGNOSIS — Z98.51 TUBAL LIGATION STATUS: Chronic | ICD-10-CM

## 2024-02-05 DIAGNOSIS — Z87.19 PERSONAL HISTORY OF OTHER DISEASES OF THE DIGESTIVE SYSTEM: ICD-10-CM

## 2024-02-05 DIAGNOSIS — K56.60 UNSPECIFIED INTESTINAL OBSTRUCTION: Chronic | ICD-10-CM

## 2024-02-05 DIAGNOSIS — Z20.822 CONTACT WITH AND (SUSPECTED) EXPOSURE TO COVID-19: ICD-10-CM

## 2024-02-05 DIAGNOSIS — R53.83 OTHER FATIGUE: ICD-10-CM

## 2024-02-05 DIAGNOSIS — R20.0 ANESTHESIA OF SKIN: ICD-10-CM

## 2024-02-05 DIAGNOSIS — Z88.2 ALLERGY STATUS TO SULFONAMIDES: ICD-10-CM

## 2024-02-05 DIAGNOSIS — F41.9 ANXIETY DISORDER, UNSPECIFIED: ICD-10-CM

## 2024-02-05 DIAGNOSIS — Z63.8 OTHER SPECIFIED PROBLEMS RELATED TO PRIMARY SUPPORT GROUP: ICD-10-CM

## 2024-02-05 DIAGNOSIS — R10.32 LEFT LOWER QUADRANT PAIN: ICD-10-CM

## 2024-02-05 LAB
ALBUMIN SERPL ELPH-MCNC: 3.5 G/DL — SIGNIFICANT CHANGE UP (ref 3.3–5)
ALP SERPL-CCNC: 95 U/L — SIGNIFICANT CHANGE UP (ref 40–120)
ALT FLD-CCNC: 18 U/L — SIGNIFICANT CHANGE UP (ref 12–78)
ANION GAP SERPL CALC-SCNC: 6 MMOL/L — SIGNIFICANT CHANGE UP (ref 5–17)
APTT BLD: 30.4 SEC — SIGNIFICANT CHANGE UP (ref 24.5–35.6)
AST SERPL-CCNC: 13 U/L — LOW (ref 15–37)
BASOPHILS # BLD AUTO: 0.04 K/UL — SIGNIFICANT CHANGE UP (ref 0–0.2)
BASOPHILS NFR BLD AUTO: 0.5 % — SIGNIFICANT CHANGE UP (ref 0–2)
BILIRUB SERPL-MCNC: 0.5 MG/DL — SIGNIFICANT CHANGE UP (ref 0.2–1.2)
BUN SERPL-MCNC: 18 MG/DL — SIGNIFICANT CHANGE UP (ref 7–23)
CALCIUM SERPL-MCNC: 9.1 MG/DL — SIGNIFICANT CHANGE UP (ref 8.5–10.1)
CHLORIDE SERPL-SCNC: 111 MMOL/L — HIGH (ref 96–108)
CO2 SERPL-SCNC: 27 MMOL/L — SIGNIFICANT CHANGE UP (ref 22–31)
CREAT SERPL-MCNC: 0.78 MG/DL — SIGNIFICANT CHANGE UP (ref 0.5–1.3)
EGFR: 92 ML/MIN/1.73M2 — SIGNIFICANT CHANGE UP
EOSINOPHIL # BLD AUTO: 0.1 K/UL — SIGNIFICANT CHANGE UP (ref 0–0.5)
EOSINOPHIL NFR BLD AUTO: 1.4 % — SIGNIFICANT CHANGE UP (ref 0–6)
GLUCOSE SERPL-MCNC: 94 MG/DL — SIGNIFICANT CHANGE UP (ref 70–99)
HCG SERPL-ACNC: <1 MIU/ML — SIGNIFICANT CHANGE UP
HCT VFR BLD CALC: 32.5 % — LOW (ref 34.5–45)
HGB BLD-MCNC: 10 G/DL — LOW (ref 11.5–15.5)
IMM GRANULOCYTES NFR BLD AUTO: 0.7 % — SIGNIFICANT CHANGE UP (ref 0–0.9)
INR BLD: 0.87 RATIO — SIGNIFICANT CHANGE UP (ref 0.85–1.18)
LIDOCAIN IGE QN: 20 U/L — SIGNIFICANT CHANGE UP (ref 13–75)
LYMPHOCYTES # BLD AUTO: 1.9 K/UL — SIGNIFICANT CHANGE UP (ref 1–3.3)
LYMPHOCYTES # BLD AUTO: 26.1 % — SIGNIFICANT CHANGE UP (ref 13–44)
MCHC RBC-ENTMCNC: 25.3 PG — LOW (ref 27–34)
MCHC RBC-ENTMCNC: 30.8 G/DL — LOW (ref 32–36)
MCV RBC AUTO: 82.3 FL — SIGNIFICANT CHANGE UP (ref 80–100)
MONOCYTES # BLD AUTO: 0.54 K/UL — SIGNIFICANT CHANGE UP (ref 0–0.9)
MONOCYTES NFR BLD AUTO: 7.4 % — SIGNIFICANT CHANGE UP (ref 2–14)
NEUTROPHILS # BLD AUTO: 4.65 K/UL — SIGNIFICANT CHANGE UP (ref 1.8–7.4)
NEUTROPHILS NFR BLD AUTO: 63.9 % — SIGNIFICANT CHANGE UP (ref 43–77)
NRBC # BLD: 0 /100 WBCS — SIGNIFICANT CHANGE UP (ref 0–0)
PLATELET # BLD AUTO: 232 K/UL — SIGNIFICANT CHANGE UP (ref 150–400)
POTASSIUM SERPL-MCNC: 4.3 MMOL/L — SIGNIFICANT CHANGE UP (ref 3.5–5.3)
POTASSIUM SERPL-SCNC: 4.3 MMOL/L — SIGNIFICANT CHANGE UP (ref 3.5–5.3)
PROT SERPL-MCNC: 6.9 GM/DL — SIGNIFICANT CHANGE UP (ref 6–8.3)
PROTHROM AB SERPL-ACNC: 10.5 SEC — SIGNIFICANT CHANGE UP (ref 9.5–13)
RAPID RVP RESULT: SIGNIFICANT CHANGE UP
RBC # BLD: 3.95 M/UL — SIGNIFICANT CHANGE UP (ref 3.8–5.2)
RBC # FLD: 14.8 % — HIGH (ref 10.3–14.5)
SARS-COV-2 RNA SPEC QL NAA+PROBE: SIGNIFICANT CHANGE UP
SODIUM SERPL-SCNC: 144 MMOL/L — SIGNIFICANT CHANGE UP (ref 135–145)
TROPONIN I, HIGH SENSITIVITY RESULT: 3.5 NG/L — SIGNIFICANT CHANGE UP
TSH SERPL-MCNC: 0.95 UU/ML — SIGNIFICANT CHANGE UP (ref 0.36–3.74)
WBC # BLD: 7.28 K/UL — SIGNIFICANT CHANGE UP (ref 3.8–10.5)
WBC # FLD AUTO: 7.28 K/UL — SIGNIFICANT CHANGE UP (ref 3.8–10.5)

## 2024-02-05 PROCEDURE — 74177 CT ABD & PELVIS W/CONTRAST: CPT | Mod: 26,MA

## 2024-02-05 PROCEDURE — 99285 EMERGENCY DEPT VISIT HI MDM: CPT

## 2024-02-05 PROCEDURE — 71046 X-RAY EXAM CHEST 2 VIEWS: CPT | Mod: 26

## 2024-02-05 PROCEDURE — 93010 ELECTROCARDIOGRAM REPORT: CPT

## 2024-02-05 PROCEDURE — 70450 CT HEAD/BRAIN W/O DYE: CPT | Mod: 26,MA

## 2024-02-05 RX ORDER — MORPHINE SULFATE 50 MG/1
4 CAPSULE, EXTENDED RELEASE ORAL ONCE
Refills: 0 | Status: DISCONTINUED | OUTPATIENT
Start: 2024-02-05 | End: 2024-02-05

## 2024-02-05 RX ORDER — SODIUM CHLORIDE 9 MG/ML
1000 INJECTION INTRAMUSCULAR; INTRAVENOUS; SUBCUTANEOUS ONCE
Refills: 0 | Status: COMPLETED | OUTPATIENT
Start: 2024-02-05 | End: 2024-02-05

## 2024-02-05 RX ORDER — MORPHINE SULFATE 50 MG/1
2 CAPSULE, EXTENDED RELEASE ORAL ONCE
Refills: 0 | Status: DISCONTINUED | OUTPATIENT
Start: 2024-02-05 | End: 2024-02-05

## 2024-02-05 RX ORDER — FAMOTIDINE 10 MG/ML
20 INJECTION INTRAVENOUS ONCE
Refills: 0 | Status: COMPLETED | OUTPATIENT
Start: 2024-02-05 | End: 2024-02-05

## 2024-02-05 RX ADMIN — FAMOTIDINE 20 MILLIGRAM(S): 10 INJECTION INTRAVENOUS at 15:53

## 2024-02-05 RX ADMIN — Medication 10 MILLIGRAM(S): at 15:08

## 2024-02-05 RX ADMIN — MORPHINE SULFATE 2 MILLIGRAM(S): 50 CAPSULE, EXTENDED RELEASE ORAL at 13:22

## 2024-02-05 RX ADMIN — Medication 30 MILLILITER(S): at 15:52

## 2024-02-05 RX ADMIN — MORPHINE SULFATE 4 MILLIGRAM(S): 50 CAPSULE, EXTENDED RELEASE ORAL at 15:09

## 2024-02-05 RX ADMIN — SODIUM CHLORIDE 1000 MILLILITER(S): 9 INJECTION INTRAMUSCULAR; INTRAVENOUS; SUBCUTANEOUS at 13:22

## 2024-02-05 SDOH — SOCIAL STABILITY - SOCIAL INSECURITY: OTHER SPECIFIED PROBLEMS RELATED TO PRIMARY SUPPORT GROUP: Z63.8

## 2024-02-05 NOTE — ED PROVIDER NOTE - OBJECTIVE STATEMENT
53F pmhx multiple abd surgeries, anxiety, IBS, GERD, chronic back pain, who presents with facial numbness sensation over whole face, facial twitching sensation occasionally, and generalized fatigue and sensation hot/cold,, duration x 3 days, with loose stools. Pt reports loose stools and LLQ abd pain feel typical of her IBS. Denies headache,. Does occasionally get migraines. Pt feels stressed and overwhelmed. Denies SI. Reports felt speech appeared somewhat abnormal 3 days ago while on facetime- has video of it. Not slurred speech but did not sound as usual.

## 2024-02-05 NOTE — ED ADULT NURSE NOTE - NSFALLUNIVINTERV_ED_ALL_ED
Bed/Stretcher in lowest position, wheels locked, appropriate side rails in place/Call bell, personal items and telephone in reach/Instruct patient to call for assistance before getting out of bed/chair/stretcher/Non-slip footwear applied when patient is off stretcher/[place] to call system/Physically safe environment - no spills, clutter or unnecessary equipment/Purposeful proactive rounding/Room/bathroom lighting operational, light cord in reach

## 2024-02-05 NOTE — ED PROVIDER NOTE - CLINICAL SUMMARY MEDICAL DECISION MAKING FREE TEXT BOX
53F pmhx multiple abd surgeries, anxiety, IBS, GERD, chronic back pain, who presents with facial numbness sensation over whole face, facial twitching sensation occasionally, and generalized fatigue and sensation hot/cold,, duration x 3 days, with loose stools. Pt reports loose stools and LLQ abd pain feel typical of her IBS. Denies headache,. Does occasionally get migraines. Pt feels stressed and overwhelmed. Denies SI. Reports felt speech appeared somewhat abnormal 3 days ago while on facetime- has video of it. Not slurred speech but did not sound as usual.  ekg nonsichemic, check troponin rule out ischemia though less likely, ct a/p rule out diverticulitis or bowel obstruction, labs rule out electrolyte abrnoamlities, IV hydration, no neuro deficits, ct brain to rule out any signs of intracranial abnormalities 53F pmhx multiple abd surgeries, anxiety, IBS, GERD, chronic back pain, who presents with facial numbness sensation over whole face, facial twitching sensation occasionally, and generalized fatigue and sensation hot/cold,, duration x 3 days, with loose stools. Pt reports loose stools and LLQ abd pain feel typical of her IBS. Denies headache,. Does occasionally get migraines. Pt feels stressed and overwhelmed. Denies SI. Reports felt speech appeared somewhat abnormal 3 days ago while on facetime- has video of it. Not slurred speech but did not sound as usual.  ekg nonsichemic, check troponin rule out ischemia though less likely, ct a/p rule out diverticulitis or bowel obstruction, labs rule out electrolyte abrnoamlities, IV hydration, no neuro deficits, ct brain to rule out any signs of intracranial abnormalities    No acute abdominal pathology, patient well-appearing, advised to follow-up with primary care physician, return precautions discussed verbalized understanding and agreeable discharge plan.

## 2024-02-05 NOTE — ED ADULT NURSE NOTE - OBJECTIVE STATEMENT
Patient alert and verbally responsive, came in with c/o lethargic, slurred speech, decreased concentration and chills that started on Friday. Also said there is some numbness to face with facial twitching and hx: anxiety

## 2024-02-05 NOTE — ED PROVIDER NOTE - PATIENT PORTAL LINK FT
You can access the FollowMyHealth Patient Portal offered by [facility] by registering at the following website: [email or web address]. By joining [facility]’s FollowMyHealth portal, you will also be able to view your health information using other applications (apps) compatible with our system.

## 2024-02-05 NOTE — ED PROVIDER NOTE - ED STEMI HIDDEN
He last saw patient feb 2020.    Has appt 9/8 with physical medicine dr beard for eval for scooter.    I told patricia she needs to keep appt later this month with dr beard to eval for scooter and discuss pain.    She hasn't seen dr vivas since February and fol up visit needed with him too.   hide

## 2024-02-05 NOTE — ED PROVIDER NOTE - PHYSICAL EXAMINATION
Gen: aox3, nad,   Head: NCAT  ENT: Airway patent, dry mucous membranes, nasal passageways clear   Cardiac: Normal rate, normal rhythm, no murmurs/rubs/gallops appreciated  Respiratory: Lungs CTA B/L  Gastrointestinal: Abdomen soft, mod LLQ ttp,, nondistended, no rebound, no guarding  MSK: No gross abnormalities, FROM of all four extremities, no edema  HEME: Extremities warm, pulses intact and symmetrical in all four extremities  Skin: No rashes, no lesions  Neuro: No gross neurologic deficits, CN II-XII intact, no facial asymmetry, no dysarthria, no dysmetria, no drift, strength equal in all four extremities

## 2024-02-08 NOTE — CONSULT NOTE ADULT - SUBJECTIVE AND OBJECTIVE BOX
I responded to the patient via [employer]. Will monitor that it is read by the patient or if she responds.    Chief Complaint:  Patient is a 52y old  Female who presents with a chief complaint of Worsening low back pain with RIGHT gluteal and leg shooting pain past 3 days, urinary and bowel incontinence at home (2023 08:57)    HPI:  52F known to our service from last admission. PMHx anxiety, depression/bipolar disorder (c/b multiple SA and 3 prior psychiatric hospitalizations), fibromyalgia, chronic back pain, sciatica, left L4 nerve root impingement, IBS, GERD, Covid, s/p gastric bypass, s/p abdominoplasty; chronic B/L foot numbness; has been seeing [facility] for years for back pain and reports having GENOVEVA's, Trigger Point injections, and steroids in the past. S/P discharge from [place] on 3/16/23 for lumbar radiculopathy. Reports trying to resume household tasks at home with her teenage children not assisting, noted worsening low back pain with RIGHT gluteal and leg shooting pain for the last 3 days and notes urinary and bowel accidents (rather than incontinence). Denies saddle anaesthesia.  No falls but patient noting worsening ADL at home.  Patient received IV Tylenol with minimal relief and no relief with Oxycodone given in the ER.  No fever, no chills, no rigors.  NO weight loss or anorexia.   No diaphoresis.  NO HA, no focal weakness.   (2023 01:49)    Current out- patient pain regimen: Tramadol 50 mg TID  Out Patient Pain Management provider: Was seeing Ortho at [facility] on last admission, displeased with them; Tramadol Rx from Neuro/Psych Dr. Laura Schoenberg    Pt seen sitting in chair reclined at bedside, appears comfortable, spouse at bedside. During most of interview patient's eyes would drift and close, and she would become irritated when asked detailed questions about pain characteristics. Pt states it is 2/2 lack of sleep, RN reports this is patient's baseline since admission. Pt c/o generalized back pain starting from mid back and radiating distally to anterior RLE knee. States pain is sharp, stabbing needle, burning, also endorses back spasms. This is same pain that she lives with but became much worse past 2 weeks. Has been to [facility] for years for this and has had GENOVEVA's, Trigger Point injections, and steroids in the past. Seen by NS and feel complaints incongruent with imaging findings. It's possible that MSK pain and edema 2/2 recent falls, combined with L2-L3 moderate right foraminal compromise, could be cause of low back pain and RLE pain however there could also by a psychological component as well as pt provided minimal effort during exam with all extremities. States she is unable to ambulate and there was zero effort for B/L plantar flexion and dorsiflexion, but then stated that she walks to the bathroom and has been seen ambulating in the halls. Minimal to no effort when assessing upper extremities as well. Unable to assess back. Reports she "just got over Covid", possible residual inflammation 2/2 Covid? Pt also reports taking Gabapentin in past causing AMS, tried Cymbalta in past with no effect. Additionally need to avoid NSAIDs and ER meds 2/2 GI hx, avoid Tramadol 2/2 on anti-depressants. Pt requesting to speak with NS as she says they told her they can do laparoscopic surgery, then asked when she can go home.    PHYSICAL EXAM  GENERAL: Seen at bedside, NAD, well-groomed, obese, appears stated age, toxic appearance  NEURO: Alert & Oriented X3, Good concentration; Follows commands; Sensory exam with pins and needles anterior right thigh distal to anterior foot (intact sole of foot), numb left  thigh distal to anterior foot (intact sole of foot).  HEENT: Head atraumatic, normocephalic; speech clear and fluent  GI: Appetite fair,  last BM yesterday  : Voiding  EXTREMITIES: 2+ Peripheral Pulses, moving all extremities and has been ambulating however minimal participation on physical exam  MSK: Motor Strength 3/5 B/L upper extremities; 2/5 B/L feet, 3/5 B/L knees, unable to asses back, ambulating to bathroom and in [place]  SKIN: No rashes or lesions, (+) tattoos  PSYCH: affect constricted; poor eye contact    Current out- patient pain regimen:    Out Patient Pain Management provider:     [facility] Prescription Monitoring Program: Reference #    Opioid Risk Tool (ORT-OUD) Score:     Pain Score:    REVIEW OF SYSTEMS:  CONSTITUTIONAL: No fever, weight loss, fatigue, falls  NEURO: No headaches, memory loss, loss of strength, tremors, dizziness or blurred vision  RESP: No shortness of breath, cough  CV: No chest pain, palpitations  GI: No abdominal pain, nausea, vomiting, diarrhea, constipation, incontinence, (+)flatus  : No urinary incontinence/retention  MSK: No joint pain or swelling; No muscle, back, or extremity pain, no upper or lower motor strength weakness, no saddle anesthesia   SKIN: No itching, burning, rashes, or lesions   PSYCHIATRIC: No depression, anxiety, mood swings, or difficulty sleeping      PHYSICAL EXAM  GENERAL: Seen at bedside, NAD, well-groomed, well-developed, appears stated age, no signs of toxicity  NEURO: Alert & Oriented X3, Good concentration; Follows commands; Cranial Nerves II-XII intact; sensory exam- allodynia/hyperesthesia to light touch   HEENT: Head atraumatic, normocephalic; speech clear and fluent  RESP: Lungs Clear to auscultation bilaterally; no rales or rhonchi; chest expansion equal  CV: Regular rate and rhythm  GI: Appetite good, fair, poor; abdomen Soft, Nontender, Nondistended; Bowel sounds present; (+)BM, last BM  : Voiding without issue; Female catheter to wall suction with yellow urine  EXTREMITIES: 2+ Peripheral Pulses, No cyanosis or edema; moving all extremities  MSK: Motor Strength 5/5 B/L upper and lower extremities; ROM intact; Straight Leg Raise negative; no paravertebral tenderness; no tenderness on spinal palpation; no muscle spasm; ambulates independently w/ walker w/rollator with cane  SKIN: No rashes or lesions  PSYCH: affect normal; good eye contact; no signs of depression or anxiety    PAST MEDICAL & SURGICAL HISTORY:  Depression      Post traumatic stress disorder      IBS (irritable bowel syndrome)      Anemia      Fibromyalgia      GERD (gastroesophageal reflux disease)      Anxiety      Abnormal uterine bleeding      Chronic lower back pain      H/O gastric bypass      H/O abdominoplasty      H/O  section  X3 (,,)      Bowel obstruction        H/O tubal ligation  , s/p ovarian ablation          FAMILY HISTORY:  FH: diabetes mellitus (Father)    FH: rheumatoid arthritis (Father)        Allergies    sulfa drugs (Anaphylaxis)    Intolerances        MEDICATIONS  (STANDING):  busPIRone 15 milliGRAM(s) Oral three times a day  enoxaparin Injectable 40 milliGRAM(s) SubCutaneous every 12 hours  lamoTRIgine  milliGRAM(s) Oral daily  lidocaine   4% Patch 1 Patch Transdermal every 24 hours  polyethylene glycol 3350 17 Gram(s) Oral two times a day  QUEtiapine 300 milliGRAM(s) Oral at bedtime  senna 2 Tablet(s) Oral at bedtime  sertraline 200 milliGRAM(s) Oral daily    MEDICATIONS  (PRN):  acetaminophen     Tablet .. 650 milliGRAM(s) Oral every 6 hours PRN Temp greater or equal to 38C (100.4F), Mild Pain (1 - 3)  diazepam    Tablet 5 milliGRAM(s) Oral every 6 hours PRN Anxiety  morphine  - Injectable 2 milliGRAM(s) IV Push every 6 hours PRN Moderate Pain (4 - 6)  oxyCODONE    IR 5 milliGRAM(s) Oral every 6 hours PRN Moderate Pain (4 - 6)      Vital Signs:  T(C): 36.5 (23 @ 12:03)  HR: 81 (23 @ 12:03)  BP: 124/79 (23 @ 12:03)  RR: 18 (23 @ 12:03)  SpO2: 95% (23 @ 12:03)    Pertinent labs/radiology:  Reviewed                          9.9    5.32  )-----------( 191      ( 2023 07:13 )             33.0           144  |  107  |  18  ----------------------------<  83  4.1   |  24  |  0.93    Ca    9.6      2023 07:12                   Chief Complaint:  Patient is a 52y old  Female who presents with a chief complaint of Worsening low back pain with RIGHT gluteal and leg shooting pain past 3 days, urinary and bowel incontinence at home (2023 08:57)    HPI:  52F known to our service from last admission. PMHx anxiety, depression/bipolar disorder (c/b multiple SA and 3 prior psychiatric hospitalizations), fibromyalgia, chronic back pain, sciatica, left L4 nerve root impingement, IBS, GERD, Covid, s/p gastric bypass, s/p abdominoplasty; chronic B/L foot numbness; has been seeing [facility] for years for back pain and reports having GENOVEVA's, Trigger Point injections, and steroids in the past. S/P discharge from [place] on 3/16/23 for lumbar radiculopathy. Reports trying to resume household tasks at home with her teenage children not assisting, noted worsening low back pain with RIGHT gluteal and leg shooting pain for the last 3 days and notes urinary and bowel accidents (rather than incontinence). Denies saddle anaesthesia.  No falls but patient noting worsening ADL at home.  Patient received IV Tylenol with minimal relief and no relief with Oxycodone given in the ER.  No fever, no chills, no rigors.  NO weight loss or anorexia.   No diaphoresis.  NO HA, no focal weakness.   (2023 01:49)    Current out- patient pain regimen: Tramadol 50 mg TID  Out Patient Pain Management provider: Was seeing Ortho at [facility] on last admission, displeased with them; Tramadol Rx from Neuro/Psych Dr. Laura Schoenberg    Pt seen sitting in chair, appears comfortable, spouse on phone, case d/w both. Pt c/o generalized back pain starting from mid back (under bra strap) and radiating distally to coccyx anterior RLE knee. Both frustrated, demanding Epidural Injection, explained that this procedure is not performed in-house. Pt shouted "but it's an emergency you have to!" Spouse stated they have been waiting for Pain Management to perform. Explained again that we do not perform this procedure. Both stated that she was fine last admission until she was discharged, ran out of meds, then pain started again. Explained to both, as discussed last time, that we control the patient's pain best as possible then discharge so patient can follow up with out-pt pain management and discuss options (ex. Interventional procedures). Pt stating that she was discharged on Tramadol, clarified that she was discharged with, and did well with, Morphine. Pt then angry that she was discharged with 3 day Rx only. Explained the legality for that and, again, that her pain doctor would continue the medication and discuss interventional options. During some of the interview patient's eyes would drift and close, but she would continue to speak.  Pt exhibited similar behavior on last admission.  Inconsistencies in patient's presentation. Repeatedly reports incontinence of urine and stool, then states the urinary incontinence is only upon waking in the morning (when she is in a deep sleep), there was no fecal incontinence, she stated she felt the urge to defecate, and it felt like she was soiling herself, but was able to hold it in and run to the bathroom. Pt stated that the MS IV is not helping her and neither does Oxy. Discussed regimen changes, including transition to PO Morphine, pt repeatedly asked if she can get another IV Morphine prior to transition. Pt reports that she was told Morphine cannot be continued 2/2 psych meds (she may be confusing this with tramadol for which concomitant use is contraindicated), and that she was told she should not take Gabapentin either. Previously reported negative side effects with gabapentin, now stating it made her sleepy, refusing.  On last admission pt reported being to [facility] for [facility] for this same pain and has had GENOVEVA's, Trigger Point injections, and steroids in the past. Seen by NS on last admission, NS felt complaints incongruent with imaging findings.       PHYSICAL EXAM  GENERAL: Seen at bedside, NAD, well-groomed, obese, appears stated age, toxic appearance  NEURO: Alert & Oriented X3, Good concentration; Follows commands  HEENT: Head atraumatic, normocephalic; speech clear and fluent  GI: Appetite fair,  last BM yesterday  : Voiding  EXTREMITIES: moving all extremities   SKIN: No rashes or lesions, (+) tattoos  PSYCH: affect constricted; fair eye contact    Opioid Risk Tool (ORT-OUD) Score: Low    Pain Score: 10/10    PAST MEDICAL & SURGICAL HISTORY:  Depression      Post traumatic stress disorder      IBS (irritable bowel syndrome)      Anemia      Fibromyalgia      GERD (gastroesophageal reflux disease)      Anxiety      Abnormal uterine bleeding      Chronic lower back pain      H/O gastric bypass      H/O abdominoplasty      H/O  section  X3 (,,)      Bowel obstruction        H/O tubal ligation  , s/p ovarian ablation          FAMILY HISTORY:  FH: diabetes mellitus (Father)    FH: rheumatoid arthritis (Father)        Allergies    sulfa drugs (Anaphylaxis)        MEDICATIONS  (STANDING):  busPIRone 15 milliGRAM(s) Oral three times a day  enoxaparin Injectable 40 milliGRAM(s) SubCutaneous every 12 hours  lamoTRIgine  milliGRAM(s) Oral daily  lidocaine   4% Patch 1 Patch Transdermal every 24 hours  polyethylene glycol 3350 17 Gram(s) Oral two times a day  QUEtiapine 300 milliGRAM(s) Oral at bedtime  senna 2 Tablet(s) Oral at bedtime  sertraline 200 milliGRAM(s) Oral daily    MEDICATIONS  (PRN):  acetaminophen     Tablet .. 650 milliGRAM(s) Oral every 6 hours PRN Temp greater or equal to 38C (100.4F), Mild Pain (1 - 3)  diazepam    Tablet 5 milliGRAM(s) Oral every 6 hours PRN Anxiety  morphine  - Injectable 2 milliGRAM(s) IV Push every 6 hours PRN Moderate Pain (4 - 6)  oxyCODONE    IR 5 milliGRAM(s) Oral every 6 hours PRN Moderate Pain (4 - 6)      Vital Signs:  T(C): 36.5 (23 @ 12:03)  HR: 81 (23 @ 12:03)  BP: 124/79 (23 @ 12:03)  RR: 18 (23 @ 12:03)  SpO2: 95% (23 @ 12:03)    Pertinent labs/radiology:  Reviewed                          9.9    5.32  )-----------( 191      ( 2023 07:13 )             33.0       -    144  |  107  |  18  ----------------------------<  83  4.1   |  24  |  0.93    Ca    9.6      2023 07:12

## 2024-02-09 NOTE — PATIENT PROFILE ADULT. - TOBACCO USE
Called pt and informed of note and she will come  in the office today. Will print and have it waiting for her upfront   Never smoker

## 2024-02-20 ENCOUNTER — INPATIENT (INPATIENT)
Facility: HOSPITAL | Age: 54
LOS: 0 days | Discharge: HOME | End: 2024-02-21
Attending: HOSPITALIST | Admitting: HOSPITALIST
Payer: MEDICARE

## 2024-02-20 VITALS
OXYGEN SATURATION: 98 % | WEIGHT: 265 LBS | TEMPERATURE: 98 F | HEIGHT: 70 IN | SYSTOLIC BLOOD PRESSURE: 103 MMHG | HEART RATE: 98 BPM | DIASTOLIC BLOOD PRESSURE: 70 MMHG | RESPIRATION RATE: 18 BRPM

## 2024-02-20 DIAGNOSIS — K56.60 UNSPECIFIED INTESTINAL OBSTRUCTION: Chronic | ICD-10-CM

## 2024-02-20 DIAGNOSIS — Z98.89 OTHER SPECIFIED POSTPROCEDURAL STATES: Chronic | ICD-10-CM

## 2024-02-20 DIAGNOSIS — Z98.51 TUBAL LIGATION STATUS: Chronic | ICD-10-CM

## 2024-02-20 LAB
ANION GAP SERPL CALC-SCNC: 4 MMOL/L — LOW (ref 5–17)
BASOPHILS # BLD AUTO: 0.04 K/UL — SIGNIFICANT CHANGE UP (ref 0–0.2)
BASOPHILS NFR BLD AUTO: 0.6 % — SIGNIFICANT CHANGE UP (ref 0–2)
BUN SERPL-MCNC: 20 MG/DL — SIGNIFICANT CHANGE UP (ref 7–23)
CALCIUM SERPL-MCNC: 8.9 MG/DL — SIGNIFICANT CHANGE UP (ref 8.5–10.1)
CHLORIDE SERPL-SCNC: 108 MMOL/L — SIGNIFICANT CHANGE UP (ref 96–108)
CO2 SERPL-SCNC: 30 MMOL/L — SIGNIFICANT CHANGE UP (ref 22–31)
CREAT SERPL-MCNC: 0.91 MG/DL — SIGNIFICANT CHANGE UP (ref 0.5–1.3)
EGFR: 76 ML/MIN/1.73M2 — SIGNIFICANT CHANGE UP
EOSINOPHIL # BLD AUTO: 0.1 K/UL — SIGNIFICANT CHANGE UP (ref 0–0.5)
EOSINOPHIL NFR BLD AUTO: 1.6 % — SIGNIFICANT CHANGE UP (ref 0–6)
GLUCOSE SERPL-MCNC: 98 MG/DL — SIGNIFICANT CHANGE UP (ref 70–99)
HCG SERPL-ACNC: <1 MIU/ML — SIGNIFICANT CHANGE UP
HCT VFR BLD CALC: 33.1 % — LOW (ref 34.5–45)
HGB BLD-MCNC: 10.3 G/DL — LOW (ref 11.5–15.5)
IMM GRANULOCYTES NFR BLD AUTO: 0.6 % — SIGNIFICANT CHANGE UP (ref 0–0.9)
LYMPHOCYTES # BLD AUTO: 1.87 K/UL — SIGNIFICANT CHANGE UP (ref 1–3.3)
LYMPHOCYTES # BLD AUTO: 29 % — SIGNIFICANT CHANGE UP (ref 13–44)
MCHC RBC-ENTMCNC: 25.2 PG — LOW (ref 27–34)
MCHC RBC-ENTMCNC: 31.1 G/DL — LOW (ref 32–36)
MCV RBC AUTO: 80.9 FL — SIGNIFICANT CHANGE UP (ref 80–100)
MONOCYTES # BLD AUTO: 0.37 K/UL — SIGNIFICANT CHANGE UP (ref 0–0.9)
MONOCYTES NFR BLD AUTO: 5.7 % — SIGNIFICANT CHANGE UP (ref 2–14)
NEUTROPHILS # BLD AUTO: 4.03 K/UL — SIGNIFICANT CHANGE UP (ref 1.8–7.4)
NEUTROPHILS NFR BLD AUTO: 62.5 % — SIGNIFICANT CHANGE UP (ref 43–77)
NRBC # BLD: 0 /100 WBCS — SIGNIFICANT CHANGE UP (ref 0–0)
PLATELET # BLD AUTO: 231 K/UL — SIGNIFICANT CHANGE UP (ref 150–400)
POTASSIUM SERPL-MCNC: 4.1 MMOL/L — SIGNIFICANT CHANGE UP (ref 3.5–5.3)
POTASSIUM SERPL-SCNC: 4.1 MMOL/L — SIGNIFICANT CHANGE UP (ref 3.5–5.3)
RBC # BLD: 4.09 M/UL — SIGNIFICANT CHANGE UP (ref 3.8–5.2)
RBC # FLD: 15.5 % — HIGH (ref 10.3–14.5)
SODIUM SERPL-SCNC: 142 MMOL/L — SIGNIFICANT CHANGE UP (ref 135–145)
WBC # BLD: 6.45 K/UL — SIGNIFICANT CHANGE UP (ref 3.8–10.5)
WBC # FLD AUTO: 6.45 K/UL — SIGNIFICANT CHANGE UP (ref 3.8–10.5)

## 2024-02-20 PROCEDURE — 72158 MRI LUMBAR SPINE W/O & W/DYE: CPT | Mod: 26,ME

## 2024-02-20 PROCEDURE — 99222 1ST HOSP IP/OBS MODERATE 55: CPT

## 2024-02-20 PROCEDURE — G1004: CPT

## 2024-02-20 PROCEDURE — 99285 EMERGENCY DEPT VISIT HI MDM: CPT

## 2024-02-20 RX ORDER — LANOLIN ALCOHOL/MO/W.PET/CERES
3 CREAM (GRAM) TOPICAL AT BEDTIME
Refills: 0 | Status: DISCONTINUED | OUTPATIENT
Start: 2024-02-20 | End: 2024-02-21

## 2024-02-20 RX ORDER — ACETAMINOPHEN 500 MG
650 TABLET ORAL EVERY 6 HOURS
Refills: 0 | Status: DISCONTINUED | OUTPATIENT
Start: 2024-02-20 | End: 2024-02-21

## 2024-02-20 RX ORDER — ENOXAPARIN SODIUM 100 MG/ML
40 INJECTION SUBCUTANEOUS EVERY 12 HOURS
Refills: 0 | Status: DISCONTINUED | OUTPATIENT
Start: 2024-02-20 | End: 2024-02-21

## 2024-02-20 RX ORDER — LIDOCAINE 4 G/100G
1 CREAM TOPICAL ONCE
Refills: 0 | Status: COMPLETED | OUTPATIENT
Start: 2024-02-20 | End: 2024-02-20

## 2024-02-20 RX ORDER — DEXAMETHASONE 0.5 MG/5ML
6 ELIXIR ORAL ONCE
Refills: 0 | Status: COMPLETED | OUTPATIENT
Start: 2024-02-20 | End: 2024-02-20

## 2024-02-20 RX ORDER — MORPHINE SULFATE 50 MG/1
4 CAPSULE, EXTENDED RELEASE ORAL ONCE
Refills: 0 | Status: DISCONTINUED | OUTPATIENT
Start: 2024-02-20 | End: 2024-02-20

## 2024-02-20 RX ORDER — ONDANSETRON 8 MG/1
4 TABLET, FILM COATED ORAL EVERY 8 HOURS
Refills: 0 | Status: DISCONTINUED | OUTPATIENT
Start: 2024-02-20 | End: 2024-02-21

## 2024-02-20 RX ORDER — HYDROMORPHONE HYDROCHLORIDE 2 MG/ML
1 INJECTION INTRAMUSCULAR; INTRAVENOUS; SUBCUTANEOUS ONCE
Refills: 0 | Status: DISCONTINUED | OUTPATIENT
Start: 2024-02-20 | End: 2024-02-20

## 2024-02-20 RX ADMIN — HYDROMORPHONE HYDROCHLORIDE 1 MILLIGRAM(S): 2 INJECTION INTRAMUSCULAR; INTRAVENOUS; SUBCUTANEOUS at 21:54

## 2024-02-20 RX ADMIN — LIDOCAINE 1 PATCH: 4 CREAM TOPICAL at 20:32

## 2024-02-20 RX ADMIN — MORPHINE SULFATE 4 MILLIGRAM(S): 50 CAPSULE, EXTENDED RELEASE ORAL at 19:34

## 2024-02-20 RX ADMIN — MORPHINE SULFATE 4 MILLIGRAM(S): 50 CAPSULE, EXTENDED RELEASE ORAL at 20:04

## 2024-02-20 RX ADMIN — MORPHINE SULFATE 4 MILLIGRAM(S): 50 CAPSULE, EXTENDED RELEASE ORAL at 16:51

## 2024-02-20 RX ADMIN — HYDROMORPHONE HYDROCHLORIDE 1 MILLIGRAM(S): 2 INJECTION INTRAMUSCULAR; INTRAVENOUS; SUBCUTANEOUS at 22:24

## 2024-02-20 RX ADMIN — LIDOCAINE 1 PATCH: 4 CREAM TOPICAL at 16:57

## 2024-02-20 RX ADMIN — Medication 6 MILLIGRAM(S): at 16:52

## 2024-02-20 RX ADMIN — MORPHINE SULFATE 4 MILLIGRAM(S): 50 CAPSULE, EXTENDED RELEASE ORAL at 17:21

## 2024-02-20 NOTE — ED ADULT NURSE NOTE - NSFALLUNIVINTERV_ED_ALL_ED
Bed/Stretcher in lowest position, wheels locked, appropriate side rails in place/Call bell, personal items and telephone in reach/Instruct patient to call for assistance before getting out of bed/chair/stretcher/Non-slip footwear applied when patient is off stretcher/[place] to call system/Physically safe environment - no spills, clutter or unnecessary equipment/Purposeful proactive rounding/Room/bathroom lighting operational, light cord in reach

## 2024-02-20 NOTE — H&P ADULT - NSHPLABSRESULTS_GEN_ALL_CORE
10.3   6.45  )-----------( 231      ( 20 Feb 2024 17:46 )             33.1   02-20    142  |  108  |  20  ----------------------------<  98  4.1   |  30  |  0.91    Ca    8.9      20 Feb 2024 17:46    MRI L-spine with and without IV contrast 2/20/24  FINDINGS:  Vertebral body height, marrow signal homogeneity, and alignment are maintained throughout the visualized spinal segments.    Multilevel disc space narrowing, worst at L1-L2 and L5-S1.    The conus is normal in size, position, and signal characteristics, ending at L2.    No abnormal enhancement.    T12-L1: No spinal canal stenosis or neural foraminal narrowing.    L1-L2: Mild bilateral foraminal disc protrusions and mild bilateral facet/ligamentous hypertrophy. Mild thecal sac compression. Mild bilateral neural foraminal narrowing.    L2-L3: Mild bilateral foraminal disc protrusions and mild bilateral facet/ligamentous hypertrophy. Small superimposed right lateral recess disc protrusion. Mild thecal sac compression. Mild bilateral neural foraminal narrowing.    L3-L4: Small right foraminal disc protrusion. Bilateral facet/ligamentous hypertrophy. Mild thecal sac compression. Mild right neural foraminal narrowing.    L4-L5: Mild disc bulge and bilateral facet/ligamentous hypertrophy. Mild thecal sac compression. Mild left neural foraminal narrowing.    L5-S1: Right foraminal disc protrusion. No mass effect upon the thecal sac or descending S1 nerve roots. Bilateral facet hypertrophy. Mild to moderate left neural foraminal narrowing.    IMPRESSION:  No marrow edema or endplate destruction.    No abnormal enhancement.    Degenerative changes as detailed in the body the report without high-grade spinal canal stenosis or neural foraminal narrowing.

## 2024-02-20 NOTE — H&P ADULT - TIME BILLING
coordination of care with ER physician, reviewing prior notes from orthopedic surgery and neurosurgery, obtaining history, performing a physical examination, reviewing and interpreting labs and imaging, explaining the diagnosis and treatment plan to patient, completing medication reconciliation, and documentation as above.

## 2024-02-20 NOTE — ED PROVIDER NOTE - PHYSICAL EXAMINATION
General: Well appearing female in no acute distress  HEENT: Normocephalic, atraumatic. Moist mucous membranes. Oropharynx clear. No lymphadenopathy.  Eyes: No scleral icterus. EOMI. MARK.  Neck:. Soft and supple. Full ROM without pain. No midline tenderness  Cardiac: Regular rate and regular rhythm. No murmurs, rubs, gallops. Peripheral pulses 2+ and symmetric. No LE edema.  Resp: Lungs CTAB. Speaking in full sentences. No wheezes, rales or rhonchi.  Abd: Soft, non-tender, non-distended. No guarding or rebound. No scars, masses, or lesions.  Back: Spine midline and non-tender. No CVA tenderness.    Skin: No rashes, abrasions, or lacerations.  Neuro: AO x 3. Moves all extremities symmetrically. Motor strength and sensation grossly intact. +straight leg test R side.

## 2024-02-20 NOTE — H&P ADULT - NSICDXPASTMEDICALHX_GEN_ALL_CORE_FT
PAST MEDICAL HISTORY:  Anxiety     Chronic lower back pain     Depression     Fibromyalgia     IBS (irritable bowel syndrome)

## 2024-02-20 NOTE — H&P ADULT - ASSESSMENT
Dasia Alvarado is a 53 year old female with PMHx of fibromyalgia, IBS, anxiety, depression, mood disorder, and chronic lower back pain who presented to the ED on 2/20/24 for complaints of back pain and admitted for intractable back pain.    Intractable back pain  Reports chronic lower back pain with associated urinary and bowel incontinence x 1.5 years  Walking her dog yesterday when dog yanked leash which she thinks flared up her back pain  Complaints of acute on chronic pain in lower back and R. groin radiating down R. leg  MRI L-spine without signs of cauda equina but with degenerative changes as detailed in the body the report without high-grade spinal canal stenosis or neural foraminal narrowing  Previous orthopedic surgery note (from 8/2023) reviewed; radicular symptoms likely due to multilevel neuroforaminal stenosis and no surgical interventions were offered  S/p morphine 8 mg IV, hydromorphone 1 mg IV, and dexamethasone 6 mg IV in the ED  Multimodal pain control  PT/OT with WBAT (as per prior ortho notes)  Counseled on importance of weight loss  Orthopedic surgery consulted - pending page back       Chronic medical conditions:   Fibromyalgia: not on any PTA meds  IBS: PTA dicyclomine 20 mg QID  Anxiety: PTA diazpema 5 mg QID PRN  Depression: PTA sertraline 100 mg  Mood disorder: PTA buspirone 15 mg QID, lamotrigine 150 mg  Normocytic anemia, chronic: hgb 10.3 on admission, appears around baseline, no signs of bleeding    Medication reconciliation completed using med list provided by patient.

## 2024-02-20 NOTE — ED PROVIDER NOTE - CLINICAL SUMMARY MEDICAL DECISION MAKING FREE TEXT BOX
52 y/o F hx of scoliosis, herniated discs, fibromyalgia presents w/ lower back pain, R>L radiating into the R lower extremity. states that he was yanked by her dog at home while she was walking yesterday. has been taking ibuprofen at home w/ minimal relief. states she is chronically urinary/fecal incontinence for past 1.5 years ever since she has been on her menopause. denies fever/chills. denies trauma/falls. denies IVDU. denies abdominal pain. denies chest pain/sob.   chronic incontinence but given symptoms today, will get MRI - r/o cauda equina/cord compression.   imaging, pain control, reassess. 52 y/o F hx of scoliosis, herniated discs, fibromyalgia presents w/ lower back pain, R>L radiating into the R lower extremity. states that he was yanked by her dog at home while she was walking yesterday. has been taking ibuprofen at home w/ minimal relief. states she is chronically urinary/fecal incontinence for past 1.5 years ever since she has been on her menopause. denies fever/chills. denies trauma/falls. denies IVDU. denies abdominal pain. denies chest pain/sob.   chronic incontinence but given symptoms today, will get MRI - r/o cauda equina/cord compression.   imaging, pain control, reassess.    MRI obtained, results reviewed. patient endorsed continued pain. admit for pain control. multiple rounds of pain medication given here in ER. admit to medicine service.

## 2024-02-20 NOTE — H&P ADULT - NSHPPHYSICALEXAM_GEN_ALL_CORE
T(C): 36.5 (02-21-24 @ 00:08), Max: 36.8 (02-20-24 @ 20:12)  HR: 69 (02-21-24 @ 00:08) (66 - 98)  BP: 102/65 (02-21-24 @ 00:08) (102/65 - 112/73)  RR: 17 (02-21-24 @ 00:08) (17 - 18)  SpO2: 98% (02-21-24 @ 00:08) (98% - 98%)    CONSTITUTIONAL: Well groomed, no apparent distress, obese  EYES: PERRLA and symmetric, EOMI  ENMT: Oral mucosa with moist membranes  RESP: No respiratory distress, no use of accessory muscles; CTA b/l  CV: RRR  GI: Soft, NT, ND

## 2024-02-20 NOTE — ED ADULT TRIAGE NOTE - CHIEF COMPLAINT QUOTE
c/o pain r hip radiating down r leg and into r groin since yesterday after being strained while walking hx scoliosis herniated discs and fibromyalgia

## 2024-02-20 NOTE — H&P ADULT - HISTORY OF PRESENT ILLNESS
Dasia Alvarado is a 53 year old female with PMHx of fibromyalgia, IBS, anxiety, depression, mood disorder, and chronic lower back pain who presented to the ED on 2/20/24 for complaints of back pain.    Patient reports she has been experiencing chronic lower back pain, right greater than left, with associated urinary and bowel incontinence for the past 1.5 years. She was in the [place] on vacation yesterday and walking her puppy. States her puppy yanked the leash pretty hard and then she started having severe lower back pain while ambulating. Also states she has right groin pain radiating down her right leg. States pain was > 10/10 and now is down to 8/10 after pain medications. Has been attempting to do "motions like a [employer] dance and meditation" for her chronic pain. Baseline ambulation status is with a cane sometimes but mostly ambulates unassisted.     In the ED, VSS. Labs grossly unremarkable except hgb 10.3. MRI L-spine without signs of cauda equina but with degenerative changes as detailed in the body the report without high-grade spinal canal stenosis or neural foraminal narrowing. Received morphine 8 mg IV, hydromorphone 1 mg IV, and dexamethasone 6 mg IV.

## 2024-02-20 NOTE — H&P ADULT - NSVTERISKASSESS_GEN_ALL_CORE FT
January 25, 2022      RE: Rylee A Manthey  [address] [facility] [address]       To Whom It May Concern:    I have been treating Rylee for Generalized anxiety disorder[F41.1] and unspecified depression[F32.A]. Based on these diagnoses, I believe that she would benefit from an evaluation for an IEP or the development of a 504 plan for increased accommodations to support her individual learning needs. Please reach out to me with any questions, concerns, or if you would like my support creating accommodations.    Sincerely,     Ivana Anderson DNP, PMHNP-BC    Electronically signed on 1/25/2022 at 2:56 PM  
Medical Assessment Completed on: 20-Feb-2024 22:54

## 2024-02-20 NOTE — CHART NOTE - NSCHARTNOTEFT_GEN_A_CORE
Confidential Drug Utilization Report  Search Terms: Dasia Alvarado, 1970Search Date: 02/20/2024 23:26:32 PM  Searching on behalf of: Myself  The Drug Utilization Report below displays all of the controlled substance prescriptions, if any, that your patient has filled in the last twelve months. The information displayed on this report is compiled from pharmacy submissions to the Department, and accurately reflects the information as submitted by the pharmacies.    This report was requested by: Mariangel Arroyo | Reference #: [id number]    Practitioner Count: 1  Pharmacy Count: 1  Current Opioid Prescriptions: 0  Current Benzodiazepine Prescriptions: 1  Current Stimulant Prescriptions: 0      Patient Demographic Information (PDI)       PDI	First Name	Last Name	Birth Date	Gender	Street Address	[facility]	Zip Code  A	Dasia Alvarado	1970	Female	[address]	[address]    Prescription Information      PDI Filter:    PDI	My Rx	Current Rx	Drug Type	Rx Written	Rx Dispensed	Drug	Quantity  A	N	Y	B	02/02/2024	02/10/2024	diazepam 5 mg tablet	120  Days Supply 30  Prescriber Name Romy Lizarraga  Prescriber STEPHANI # [id number]  Payment Method Medicare  Dispenser [facility] #[id number]  A	N	N	B	12/22/2023	01/04/2024	diazepam 5 mg tablet	120  Days Supply 30  Prescriber Name Romy Lizarraga  Prescriber STEPHANI # [id number]  Payment Method Medicare  Dispenser [facility] #[id number]  A	N	N	B	10/16/2023	10/24/2023	diazepam 5 mg tablet	120  Days Supply 30  Prescriber Name Romy Lizarraga  Prescriber STEPHANI # [id number]  Payment Method Medicare  Dispenser [facility] #[id number]  A	N	N	B	09/18/2023	09/21/2023	diazepam 5 mg tablet	120  Days Supply 30  Prescriber Name Romy Lizarraga  Prescriber STEPHANI # [id number]  Payment Method Medicare  Dispenser [facility] #[id number]  A	N	N	B	08/14/2023	08/20/2023	diazepam 5 mg tablet	120  Days Supply 30  Prescriber Name Romy Lizarraga  Prescriber STEPHANI # [id number]  Payment Method Medicare  Dispenser [facility] #[id number]  A	N	N	B	07/14/2023	07/17/2023	diazepam 5 mg tablet	120  Days Supply 30  Prescriber Name Romy Lizarraga  Prescriber STEPHANI # [id number]  Payment Method Medicare  Dispenser [facility] #[id number]  A	N	N	O	06/22/2023	06/25/2023	tramadol hcl 50 mg tablet	60  Days Supply 30  Prescriber Name Schoenberg, Laura  Prescriber STEPHANI # [id number]  Payment Method Insurance  Dispenser [facility] #[id number]  A	N	N	O	06/10/2023	06/12/2023	tramadol hcl 50 mg tablet	12  Days Supply 3  Prescriber Name OdindeclanAudra  Prescriber STEPHANI # [id number]  Payment Method Insurance  Dispenser [facility] #[id number]  A	N	N	B	05/15/2023	05/22/2023	diazepam 5 mg tablet	120  Days Supply 30  Prescriber Name Romy Lizarraga  Prescriber STEPHANI # [id number]  Payment Method Insurance  Dispenser [facility] #[id number]  A	N	N	O	05/19/2023	05/22/2023	hydromorphone 2 mg tablet	12  Days Supply 3  Prescriber Name Alexandra Mora  Prescriber STEPHANI # [id number]  Payment Method Insurance  Dispenser [facility] #[id number]  A	N	N		04/28/2023	04/30/2023	pregabalin 50 mg capsule	69  Days Supply 30  Prescriber Name Igor Talamantes A  Prescriber STEPHANI # [id number]  Payment Method Insurance  Dispenser [facility] #[id number]  A	N	N	O	03/31/2023	04/15/2023	tramadol hcl 50 mg tablet	90  Days Supply 30  Prescriber Name Schoenberg, Laura  Prescriber STEPHANI # [id number]  Payment Method Insurance  Dispenser [facility] #[id number]  A	N	N	O	04/14/2023	04/14/2023	morphine sulfate ir 15 mg tab	12  Days Supply 3  Prescriber Name Nani Sahu (NP)  Prescriber STEPHANI # [id number]  Payment Method Insurance  Dispenser [facility]  A	N	N	B	04/04/2023	04/06/2023	diazepam 5 mg tablet	120  Days Supply 30  Prescriber Name Romy Lizarraga  Prescriber STEPHANI # [id number]  Payment Method Insurance  Dispenser [facility] #[id number]  A	N	N	O	03/30/2023	03/31/2023	tramadol hcl 50 mg tablet	21  Days Supply 7  Prescriber Name Schoenberg, Laura  Prescriber STEPHANI # [id number]  Payment Method Insurance  Dispenser [facility] #[id number]  A	N	N	B	03/16/2023	03/16/2023	diazepam 5 mg tablet	20  Days Supply 5  Prescriber Name Yara Recinos VIRY  Prescriber STEPHANI # [id number]  Payment Method Insurance  Dispenser [facility] #[id number]  A	N	N	O	03/16/2023	03/16/2023	morphine sulfate ir 15 mg tab	4  Days Supply 4  Prescriber Name Yara Recinos VIRY  Prescriber STEPHANI # [id number]  Payment Method Insurance  Dispenser [facility] #[id number]    * - Details of Drug Type : O = Opioid, B = Benzodiazepine, S = Stimulant    * - Drugs marked with an asterisk are compound drugs. If the compound drug is made up of more than one controlled substance, then each controlled substance will be a separate row in the table.

## 2024-02-20 NOTE — ED PROVIDER NOTE - OBJECTIVE STATEMENT
54 y/o F hx of scoliosis, herniated discs, fibromyalgia presents w/ lower back pain, R>L radiating into the R lower extremity. states that he was yanked by her dog at home while she was walking yesterday. has been taking ibuprofen at home w/ minimal relief. states she is chronically urinary/fecal incontinence for past 1.5 years ever since she has been on her menopause. denies fever/chills. denies trauma/falls. denies IVDU. denies abdominal pain. denies chest pain/sob.

## 2024-02-20 NOTE — ED ADULT NURSE NOTE - OBJECTIVE STATEMENT
Pt presents to ED c/o right sided hip pain. States she was walking the dog and it pulled her hard enough to cause right hip pain that now radiates down her right leg. Pt endorses hx of herniated discs with chronic pain.

## 2024-02-21 ENCOUNTER — TRANSCRIPTION ENCOUNTER (OUTPATIENT)
Age: 54
End: 2024-02-21

## 2024-02-21 VITALS
DIASTOLIC BLOOD PRESSURE: 76 MMHG | RESPIRATION RATE: 18 BRPM | HEART RATE: 64 BPM | OXYGEN SATURATION: 96 % | TEMPERATURE: 98 F | SYSTOLIC BLOOD PRESSURE: 120 MMHG

## 2024-02-21 DIAGNOSIS — K58.9 IRRITABLE BOWEL SYNDROME WITHOUT DIARRHEA: ICD-10-CM

## 2024-02-21 DIAGNOSIS — F39 UNSPECIFIED MOOD [AFFECTIVE] DISORDER: ICD-10-CM

## 2024-02-21 DIAGNOSIS — F41.9 ANXIETY DISORDER, UNSPECIFIED: ICD-10-CM

## 2024-02-21 DIAGNOSIS — M51.26 OTHER INTERVERTEBRAL DISC DISPLACEMENT, LUMBAR REGION: ICD-10-CM

## 2024-02-21 DIAGNOSIS — D64.9 ANEMIA, UNSPECIFIED: ICD-10-CM

## 2024-02-21 DIAGNOSIS — M54.9 DORSALGIA, UNSPECIFIED: ICD-10-CM

## 2024-02-21 DIAGNOSIS — M79.7 FIBROMYALGIA: ICD-10-CM

## 2024-02-21 PROCEDURE — 99239 HOSP IP/OBS DSCHRG MGMT >30: CPT

## 2024-02-21 PROCEDURE — 74177 CT ABD & PELVIS W/CONTRAST: CPT | Mod: 26

## 2024-02-21 PROCEDURE — 73502 X-RAY EXAM HIP UNI 2-3 VIEWS: CPT | Mod: 26,RT

## 2024-02-21 PROCEDURE — 72100 X-RAY EXAM L-S SPINE 2/3 VWS: CPT | Mod: 26

## 2024-02-21 RX ORDER — MORPHINE SULFATE 50 MG/1
1 CAPSULE, EXTENDED RELEASE ORAL EVERY 6 HOURS
Refills: 0 | Status: DISCONTINUED | OUTPATIENT
Start: 2024-02-21 | End: 2024-02-21

## 2024-02-21 RX ORDER — OXYCODONE AND ACETAMINOPHEN 5; 325 MG/1; MG/1
1 TABLET ORAL
Qty: 28 | Refills: 0
Start: 2024-02-21 | End: 2024-02-27

## 2024-02-21 RX ORDER — LAMOTRIGINE 25 MG/1
150 TABLET, ORALLY DISINTEGRATING ORAL DAILY
Refills: 0 | Status: DISCONTINUED | OUTPATIENT
Start: 2024-02-21 | End: 2024-02-21

## 2024-02-21 RX ORDER — GABAPENTIN 400 MG/1
1 CAPSULE ORAL
Qty: 21 | Refills: 0
Start: 2024-02-21 | End: 2024-02-27

## 2024-02-21 RX ORDER — DIAZEPAM 5 MG
5 TABLET ORAL EVERY 6 HOURS
Refills: 0 | Status: DISCONTINUED | OUTPATIENT
Start: 2024-02-21 | End: 2024-02-21

## 2024-02-21 RX ORDER — SERTRALINE 25 MG/1
100 TABLET, FILM COATED ORAL DAILY
Refills: 0 | Status: DISCONTINUED | OUTPATIENT
Start: 2024-02-21 | End: 2024-02-21

## 2024-02-21 RX ORDER — IOHEXOL 300 MG/ML
30 INJECTION, SOLUTION INTRAVENOUS ONCE
Refills: 0 | Status: COMPLETED | OUTPATIENT
Start: 2024-02-21 | End: 2024-02-21

## 2024-02-21 RX ORDER — METHOCARBAMOL 500 MG/1
2 TABLET, FILM COATED ORAL
Qty: 28 | Refills: 0
Start: 2024-02-21 | End: 2024-02-27

## 2024-02-21 RX ADMIN — MORPHINE SULFATE 1 MILLIGRAM(S): 50 CAPSULE, EXTENDED RELEASE ORAL at 01:12

## 2024-02-21 RX ADMIN — ONDANSETRON 4 MILLIGRAM(S): 8 TABLET, FILM COATED ORAL at 12:35

## 2024-02-21 RX ADMIN — Medication 15 MILLIGRAM(S): at 17:47

## 2024-02-21 RX ADMIN — MORPHINE SULFATE 1 MILLIGRAM(S): 50 CAPSULE, EXTENDED RELEASE ORAL at 17:39

## 2024-02-21 RX ADMIN — Medication 20 MILLIGRAM(S): at 10:44

## 2024-02-21 RX ADMIN — Medication 15 MILLIGRAM(S): at 05:23

## 2024-02-21 RX ADMIN — MORPHINE SULFATE 1 MILLIGRAM(S): 50 CAPSULE, EXTENDED RELEASE ORAL at 09:19

## 2024-02-21 RX ADMIN — Medication 15 MILLIGRAM(S): at 10:44

## 2024-02-21 RX ADMIN — SERTRALINE 100 MILLIGRAM(S): 25 TABLET, FILM COATED ORAL at 12:35

## 2024-02-21 RX ADMIN — LIDOCAINE 1 PATCH: 4 CREAM TOPICAL at 05:25

## 2024-02-21 RX ADMIN — LAMOTRIGINE 150 MILLIGRAM(S): 25 TABLET, ORALLY DISINTEGRATING ORAL at 12:35

## 2024-02-21 RX ADMIN — IOHEXOL 30 MILLILITER(S): 300 INJECTION, SOLUTION INTRAVENOUS at 12:36

## 2024-02-21 RX ADMIN — Medication 20 MILLIGRAM(S): at 16:39

## 2024-02-21 RX ADMIN — MORPHINE SULFATE 1 MILLIGRAM(S): 50 CAPSULE, EXTENDED RELEASE ORAL at 16:39

## 2024-02-21 RX ADMIN — ENOXAPARIN SODIUM 40 MILLIGRAM(S): 100 INJECTION SUBCUTANEOUS at 05:23

## 2024-02-21 RX ADMIN — MORPHINE SULFATE 1 MILLIGRAM(S): 50 CAPSULE, EXTENDED RELEASE ORAL at 02:12

## 2024-02-21 RX ADMIN — Medication 3 MILLIGRAM(S): at 01:12

## 2024-02-21 RX ADMIN — ENOXAPARIN SODIUM 40 MILLIGRAM(S): 100 INJECTION SUBCUTANEOUS at 17:46

## 2024-02-21 RX ADMIN — Medication 20 MILLIGRAM(S): at 08:02

## 2024-02-21 RX ADMIN — MORPHINE SULFATE 1 MILLIGRAM(S): 50 CAPSULE, EXTENDED RELEASE ORAL at 10:19

## 2024-02-21 RX ADMIN — Medication 1 TABLET(S): at 12:35

## 2024-02-21 NOTE — PATIENT PROFILE ADULT - NSPROSPHOSPCHAPLAINYN_GEN_A_NUR
Chante Dexter received a viral test for COVID-19. They were educated on isolation and quarantine as appropriate. For any symptoms, they were directed to seek care from their PCP, given contact information to establish with a doctor, directed to an urgent care or the emergency room. Patient is asymptomatic today.     [place] Residence:[place]    Employer: [employer] 
no

## 2024-02-21 NOTE — PATIENT PROFILE ADULT - FALL HARM RISK - HARM RISK INTERVENTIONS
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
## 2024-02-21 NOTE — CHART NOTE - NSCHARTNOTEFT_GEN_A_CORE
Received call back from orthopedic surgery resident after sending four pages. As per orthopedic surgery resident, they do not perform spinal injections for pain relief in the hospital and typically, pain management does that as an outpatient. Discussed aforementioned with patient who is requesting to see orthopedic surgery. Pending official consultation/evaluation.

## 2024-02-21 NOTE — DISCHARGE NOTE PROVIDER - NSDCMRMEDTOKEN_GEN_ALL_CORE_FT
busPIRone 15 mg oral tablet: 1 tab(s) orally 4 times a day  diazePAM 5 mg oral tablet: 1 tab(s) orally every 6 hours, As Needed -Anxiety - for anxiety MDD:4 tabs daily  dicyclomine 20 mg oral tablet: 1 tab(s) orally 4 times a day  gabapentin 300 mg oral capsule: 1 cap(s) orally 3 times a day  lamoTRIgine 150 mg oral tablet: 1 tab(s) orally once a day  methocarbamol 500 mg oral tablet: 2 tab(s) orally 2 times a day  Percocet 5 mg-325 mg oral tablet: 1 tab(s) orally every 6 hours MDD: 1 tab every 6 hours  sertraline 100 mg oral tablet: 1 tab(s) orally once a day

## 2024-02-21 NOTE — DISCHARGE NOTE NURSING/CASE MANAGEMENT/SOCIAL WORK - NSDCPEFALRISK_GEN_ALL_CORE
For information on Fall & Injury Prevention, visit: [email or web address] OR  [email or web address] OR  https://www.cdc.gov/steadi/patient.html

## 2024-02-21 NOTE — PHYSICAL THERAPY INITIAL EVALUATION ADULT - GENERAL OBSERVATIONS, REHAB EVAL
Ht 5'10" Wt 294lbs Case d/w ortho resident Dr Poole about R hip Xray and low back MRI  Whitney that pt has no fracture/dislocation and can proceed with PT evaluation c WBAT to right lower limb. Requested to update EMR. Case d/w RN Jessica prior to PT session. Pt recd supine nad  Noted disposable heat packs on the back . Pt  reported 8/10 R SI jt pain radiating to R groin.

## 2024-02-21 NOTE — DISCHARGE NOTE PROVIDER - TIME SPENT: (MINUTES SPENT ON THE DISCHARGE SERVICE)
Physical Therapy Daily Progress Note        Patient: Barbara Hernandez   : 1949  Diagnosis/ICD-10 Code:  Right shoulder pain, unspecified chronicity [M25.511]  Referring practitioner: Eb Ghosh MD  Date of Initial Visit: Type: THERAPY  Noted: 2019  Today's Date: 2020  Patient seen for 5 sessions           Subjective   Barbara Hernandez reports: last treatment to posterior shoulder seem to make neck and arm worse.      Objective   PALPATION: right bicep tendon tender.  TEST:Speed's +, Hawken-Chad's test +  See Exercise, Manual, and Modality Logs for complete treatment.       Assessment/Plan  Pt is really limiting us on what we can do with her.  She seems to less tender than she was a few weeks ago.  Progress per Plan of Care           Manual Therapy:    10     mins  84942;  Therapeutic Exercise:         mins  65563;     Neuromuscular Antolin:        mins  24202;    Therapeutic Activity:          mins  20099;     Gait Training:           mins  38560;     Ultrasound:     15     mins  35626;    Electrical Stimulation:    30     mins  38261 ( );  Iontophoresis          mins 70216   Traction          mins  87984  Fluidotherapy          mins  48680  Dry Needling          mins self-pay  Paraffin          mins  89672    Timed Treatment:   25   mins   Total Treatment:     55   mins    Declan Diaz, PT  Physical Therapist   31

## 2024-02-21 NOTE — PHARMACOTHERAPY INTERVENTION NOTE - COMMENTS
Modified allergy header to state that pt received cephalexin on a previous admission with no documented reaction.

## 2024-02-21 NOTE — PHYSICAL THERAPY INITIAL EVALUATION ADULT - GAIT DISTANCE, PT EVAL
Able to walk 15 in room with personal SAC slowly independently. For outside room walking  used RW- Ambulated 100ft c rolling walker with CS

## 2024-02-21 NOTE — PROGRESS NOTE ADULT - SUBJECTIVE AND OBJECTIVE BOX
Patient seen and examined  Review of Systems:  General:denies fever chills, headache, weakness  HEENT: denies blurry vision,diffculty swallowing, difficulty hearing, tinnitus  Cardiovascular: denies chest pain  ,palpitations  Pulmonary:denies shortness of breath, cough, wheezing, hemoptysis  Gastrointestinal: denies abdominal pain, constipation, diarrhea,nausea , vomiting, hematochezia  : denies hematuria, dysuria, or incontinence  Neurological: denies weakness, numbness , tingling, dizziness, tremors  MSK: denies muscle pain, difficulty ambulating, swelling, back pain  skin: denies skin rash, itching, burning, or  skin lesions  Psychiatrical: denies mood disturbances, anxierty, feeling depressed, depression , or difficulty sleeping    Objective:  Vitals  T(C): 36.5 (02-21-24 @ 05:22), Max: 36.8 (02-20-24 @ 20:12)  HR: 63 (02-21-24 @ 05:22) (62 - 98)  BP: 117/72 (02-21-24 @ 05:22) (102/65 - 117/72)  RR: 18 (02-21-24 @ 05:22) (17 - 18)  SpO2: 96% (02-21-24 @ 05:22) (96% - 98%)    Physical Exam:  General: comfortable, no acute distress  HEENT: Atraumatic, no LAD, trachea midline, PERRLA  Cardiovascular: normal s1s2, no murmurs, gallops or fricition rubs  Pulmonary: clear to ausculation Bilaterally, no wheezing , rhonchi  Gastrointestinal: soft non tender non distended, no masses felt, no organomegally  Muscloskeletal: no lower extremity edema, intact bilateral lower extremity pulses  Neurological: CN II-12 intact. No focal weakness  Psychiatrical: normal mood, cooperative  SKIN: no rash, lesions or ulcers    Labs:                          10.3   6.45  )-----------( 231      ( 20 Feb 2024 17:46 )             33.1     02-20    142  |  108  |  20  ----------------------------<  98  4.1   |  30  |  0.91    Ca    8.9      20 Feb 2024 17:46              Active Medications  MEDICATIONS  (STANDING):  busPIRone 15 milliGRAM(s) Oral <User Schedule>  dicyclomine 20 milliGRAM(s) Oral four times a day before meals  enoxaparin Injectable 40 milliGRAM(s) SubCutaneous every 12 hours  iohexol 300 mG (iodine)/mL Oral Solution 30 milliLiter(s) Oral once  lamoTRIgine 150 milliGRAM(s) Oral daily  multivitamin 1 Tablet(s) Oral daily  sertraline 100 milliGRAM(s) Oral daily    MEDICATIONS  (PRN):  acetaminophen     Tablet .. 650 milliGRAM(s) Oral every 6 hours PRN Temp greater or equal to 38C (100.4F), Mild Pain (1 - 3)  aluminum hydroxide/magnesium hydroxide/simethicone Suspension 30 milliLiter(s) Oral every 4 hours PRN Dyspepsia  diazepam    Tablet 5 milliGRAM(s) Oral every 6 hours PRN Anxiety  melatonin 3 milliGRAM(s) Oral at bedtime PRN Insomnia  morphine  - Injectable 1 milliGRAM(s) IV Push every 6 hours PRN Severe Pain (7 - 10)  ondansetron Injectable 4 milliGRAM(s) IV Push every 8 hours PRN Nausea and/or Vomiting  oxycodone    5 mG/acetaminophen 325 mG 1 Tablet(s) Oral every 6 hours PRN Moderate Pain (4 - 6)

## 2024-02-21 NOTE — PHYSICAL THERAPY INITIAL EVALUATION ADULT - ACTIVE RANGE OF MOTION EXAMINATION, REHAB EVAL
+R groin pain and R sided SI jt pain with Hip range of motion testing/bilateral upper extremity Active ROM was WFL (within functional limits)/bilateral  lower extremity Active ROM was WFL (within functional limits)

## 2024-02-21 NOTE — DISCHARGE NOTE PROVIDER - HOSPITAL COURSE
54yo female with pmh anxiety, depression/bipolar disorder (c/b multiple SA and 3 prior psychiatric hospitalizations), IBS, fibromyalgia, chronic back pain, GERD, s/p gastric bypass presenting with neck pain.     #acute on chronic Neck pain  -CT cervical negative for fractures  pain medicaiton    #Bipolar disorder/anxiety/depression  -cont current meds. offered psychiatry service consultation but patient refused.     chronic low back pain and hx of herniated discs.+ stool incontinence.   Ortho consulted    Morbid obesity. Diet and exercise discussed.       Full code   heparin subQ
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
## 2024-02-21 NOTE — PATIENT PROFILE ADULT - HISTORY OF COVID-19 VACCINATION
[No Acute Distress] : no acute distress [Well Nourished] : well nourished [Well Developed] : well developed [No JVD] : no jugular venous distention [Supple] : supple [No Lymphadenopathy] : no lymphadenopathy [No Respiratory Distress] : no respiratory distress  [Clear to Auscultation] : lungs were clear to auscultation bilaterally [No Accessory Muscle Use] : no accessory muscle use [Normal Rate] : normal rate  [Regular Rhythm] : with a regular rhythm [Normal S1, S2] : normal S1 and S2 [Pedal Pulses Present] : the pedal pulses are present [No Edema] : there was no peripheral edema [Soft] : abdomen soft [Non Tender] : non-tender [Non-distended] : non-distended [No Masses] : no abdominal mass palpated [No HSM] : no HSM [Normal Gait] : normal gait [Coordination Grossly Intact] : coordination grossly intact [No Focal Deficits] : no focal deficits Vaccine status unknown
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
## 2024-02-21 NOTE — DISCHARGE NOTE NURSING/CASE MANAGEMENT/SOCIAL WORK - PATIENT PORTAL LINK FT
You can access the FollowMyHealth Patient Portal offered by [facility] by registering at the following website: [email or web address]. By joining [employer]’s FollowMyHealth portal, you will also be able to view your health information using other applications (apps) compatible with our system.

## 2024-02-21 NOTE — DISCHARGE NOTE PROVIDER - NSDCCPCAREPLAN_GEN_ALL_CORE_FT
PRINCIPAL DISCHARGE DIAGNOSIS  Diagnosis: Intractable back pain  Assessment and Plan of Treatment: PLease followup with orthopedics on discharge

## 2024-02-27 DIAGNOSIS — M54.50 LOW BACK PAIN, UNSPECIFIED: ICD-10-CM

## 2024-02-27 DIAGNOSIS — Z98.84 BARIATRIC SURGERY STATUS: ICD-10-CM

## 2024-02-27 DIAGNOSIS — F31.9 BIPOLAR DISORDER, UNSPECIFIED: ICD-10-CM

## 2024-02-27 DIAGNOSIS — F41.9 ANXIETY DISORDER, UNSPECIFIED: ICD-10-CM

## 2024-02-27 DIAGNOSIS — M79.7 FIBROMYALGIA: ICD-10-CM

## 2024-02-27 DIAGNOSIS — M54.2 CERVICALGIA: ICD-10-CM

## 2024-02-27 DIAGNOSIS — K58.9 IRRITABLE BOWEL SYNDROME WITHOUT DIARRHEA: ICD-10-CM

## 2024-02-27 DIAGNOSIS — E66.01 MORBID (SEVERE) OBESITY DUE TO EXCESS CALORIES: ICD-10-CM

## 2024-02-27 DIAGNOSIS — D64.9 ANEMIA, UNSPECIFIED: ICD-10-CM

## 2024-02-27 DIAGNOSIS — G89.29 OTHER CHRONIC PAIN: ICD-10-CM

## 2024-03-18 ENCOUNTER — APPOINTMENT (OUTPATIENT)
Dept: ORTHOPEDIC SURGERY | Facility: CLINIC | Age: 54
End: 2024-03-18

## 2024-03-24 NOTE — ED ADULT NURSE NOTE - PMH
Abnormal uterine bleeding    Anemia    Anxiety    Chronic lower back pain    Depression    Fibromyalgia    GERD (gastroesophageal reflux disease)    IBS (irritable bowel syndrome)    Post traumatic stress disorder FAMILY HISTORY:  Father  Still living? Unknown  Family history of hypertension, Age at diagnosis: Age Unknown    Mother  Still living? Unknown  Family history of breast cancer, Age at diagnosis: Age Unknown  Family history of stroke, Age at diagnosis: Age Unknown

## 2024-04-08 ENCOUNTER — EMERGENCY (EMERGENCY)
Facility: HOSPITAL | Age: 54
LOS: 0 days | Discharge: HOME | End: 2024-04-08
Attending: STUDENT IN AN ORGANIZED HEALTH CARE EDUCATION/TRAINING PROGRAM
Payer: COMMERCIAL

## 2024-04-08 ENCOUNTER — APPOINTMENT (OUTPATIENT)
Dept: ORTHOPEDIC SURGERY | Facility: CLINIC | Age: 54
End: 2024-04-08

## 2024-04-08 VITALS
HEART RATE: 69 BPM | TEMPERATURE: 98 F | DIASTOLIC BLOOD PRESSURE: 77 MMHG | OXYGEN SATURATION: 99 % | RESPIRATION RATE: 17 BRPM | SYSTOLIC BLOOD PRESSURE: 116 MMHG

## 2024-04-08 VITALS
WEIGHT: 274.92 LBS | OXYGEN SATURATION: 96 % | HEIGHT: 70 IN | TEMPERATURE: 98 F | DIASTOLIC BLOOD PRESSURE: 85 MMHG | SYSTOLIC BLOOD PRESSURE: 118 MMHG | HEART RATE: 73 BPM | RESPIRATION RATE: 19 BRPM

## 2024-04-08 DIAGNOSIS — Z98.51 TUBAL LIGATION STATUS: Chronic | ICD-10-CM

## 2024-04-08 DIAGNOSIS — F32.A DEPRESSION, UNSPECIFIED: ICD-10-CM

## 2024-04-08 DIAGNOSIS — Z88.6 ALLERGY STATUS TO ANALGESIC AGENT: ICD-10-CM

## 2024-04-08 DIAGNOSIS — K58.9 IRRITABLE BOWEL SYNDROME WITHOUT DIARRHEA: ICD-10-CM

## 2024-04-08 DIAGNOSIS — R51.9 HEADACHE, UNSPECIFIED: ICD-10-CM

## 2024-04-08 DIAGNOSIS — Z88.0 ALLERGY STATUS TO PENICILLIN: ICD-10-CM

## 2024-04-08 DIAGNOSIS — R41.9 UNSPECIFIED SYMPTOMS AND SIGNS INVOLVING COGNITIVE FUNCTIONS AND AWARENESS: ICD-10-CM

## 2024-04-08 DIAGNOSIS — G89.29 OTHER CHRONIC PAIN: ICD-10-CM

## 2024-04-08 DIAGNOSIS — Z88.2 ALLERGY STATUS TO SULFONAMIDES: ICD-10-CM

## 2024-04-08 DIAGNOSIS — M54.50 LOW BACK PAIN, UNSPECIFIED: ICD-10-CM

## 2024-04-08 DIAGNOSIS — Z98.89 OTHER SPECIFIED POSTPROCEDURAL STATES: Chronic | ICD-10-CM

## 2024-04-08 DIAGNOSIS — K56.60 UNSPECIFIED INTESTINAL OBSTRUCTION: Chronic | ICD-10-CM

## 2024-04-08 LAB
ALBUMIN SERPL ELPH-MCNC: 3.6 G/DL — SIGNIFICANT CHANGE UP (ref 3.3–5)
ALP SERPL-CCNC: 98 U/L — SIGNIFICANT CHANGE UP (ref 40–120)
ALT FLD-CCNC: 13 U/L — SIGNIFICANT CHANGE UP (ref 12–78)
ANION GAP SERPL CALC-SCNC: 8 MMOL/L — SIGNIFICANT CHANGE UP (ref 5–17)
AST SERPL-CCNC: 14 U/L — LOW (ref 15–37)
BASOPHILS # BLD AUTO: 0.05 K/UL — SIGNIFICANT CHANGE UP (ref 0–0.2)
BASOPHILS NFR BLD AUTO: 0.8 % — SIGNIFICANT CHANGE UP (ref 0–2)
BILIRUB SERPL-MCNC: 0.4 MG/DL — SIGNIFICANT CHANGE UP (ref 0.2–1.2)
BUN SERPL-MCNC: 16 MG/DL — SIGNIFICANT CHANGE UP (ref 7–23)
CALCIUM SERPL-MCNC: 9.2 MG/DL — SIGNIFICANT CHANGE UP (ref 8.5–10.1)
CHLORIDE SERPL-SCNC: 109 MMOL/L — HIGH (ref 96–108)
CK MB BLD-MCNC: <1.6 % — SIGNIFICANT CHANGE UP (ref 0–3.5)
CK MB CFR SERPL CALC: <1 NG/ML — SIGNIFICANT CHANGE UP (ref 0.5–3.6)
CK SERPL-CCNC: 64 U/L — SIGNIFICANT CHANGE UP (ref 26–192)
CO2 SERPL-SCNC: 25 MMOL/L — SIGNIFICANT CHANGE UP (ref 22–31)
CREAT SERPL-MCNC: 0.82 MG/DL — SIGNIFICANT CHANGE UP (ref 0.5–1.3)
EGFR: 87 ML/MIN/1.73M2 — SIGNIFICANT CHANGE UP
EOSINOPHIL # BLD AUTO: 0.04 K/UL — SIGNIFICANT CHANGE UP (ref 0–0.5)
EOSINOPHIL NFR BLD AUTO: 0.6 % — SIGNIFICANT CHANGE UP (ref 0–6)
GLUCOSE SERPL-MCNC: 88 MG/DL — SIGNIFICANT CHANGE UP (ref 70–99)
HCG SERPL-ACNC: <1 MIU/ML — SIGNIFICANT CHANGE UP
HCT VFR BLD CALC: 35.3 % — SIGNIFICANT CHANGE UP (ref 34.5–45)
HGB BLD-MCNC: 10.9 G/DL — LOW (ref 11.5–15.5)
IMM GRANULOCYTES NFR BLD AUTO: 0.8 % — SIGNIFICANT CHANGE UP (ref 0–0.9)
LYMPHOCYTES # BLD AUTO: 2.31 K/UL — SIGNIFICANT CHANGE UP (ref 1–3.3)
LYMPHOCYTES # BLD AUTO: 34.7 % — SIGNIFICANT CHANGE UP (ref 13–44)
MCHC RBC-ENTMCNC: 25.5 PG — LOW (ref 27–34)
MCHC RBC-ENTMCNC: 30.9 G/DL — LOW (ref 32–36)
MCV RBC AUTO: 82.5 FL — SIGNIFICANT CHANGE UP (ref 80–100)
MONOCYTES # BLD AUTO: 0.65 K/UL — SIGNIFICANT CHANGE UP (ref 0–0.9)
MONOCYTES NFR BLD AUTO: 9.8 % — SIGNIFICANT CHANGE UP (ref 2–14)
NEUTROPHILS # BLD AUTO: 3.56 K/UL — SIGNIFICANT CHANGE UP (ref 1.8–7.4)
NEUTROPHILS NFR BLD AUTO: 53.3 % — SIGNIFICANT CHANGE UP (ref 43–77)
NRBC # BLD: 0 /100 WBCS — SIGNIFICANT CHANGE UP (ref 0–0)
PLATELET # BLD AUTO: 193 K/UL — SIGNIFICANT CHANGE UP (ref 150–400)
POTASSIUM SERPL-MCNC: 4.1 MMOL/L — SIGNIFICANT CHANGE UP (ref 3.5–5.3)
POTASSIUM SERPL-SCNC: 4.1 MMOL/L — SIGNIFICANT CHANGE UP (ref 3.5–5.3)
PROT SERPL-MCNC: 7.2 GM/DL — SIGNIFICANT CHANGE UP (ref 6–8.3)
RBC # BLD: 4.28 M/UL — SIGNIFICANT CHANGE UP (ref 3.8–5.2)
RBC # FLD: 14.9 % — HIGH (ref 10.3–14.5)
SODIUM SERPL-SCNC: 142 MMOL/L — SIGNIFICANT CHANGE UP (ref 135–145)
TROPONIN I, HIGH SENSITIVITY RESULT: <3 NG/L — SIGNIFICANT CHANGE UP
WBC # BLD: 6.66 K/UL — SIGNIFICANT CHANGE UP (ref 3.8–10.5)
WBC # FLD AUTO: 6.66 K/UL — SIGNIFICANT CHANGE UP (ref 3.8–10.5)

## 2024-04-08 PROCEDURE — 99284 EMERGENCY DEPT VISIT MOD MDM: CPT

## 2024-04-08 PROCEDURE — 93010 ELECTROCARDIOGRAM REPORT: CPT

## 2024-04-08 RX ORDER — ACETAMINOPHEN 500 MG
1000 TABLET ORAL ONCE
Refills: 0 | Status: COMPLETED | OUTPATIENT
Start: 2024-04-08 | End: 2024-04-08

## 2024-04-08 RX ADMIN — Medication 400 MILLIGRAM(S): at 13:08

## 2024-04-08 RX ADMIN — Medication 125 MILLIGRAM(S): at 15:56

## 2024-04-08 NOTE — ED PROVIDER NOTE - PATIENT PORTAL LINK FT
You can access the FollowMyHealth Patient Portal offered by [facility] by registering at the following website: [email or web address]. By joining [employer]’s FollowMyHealth portal, you will also be able to view your health information using other applications (apps) compatible with our system.

## 2024-04-08 NOTE — ED PROVIDER NOTE - PROGRESS NOTE DETAILS
pt's chart reviewed, complex care reviewed, chronic pain consult consulted , report pt has mulitple visits for lower back pain, pt usually goes to [facility], h//o fibromyalgia, ibs, anxiety, gerd and gastric bypass, facial pain is new. pt does go to pain management which she confirmed. no narcotics given, iv tylenon and solumedrol pt given  steroids for inflammation , told no narcotics will be given, follow up with her pain managemnt pt's chart reviewed, complex care reviewed, chronic pain consulted , report pt has mulitple visits for lower back pain, pt usually goes to [facility], h//o fibromyalgia, ibs, anxiety, gerd and gastric bypass, facial pain is new. pt does go to pain management which she confirmed. no narcotics given, iv tylenon and solumedrol

## 2024-04-08 NOTE — ED PROVIDER NOTE - OBJECTIVE STATEMENT
see mdm see mdm    right face pain   worsening, diagnosed with trigeminal neuralgia  wants oxycodone and morphine

## 2024-04-08 NOTE — ED ADULT NURSE NOTE - NSICDXFAMILYHX_GEN_ALL_CORE_FT
pt Icelandic speaking, per  [id number] pt gives clara andrade permission to translate FAMILY HISTORY:  Father  Still living? Unknown  FH: diabetes mellitus, Age at diagnosis: Age Unknown  FH: rheumatoid arthritis, Age at diagnosis: Age Unknown

## 2024-04-08 NOTE — ED ADULT NURSE NOTE - NS PRO PASSIVE SMOKE EXP
Uncertain Causes of Chest Pain    Chest pain can happen for a number of reasons. Sometimes the cause can not be determined. If yourÂ condition does not seem serious, and your pain does not appear to be coming from your heart, your healthcare provider may recommend watching it closely. Sometimes the signs of a serious problem take more time to appear. Many problems can cause chest pain that are not related to your heart. These include:  Â· Musculoskeletal. Costochondritis, an inflammation of the tissues around the ribs that can occur from trauma or overuse injuries  Â· Respiratory. Pneumonia, pneumothorax, or pneumonitis (inflammation of the lining of the chest and lungs)  Â· Gastrointestinal. Esophageal reflux, heartburn, or gallbladder disease  Â· Anxiety and panic disorders  Â· Nerve compression and neuritis  Â· Miscellaneous others such as aortic aneurysm or pulmonary embolism (a blood clot in the lungs)  Home care  After your visit, follow these recommendations:  Â· Rest today and avoid strenuous activity. Â· Take any prescribed medicine as directed. Â· Be aware of any recurrent chest pain and notice any changes  Follow-up care  Follow up with your healthcare provider if you do not start to feel better within 24 hours, or as advised.   Call 911  Call 911 if any of these occur:  Â· A change in the type of pain: if it feels different, becomes more severe, lasts longer, or begins to spread into your shoulder, arm, neck, jaw or back  Â· Shortness of breath or increased pain with breathing  Â· Weakness, dizziness, or fainting  Â· Rapid heart beat  Â· Crushing sensation in your chest  When to seek medical advice  Call your healthcare provider right away if any of the following occur:  Â· Cough with dark colored sputum (phlegm) or blood  Â· Fever of 100.4ÂºFÂ (38ÂºC) or higher, or as directed by your healthcare provider  Â· Swelling, pain or redness in one leg  Â· Shortness of breath  Â© 2618-5590 [facility] [place], [place], [address]. All rights reserved. This information is not intended as a substitute for professional medical care. Always follow your healthcare professional's instructions. No

## 2024-04-08 NOTE — ED ADULT TRIAGE NOTE - CHIEF COMPLAINT QUOTE
BIBA for epigastric pain and chest pain x6 months, also states she has nausea. PMH multiple back surgery, IBS.

## 2024-04-08 NOTE — ED ADULT NURSE NOTE - OBJECTIVE STATEMENT
As per pt she had CP and some SOB this AM, went to [facility] and was told to come to ED for further eval. Pt has extensive back surgery hx, goes to pain management clinic on carbamezepine - is compliant and has psych hx as well, no recent medication changes. Pt well appearing in ED, comfortable states has trigeminal nerve pain in face that she sees a neurologist for.

## 2024-04-08 NOTE — ED PROVIDER NOTE - CLINICAL SUMMARY MEDICAL DECISION MAKING FREE TEXT BOX
53 year old female with h/o anxiety, depression, chronic lower back pain, fibromyalgia, IBS and bowel obstruction presents today c/o right facial pain, reports being recently diagnosed with trigeminal neuralgia, today feels worse, pt denies fevers or chills, pt requests oxycodone and morphine. On exam pt is alert and oriented x 3, initially sleeping when I walked into the room, easily arousable, appeared in no distress, nontoxic/nonlethargic appearing, able to follow commands without difficulty. pt grimacing in pain and asking for narcotics, complex care noted reviewed in chart, chronic pain management consulted, pt states that she currently is in pain management but unable to see her doctor. Pt treated for her pain however, no narcotics given, pt does not appear in pain, able to open and close her mouth and touch face without difficulty,  pt given iv tylenol and steroids, told to follow up with her own pain management

## 2024-04-08 NOTE — ED ADULT NURSE NOTE - ALCOHOL PRE SCREEN (AUDIT - C)
Nakul Doss Patient Age: 70 year old  MESSAGE: Interpreting service used: No    Insurance on file confirmed with caller: Yes    IM/FP- Update for the Provider     Caller updating the provider on n/a    Caller reports: seemaHERMILO moncada, calling from [facility] stated she requested Physician order signature on 8/1 has not received anything checking on status         Is patient having new or worsening symptoms? No- Does caller require a call back? No -Route message to provider's clinical support pool.          Message read back to caller for accuracy: Yes       ALLERGIES:  Penicillin g and Glimepiride  Current Outpatient Medications   Medication Sig Dispense Refill   • HYDROcodone-acetaminophen (NORCO)  MG per tablet Take 0.5-1 tablet by mouth every 6 hours as needed for pain. 90 tablet 0   • azaTHIOprine (IMURAN) 50 MG tablet Take 2 tablets by mouth daily 180 tablet 0   • metroNIDAZOLE (FLAGYL) 500 MG tablet Take 1 tablet by mouth every 8 hours for 39 days.     • Accu-Chek Carmen Plus test strip TEST BLOOD SUGAR TWO TIMES DAILY AS DIRECTED. 200 each 3   • insulin glargine (Lantus SoloStar) 100 UNIT/ML pen-injector Inject 30 Units into the skin nightly. Prime 2 units before each dose. 30 mL 3   • predniSONE (DELTASONE) 5 MG tablet TAKE 2 AND 1/2 TABLETS BY MOUTH DAILY 225 tablet 3   • potassium CHLORIDE (KLOR-CON M) 20 MEQ german ER tablet Take 1 tablet by mouth daily. To be taken with the furosemide. 90 tablet 3   • mupirocin (BACTROBAN) 2 % ointment 2 times daily.     • metFORMIN (GLUCOPHAGE-XR) 500 MG 24 hr tablet Take 1 tablet by mouth in the morning and 1 tablet in the evening. 180 tablet 3   • atorvastatin (LIPITOR) 40 MG tablet Take 1 tablet by mouth nightly. 90 tablet 3   • naLOXone (NARCAN) 4 MG/0.1ML nasal spray Spray the content of 1 device into 1 nostril. Call 911. May repeat with 2nd device in alternate nostril if no response in 2-3 minutes. 2 each 1   • collagenase (SANTYL) 250 UNIT/GM  ointment Apply 1 application topically daily.     • albuterol 108 (90 Base) MCG/ACT inhaler Inhale 2 puffs into the lungs every 4 hours as needed for Shortness of Breath or Wheezing. 1 each 5   • Insulin Pen Needle (B-D U/F PEN NEEDLE 5/16\") 31G X 8 MM Misc Use to inject insulin 1 time daily. Remove needle cover(s) to expose needle before injecting. 100 each 3   • furosemide (LASIX) 40 MG tablet Take 1 tablet by mouth daily as needed (edema). 90 tablet 3   • Wound Dressings (Medihoney Wound/Burn Dressing) Gel      • Blood Glucose Monitoring Suppl (ACCU-CHEK SHAWN PLUS) w/Device Kit 1 kit 2 (two) times a day. E11.22. 1 kit 0   • SOFTCLIX LANCETS Misc Test Bid E11.22 200 each 3   • Probiotic Product (PROBIOTIC ACIDOPHILUS BIOBEADS) Cap One tab daily while on antibiotics     • Folic Acid 5 MG Cap Take 5 mg by mouth daily. Do not start before November 22, 2021.     • ferrous sulfate 325 (65 FE) MG tablet Take 325 mg by mouth daily.     • aspirin 81 MG tablet Take 81 mg by mouth daily.     • ascorbic acid (VITAMIN C) 250 MG tablet Take 500 mg by mouth 2 times daily. Do not start before November 22, 2021.     • albuterol (VENTOLIN) (2.5 MG/3ML) 0.083% nebulizer solution Inhale 2.5 mg into the lungs as needed for Shortness of Breath or Wheezing.     • MULTIPLE VITAMIN PO Take 1 tablet by mouth daily. Do not start before November 22, 2021.       No current facility-administered medications for this visit.     PHARMACY to use: see below           Pharmacy preference(s) on file:   [id number] - [place], [place] - [address]  [address]  [facility] [address]  Phone: [phone] Fax: [phone]      CALL BACK INFO: Ok to leave response (including medical information) on answering machine      PCP: Oumar Napoles MD         INS: Payor: [employer] MEDICARE ADVANTAGE / Plan:  BE /074 / Product Type: [employer] MEDICARE ADVANTAGE   PATIENT ADDRESS:  [address] [address]     Statement Selected

## 2024-04-18 ENCOUNTER — NON-APPOINTMENT (OUTPATIENT)
Age: 54
End: 2024-04-18

## 2024-04-25 NOTE — DATA REVIEWED
[No studies available for review at this time] : No studies available for review at this time [de-identified] : audio with left CHL, type B tymp on left Available

## 2024-05-02 NOTE — ED PROVIDER NOTE - CROS ED RESP ALL NEG
"ADVICE FOR CHILDREN:    EAT 3 MEALS A DAY AN A HEALTHY AFTER SCHOOL SNACK. A CHILD SHOULD EAT 5 SERVINGS OF FRUITS AND VEGETABLES EVERY DAY(FRUIT WITH BREAKFAST, LUNCH, AND DINNER; VEGETABLE WITH LUNCH AND DINNER; FRUIT OR VEGETABLE FOR SNACKS. THEY SHOULD DRINK MILK WITH MEALS OR AT LEAST 3 GLASSES PER DAY TO GET ENOUGH CALCIUM FOR STRONG BONES AND BONE GROWTH. DAIRY PRODUCTS ALSO CONTAIN CALCIUM AND CAN PROVIDE ADDITIONAL CALCIUM. THEY SHOULD ALSO GET AT LEAST 2 SERVINGS OF PROTEIN(MEAT, EGGS, FISH, CHEESE, NUTS, BEANS)    THEY SHOULD EAT FOODS FROM THE FARM AND NOT FROM THE FACTORY TO HAVE A HEALTHY LIFE.    ROUTINES ARE IMPORTANT TO TEACH TO YOUNG CHILDREN AND TO PREPARE FOR THE MORNING THE NIGHT BEFORE SO THEY SHOULD:  -GET HOMEWORK DONE AND BACK PACK READY TO GO THE NIGHT BEFORE AND KEEP IN THE SAME PLACE SO READY FOR THE MORNING  -THEY SHOULD HAVE CLOTHES PICKED OUT AND READY TO WEAR FOR THE NEXT DAY FOR SCHOOL  - IF PACK LUNCH FOR SCHOOL THEY SHOULD HELP PACK LUNCH THE NIGHT BEFORE AND JUNK FOOD SHOULD NOT BE PART OF THEIR LUNCHES SO IT IS LEARNING WHAT FOOD THEY SHOULD EAT AND TO LEARN HOW TO TAKE CARE OF THEMSELVES  -IF THEY PLAY SPORTS THEIR EQUIPMENT SHOULD ALWAYS BE KEPT IN SAME DESIGNATED PLACE SO THEY HAVE EVERYTHING THEY NEED TO GO TO THEIR SPORTS GAMES OR PRACTICES    CHORES:  -CHILDREN AT YOUNG AGES WANT TO IMITATE THEIR PARENTS SO THEY SHOULD BE INCLUDED AND ASKED TO DO THINGS CALLED \"CHORES\" TO HELP  -START WITH HAVING THEM  TOYS OR CLEAN UP WHATEVER THEY ARE PLAYING WITH WHEN DONE PLAYING (\"CLEAN ASYOU GO\") INSTEAD OF HAVING A BIG JOB TO DO AT THE END OF THE DAY; THEY CAN HELP WITH PREPARING MEALS THAT JUST INVOLVE ADDING INGREDIENTS TO A BOWL AND STIRRING OR OTHER THINGS THAT DO NOT INVOLVE HEAT; PUT DIRTY CLOTHES IN A BASKET UNTIL DO LAUNDRY, HELP SORT CLOTHES FOR  WHEN GETTING OLDER, ETC TO GIVE THEM LIFE SKILLS TO TAKE CARE OF THEMSELVES.    PHYSICAL ACTIVITY:  -CHILDREN SHOULD GET " "PHYSICAL ACTIVITY EVERY DAY; FREE PLAY WHICH INVOLVES JUST RUNNING AND PLAYING WITH THEIR SIBLINGS OR FRIENDS USING THEIR IMAGINATION OR PLAYING IN OUTSIDE OR IN THE HOUSE WITH THE IDEA OF JUST PLAYING WITHOUT ANY COMPETITION INVOLVED  -IF THEY HAVE BEEN SITTING IN SCHOOL ALL DAY THEN THEY SHOULD GET 15-30 MINUTES OF PHYSICAL ACTIVITY (FREE PLAY) AFTER SCHOOL, HAVE A SNACK, THEN DO HOMEWORK. SCREEN TIME OF TV OR VIDEO GAMES SHOULD NOT BE PART OF THEIR ROUTINE AFTER SCHOOL    SLEEP IS VERY IMPORTANT FOR GROWTH, STRENGTHENING THE IMMUNE SYSTEM TO FIGHT OFF INFECTIONS AND CANCERS, TO HAVE GOOD REACTION TIME, AND TO PUT SHORT TERM MEMORIES FROM THE DAY INTO LONG TERM MEMORIES.  -CHILDREN OF THIS AGE REQUIRE 10 TO 12 HOURS OF SLEEP PER NIGHT; SPORTS OR EVENING ACTIVITIES SHOULD NOT COMPROMISE THEIR ABILITY TO GET ENOUGH SLEEP  -CHILDREN SHOULD HAVE A GOOD ROUTINE: NO SUGARY FOODS OR DRINKS 2 HOURS BEFORE BEDTIME, NO SCREEN TIME FOR 2 HOURS BEFORE FALLING ASLEEP, BRUSH TEETH, SHOWER OR BATH, READ FOR 30 MINUTES PRIOR TO BEDTIME    EMOTIONS ARE PART OF BEING HUMAN:  -HELP YOUR CHILD IDENTIFY THEIR DIFFERENT EMOTIONS FROM A YOUNG AGE(SAD, MAD, TIRED, HUNGRY, FRUSTRATED) SO WHEN YOU SEE ONE OF THOSE EMOTIONS THEN SAY TO THEM THAT \"YOU LOOK HUNGRY\" , I SEE YOU ARE SAD\" , ETC) , LET THEM HAVE THEIR EMOTION BUT THEN GET THEM TO MOVE ON WITH WHAT THEY NEED TO DO(TALK ABOUT WHY THEY ARE SAD, EAT SOMETHING IF ARE HUNGRY,) AND THEN TALK ABOUT WAYS TO HANDLE THEIR EMOTIONS IF THEY ARE MAD, SAD, FRUSTRATED-TAKE DEEP BREATHS, WALK AWAY AND HAVE SOME QUIET TIME IN THEIR ROOM, PUNCH THEIR PILLOW, OR GO DO SOMETHING PHYSICALLY ACTIVE EITHER IN HOUSE(GO UP AND DOWN STAIRS, RUN AROUND IN BACKYARD, DO SIT UPS, PUSH UPS?) OR OUTSIDE TO RELEASE ENDORPHINS TO MAKE THEM FEEL BETTER ; PROMOTES HEALTHY HEART, HEALTHY MUSCLES, AND HEALTHY MIND AND IS GOOD SKILL FOR LIFE.    HAVE RULES AND SET LIMITS AND USE TIME OUTS OF CONSEQUENCES/ LOSS OF " PRIVILEGES BUT ALSO LET THEM KNOW YOU LOVE THEM UNCONDITIONALLY AND FOREVER.........   negative...

## 2024-05-06 ENCOUNTER — APPOINTMENT (OUTPATIENT)
Dept: ORTHOPEDIC SURGERY | Facility: CLINIC | Age: 54
End: 2024-05-06

## 2024-07-03 NOTE — ED PROVIDER NOTE - ADDITIONAL PROGRESS NOTE...
Weekly drinking would not likely contribute, but only way to find out is stopping and recheck hepatic panel in 2-4 weeks. Recommend avoiding any supplements. Will monitor. If persistent, would recommend liver US.   Additional Progress Note...

## 2024-08-25 NOTE — ED ADULT TRIAGE NOTE - SPO2 (%)
89 y/o male with hx of HTN, HLD, AFib on coumadin, COPD, lung CA s/p RT-in remission, woke up in the middle of night with sob, rigor, in ED found to have sepsis d/t pneumonia , given fluids and started on Abx
98

## 2024-09-10 NOTE — ED ADULT NURSE NOTE - COVID-19 RESULT DATE/TIME
[FreeTextEntry6] : -Hearing issues x1-2 weeks, no ear pain, but feels that hearing is distorted, improves with over the ear earphone usage -Started with sore throat and has progressed to lingering cough now, no sneezing or runny nose, during beginning of illness, had mild elevated temps to 100ish F but nothing above that -Started Amoxil 5 mL three times daily x10 days rx'd be dentist for tooth infection  17-Dec-2021 18:06

## 2024-10-18 NOTE — ED ADULT NURSE NOTE - CADM POA URETHRAL CATHETER
[facility]  [address]  [place] [address]  [phone]  FAX [phone]      Discharge instructions for Dr. Raulito An     General:   -You will be groggy throughout the day due to the effects of anesthesia.  Have a responsible adult monitor you for the next 24 hours.  -Call if fever or chills  -It is very normal to have burning and pain.  If the pain is severe and out of your control contact Dr. An.    Diet: You may eat or drink whatever you like today.  You may experience some nausea.  Call if you have vomiting or inability to tolerate food or drink.    Urine: Expect blood in your urine.  This is normal.  This may be very traumatic in appearance but is not concerning unless there are large amount of clots that prevent you from being able to urinate.  If you have any questions or concerns contact Dr. An's office.    Driving: You may not drive for 24 hours.  You also may not drive if you are taking narcotic pain medications.    Activity: There are no restrictions on your activity.     No

## 2024-10-21 ENCOUNTER — EMERGENCY (EMERGENCY)
Facility: HOSPITAL | Age: 54
LOS: 1 days | Discharge: HOME | End: 2024-10-21
Attending: STUDENT IN AN ORGANIZED HEALTH CARE EDUCATION/TRAINING PROGRAM | Admitting: STUDENT IN AN ORGANIZED HEALTH CARE EDUCATION/TRAINING PROGRAM
Payer: MEDICARE

## 2024-10-21 VITALS
HEART RATE: 87 BPM | DIASTOLIC BLOOD PRESSURE: 79 MMHG | OXYGEN SATURATION: 97 % | WEIGHT: 270.07 LBS | RESPIRATION RATE: 18 BRPM | SYSTOLIC BLOOD PRESSURE: 112 MMHG | TEMPERATURE: 98 F

## 2024-10-21 VITALS
OXYGEN SATURATION: 96 % | DIASTOLIC BLOOD PRESSURE: 83 MMHG | HEART RATE: 67 BPM | RESPIRATION RATE: 18 BRPM | SYSTOLIC BLOOD PRESSURE: 130 MMHG | TEMPERATURE: 98 F

## 2024-10-21 DIAGNOSIS — Z98.51 TUBAL LIGATION STATUS: Chronic | ICD-10-CM

## 2024-10-21 DIAGNOSIS — K56.60 UNSPECIFIED INTESTINAL OBSTRUCTION: Chronic | ICD-10-CM

## 2024-10-21 DIAGNOSIS — Z98.89 OTHER SPECIFIED POSTPROCEDURAL STATES: Chronic | ICD-10-CM

## 2024-10-21 LAB
ADD ON TEST-SPECIMEN IN LAB: SIGNIFICANT CHANGE UP
ALBUMIN SERPL ELPH-MCNC: 4.6 G/DL — SIGNIFICANT CHANGE UP (ref 3.3–5)
ALP SERPL-CCNC: 128 U/L — HIGH (ref 40–120)
ALT FLD-CCNC: 14 U/L — SIGNIFICANT CHANGE UP (ref 4–33)
ANION GAP SERPL CALC-SCNC: 12 MMOL/L — SIGNIFICANT CHANGE UP (ref 7–14)
APTT BLD: 27.3 SEC — SIGNIFICANT CHANGE UP (ref 24.5–35.6)
AST SERPL-CCNC: 17 U/L — SIGNIFICANT CHANGE UP (ref 4–32)
BASOPHILS # BLD AUTO: 0.03 K/UL — SIGNIFICANT CHANGE UP (ref 0–0.2)
BASOPHILS NFR BLD AUTO: 0.5 % — SIGNIFICANT CHANGE UP (ref 0–2)
BILIRUB SERPL-MCNC: 0.3 MG/DL — SIGNIFICANT CHANGE UP (ref 0.2–1.2)
BUN SERPL-MCNC: 17 MG/DL — SIGNIFICANT CHANGE UP (ref 7–23)
CALCIUM SERPL-MCNC: 9.7 MG/DL — SIGNIFICANT CHANGE UP (ref 8.4–10.5)
CHLORIDE SERPL-SCNC: 104 MMOL/L — SIGNIFICANT CHANGE UP (ref 98–107)
CO2 SERPL-SCNC: 23 MMOL/L — SIGNIFICANT CHANGE UP (ref 22–31)
CREAT SERPL-MCNC: 0.79 MG/DL — SIGNIFICANT CHANGE UP (ref 0.5–1.3)
CRP SERPL-MCNC: <3 MG/L — SIGNIFICANT CHANGE UP
EGFR: 90 ML/MIN/1.73M2 — SIGNIFICANT CHANGE UP
EOSINOPHIL # BLD AUTO: 0.12 K/UL — SIGNIFICANT CHANGE UP (ref 0–0.5)
EOSINOPHIL NFR BLD AUTO: 1.9 % — SIGNIFICANT CHANGE UP (ref 0–6)
ERYTHROCYTE [SEDIMENTATION RATE] IN BLOOD: 11 MM/HR — SIGNIFICANT CHANGE UP (ref 4–25)
GLUCOSE SERPL-MCNC: 84 MG/DL — SIGNIFICANT CHANGE UP (ref 70–99)
HCT VFR BLD CALC: 35.6 % — SIGNIFICANT CHANGE UP (ref 34.5–45)
HGB BLD-MCNC: 11.3 G/DL — LOW (ref 11.5–15.5)
IANC: 3.79 K/UL — SIGNIFICANT CHANGE UP (ref 1.8–7.4)
IMM GRANULOCYTES NFR BLD AUTO: 0.5 % — SIGNIFICANT CHANGE UP (ref 0–0.9)
INR BLD: <0.9 RATIO — SIGNIFICANT CHANGE UP (ref 0.85–1.16)
LYMPHOCYTES # BLD AUTO: 1.69 K/UL — SIGNIFICANT CHANGE UP (ref 1–3.3)
LYMPHOCYTES # BLD AUTO: 27.3 % — SIGNIFICANT CHANGE UP (ref 13–44)
MCHC RBC-ENTMCNC: 25.3 PG — LOW (ref 27–34)
MCHC RBC-ENTMCNC: 31.7 GM/DL — LOW (ref 32–36)
MCV RBC AUTO: 79.6 FL — LOW (ref 80–100)
MONOCYTES # BLD AUTO: 0.54 K/UL — SIGNIFICANT CHANGE UP (ref 0–0.9)
MONOCYTES NFR BLD AUTO: 8.7 % — SIGNIFICANT CHANGE UP (ref 2–14)
NEUTROPHILS # BLD AUTO: 3.79 K/UL — SIGNIFICANT CHANGE UP (ref 1.8–7.4)
NEUTROPHILS NFR BLD AUTO: 61.1 % — SIGNIFICANT CHANGE UP (ref 43–77)
NRBC # BLD: 0 /100 WBCS — SIGNIFICANT CHANGE UP (ref 0–0)
NRBC # FLD: 0 K/UL — SIGNIFICANT CHANGE UP (ref 0–0)
PLATELET # BLD AUTO: 231 K/UL — SIGNIFICANT CHANGE UP (ref 150–400)
POTASSIUM SERPL-MCNC: 4.2 MMOL/L — SIGNIFICANT CHANGE UP (ref 3.5–5.3)
POTASSIUM SERPL-SCNC: 4.2 MMOL/L — SIGNIFICANT CHANGE UP (ref 3.5–5.3)
PROT SERPL-MCNC: 7.4 G/DL — SIGNIFICANT CHANGE UP (ref 6–8.3)
PROTHROM AB SERPL-ACNC: 10.4 SEC — SIGNIFICANT CHANGE UP (ref 9.9–13.4)
RBC # BLD: 4.47 M/UL — SIGNIFICANT CHANGE UP (ref 3.8–5.2)
RBC # FLD: 15.6 % — HIGH (ref 10.3–14.5)
SODIUM SERPL-SCNC: 139 MMOL/L — SIGNIFICANT CHANGE UP (ref 135–145)
WBC # BLD: 6.2 K/UL — SIGNIFICANT CHANGE UP (ref 3.8–10.5)
WBC # FLD AUTO: 6.2 K/UL — SIGNIFICANT CHANGE UP (ref 3.8–10.5)

## 2024-10-21 PROCEDURE — 70496 CT ANGIOGRAPHY HEAD: CPT | Mod: 26,MC

## 2024-10-21 PROCEDURE — 70498 CT ANGIOGRAPHY NECK: CPT | Mod: 26,MC

## 2024-10-21 PROCEDURE — 99285 EMERGENCY DEPT VISIT HI MDM: CPT

## 2024-10-21 RX ORDER — ACETAMINOPHEN 325 MG
1000 TABLET ORAL ONCE
Refills: 0 | Status: COMPLETED | OUTPATIENT
Start: 2024-10-21 | End: 2024-10-21

## 2024-10-21 RX ORDER — HYDROMORPHONE HYDROCHLORIDE 1 MG/ML
4 INJECTION, SOLUTION INTRAMUSCULAR; INTRAVENOUS; SUBCUTANEOUS ONCE
Refills: 0 | Status: DISCONTINUED | OUTPATIENT
Start: 2024-10-21 | End: 2024-10-21

## 2024-10-21 RX ORDER — SODIUM CHLORIDE 0.9 % (FLUSH) 0.9 %
1000 SYRINGE (ML) INJECTION ONCE
Refills: 0 | Status: COMPLETED | OUTPATIENT
Start: 2024-10-21 | End: 2024-10-21

## 2024-10-21 RX ORDER — METOCLOPRAMIDE HCL 5 MG
10 TABLET ORAL ONCE
Refills: 0 | Status: COMPLETED | OUTPATIENT
Start: 2024-10-21 | End: 2024-10-21

## 2024-10-21 RX ADMIN — Medication 1000 MILLILITER(S): at 15:30

## 2024-10-21 RX ADMIN — Medication 1000 MILLIGRAM(S): at 12:30

## 2024-10-21 RX ADMIN — HYDROMORPHONE HYDROCHLORIDE 4 MILLIGRAM(S): 1 INJECTION, SOLUTION INTRAMUSCULAR; INTRAVENOUS; SUBCUTANEOUS at 16:00

## 2024-10-21 RX ADMIN — HYDROMORPHONE HYDROCHLORIDE 4 MILLIGRAM(S): 1 INJECTION, SOLUTION INTRAMUSCULAR; INTRAVENOUS; SUBCUTANEOUS at 14:29

## 2024-10-21 RX ADMIN — Medication 400 MILLIGRAM(S): at 12:12

## 2024-10-21 RX ADMIN — Medication 10 MILLIGRAM(S): at 14:29

## 2024-10-21 RX ADMIN — Medication 1000 MILLIGRAM(S): at 16:00

## 2024-10-21 RX ADMIN — Medication 1000 MILLILITER(S): at 14:28

## 2024-10-21 NOTE — ED PROVIDER NOTE - CLINICAL SUMMARY MEDICAL DECISION MAKING FREE TEXT BOX
54-year-old female with a history of trigeminal neuralgia, scoliosis, fibromyalgia, IBS, migraines, GERD, anxiety presenting for 3 days of worsening facial burning and new slurred speech and drooling which started after washing her face at 6 PM on 10/19.  Patient states that her typical symptoms of trigeminal neuralgia are facial pain and jaw pain which are present today but worse than previously.  Slurred speech and drooling are new. Exam significant for allodynia. Did not appreciate facial drooping, tongue swelling, neck swelling, neurologic deficits new from baseline. Ddx includes but not limited to trigeminal neuralgia, CVA, GCA although no scalp pain or vision changes. Will obtain CT angio head and neck and basic labs including CRP, ESR. Pain management with reglan and tylenol IV.

## 2024-10-21 NOTE — ED PROVIDER NOTE - OBJECTIVE STATEMENT
54-year-old female with a history of trigeminal neuralgia, scoliosis, fibromyalgia, IBS, migraines, GERD, anxiety presenting for 3 days of worsening facial burning and new slurred speech and drooling which started after washing her face at 6 PM on 10/19.  Patient states that her typical symptoms of trigeminal neuralgia are facial pain and jaw pain which are present today but worse than previously.  Slurred speech and drooling are new. Denies difficulty swallowing food or fluids.  Denies shortness of breath, chest pain, constitutional symptoms, focal neurologic deficits in extremities, vision changes, headache, nausea, vomiting, recent injury/trauma.

## 2024-10-21 NOTE — ED ADULT TRIAGE NOTE - CHIEF COMPLAINT QUOTE
pt c/o facial burning and drooping x 2 days.  pt was told to come to ED by her PCP.  Hx: trigeminal neuralgia, scoliosis, fibromyalgia, migraines

## 2024-10-21 NOTE — ED PROVIDER NOTE - PATIENT PORTAL LINK FT
You can access the FollowMyHealth Patient Portal offered by [facility] by registering at the following website: [email or web address]. By joining [employer]’s FollowMyHealth portal, you will also be able to view your health information using other applications (apps) compatible with our system.

## 2024-10-21 NOTE — ED PROVIDER NOTE - ATTENDING CONTRIBUTION TO CARE
Dr. Rasmussen, Attending Physician-  I performed a face to face bedside interview with patient regarding history of present illness, review of symptoms and past medical history. I completed an independent physical exam.  I have discussed patient's plan of care with the resident.    54-year-old F with/Hx of trigeminal neuralgia, scoliosis, fibromyalgia, IBS, migraines, GERD, anxiety presenting for 3 days of worsening facial burning and new slurred speech and drooling which started after washing her face at 6 PM on 10/19.  Patient states that her typical symptoms of trigeminal neuralgia are facial pain and jaw pain which are present today but worse than previously.  Slurred speech and drooling are new. Reports smacking her head into a door on Friday, felt dizzy after no LOC/difficulty ambulating; denies visual/hearing changes, fevers, blisters/rashes, n/v/d/c, ROS otherwise negative.     Exam significant for allodynia. Did not appreciate facial drooping, tongue swelling, neck swelling. CNs notable for decreased sensation to LT throughout face, symmetric, w/mild worsening of numbness on exam L V3 distribution (L chin). Neuro exam otherwise unremarkable; gait deferred. PERRL 3mm b/l.     DDx includes but not limited to trigeminal neuralgia, CVA/ICH, GCA although no scalp pain or vision changes. No rashes/blisters to suggest shingles, no facial weakness to suggest bell's palsy. Will obtain CT angio head and neck and basic labs including CRP, ESR. Pain management with reglan and tylenol IV. Complex care note appreciated, will escalate to dilaudid PO after convo w/patient as lyrica/gabapentin/carbamazepine/tylenol do not help pt's pain. - Zac Rasmussen MD. EM Attending

## 2024-10-21 NOTE — ED PROVIDER NOTE - PROGRESS NOTE DETAILS
patient with persistent pain, PO Dilaudid given after referencing complex care note. Labs otherwise unremarkable. Pending CT Patient resting comfortably in bed on reevaluation. Pain improved. CT imaging without acute findings. Discussed results with patient. Recommend outpatient f/u with neurologist and PCM which patient is in agreement with. Spoke to patient's  over the phone who is also in agreement and will come  the patient.

## 2024-10-21 NOTE — ED PROVIDER NOTE - NSFOLLOWUPINSTRUCTIONS_ED_ALL_ED_FT
Thank you for letting us treat you today. You were seen at [facility] for evaluation of your symptoms.    Based on today's evaluation there is no evidence of any life or limb threatening condition that requires hospitalization, surgery or IV antibiotics. However please be aware that sometimes life-threatening conditions can present atypically at first. Therefore, I must ask if at any point after leaving you develop worsening symptoms, new or concerning symptoms, or feel as though you are failing to improve please return to the emergency department for further evaluation.     We recommend that you follow up with your neurologist and primary care doctor as soon as possible for reevaluation and further treatment of your symptoms. You may take over the counter Tylenol and Ibuprofen to control your pain while you await your follow up appointments.     Please return for evaluation in the ED for any new/worsening/concerning/persistent symptoms such as severe pain, high fevers, difficulty breathing, chest pain, confusion, profuse vomiting or diarrhea, or any other concerns or with your primary care doctor for routine medical management.     Please review your ER admission or any abnormalities in your  labs and/or imaging obtained today with your primary care physician as soon as possible: Thank you for letting us treat you today. You were seen at [facility] for evaluation of your symptoms.    Based on today's evaluation there is no evidence of any life or limb threatening condition that requires hospitalization, surgery or IV antibiotics. However please be aware that sometimes life-threatening conditions can present atypically at first. Therefore, I must ask if at any point after leaving you develop worsening symptoms, new or concerning symptoms, or feel as though you are failing to improve please return to the emergency department for further evaluation.     We recommend that you follow up with your neurologist and primary care doctor as soon as possible for reevaluation and further treatment of your symptoms. You may take over the counter Tylenol to control your pain while you await your follow up appointments.     Please return for evaluation in the ED for any new/worsening/concerning/persistent symptoms such as severe pain, high fevers, difficulty breathing, chest pain, confusion, profuse vomiting or diarrhea, or any other concerns or with your primary care doctor for routine medical management.     Please review your ER admission or any abnormalities in your  labs and/or imaging obtained today with your primary care physician as soon as possible:

## 2024-12-30 ENCOUNTER — NON-APPOINTMENT (OUTPATIENT)
Age: 54
End: 2024-12-30

## 2025-01-22 ENCOUNTER — APPOINTMENT (OUTPATIENT)
Dept: OBGYN | Facility: CLINIC | Age: 55
End: 2025-01-22

## 2025-04-09 ENCOUNTER — APPOINTMENT (OUTPATIENT)
Dept: ORTHOPEDIC SURGERY | Facility: CLINIC | Age: 55
End: 2025-04-09
Payer: MEDICARE

## 2025-04-09 DIAGNOSIS — S92.514A NONDISPLACED FRACTURE OF PROXIMAL PHALANX OF RIGHT LESSER TOE(S), INITIAL ENCOUNTER FOR CLOSED FRACTURE: ICD-10-CM

## 2025-04-09 DIAGNOSIS — Z00.00 ENCOUNTER FOR GENERAL ADULT MEDICAL EXAMINATION W/OUT ABNORMAL FINDINGS: ICD-10-CM

## 2025-04-09 PROCEDURE — 99203 OFFICE O/P NEW LOW 30 MIN: CPT

## 2025-04-09 PROCEDURE — 73660 X-RAY EXAM OF TOE(S): CPT | Mod: RT

## 2025-04-17 NOTE — PATIENT PROFILE ADULT - NSPROPTRIGHTNOTIFYPHONE_GEN_A_NUR
Last OV: 1/30/2025  Next OV: 6/3/2025    Next appointment due:5/30/2024    Last fill:12/26/2024  Refills:2    
[id number] & [id number]

## 2025-04-19 ENCOUNTER — EMERGENCY (EMERGENCY)
Facility: HOSPITAL | Age: 55
LOS: 0 days | Discharge: HOME | End: 2025-04-20
Attending: STUDENT IN AN ORGANIZED HEALTH CARE EDUCATION/TRAINING PROGRAM
Payer: COMMERCIAL

## 2025-04-19 ENCOUNTER — APPOINTMENT (OUTPATIENT)
Dept: ORTHOPEDIC SURGERY | Facility: CLINIC | Age: 55
End: 2025-04-19
Payer: COMMERCIAL

## 2025-04-19 ENCOUNTER — RESULT CHARGE (OUTPATIENT)
Age: 55
End: 2025-04-19

## 2025-04-19 VITALS
TEMPERATURE: 98 F | SYSTOLIC BLOOD PRESSURE: 103 MMHG | HEIGHT: 70 IN | RESPIRATION RATE: 16 BRPM | HEART RATE: 70 BPM | OXYGEN SATURATION: 99 % | WEIGHT: 255.07 LBS | DIASTOLIC BLOOD PRESSURE: 68 MMHG

## 2025-04-19 VITALS
TEMPERATURE: 98 F | DIASTOLIC BLOOD PRESSURE: 77 MMHG | SYSTOLIC BLOOD PRESSURE: 112 MMHG | HEART RATE: 68 BPM | OXYGEN SATURATION: 98 % | RESPIRATION RATE: 19 BRPM

## 2025-04-19 VITALS — BODY MASS INDEX: 39.37 KG/M2 | HEIGHT: 70 IN | WEIGHT: 275 LBS

## 2025-04-19 DIAGNOSIS — G50.0 TRIGEMINAL NEURALGIA: ICD-10-CM

## 2025-04-19 DIAGNOSIS — M79.7 FIBROMYALGIA: ICD-10-CM

## 2025-04-19 DIAGNOSIS — K58.9 IRRITABLE BOWEL SYNDROME, UNSPECIFIED: ICD-10-CM

## 2025-04-19 DIAGNOSIS — F41.9 ANXIETY DISORDER, UNSPECIFIED: ICD-10-CM

## 2025-04-19 DIAGNOSIS — M53.3 SACROCOCCYGEAL DISORDERS, NOT ELSEWHERE CLASSIFIED: ICD-10-CM

## 2025-04-19 DIAGNOSIS — Z98.89 OTHER SPECIFIED POSTPROCEDURAL STATES: Chronic | ICD-10-CM

## 2025-04-19 DIAGNOSIS — Z88.6 ALLERGY STATUS TO ANALGESIC AGENT: ICD-10-CM

## 2025-04-19 DIAGNOSIS — K21.9 GASTRO-ESOPHAGEAL REFLUX DISEASE WITHOUT ESOPHAGITIS: ICD-10-CM

## 2025-04-19 DIAGNOSIS — K56.60 UNSPECIFIED INTESTINAL OBSTRUCTION: Chronic | ICD-10-CM

## 2025-04-19 DIAGNOSIS — R20.8 OTHER DISTURBANCES OF SKIN SENSATION: ICD-10-CM

## 2025-04-19 DIAGNOSIS — Z98.51 TUBAL LIGATION STATUS: Chronic | ICD-10-CM

## 2025-04-19 DIAGNOSIS — G89.29 OTHER CHRONIC PAIN: ICD-10-CM

## 2025-04-19 DIAGNOSIS — F32.A DEPRESSION, UNSPECIFIED: ICD-10-CM

## 2025-04-19 DIAGNOSIS — R32 UNSPECIFIED URINARY INCONTINENCE: ICD-10-CM

## 2025-04-19 DIAGNOSIS — G83.4 CAUDA EQUINA SYNDROME: ICD-10-CM

## 2025-04-19 DIAGNOSIS — M54.41 LUMBAGO WITH SCIATICA, RIGHT SIDE: ICD-10-CM

## 2025-04-19 DIAGNOSIS — Z88.2 ALLERGY STATUS TO SULFONAMIDES: ICD-10-CM

## 2025-04-19 DIAGNOSIS — Z88.0 ALLERGY STATUS TO PENICILLIN: ICD-10-CM

## 2025-04-19 LAB
ALBUMIN SERPL ELPH-MCNC: 3.7 G/DL — SIGNIFICANT CHANGE UP (ref 3.3–5)
ALP SERPL-CCNC: 114 U/L — SIGNIFICANT CHANGE UP (ref 40–120)
ALT FLD-CCNC: 13 U/L — SIGNIFICANT CHANGE UP (ref 12–78)
ANION GAP SERPL CALC-SCNC: 6 MMOL/L — SIGNIFICANT CHANGE UP (ref 5–17)
APPEARANCE UR: CLEAR — SIGNIFICANT CHANGE UP
APTT BLD: 25.2 SEC — SIGNIFICANT CHANGE UP (ref 24.5–35.6)
AST SERPL-CCNC: 15 U/L — SIGNIFICANT CHANGE UP (ref 15–37)
BASOPHILS # BLD AUTO: 0.03 K/UL — SIGNIFICANT CHANGE UP (ref 0–0.2)
BASOPHILS NFR BLD AUTO: 0.4 % — SIGNIFICANT CHANGE UP (ref 0–2)
BILIRUB SERPL-MCNC: 0.2 MG/DL — SIGNIFICANT CHANGE UP (ref 0.2–1.2)
BILIRUB UR-MCNC: NEGATIVE — SIGNIFICANT CHANGE UP
BUN SERPL-MCNC: 16 MG/DL — SIGNIFICANT CHANGE UP (ref 7–23)
CALCIUM SERPL-MCNC: 8.9 MG/DL — SIGNIFICANT CHANGE UP (ref 8.5–10.1)
CHLORIDE SERPL-SCNC: 111 MMOL/L — HIGH (ref 96–108)
CO2 SERPL-SCNC: 26 MMOL/L — SIGNIFICANT CHANGE UP (ref 22–31)
COLOR SPEC: YELLOW — SIGNIFICANT CHANGE UP
CREAT SERPL-MCNC: 0.82 MG/DL — SIGNIFICANT CHANGE UP (ref 0.5–1.3)
DIFF PNL FLD: NEGATIVE — SIGNIFICANT CHANGE UP
EGFR: 86 ML/MIN/1.73M2 — SIGNIFICANT CHANGE UP
EGFR: 86 ML/MIN/1.73M2 — SIGNIFICANT CHANGE UP
EOSINOPHIL # BLD AUTO: 0.07 K/UL — SIGNIFICANT CHANGE UP (ref 0–0.5)
EOSINOPHIL NFR BLD AUTO: 1 % — SIGNIFICANT CHANGE UP (ref 0–6)
GLUCOSE SERPL-MCNC: 82 MG/DL — SIGNIFICANT CHANGE UP (ref 70–99)
GLUCOSE UR QL: NEGATIVE MG/DL — SIGNIFICANT CHANGE UP
HCG SERPL-ACNC: <1 MIU/ML — SIGNIFICANT CHANGE UP
HCT VFR BLD CALC: 33.7 % — LOW (ref 34.5–45)
HGB BLD-MCNC: 10.8 G/DL — LOW (ref 11.5–15.5)
IMM GRANULOCYTES NFR BLD AUTO: 0.3 % — SIGNIFICANT CHANGE UP (ref 0–0.9)
INR BLD: 0.9 RATIO — SIGNIFICANT CHANGE UP (ref 0.85–1.16)
KETONES UR-MCNC: NEGATIVE MG/DL — SIGNIFICANT CHANGE UP
LEUKOCYTE ESTERASE UR-ACNC: ABNORMAL
LYMPHOCYTES # BLD AUTO: 2 K/UL — SIGNIFICANT CHANGE UP (ref 1–3.3)
LYMPHOCYTES # BLD AUTO: 28.4 % — SIGNIFICANT CHANGE UP (ref 13–44)
MCHC RBC-ENTMCNC: 26.5 PG — LOW (ref 27–34)
MCHC RBC-ENTMCNC: 32 G/DL — SIGNIFICANT CHANGE UP (ref 32–36)
MCV RBC AUTO: 82.6 FL — SIGNIFICANT CHANGE UP (ref 80–100)
MONOCYTES # BLD AUTO: 0.51 K/UL — SIGNIFICANT CHANGE UP (ref 0–0.9)
MONOCYTES NFR BLD AUTO: 7.3 % — SIGNIFICANT CHANGE UP (ref 2–14)
NEUTROPHILS # BLD AUTO: 4.4 K/UL — SIGNIFICANT CHANGE UP (ref 1.8–7.4)
NEUTROPHILS NFR BLD AUTO: 62.6 % — SIGNIFICANT CHANGE UP (ref 43–77)
NITRITE UR-MCNC: NEGATIVE — SIGNIFICANT CHANGE UP
NRBC BLD AUTO-RTO: 0 /100 WBCS — SIGNIFICANT CHANGE UP (ref 0–0)
PH UR: 6.5 — SIGNIFICANT CHANGE UP (ref 5–8)
PLATELET # BLD AUTO: 220 K/UL — SIGNIFICANT CHANGE UP (ref 150–400)
POTASSIUM SERPL-MCNC: 4.5 MMOL/L — SIGNIFICANT CHANGE UP (ref 3.5–5.3)
POTASSIUM SERPL-SCNC: 4.5 MMOL/L — SIGNIFICANT CHANGE UP (ref 3.5–5.3)
PROT SERPL-MCNC: 7.1 GM/DL — SIGNIFICANT CHANGE UP (ref 6–8.3)
PROT UR-MCNC: NEGATIVE MG/DL — SIGNIFICANT CHANGE UP
PROTHROM AB SERPL-ACNC: 10.5 SEC — SIGNIFICANT CHANGE UP (ref 9.9–13.4)
RBC # BLD: 4.08 M/UL — SIGNIFICANT CHANGE UP (ref 3.8–5.2)
RBC # FLD: 14.5 % — SIGNIFICANT CHANGE UP (ref 10.3–14.5)
SODIUM SERPL-SCNC: 143 MMOL/L — SIGNIFICANT CHANGE UP (ref 135–145)
SP GR SPEC: 1.01 — SIGNIFICANT CHANGE UP (ref 1–1.03)
UROBILINOGEN FLD QL: 1 MG/DL — SIGNIFICANT CHANGE UP (ref 0.2–1)
WBC # BLD: 7.03 K/UL — SIGNIFICANT CHANGE UP (ref 3.8–10.5)
WBC # FLD AUTO: 7.03 K/UL — SIGNIFICANT CHANGE UP (ref 3.8–10.5)

## 2025-04-19 PROCEDURE — 72100 X-RAY EXAM L-S SPINE 2/3 VWS: CPT

## 2025-04-19 PROCEDURE — 72220 X-RAY EXAM SACRUM TAILBONE: CPT

## 2025-04-19 PROCEDURE — 72170 X-RAY EXAM OF PELVIS: CPT

## 2025-04-19 PROCEDURE — 99213 OFFICE O/P EST LOW 20 MIN: CPT

## 2025-04-19 PROCEDURE — 99285 EMERGENCY DEPT VISIT HI MDM: CPT

## 2025-04-19 PROCEDURE — 72158 MRI LUMBAR SPINE W/O & W/DYE: CPT | Mod: 26

## 2025-04-19 RX ORDER — ACETAMINOPHEN 500 MG/5ML
1000 LIQUID (ML) ORAL ONCE
Refills: 0 | Status: COMPLETED | OUTPATIENT
Start: 2025-04-19 | End: 2025-04-19

## 2025-04-19 RX ORDER — CYCLOBENZAPRINE HYDROCHLORIDE 15 MG/1
1 CAPSULE, EXTENDED RELEASE ORAL
Qty: 15 | Refills: 0
Start: 2025-04-19 | End: 2025-04-23

## 2025-04-19 RX ADMIN — Medication 1000 MILLIGRAM(S): at 17:59

## 2025-04-19 RX ADMIN — Medication 2 MILLIGRAM(S): at 23:09

## 2025-04-19 RX ADMIN — Medication 20 MILLIGRAM(S): at 22:50

## 2025-04-19 RX ADMIN — Medication 2 MILLIGRAM(S): at 21:58

## 2025-04-19 RX ADMIN — Medication 2 MILLIGRAM(S): at 20:21

## 2025-04-19 RX ADMIN — Medication 1000 MILLIGRAM(S): at 16:49

## 2025-04-19 RX ADMIN — Medication 400 MILLIGRAM(S): at 16:34

## 2025-04-19 RX ADMIN — Medication 2 MILLIGRAM(S): at 21:00

## 2025-04-30 ENCOUNTER — APPOINTMENT (OUTPATIENT)
Dept: ORTHOPEDIC SURGERY | Facility: CLINIC | Age: 55
End: 2025-04-30

## 2025-05-02 ENCOUNTER — APPOINTMENT (OUTPATIENT)
Dept: ORTHOPEDIC SURGERY | Facility: CLINIC | Age: 55
End: 2025-05-02
Payer: COMMERCIAL

## 2025-05-02 VITALS — BODY MASS INDEX: 51.92 KG/M2 | HEIGHT: 61 IN | WEIGHT: 275 LBS

## 2025-05-02 DIAGNOSIS — M54.50 LOW BACK PAIN, UNSPECIFIED: ICD-10-CM

## 2025-05-02 PROCEDURE — 99214 OFFICE O/P EST MOD 30 MIN: CPT

## 2025-05-02 RX ORDER — BACLOFEN 10 MG/1
10 TABLET ORAL 3 TIMES DAILY
Qty: 40 | Refills: 1 | Status: ACTIVE | COMMUNITY
Start: 2025-05-02 | End: 1900-01-01

## 2025-05-06 ENCOUNTER — APPOINTMENT (OUTPATIENT)
Dept: ORTHOPEDIC SURGERY | Facility: CLINIC | Age: 55
End: 2025-05-06

## 2025-05-11 ENCOUNTER — EMERGENCY (EMERGENCY)
Facility: HOSPITAL | Age: 55
LOS: 1 days | End: 2025-05-11
Attending: EMERGENCY MEDICINE | Admitting: EMERGENCY MEDICINE
Payer: MEDICARE

## 2025-05-11 VITALS
HEIGHT: 70 IN | TEMPERATURE: 98 F | SYSTOLIC BLOOD PRESSURE: 104 MMHG | OXYGEN SATURATION: 97 % | WEIGHT: 261.91 LBS | HEART RATE: 86 BPM | DIASTOLIC BLOOD PRESSURE: 73 MMHG | RESPIRATION RATE: 18 BRPM

## 2025-05-11 VITALS
DIASTOLIC BLOOD PRESSURE: 63 MMHG | TEMPERATURE: 98 F | RESPIRATION RATE: 17 BRPM | HEART RATE: 73 BPM | SYSTOLIC BLOOD PRESSURE: 111 MMHG | OXYGEN SATURATION: 100 %

## 2025-05-11 DIAGNOSIS — K56.60 UNSPECIFIED INTESTINAL OBSTRUCTION: Chronic | ICD-10-CM

## 2025-05-11 DIAGNOSIS — Z98.89 OTHER SPECIFIED POSTPROCEDURAL STATES: Chronic | ICD-10-CM

## 2025-05-11 DIAGNOSIS — Z98.51 TUBAL LIGATION STATUS: Chronic | ICD-10-CM

## 2025-05-11 LAB
ALBUMIN SERPL ELPH-MCNC: 3.6 G/DL — SIGNIFICANT CHANGE UP (ref 3.3–5)
ALP SERPL-CCNC: 104 U/L — SIGNIFICANT CHANGE UP (ref 40–120)
ALT FLD-CCNC: 19 U/L — SIGNIFICANT CHANGE UP (ref 4–33)
ANION GAP SERPL CALC-SCNC: 11 MMOL/L — SIGNIFICANT CHANGE UP (ref 7–14)
APPEARANCE UR: CLEAR — SIGNIFICANT CHANGE UP
AST SERPL-CCNC: 34 U/L — HIGH (ref 4–32)
BASOPHILS # BLD AUTO: 0 K/UL — SIGNIFICANT CHANGE UP (ref 0–0.2)
BASOPHILS NFR BLD AUTO: 0 % — SIGNIFICANT CHANGE UP (ref 0–2)
BILIRUB SERPL-MCNC: 0.4 MG/DL — SIGNIFICANT CHANGE UP (ref 0.2–1.2)
BILIRUB UR-MCNC: NEGATIVE — SIGNIFICANT CHANGE UP
BLOOD GAS VENOUS COMPREHENSIVE RESULT: SIGNIFICANT CHANGE UP
BUN SERPL-MCNC: 13 MG/DL — SIGNIFICANT CHANGE UP (ref 7–23)
CALCIUM SERPL-MCNC: 8.6 MG/DL — SIGNIFICANT CHANGE UP (ref 8.4–10.5)
CHLORIDE SERPL-SCNC: 104 MMOL/L — SIGNIFICANT CHANGE UP (ref 98–107)
CO2 SERPL-SCNC: 22 MMOL/L — SIGNIFICANT CHANGE UP (ref 22–31)
COLOR SPEC: YELLOW — SIGNIFICANT CHANGE UP
CREAT SERPL-MCNC: 0.78 MG/DL — SIGNIFICANT CHANGE UP (ref 0.5–1.3)
DIFF PNL FLD: NEGATIVE — SIGNIFICANT CHANGE UP
EGFR: 91 ML/MIN/1.73M2 — SIGNIFICANT CHANGE UP
EGFR: 91 ML/MIN/1.73M2 — SIGNIFICANT CHANGE UP
EOSINOPHIL # BLD AUTO: 0.09 K/UL — SIGNIFICANT CHANGE UP (ref 0–0.5)
EOSINOPHIL NFR BLD AUTO: 1.8 % — SIGNIFICANT CHANGE UP (ref 0–6)
GLUCOSE SERPL-MCNC: 85 MG/DL — SIGNIFICANT CHANGE UP (ref 70–99)
GLUCOSE UR QL: NEGATIVE MG/DL — SIGNIFICANT CHANGE UP
HCT VFR BLD CALC: 31.3 % — LOW (ref 34.5–45)
HGB BLD-MCNC: 10.1 G/DL — LOW (ref 11.5–15.5)
IANC: 1.63 K/UL — LOW (ref 1.8–7.4)
KETONES UR-MCNC: NEGATIVE MG/DL — SIGNIFICANT CHANGE UP
LEUKOCYTE ESTERASE UR-ACNC: ABNORMAL
LIDOCAIN IGE QN: 49 U/L — SIGNIFICANT CHANGE UP (ref 7–60)
LYMPHOCYTES # BLD AUTO: 1.15 K/UL — SIGNIFICANT CHANGE UP (ref 1–3.3)
LYMPHOCYTES # BLD AUTO: 21.9 % — SIGNIFICANT CHANGE UP (ref 13–44)
MCHC RBC-ENTMCNC: 26.2 PG — LOW (ref 27–34)
MCHC RBC-ENTMCNC: 32.3 G/DL — SIGNIFICANT CHANGE UP (ref 32–36)
MCV RBC AUTO: 81.1 FL — SIGNIFICANT CHANGE UP (ref 80–100)
MONOCYTES # BLD AUTO: 0.32 K/UL — SIGNIFICANT CHANGE UP (ref 0–0.9)
MONOCYTES NFR BLD AUTO: 6.1 % — SIGNIFICANT CHANGE UP (ref 2–14)
NEUTROPHILS # BLD AUTO: 2.25 K/UL — SIGNIFICANT CHANGE UP (ref 1.8–7.4)
NEUTROPHILS NFR BLD AUTO: 43 % — SIGNIFICANT CHANGE UP (ref 43–77)
NITRITE UR-MCNC: POSITIVE
PH UR: 6.5 — SIGNIFICANT CHANGE UP (ref 5–8)
PLATELET # BLD AUTO: 153 K/UL — SIGNIFICANT CHANGE UP (ref 150–400)
POTASSIUM SERPL-MCNC: 3.7 MMOL/L — SIGNIFICANT CHANGE UP (ref 3.5–5.3)
POTASSIUM SERPL-SCNC: 3.7 MMOL/L — SIGNIFICANT CHANGE UP (ref 3.5–5.3)
PROT SERPL-MCNC: 6.3 G/DL — SIGNIFICANT CHANGE UP (ref 6–8.3)
PROT UR-MCNC: NEGATIVE MG/DL — SIGNIFICANT CHANGE UP
RBC # BLD: 3.86 M/UL — SIGNIFICANT CHANGE UP (ref 3.8–5.2)
RBC # FLD: 15.7 % — HIGH (ref 10.3–14.5)
SODIUM SERPL-SCNC: 137 MMOL/L — SIGNIFICANT CHANGE UP (ref 135–145)
SP GR SPEC: 1.01 — SIGNIFICANT CHANGE UP (ref 1–1.03)
UROBILINOGEN FLD QL: 1 MG/DL — SIGNIFICANT CHANGE UP (ref 0.2–1)
WBC # BLD: 5.23 K/UL — SIGNIFICANT CHANGE UP (ref 3.8–10.5)
WBC # FLD AUTO: 5.23 K/UL — SIGNIFICANT CHANGE UP (ref 3.8–10.5)

## 2025-05-11 PROCEDURE — 99285 EMERGENCY DEPT VISIT HI MDM: CPT

## 2025-05-11 PROCEDURE — 74177 CT ABD & PELVIS W/CONTRAST: CPT | Mod: 26

## 2025-05-11 PROCEDURE — 93010 ELECTROCARDIOGRAM REPORT: CPT

## 2025-05-11 RX ORDER — CEFDINIR 250 MG/5ML
1 POWDER, FOR SUSPENSION ORAL
Qty: 14 | Refills: 0
Start: 2025-05-11 | End: 2025-05-17

## 2025-05-11 RX ORDER — ACETAMINOPHEN 500 MG/5ML
1000 LIQUID (ML) ORAL ONCE
Refills: 0 | Status: COMPLETED | OUTPATIENT
Start: 2025-05-11 | End: 2025-05-11

## 2025-05-11 RX ORDER — IOHEXOL 350 MG/ML
30 INJECTION, SOLUTION INTRAVENOUS ONCE
Refills: 0 | Status: COMPLETED | OUTPATIENT
Start: 2025-05-11 | End: 2025-05-11

## 2025-05-11 RX ORDER — ONDANSETRON HCL/PF 4 MG/2 ML
4 VIAL (ML) INJECTION ONCE
Refills: 0 | Status: COMPLETED | OUTPATIENT
Start: 2025-05-11 | End: 2025-05-11

## 2025-05-11 RX ORDER — CEFTRIAXONE 500 MG/1
1000 INJECTION, POWDER, FOR SOLUTION INTRAMUSCULAR; INTRAVENOUS ONCE
Refills: 0 | Status: COMPLETED | OUTPATIENT
Start: 2025-05-11 | End: 2025-05-11

## 2025-05-11 RX ADMIN — Medication 4 MILLIGRAM(S): at 11:56

## 2025-05-11 RX ADMIN — Medication 4 MILLIGRAM(S): at 14:51

## 2025-05-11 RX ADMIN — CEFTRIAXONE 100 MILLIGRAM(S): 500 INJECTION, POWDER, FOR SOLUTION INTRAMUSCULAR; INTRAVENOUS at 14:07

## 2025-05-11 RX ADMIN — IOHEXOL 30 MILLILITER(S): 350 INJECTION, SOLUTION INTRAVENOUS at 11:53

## 2025-05-11 RX ADMIN — Medication 1000 MILLILITER(S): at 11:54

## 2025-05-11 RX ADMIN — Medication 4 MILLIGRAM(S): at 11:53

## 2025-05-11 RX ADMIN — Medication 20 MILLIGRAM(S): at 11:53

## 2025-05-11 RX ADMIN — Medication 400 MILLIGRAM(S): at 11:53

## 2025-05-11 NOTE — ED PROVIDER NOTE - ATTENDING APP SHARED VISIT CONTRIBUTION OF CARE
54F p/w abd pain RLQ x 3 days.  Pt has had R hip pain x 2-3 weeks.  Pt went to GI Dr few days ago, concerned for hernia, scheduled for OP colonoscopy.  H/o SBO in the past, multiple abd surgeries including gastric bypass, , CCY.  Still has appx.  (+)Flatus, having BM.  Nausea no vomiting.  No fever.  RUQ and RLQ ttp.  Abd nondistended and soft.  Rx fluids and IV tylenol. Check labs and CT with PO and IV contrast.  Surgical eval.  Dispo depending on findings and clinical course.    VS:  unremarkable    GEN - NAD;   well appearing;   A+O x3   HEAD - NC/AT     ENT - PEERL, EOMI, mucous membranes    moist , no discharge      NECK: Neck supple, non-tender without lymphadenopathy, no masses, no JVD  PULM - CTA b/l,  symmetric breath sounds  COR -  normal heart sounds    ABD - , ND, periumbilical and LLQ ttp, soft,  BACK - no CVA tenderness, nontender spine     EXTREMS - no edema, no deformity, warm and well perfused    SKIN - no rash    or bruising      NEUROLOGIC - alert, face symmetric, speech fluent, sensation nl, motor no focal deficit.

## 2025-05-11 NOTE — ED PROVIDER NOTE - CARE PLAN
Principal Discharge DX:	Acute UTI   1 Principal Discharge DX:	Acute UTI  Secondary Diagnosis:	Abdominal pain  Secondary Diagnosis:	H/O gastric bypass

## 2025-05-11 NOTE — ED PROVIDER NOTE - PATIENT PORTAL LINK FT
You can access the FollowMyHealth Patient Portal offered by [facility] by registering at the following website: [email or web address]. By joining [employer]’s FollowMyHealth portal, you will also be able to view your health information using other applications (apps) compatible with our system.

## 2025-05-11 NOTE — CONSULT NOTE ADULT - SUBJECTIVE AND OBJECTIVE BOX
*** SURGERY CONSULT NOTE    Consulting Team: Surgery     Patient: VI PETERSON , 54y (74)Female   MRN: [id number]  Location: [facility] ED  Visit: 25 Emergency  Date: 25 @ 16:21      HPI: 52F PMHx depression, anxiety, IBS, fibromyalgia, chronic LBP, SBO s/p ex-lap and ALEXSANDER (), gastric bypass presenting with 3xday hx of vague abd pain. She reports previously having 2-weeks of diarrhea with associated abd pain/hip pain, the diarrhea resolved and she is passing gas but last BM was Wednesday. She otherwise denies subjective f/c, vomiting, CP, SOB, or dysuria. She states that she is following up with her GI doctor and is scheduled for an colonoscopy in approximately 2 weeks. In the ED AVSS, labs unremarkable, +UA, and CT without evidence of obstruction or leak. Surgery consulted to assess.         PAST MEDICAL HISTORY:  Anxiety    Depression    Post traumatic stress disorder    IBS (irritable bowel syndrome)    Anemia    Fibromyalgia    GERD (gastroesophageal reflux disease)    Anxiety    Abnormal uterine bleeding    Chronic lower back pain        PAST SURGICAL HISTORY:  Depression    H/O gastric bypass    H/O abdominoplasty    H/O  section    Bowel obstruction    H/O tubal ligation        MEDICATIONS:      ALLERGIES:  penicillin (Other)  NSAIDs (Other)  Tolerates cephalexin (Other)  sulfa drugs (Anaphylaxis)  aspirin (Other)      VITALS & I/Os:  Vital Signs Last 24 Hrs  T(C): 36.3 (11 May 2025 11:02), Max: 36.4 (11 May 2025 10:24)  T(F): 97.4 (11 May 2025 11:02), Max: 97.6 (11 May 2025 10:24)  HR: 73 (11 May 2025 14:35) (70 - 86)  BP: 113/60 (11 May 2025 14:35) (104/73 - 113/60)  BP(mean): --  RR: 17 (11 May 2025 14:35) (17 - 18)  SpO2: 97% (11 May 2025 14:35) (97% - 97%)    Parameters below as of 11 May 2025 14:35  Patient On (Oxygen Delivery Method): room air        I&O's Summary      PHYSICAL EXAM:  General Appearance: no acute distress, NTND   Chest: airway intact, non-labored breathing  CV: no JVD, palpable pulses b/l  Abdomen: soft, non-distended, mild periumbilical TTP  Extremities: [facility]     LABS:                        10.1   5.23  )-----------( 153      ( 11 May 2025 11:45 )             31.3         137  |  104  |  13  ----------------------------<  85  3.7   |  22  |  0.78    Ca    8.6      11 May 2025 11:45    TPro  6.3  /  Alb  3.6  /  TBili  0.4  /  DBili  x   /  AST  34[H]  /  ALT  19  /  AlkPhos  104      Lactate:  0511 @ 11:45  0.9              Urinalysis Basic - ( 11 May 2025 13:18 )    Color: Yellow / Appearance: Clear / S.009 / pH: x  Gluc: x / Ketone: Negative mg/dL  / Bili: Negative / Urobili: 1.0 mg/dL   Blood: x / Protein: Negative mg/dL / Nitrite: Positive   Leuk Esterase: Moderate / RBC: 0 /HPF / WBC 15 /HPF   Sq Epi: x / Non Sq Epi: 5 /HPF / Bacteria: Many /HPF          IMAGING:  FINDINGS:  LOWER CHEST: Within normal limits.    LIVER: 1.6 cm right hepatic lobe cyst. Additional subcentimeter   hypodensities, which are too small to characterize.  BILE DUCTS: Mild intra and extrahepatic biliary ductal dilatation with   distal common bile duct measuring up to 1.4 cm, similar to prior and   likely related to postcholecystectomy state.  GALLBLADDER: Cholecystectomy.  SPLEEN: The spleen is enlarged measuring approximately 17.0 cm in   craniocaudal of 17.8 cm in AP length.  PANCREAS: Fatty atrophy of the pancreatic head and neck.  ADRENALS: Within normal limits.  KIDNEYS/URETERS: Right renal cyst. Left parapelvic cyst versus extrarenal   pelvis. No hydronephrosis or urinary tract calculi.    BLADDER: Within normal limits.  REPRODUCTIVE ORGANS: Uterus and adnexa within normal limits.    BOWEL: Status post Jaron-en-Y gastric bypass with retrocolic Jaron limb.   Left lower quadrant jejunojejunal anastomosis with patulous dilatation of   the proximal jejunum, similar to prior. Oral contrast reaches the level   of the mid small bowel. No bowel obstruction. The colon appears to be   mildly thick-walled, though possibly due to underdistention. Mild rectal   stool burden. Appendix is normal.  PERITONEUM/RETROPERITONEUM: Within normal limits.  VESSELS: Within normal limits.  LYMPH NODES: There are numerous small mesenteric lymph nodes that are   likely reactive. No pathologically enlarged nodes identified.  ABDOMINAL WALL: Postsurgical changes.  BONES: Degenerative changes of the spine, particularly at L5-S1.    IMPRESSION:  No acute pathology in the abdomen or pelvis.    Postoperative changes related to gastric bypass.    Splenomegaly.    --- End of Report ---

## 2025-05-11 NOTE — ED PROVIDER NOTE - NSFOLLOWUPINSTRUCTIONS_ED_ALL_ED_FT
Rest, drink plenty of fluids.  Advance activity as tolerated.  Continue all previously prescribed medications as directed.  Follow up with your primary care physician in 48-72 hours- bring copies of your results.  Return to the ER for worsening or persistent symptoms, and/or ANY NEW OR CONCERNING SYMPTOMS. If you have issues obtaining follow up, please call: [phone] to obtain a doctor or specialist who takes your insurance in your area.  You may call [phone] to make an appointment with the internal medicine clinic.

## 2025-05-11 NOTE — ED PROVIDER NOTE - CLINICAL SUMMARY MEDICAL DECISION MAKING FREE TEXT BOX
This is a 54-year-old female with a past medical history of anxiety, depression, chronic lower back pain,, fibromyalgia, IBS, history of SBO, history of abdominoplasty history of gastric bypass.  Presented to the ED complaining having abdominal pain in the right lower abdomen for the past 2 to 3 days. Abdominal pain w/ history of multiple abdominal surgery, will do labs, fluids, pain control and ct abap, patient requesting Morphine will start with iv tylenol. Bree had MRI of lumbar a few weeks prior.

## 2025-05-11 NOTE — ED ADULT NURSE NOTE - NSFALLRISKINTERV_ED_ALL_ED
Assistance OOB with selected safe patient handling equipment if applicable/Assistance with ambulation/Communicate fall risk and risk factors to all staff, patient, and family/Encourage patient to sit up slowly, dangle for a short time, stand at bedside before walking/Monitor gait and stability/Orthostatic vital signs/Provide patient with walking aids/Provide visual cue: yellow wristband, yellow gown, etc/Reinforce activity limits and safety measures with patient and family/Call bell, personal items and telephone in reach/Instruct patient to call for assistance before getting out of bed/chair/stretcher/Non-slip footwear applied when patient is off stretcher/[place] to call system/Physically safe environment - no spills, clutter or unnecessary equipment/Purposeful Proactive Rounding/Room/bathroom lighting operational, light cord in reach

## 2025-05-11 NOTE — CONSULT NOTE ADULT - ASSESSMENT
52F PMHx depression, anxiety, IBS, fibromyalgia, chronic LBP, SBO s/p ex-lap and ALEXSANDER (2014), gastric bypass presenting with 3xday hx of vague abd pain. Of note patient with similar presentations to ED in the past, she is otherwise not clinically obstructed and imaging showing no acute pathology.    Rec  - no acute surgical intervention, labs WNL and imaging without acute findings  - rec aggressive bowel reg in ED for moderate stool burden in rectal vault on imaging and reports last BM ~4-5 days ago  - rec GI stool PCR   - Abx for +UA  - rec continued f/u with GI and planned colonoscopy  - PO challenge  - Dispo per ED    B Team [id number]  Justo Ying MD (PGY2)

## 2025-05-11 NOTE — ED ADULT TRIAGE NOTE - CHIEF COMPLAINT QUOTE
increased rlq ,r groin area pain since wed. nausea, denies vomiting. states no bm since wed-- preceded by 2 wks diarrhea /  pmh- multiple abd  surgery  ,fibromyalgia, anxiety.  appears uncomfortable at triage

## 2025-05-11 NOTE — ED ADULT NURSE REASSESSMENT NOTE - NS ED NURSE REASSESS COMMENT FT1
Received report from jose maria Sy. Pt laying in stretcher. NAD. Pt endorses right lower quadrant discomfort. Pt denies chest pain, SOB, palpitations, dizziness, weakness, N/V/D, at the time. AxOx4. RR even and unlabored on room air. Pt on cardiac monitor, NSR. Abdomen soft, nondistended, and (+) TTP right lower quadrant. Safety maintained.

## 2025-05-11 NOTE — ED PROVIDER NOTE - NSICDXFAMILYHX_GEN_ALL_CORE_FT
Here for radiation therapy consultation. Independent with ADL and lives with spouse. Per patient-no history of radiation therapy.Work schedule would be a barrier for daily radiation therapy and prefers last appt of the day d/t work schedule.  Denies pain to nose at this time and highest 3/10 with touch.  Eye Problem(s):glasses or contacts  ENT Problem(s):deafness and tinnitus  Cardiovascular problem(s):negative  Respiratory problem(s):phlegm  Gastro-intestinal problem(s):stomach or duodenal ulcer  Genito-urinary problem(s):nocturia, kidney stone  Musculoskeletal problem(s):back pain when has a cold  Integumentary problem(s):negative  Neurological problem(s):negative  Psychiatric problem(s):negative  Endocrine problem(s):negative  Hematologic and/or Lymphatic problem(s):allergies       FAMILY HISTORY:  Father  Still living? Unknown  FH: diabetes mellitus, Age at diagnosis: Age Unknown  FH: rheumatoid arthritis, Age at diagnosis: Age Unknown

## 2025-05-11 NOTE — ED PROVIDER NOTE - ABDOMINAL TENDER
tenderness in the ruq and RLQ, no guarding or rigidity. no obvious distention. no rashes./right upper quadrant/right lower quadrant

## 2025-05-11 NOTE — ED ADULT NURSE NOTE - OBJECTIVE STATEMENT
54 year old female c/o 10/10, cramping, sore, continuous, reproducible, right groin pain radiating to the right lower quadrant of abdomen x 2 weeks. Pt states their GI physician recommended pt to come to ED for a possible hernia. Pt endorses nausea but denies any episodes of emesis. Pt endorses slight dizziness at this time with increased weakness. Pt states additional episodes of sweating. Pt states last bowel movement was on 5/7 and last episode of flatulence was 5/10. Pt denies any chest pain, SOB, palpitations, headache, diarrhea, or urinary symptoms at this time.     AxOx4. RR even and unlabored on room air. Lung sounds clear to auscultation in all fields. Pt on cardiac monitor, NSR. Abdomen soft, nondistended, and (+) TTP on right lower quadrant and right groin region. Normoactive bowel sounds heard in all 4 quadrants. Safety maintained.

## 2025-05-14 RX ORDER — CIPROFLOXACIN HCL 250 MG
1 TABLET ORAL
Qty: 14 | Refills: 0
Start: 2025-05-14 | End: 2025-05-20

## 2025-05-19 ENCOUNTER — APPOINTMENT (OUTPATIENT)
Dept: OBGYN | Facility: CLINIC | Age: 55
End: 2025-05-19

## 2025-05-28 ENCOUNTER — APPOINTMENT (OUTPATIENT)
Dept: PAIN MANAGEMENT | Facility: CLINIC | Age: 55
End: 2025-05-28

## 2025-06-07 ENCOUNTER — INPATIENT (INPATIENT)
Facility: HOSPITAL | Age: 55
LOS: 5 days | Discharge: HOME | DRG: 392 | End: 2025-06-13
Attending: INTERNAL MEDICINE | Admitting: STUDENT IN AN ORGANIZED HEALTH CARE EDUCATION/TRAINING PROGRAM
Payer: COMMERCIAL

## 2025-06-07 VITALS
DIASTOLIC BLOOD PRESSURE: 79 MMHG | HEIGHT: 70 IN | SYSTOLIC BLOOD PRESSURE: 111 MMHG | OXYGEN SATURATION: 96 % | TEMPERATURE: 98 F | RESPIRATION RATE: 18 BRPM | HEART RATE: 81 BPM | WEIGHT: 257.94 LBS

## 2025-06-07 DIAGNOSIS — Z98.89 OTHER SPECIFIED POSTPROCEDURAL STATES: Chronic | ICD-10-CM

## 2025-06-07 DIAGNOSIS — Z98.51 TUBAL LIGATION STATUS: Chronic | ICD-10-CM

## 2025-06-07 DIAGNOSIS — K56.60 UNSPECIFIED INTESTINAL OBSTRUCTION: Chronic | ICD-10-CM

## 2025-06-07 LAB
ALBUMIN SERPL ELPH-MCNC: 4.1 G/DL — SIGNIFICANT CHANGE UP (ref 3.3–5)
ALP SERPL-CCNC: 100 U/L — SIGNIFICANT CHANGE UP (ref 40–120)
ALT FLD-CCNC: 22 U/L — SIGNIFICANT CHANGE UP (ref 10–45)
ANION GAP SERPL CALC-SCNC: 14 MMOL/L — SIGNIFICANT CHANGE UP (ref 5–17)
APPEARANCE UR: CLEAR — SIGNIFICANT CHANGE UP
AST SERPL-CCNC: 35 U/L — SIGNIFICANT CHANGE UP (ref 10–40)
BASOPHILS # BLD AUTO: 0.03 K/UL — SIGNIFICANT CHANGE UP (ref 0–0.2)
BASOPHILS NFR BLD AUTO: 0.5 % — SIGNIFICANT CHANGE UP (ref 0–2)
BILIRUB SERPL-MCNC: 0.4 MG/DL — SIGNIFICANT CHANGE UP (ref 0.2–1.2)
BILIRUB UR-MCNC: NEGATIVE — SIGNIFICANT CHANGE UP
BUN SERPL-MCNC: 18 MG/DL — SIGNIFICANT CHANGE UP (ref 7–23)
CALCIUM SERPL-MCNC: 9.1 MG/DL — SIGNIFICANT CHANGE UP (ref 8.4–10.5)
CHLORIDE SERPL-SCNC: 108 MMOL/L — SIGNIFICANT CHANGE UP (ref 96–108)
CO2 SERPL-SCNC: 19 MMOL/L — LOW (ref 22–31)
COLOR SPEC: YELLOW — SIGNIFICANT CHANGE UP
CREAT SERPL-MCNC: 0.88 MG/DL — SIGNIFICANT CHANGE UP (ref 0.5–1.3)
DIFF PNL FLD: NEGATIVE — SIGNIFICANT CHANGE UP
EGFR: 79 ML/MIN/1.73M2 — SIGNIFICANT CHANGE UP
EGFR: 79 ML/MIN/1.73M2 — SIGNIFICANT CHANGE UP
EOSINOPHIL # BLD AUTO: 0.16 K/UL — SIGNIFICANT CHANGE UP (ref 0–0.5)
EOSINOPHIL NFR BLD AUTO: 2.5 % — SIGNIFICANT CHANGE UP (ref 0–6)
GAS PNL BLDV: SIGNIFICANT CHANGE UP
GLUCOSE SERPL-MCNC: 86 MG/DL — SIGNIFICANT CHANGE UP (ref 70–99)
GLUCOSE UR QL: NEGATIVE MG/DL — SIGNIFICANT CHANGE UP
HCG SERPL-ACNC: <2 MIU/ML — SIGNIFICANT CHANGE UP
HCT VFR BLD CALC: 36.3 % — SIGNIFICANT CHANGE UP (ref 34.5–45)
HGB BLD-MCNC: 11.1 G/DL — LOW (ref 11.5–15.5)
IMM GRANULOCYTES NFR BLD AUTO: 0.9 % — SIGNIFICANT CHANGE UP (ref 0–0.9)
KETONES UR QL: NEGATIVE MG/DL — SIGNIFICANT CHANGE UP
LEUKOCYTE ESTERASE UR-ACNC: NEGATIVE — SIGNIFICANT CHANGE UP
LIDOCAIN IGE QN: 21 U/L — SIGNIFICANT CHANGE UP (ref 7–60)
LYMPHOCYTES # BLD AUTO: 2.63 K/UL — SIGNIFICANT CHANGE UP (ref 1–3.3)
LYMPHOCYTES # BLD AUTO: 41 % — SIGNIFICANT CHANGE UP (ref 13–44)
MCHC RBC-ENTMCNC: 26.3 PG — LOW (ref 27–34)
MCHC RBC-ENTMCNC: 30.6 G/DL — LOW (ref 32–36)
MCV RBC AUTO: 86 FL — SIGNIFICANT CHANGE UP (ref 80–100)
MONOCYTES # BLD AUTO: 0.76 K/UL — SIGNIFICANT CHANGE UP (ref 0–0.9)
MONOCYTES NFR BLD AUTO: 11.9 % — SIGNIFICANT CHANGE UP (ref 2–14)
NEUTROPHILS # BLD AUTO: 2.77 K/UL — SIGNIFICANT CHANGE UP (ref 1.8–7.4)
NEUTROPHILS NFR BLD AUTO: 43.2 % — SIGNIFICANT CHANGE UP (ref 43–77)
NITRITE UR-MCNC: NEGATIVE — SIGNIFICANT CHANGE UP
NRBC BLD AUTO-RTO: 0 /100 WBCS — SIGNIFICANT CHANGE UP (ref 0–0)
OB PNL STL: NEGATIVE — SIGNIFICANT CHANGE UP
PH UR: 5.5 — SIGNIFICANT CHANGE UP (ref 5–8)
PLATELET # BLD AUTO: 184 K/UL — SIGNIFICANT CHANGE UP (ref 150–400)
POTASSIUM SERPL-MCNC: 3.7 MMOL/L — SIGNIFICANT CHANGE UP (ref 3.5–5.3)
POTASSIUM SERPL-SCNC: 3.7 MMOL/L — SIGNIFICANT CHANGE UP (ref 3.5–5.3)
PROT SERPL-MCNC: 6.7 G/DL — SIGNIFICANT CHANGE UP (ref 6–8.3)
PROT UR-MCNC: NEGATIVE MG/DL — SIGNIFICANT CHANGE UP
RBC # BLD: 4.22 M/UL — SIGNIFICANT CHANGE UP (ref 3.8–5.2)
RBC # FLD: 17.6 % — HIGH (ref 10.3–14.5)
SODIUM SERPL-SCNC: 141 MMOL/L — SIGNIFICANT CHANGE UP (ref 135–145)
SP GR SPEC: >1.03 — HIGH (ref 1–1.03)
UROBILINOGEN FLD QL: 0.2 MG/DL — SIGNIFICANT CHANGE UP (ref 0.2–1)
WBC # BLD: 6.41 K/UL — SIGNIFICANT CHANGE UP (ref 3.8–10.5)
WBC # FLD AUTO: 6.41 K/UL — SIGNIFICANT CHANGE UP (ref 3.8–10.5)

## 2025-06-07 PROCEDURE — 99285 EMERGENCY DEPT VISIT HI MDM: CPT

## 2025-06-07 PROCEDURE — 74177 CT ABD & PELVIS W/CONTRAST: CPT | Mod: 26

## 2025-06-07 RX ORDER — ONDANSETRON HCL/PF 4 MG/2 ML
4 VIAL (ML) INJECTION ONCE
Refills: 0 | Status: COMPLETED | OUTPATIENT
Start: 2025-06-07 | End: 2025-06-07

## 2025-06-07 RX ORDER — B1/B2/B3/B5/B6/B12/VIT C/FOLIC 500-0.5 MG
1 TABLET ORAL ONCE
Refills: 0 | Status: DISCONTINUED | OUTPATIENT
Start: 2025-06-07 | End: 2025-06-07

## 2025-06-07 RX ORDER — B1/B2/B3/B5/B6/B12/VIT C/FOLIC 500-0.5 MG
1 TABLET ORAL ONCE
Refills: 0 | Status: COMPLETED | OUTPATIENT
Start: 2025-06-07 | End: 2025-06-07

## 2025-06-07 RX ORDER — FOLIC ACID 1 MG/1
1 TABLET ORAL ONCE
Refills: 0 | Status: COMPLETED | OUTPATIENT
Start: 2025-06-07 | End: 2025-06-07

## 2025-06-07 RX ADMIN — Medication 4 MILLIGRAM(S): at 18:03

## 2025-06-07 RX ADMIN — Medication 4 MILLIGRAM(S): at 19:57

## 2025-06-07 RX ADMIN — Medication 103 MILLIGRAM(S): at 17:06

## 2025-06-07 RX ADMIN — Medication 3 MILLIGRAM(S): at 17:05

## 2025-06-07 RX ADMIN — Medication 4 MILLIGRAM(S): at 21:11

## 2025-06-07 RX ADMIN — Medication 4 MILLIGRAM(S): at 21:12

## 2025-06-07 RX ADMIN — FOLIC ACID 1 MILLIGRAM(S): 1 TABLET ORAL at 17:06

## 2025-06-07 RX ADMIN — Medication 1000 MILLILITER(S): at 17:06

## 2025-06-07 RX ADMIN — Medication 1 TABLET(S): at 17:05

## 2025-06-07 NOTE — ED PROVIDER NOTE - ATTENDING CONTRIBUTION TO CARE
I, Serjio Potts MD, Emergency Medicine Attending Physician, personally saw and examined the patient and I personally made/approve the management plan and take responsibility for the patient management.    MDM: 54-year-old female with history of anxiety, depression, fibromyalgia, IBS, SBO, abdominoplasty, gastric bypass 2005, anxiety, depression, who presents with right lower quadrant abdominal pain that has been present for the last 3 months, but worsened today, associated with a lump that she feels on palpation.  Patient has also been having bloody stool for last 1 month and has been seen by GI and had EGD and colonoscopy 2 weeks ago at [facility] (found to have hemorrhoids).  Patient started to have dark tarry stool for the last 2 days.    ROS: denies trauma, fevers, chills, chest pain, shortness of breath, flank pain, back pain, nausea, vomiting, diarrhea, constipation, obstipation, dysuria, hematuria, urinary frequency, vaginal bleeding    On examination, patient with stable vitals, nontoxic-appearing. Cardiac examination RRR, lungs CTAB with no W/R/R.  ABD: Abdomen with generalized tenderness that is worse in the right lower quadrant and small palpable lump, but otherwise soft, non-distended, no rebound, no guarding, no rigidity. No CVA tenderness..  Neurovascularly intact in all 4 extremities.  Cranial nerves III-XII intact, no pronator drift, normal speech and gait.resident, chaperoned by female nurse performed by  chaperoned rectal examination showed no gross blood or melena.    Will send FOBT to eval for occult blood.  Will obtain labs to evaluate for hematologic disorder, metabolic derangements, hepatic and renal function, and screen for infection.  Will obtain CT Abd/Pelv to assess for acute intraabdominal surgical and infectious pathology.  IV fluids, antiemetic and banana bag. I, Serjio Potts MD, Emergency Medicine Attending Physician, personally saw and examined the patient and I personally made/approve the management plan and take responsibility for the patient management.    MDM: 54-year-old female with history of anxiety, depression, fibromyalgia, IBS, SBO, abdominoplasty, gastric bypass 2005, anxiety, depression, who presents with right lower quadrant abdominal pain that has been present for the last 3 months, but worsened today, associated with a lump that she feels on palpation.  Patient has also been having bloody stool for last 1 month and has been seen by GI and had EGD and colonoscopy 2 weeks ago at [facility] (found to have hemorrhoids).  Patient started to have dark tarry stool for the last 2 days.    ROS: denies trauma, fevers, chills, chest pain, shortness of breath, flank pain, back pain, nausea, vomiting, diarrhea, constipation, obstipation, dysuria, hematuria, urinary frequency, vaginal bleeding    On examination, patient with stable vitals, nontoxic-appearing. Cardiac examination RRR, lungs CTAB with no W/R/R.  ABD: Abdomen with generalized tenderness that is worse in the right lower quadrant and small palpable lump, but otherwise soft, non-distended, no rebound, no guarding, no rigidity. No CVA tenderness..  Neurovascularly intact in all 4 extremities.  Cranial nerves III-XII intact, no pronator drift, normal speech and gait.resident, chaperoned by female nurse performed by  chaperoned rectal examination showed no gross blood or melena.    Will send FOBT to eval for occult blood.  Will obtain labs to evaluate for hematologic disorder, metabolic derangements, hepatic and renal function, and screen for infection.  Will obtain CT Abd/Pelv to assess for acute intraabdominal surgical and infectious pathology.  IV fluids, antiemetic and banana bag.    Labs reviewed, nonactionable, CT abdomen with no acute intra-abdominal pathology.  Bariatric surgery was consulted, saw patient deemed no acute surgical intervention.  However patient still in pain and had received multiple doses of morphine.  Signed out at 2300 pending pain control and reassessment, possible admission for pain control.

## 2025-06-07 NOTE — ED ADULT NURSE NOTE - OBJECTIVE STATEMENT
54 y.o female, A&Ox4, PMH anxiety, depression, fibromyalgia, IBS, pt presents to ED c/o abdominal pain. pt states she has been having x3 months of abdominal pain and states she has been having on and off blood in stool during that time, but states more recently noticed more blood in stool. pt endorses her abdominal pain to be primarily on the RLQ. pt states she recently had a colonoscopy which pt states showed intestinal inflammation, hemorrhoids, fissures. pt states she gets out of breath and tired after exertion. pt denies N/V/D, fevers, chills, chest pain.  IV access established, pt safety and comfort provided.

## 2025-06-07 NOTE — ED PROVIDER NOTE - CLINICAL SUMMARY MEDICAL DECISION MAKING FREE TEXT BOX
54-year-old female past medical history of fibromyalgia, IBS, depression, Jaron-en-Y performed in 2005, SBO's in the past, incarcerated hernias presenting due to right lower quadrant abdominal pain.  Patient states been having this pain for the past 3 months and had some workup and came back negative.  Patient states the pain is worsened today and she is noticed a lump in the right lower quadrant.  Denies any vomiting, but states she had bloody stools for the past month which turned into melena the past 2 days.  No fevers, cough, congestion, shortness of breath, chest pain.    Patient appears to be in distress in the emergency department.  AO x 3 and afebrile.  Hemodynamically stable.  Lungs clear to auscultation bilaterally.  Heart has normal rate and rhythm.  Patient has diffuse abdominal tenderness mostly localized to right lower quadrant with mild lump present which appears to be a fat-containing hernia.  Will order CBC, CMP, VBG, lipase, CT abdomen pelvis with IV and p.o. contrast to evaluate for any signs of intra-abdominal pathology such as SBO, incarcerated hernia, appendicitis, diverticulitis.

## 2025-06-07 NOTE — ED ADULT NURSE REASSESSMENT NOTE - NS ED NURSE REASSESS COMMENT FT1
Report received from Ina ECHOLS RN. Pt AXOX4 breathing unlabored on room air and speaking in complete sentences. Pt updated on plan of care; awaiting UA/UC. Safety and comfort measures maintained. Bed in lowest position. Bed rails in appropriate positions. Call bell within reach.

## 2025-06-08 DIAGNOSIS — R10.9 UNSPECIFIED ABDOMINAL PAIN: ICD-10-CM

## 2025-06-08 DIAGNOSIS — F41.1 GENERALIZED ANXIETY DISORDER: ICD-10-CM

## 2025-06-08 LAB
ANION GAP SERPL CALC-SCNC: 12 MMOL/L — SIGNIFICANT CHANGE UP (ref 5–17)
BUN SERPL-MCNC: 14 MG/DL — SIGNIFICANT CHANGE UP (ref 7–23)
CALCIUM SERPL-MCNC: 8.8 MG/DL — SIGNIFICANT CHANGE UP (ref 8.4–10.5)
CHLORIDE SERPL-SCNC: 108 MMOL/L — SIGNIFICANT CHANGE UP (ref 96–108)
CO2 SERPL-SCNC: 19 MMOL/L — LOW (ref 22–31)
CREAT SERPL-MCNC: 0.86 MG/DL — SIGNIFICANT CHANGE UP (ref 0.5–1.3)
EGFR: 81 ML/MIN/1.73M2 — SIGNIFICANT CHANGE UP
EGFR: 81 ML/MIN/1.73M2 — SIGNIFICANT CHANGE UP
GLUCOSE SERPL-MCNC: 78 MG/DL — SIGNIFICANT CHANGE UP (ref 70–99)
HCT VFR BLD CALC: 32.6 % — LOW (ref 34.5–45)
HGB BLD-MCNC: 9.9 G/DL — LOW (ref 11.5–15.5)
MCHC RBC-ENTMCNC: 26.3 PG — LOW (ref 27–34)
MCHC RBC-ENTMCNC: 30.4 G/DL — LOW (ref 32–36)
MCV RBC AUTO: 86.5 FL — SIGNIFICANT CHANGE UP (ref 80–100)
NRBC BLD AUTO-RTO: 0 /100 WBCS — SIGNIFICANT CHANGE UP (ref 0–0)
PLATELET # BLD AUTO: 154 K/UL — SIGNIFICANT CHANGE UP (ref 150–400)
POTASSIUM SERPL-MCNC: 3.7 MMOL/L — SIGNIFICANT CHANGE UP (ref 3.5–5.3)
POTASSIUM SERPL-SCNC: 3.7 MMOL/L — SIGNIFICANT CHANGE UP (ref 3.5–5.3)
RBC # BLD: 3.77 M/UL — LOW (ref 3.8–5.2)
RBC # FLD: 17.6 % — HIGH (ref 10.3–14.5)
SODIUM SERPL-SCNC: 139 MMOL/L — SIGNIFICANT CHANGE UP (ref 135–145)
WBC # BLD: 3.92 K/UL — SIGNIFICANT CHANGE UP (ref 3.8–10.5)
WBC # FLD AUTO: 3.92 K/UL — SIGNIFICANT CHANGE UP (ref 3.8–10.5)

## 2025-06-08 PROCEDURE — 99223 1ST HOSP IP/OBS HIGH 75: CPT

## 2025-06-08 RX ORDER — MELATONIN 5 MG
3 TABLET ORAL AT BEDTIME
Refills: 0 | Status: DISCONTINUED | OUTPATIENT
Start: 2025-06-08 | End: 2025-06-13

## 2025-06-08 RX ORDER — ONDANSETRON HCL/PF 4 MG/2 ML
4 VIAL (ML) INJECTION EVERY 8 HOURS
Refills: 0 | Status: DISCONTINUED | OUTPATIENT
Start: 2025-06-08 | End: 2025-06-13

## 2025-06-08 RX ORDER — ACETAMINOPHEN 500 MG/5ML
650 LIQUID (ML) ORAL EVERY 6 HOURS
Refills: 0 | Status: DISCONTINUED | OUTPATIENT
Start: 2025-06-08 | End: 2025-06-13

## 2025-06-08 RX ORDER — ACETAMINOPHEN 500 MG/5ML
1000 LIQUID (ML) ORAL ONCE
Refills: 0 | Status: COMPLETED | OUTPATIENT
Start: 2025-06-08 | End: 2025-06-08

## 2025-06-08 RX ORDER — DIAZEPAM 5 MG/1
5 TABLET ORAL EVERY 6 HOURS
Refills: 0 | Status: DISCONTINUED | OUTPATIENT
Start: 2025-06-08 | End: 2025-06-13

## 2025-06-08 RX ORDER — SERTRALINE 100 MG/1
100 TABLET, FILM COATED ORAL DAILY
Refills: 0 | Status: DISCONTINUED | OUTPATIENT
Start: 2025-06-08 | End: 2025-06-13

## 2025-06-08 RX ORDER — FOLIC ACID 1 MG/1
1 TABLET ORAL DAILY
Refills: 0 | Status: DISCONTINUED | OUTPATIENT
Start: 2025-06-08 | End: 2025-06-13

## 2025-06-08 RX ORDER — MAGNESIUM, ALUMINUM HYDROXIDE 200-200 MG
30 TABLET,CHEWABLE ORAL EVERY 4 HOURS
Refills: 0 | Status: DISCONTINUED | OUTPATIENT
Start: 2025-06-08 | End: 2025-06-13

## 2025-06-08 RX ORDER — BUSPIRONE HYDROCHLORIDE 15 MG/1
15 TABLET ORAL THREE TIMES A DAY
Refills: 0 | Status: DISCONTINUED | OUTPATIENT
Start: 2025-06-08 | End: 2025-06-13

## 2025-06-08 RX ORDER — B1/B2/B3/B5/B6/B12/VIT C/FOLIC 500-0.5 MG
1 TABLET ORAL DAILY
Refills: 0 | Status: DISCONTINUED | OUTPATIENT
Start: 2025-06-08 | End: 2025-06-13

## 2025-06-08 RX ADMIN — Medication 4 MILLIGRAM(S): at 21:49

## 2025-06-08 RX ADMIN — Medication 4 MILLIGRAM(S): at 17:38

## 2025-06-08 RX ADMIN — Medication 100 MILLILITER(S): at 05:45

## 2025-06-08 RX ADMIN — SERTRALINE 100 MILLIGRAM(S): 100 TABLET, FILM COATED ORAL at 13:15

## 2025-06-08 RX ADMIN — Medication 4 MILLIGRAM(S): at 10:01

## 2025-06-08 RX ADMIN — BUSPIRONE HYDROCHLORIDE 15 MILLIGRAM(S): 15 TABLET ORAL at 13:17

## 2025-06-08 RX ADMIN — Medication 1000 MILLIGRAM(S): at 21:28

## 2025-06-08 RX ADMIN — Medication 400 MILLIGRAM(S): at 05:08

## 2025-06-08 RX ADMIN — Medication 30 MILLILITER(S): at 17:46

## 2025-06-08 RX ADMIN — Medication 4 MILLIGRAM(S): at 05:46

## 2025-06-08 RX ADMIN — Medication 4 MILLIGRAM(S): at 22:19

## 2025-06-08 RX ADMIN — FOLIC ACID 1 MILLIGRAM(S): 1 TABLET ORAL at 13:15

## 2025-06-08 RX ADMIN — DIAZEPAM 5 MILLIGRAM(S): 5 TABLET ORAL at 13:15

## 2025-06-08 RX ADMIN — BUSPIRONE HYDROCHLORIDE 15 MILLIGRAM(S): 15 TABLET ORAL at 21:48

## 2025-06-08 RX ADMIN — DIAZEPAM 5 MILLIGRAM(S): 5 TABLET ORAL at 21:53

## 2025-06-08 RX ADMIN — Medication 650 MILLIGRAM(S): at 13:16

## 2025-06-08 RX ADMIN — Medication 400 MILLIGRAM(S): at 20:58

## 2025-06-08 RX ADMIN — Medication 1 TABLET(S): at 13:16

## 2025-06-08 NOTE — CONSULT NOTE ADULT - ASSESSMENT
54F with JENNIFER, MDD, fibromyalgia, IBS, PSH including RNYGB (2005, Patel [place]) presenting for evaluation of abdominal pain. Patient reports 2 years of abdominal pain, frequent diarrhea, incontinence, and hemorrhoids. Followed outpatient by Dr. Lott who recently did an EGD and colonoscopy ~2 weeks ago, records not available but reportedly with bleeding hemorrhoids and colitis with +norovirus. Had an episode of dark diarrhea this morning before presenting to ED. Poor appetite but no po intolerance.    Recs:  - Abdomen exam benign, no evidence of structural/surgical pathology on CT   - VSS, labs unremarkable  - GI consult  - GI PCR  - Pain control  - Hydration      [facility]  [id number] 54F with JENNIFER, MDD, fibromyalgia, IBS, PSH including RNYGB (2005, [facility]el [place]) presenting for evaluation of abdominal pain. Patient reports 2 years of abdominal pain, frequent diarrhea, incontinence, and hemorrhoids. Followed outpatient by Dr. Lott who recently did an EGD and colonoscopy ~2 weeks ago, records not available but reportedly with bleeding hemorrhoids and colitis with +norovirus. Had an episode of dark diarrhea this morning before presenting to ED. Poor appetite but no po intolerance.    Recs:  - Abdomen exam benign, no evidence of structural/surgical pathology on CT   - VSS, labs unremarkable  - GI consult  - GI PCR, FOBT  - Pain control  - Hydration      [facility]  [id number] 54F with JENNIFER, MDD, fibromyalgia, IBS, PSH including RNYGB (2005, [facility] [place]) presenting for evaluation of abdominal pain. Patient reports 2 years of abdominal pain, frequent diarrhea, incontinence, and hemorrhoids. Followed outpatient by Dr. Lott who recently did an EGD and colonoscopy ~2 weeks ago, records not available but reportedly with bleeding hemorrhoids and colitis with +norovirus. Had an episode of dark diarrhea this morning before presenting to ED. Poor appetite but no po intolerance.    Recs:  - Abdomen exam benign, no evidence of structural/surgical pathology on CT   - VSS, labs unremarkable  - GI consult  - GI PCR, FOBT  - Pain control  - Banana bag      [facility]  [id number] 54F with JENNIFER, MDD, fibromyalgia, IBS, PSH including RNYGB (2005, [facility] [place]) presenting for evaluation of abdominal pain. Patient reports 2 years of abdominal pain, frequent diarrhea, incontinence, and hemorrhoids. Followed outpatient by Dr. Lott who recently did an EGD and colonoscopy ~2 weeks ago, records not available but reportedly with bleeding hemorrhoids and colitis with +norovirus. Had an episode of dark diarrhea this morning before presenting to ED. Poor appetite but no po intolerance.    Recs:  - Abdomen exam benign, again imaged mesenteric/inguinal lymphadenopathy but no evidence of structural/surgical pathology on CT   - VSS, labs unremarkable  - Trend H&H q6 until stable  - GI consult  - GI PCR, FOBT  - Pain control  - Banana bag  - Diet per primary, recommend Ensure Max protein shakes    Discussed with fellow    [facility]  [id number]

## 2025-06-08 NOTE — CONSULT NOTE ADULT - ASSESSMENT
54 year old female with hx of gastric bypass presenting with acute on chronic abdominal pain    1. Abdominal pain. Multiple endoscopic evaluations negative. Suspect component of IBS-D and chronic pain  trial of xifaxan  MR enterography to r/o Crohn's disease  appreciate surgery input    2. Anemia, likely secondary to hx of gastric bypass    3. Major depression/fibryomyalgia      I had a prolonged conversation with the patient regarding the hospital course, differential diagnosis, results of diagnostic tests this far, and therapeutic modalities available. Plan of care discussed with the patient after the evaluation. Patient expresses a clear understanding of the plan of care.  Sixty five minutes (or equivalent based on complexity) spent on the total encounter, of which more than fifty percent of the encounter was spent on counseling and/or coordinating care by the attending physician.     [facility]  Arnulfo Hall M.D.   [address] [place] [address]  Office: [phone]

## 2025-06-08 NOTE — H&P ADULT - NSHPLABSRESULTS_GEN_ALL_CORE
11.1   6.41  )-----------( 184      ( 07 Jun 2025 16:17 )             36.3       06-07    141  |  108  |  18  ----------------------------<  86  3.7   |  19[L]  |  0.88    Ca    9.1      07 Jun 2025 16:17    TPro  6.7  /  Alb  4.1  /  TBili  0.4  /  DBili  x   /  AST  35  /  ALT  22  /  AlkPhos  100  06-07      Urinalysis with Rflx Culture (06.07.25 @ 20:49)    Urine Appearance: Clear    Color: Yellow    Specific Gravity: >1.030    pH Urine: 5.5    Protein, Urine: Negative mg/dL    Glucose Qualitative, Urine: Negative mg/dL    Ketone , Urine: Negative mg/dL    Blood, Urine: Negative    Bilirubin: Negative    Urobilinogen: 0.2 mg/dL    Leukocyte Esterase Concentration: Negative    Nitrite: Negative      - - - - - - - - - - - - - - - - - - - - - - - - - - - - - - - - - - - - - - - - - - - - - - - - - - - -       EKG PERSONALLY REVIEWED: X      IMAGES PERSONALLY REVIEWED:       < from: CT Abdomen and Pelvis w/ Oral Cont and w/ IV Cont (06.07.25 @ 18:18) >    BOWEL: Status post Jaron-en-Y gastric bypass. Oral contrast. The small   bowel. Left lower quadrant jejunojejunal patulous anastomosis. No bowel   obstruction. Appendix is not visualized. No evidence of inflammation in   the pericecal region.    < from: CT Abdomen and Pelvis w/ Oral Cont and w/ IV Cont (06.07.25 @ 18:18) >  IMPRESSION:  Cause of abdominal pain not identified

## 2025-06-08 NOTE — CONSULT NOTE ADULT - SUBJECTIVE AND OBJECTIVE BOX
Chief Complaint:  Patient is a 54y old  Female who presents with a chief complaint of Abdomen pain (2025 07:26)      Date of service: 25 @ 22:18    HPI:    The patient is a 54 year old female with hx of gastric bypass presenting for abdominal pain.  The patient also endorses loose BM.  She has undergone multiple endoscopic tests in the past 3-4 months whih have been unrevealing.      Allergies:  aspirin (Other)  penicillin (Other)  sulfa drugs (Anaphylaxis)  NSAIDs (Other)  Tolerates cephalexin (Other)      Home Medications:    Hospital Medications:  acetaminophen     Tablet .. 650 milliGRAM(s) Oral every 6 hours PRN  aluminum hydroxide/magnesium hydroxide/simethicone Suspension 30 milliLiter(s) Oral every 4 hours PRN  busPIRone 15 milliGRAM(s) Oral three times a day  diazepam    Tablet 5 milliGRAM(s) Oral every 6 hours PRN  folic acid 1 milliGRAM(s) Oral daily  melatonin 3 milliGRAM(s) Oral at bedtime PRN  morphine  - Injectable 2 milliGRAM(s) IV Push every 4 hours PRN  morphine  - Injectable 4 milliGRAM(s) IV Push every 4 hours PRN  multivitamin 1 Tablet(s) Oral daily  ondansetron Injectable 4 milliGRAM(s) IV Push every 8 hours PRN  sertraline 100 milliGRAM(s) Oral daily  sodium chloride 0.9%. 1000 milliLiter(s) IV Continuous <Continuous>      PMHX/PSHX:  Anxiety    Depression    Post traumatic stress disorder    IBS (irritable bowel syndrome)    Anemia    Fibromyalgia    GERD (gastroesophageal reflux disease)    Anxiety    Abnormal uterine bleeding    Chronic lower back pain    Depression    H/O gastric bypass    H/O abdominoplasty    H/O  section    Bowel obstruction    H/O tubal ligation        Family history:  No pertinent family history in first degree relatives    FH: diabetes mellitus (Father)    FH: rheumatoid arthritis (Father)        Social History:   Denies ethanol use.  Denies illicit drug use.    ROS:     General:  No wt loss, fevers, chills, night sweats, fatigue,   Eyes:  Good vision, no reported pain  ENT:  No sore throat, pain, runny nose, dysphagia  CV:  No pain, palpitations, hypo/hypertension  Resp:  No dyspnea, cough, tachypnea, wheezing  GI:  See HPI  :  No pain, bleeding, incontinence, nocturia  Muscle:  No pain, weakness  Neuro:  No weakness, tingling, memory problems  Psych:  No fatigue, insomnia, mood problems, depression  Endocrine:  No polyuria, polydipsia, cold/heat intolerance  Heme:  No petechiae, ecchymosis, easy bruisability  Integumentary:  No rash, edema      PHYSICAL EXAM:     GENERAL:  Appears stated age, well-groomed, well-nourished, no distress  HEENT:  NC/AT,  conjunctivae anicteric, clear and pink,   NECK: supple, trachea midline  CHEST:  Full & symmetric excursion, no increased effort, breath sounds clear  HEART:  Regular rhythm, no JVD  ABDOMEN:  Soft, non-tender, non-distended, normoactive bowel sounds,  no masses , no hepatosplenomegaly  EXTREMITIES:  no cyanosis,clubbing or edema  SKIN:  No rash, erythema, or, ecchymoses, no jaundice  NEURO:  Alert, non-focal, no asterixis  PSYCH: Appropriate affect, oriented to place and time  RECTAL: Deferred      Vital Signs:  Vital Signs Last 24 Hrs  T(C): 36.7 (2025 19:18), Max: 36.7 (2025 19:18)  T(F): 98.1 (2025 19:18), Max: 98.1 (2025 19:18)  HR: 57 (2025 19:18) (56 - 63)  BP: 94/60 (2025 19:18) (87/54 - 116/57)  BP(mean): 76 (2025 08:09) (74 - 76)  RR: 18 (2025 19:18) (16 - 18)  SpO2: 96% (2025 19:18) (96% - 99%)    Parameters below as of 2025 19:18  Patient On (Oxygen Delivery Method): room air      Daily     Daily     LABS: Labs personally reviewed by me:                        9.9    3.92  )-----------( 154      ( 2025 05:56 )             32.6     06-08    139  |  108  |  14  ----------------------------<  78  3.7   |  19[L]  |  0.86    Ca    8.8      2025 05:56    TPro  6.7  /  Alb  4.1  /  TBili  0.4  /  DBili  x   /  AST  35  /  ALT  22  /  AlkPhos  100  06-07    LIVER FUNCTIONS - ( 2025 16:17 )  Alb: 4.1 g/dL / Pro: 6.7 g/dL / ALK PHOS: 100 U/L / ALT: 22 U/L / AST: 35 U/L / GGT: x             Urinalysis Basic - ( 2025 05:56 )    Color: x / Appearance: x / SG: x / pH: x  Gluc: 78 mg/dL / Ketone: x  / Bili: x / Urobili: x   Blood: x / Protein: x / Nitrite: x   Leuk Esterase: x / RBC: x / WBC x   Sq Epi: x / Non Sq Epi: x / Bacteria: x          Imaging personally reviewed by me:

## 2025-06-08 NOTE — H&P ADULT - HISTORY OF PRESENT ILLNESS
54F PMH JENNIFER, MDD, fibromyalgia, IBS, RNYGB (2005, [facility]el [place], lost 180lbs, regained 60 after pregnancy) with multiple subsequent surgeries for SBOs presenting for evaluation of abdominal pain. Patient reports for the last 2 years she has had episodes of frequent diarrhea and incontinence as well as bleeding hemorrhoids. She frequently has abdominal pain and is followed closely by Dr. Lott who recently did an EGD and colonoscopy 2 weeks ago for hemorrhoidal bleeding and recurrent abd pain/diarrhea. Records unavailable but she reports they noted colitis and testing was positive for norovirus.     Denies NSAID use. Denies tobacco use. States she is concerned that she has another obstruction; notes a lump to the right of her belly button that feels similar to scar tissue she has had in the past. Endorsing nausea poor appetite but no vomiting, fever.

## 2025-06-08 NOTE — H&P ADULT - ASSESSMENT
54F PMH JENNIFER, MDD, fibromyalgia, IBS, RNYGB (2005, Patel [place], lost 180lbs, regained 60 after pregnancy) with multiple subsequent surgeries for SBOs presenting for evaluation of intractable abdominal pain a/f further eval and pain control

## 2025-06-08 NOTE — H&P ADULT - NSHPPHYSICALEXAM_GEN_ALL_CORE
T(C): 36.6 (06-08-25 @ 04:25), Max: 36.7 (06-07-25 @ 14:39)  HR: 59 (06-08-25 @ 04:25) (59 - 81)  BP: 116/57 (06-08-25 @ 04:25) (111/70 - 132/58)  RR: 16 (06-08-25 @ 04:25) (16 - 18)  SpO2: 99% (06-08-25 @ 04:25) (95% - 99%)    CONSTITUTIONAL: Well groomed, no apparent distress  EYES: PERRLA , EOMI  ENMT: MMM. Normal dentition  RESP: No respiratory distress, no use of accessory muscles; CTA b/l  CV: +S1S2, RRR, no MRG; no peripheral edema  GI: Soft, NTND, no RGR  MSK: spontaneously moves all extremities   SKIN: No rashes or ulcers noted  NEURO:  No focal deficits, sensation intact throughout   PSYCH: A+O x 3, mood and affect appropriate T(C): 36.6 (06-08-25 @ 04:25), Max: 36.7 (06-07-25 @ 14:39)  HR: 59 (06-08-25 @ 04:25) (59 - 81)  BP: 116/57 (06-08-25 @ 04:25) (111/70 - 132/58)  RR: 16 (06-08-25 @ 04:25) (16 - 18)  SpO2: 99% (06-08-25 @ 04:25) (95% - 99%)    CONSTITUTIONAL: Well groomed, no apparent distress  EYES: PERRLA , EOMI  ENMT: MMM. Normal dentition  RESP: No respiratory distress, CTA b/l  CV: +S1S2, RRR, no MRG  GI: Soft,  palpable "knot" in RLQ, mild tender to deep palpation, normoactive BS   MSK: spontaneously moves all extremities   SKIN: No rashes or ulcers noted  NEURO:  No focal deficits, sensation intact throughout   PSYCH: A+O x 3

## 2025-06-08 NOTE — CONSULT NOTE ADULT - SUBJECTIVE AND OBJECTIVE BOX
BARIATRIC SURGERY CONSULT NOTE  Consulting surgical team: Donnell  Consulting attending: Aimee    HPI:  54F with history of JENNIFER, MDD, fibromyalgia, IBS, RNYGB (, Patel [place]dionte) with multiple subsequent surgeries for SBOs presenting for evaluation of abdominal pain. Patient reports for the last 2 years she has had episodes of frequent diarrhea and incontinence as well as bleeding hemorrhoids. She frequently has abdominal pain and is followed closely by Dr. Lott who recently did an EGD and colonoscopy 2 weeks ago for hemorrhoidal bleeding and recurrent abd pain/diarrhea. Unable to review records however patient reports they noted colitis and testing was positive for norovirus. Admits to nausea but no vomiting, tolerating PO intake but has very poor appetite. Had an episode of dark diarrhea this morning before presenting to ED.     After her RNYGB she lost 180lbs, regained 60 after pregnancy. Denies NSAID use. Denies tobacco use. States she is concerned that she has another obstruction; notes a lump to the right of her belly button that feels similar to scar tissue she has had in the past.     PAST MEDICAL HISTORY:  Anxiety  Depression  Post traumatic stress disorder  IBS (irritable bowel syndrome)  Anemia  Fibromyalgia  GERD (gastroesophageal reflux disease)  Anxiety  Abnormal uterine bleeding  Chronic lower back pain    PAST SURGICAL HISTORY:  Depression  H/O gastric bypass  H/O abdominoplasty  H/O  section  Bowel obstruction  H/O tubal ligation    SOCIAL HISTORY:  - Denies EtOH abuse, smoking, IVDA    MEDICATIONS:      ALLERGIES:  aspirin (Other)  penicillin (Other)  sulfa drugs (Anaphylaxis)  NSAIDs (Other)  Tolerates cephalexin (Other)      VITALS & I/Os:  Vital Signs Last 24 Hrs  T(C): 36.6 (2025 19:30), Max: 36.7 (2025 14:39)  T(F): 97.9 (2025 19:30), Max: 98.1 (2025 14:39)  HR: 70 (2025 21:10) (70 - 81)  BP: 132/58 (2025 21:10) (111/70 - 132/58)  BP(mean): 81 (2025 19:30) (81 - 81)  RR: 16 (2025 21:10) (16 - 18)  SpO2: 97% (2025 21:10) (95% - 97%)    Parameters below as of 2025 21:10  Patient On (Oxygen Delivery Method): room air        I&O's Summary      PHYSICAL EXAM:  GEN: NAD  PULM: nonlabored breathing on RA  CV: hemodynamically stable  ABD: soft, nondistended, nontender throughout. No rebound/guarding. Small mass in subcutaneous tissue of abdominal wall to right of umbilicus, mildly tender with no overlying skin changes  EXT: Grossly symmetric, WWP  NEURO: AAOx4, no focal neuro deficits    LABS:                        11.1   6.41  )-----------( 184      ( 2025 16:17 )             36.3     06-07    141  |  108  |  18  ----------------------------<  86  3.7   |  19[L]  |  0.88    Ca    9.1      2025 16:17    TPro  6.7  /  Alb  4.1  /  TBili  0.4  /  DBili  x   /  AST  35  /  ALT  22  /  AlkPhos  100  -07    Lactate:  - @ 16:09  0.7              Urinalysis Basic - ( 2025 20:49 )    Color: Yellow / Appearance: Clear / SG: >1.030 / pH: x  Gluc: x / Ketone: x  / Bili: Negative / Urobili: 0.2 mg/dL   Blood: x / Protein: Negative mg/dL / Nitrite: Negative   Leuk Esterase: Negative / RBC: x / WBC x   Sq Epi: x / Non Sq Epi: x / Bacteria: x        IMAGING:  < from: CT Abdomen and Pelvis w/ Oral Cont and w/ IV Cont (25 @ 18:18) >  FINDINGS:  LOWER CHEST: Within normal limits.    LIVER: Subcentimeter hypodensities too small to characterize.  BILE DUCTS: Intrahepatic and extrahepatic biliary ductal dilatation   similar to prior..  GALLBLADDER: Cholecystectomy.  SPLEEN: Splenomegaly.  PANCREAS: Atrophic.  ADRENALS: Within normal limits.  KIDNEYS/URETERS: Bilateral renal cysts. No hydronephrosis or obstructing   calculus..    BLADDER: Within normal limits.  REPRODUCTIVE ORGANS: Uterus and adnexa within normallimits.    BOWEL: Status post Jaron-en-Y gastric bypass. Oral contrast. The small   bowel. Left lower quadrant jejunojejunal patulous anastomosis. No bowel   obstruction. Appendix is not visualized. No evidence of inflammation in   the pericecal region.  PERITONEUM/RETROPERITONEUM: Within normal limits.  VESSELS: Within normal limits.  LYMPH NODES: Multiple small mesenteric and inguinal lymph nodes similar   to prior.  ABDOMINAL WALL: Within normal limits.  BONES: Degenerative changes.    IMPRESSION:  Cause of abdominal pain not identified    --- End of Report ---  < end of copied text >

## 2025-06-08 NOTE — PROGRESS NOTE ADULT - SUBJECTIVE AND OBJECTIVE BOX
SUBJECTIVE / OVERNIGHT EVENTS:      Patient seen and examined at bedside. Laying in bed. Upset about having to come back to the hospital. Reports ongoing abd discomfort       --------------------------------------------------------------------------------------------  LABS:                        9.9    3.92  )-----------( 154      ( 08 Jun 2025 05:56 )             32.6     06-08    139  |  108  |  14  ----------------------------<  78  3.7   |  19[L]  |  0.86    Ca    8.8      08 Jun 2025 05:56    TPro  6.7  /  Alb  4.1  /  TBili  0.4  /  DBili  x   /  AST  35  /  ALT  22  /  AlkPhos  100  06-07      CAPILLARY BLOOD GLUCOSE            Urinalysis Basic - ( 08 Jun 2025 05:56 )    Color: x / Appearance: x / SG: x / pH: x  Gluc: 78 mg/dL / Ketone: x  / Bili: x / Urobili: x   Blood: x / Protein: x / Nitrite: x   Leuk Esterase: x / RBC: x / WBC x   Sq Epi: x / Non Sq Epi: x / Bacteria: x        RADIOLOGY & ADDITIONAL TESTS: < from: CT Abdomen and Pelvis w/ Oral Cont and w/ IV Cont (06.07.25 @ 18:18) >  IMPRESSION:  Cause of abdominal pain not identified    < end of copied text >      Imaging Personally Reviewed:  [x] YES  [ ] NO    Consultant(s) Notes Reviewed:  [x] YES  [ ] NO    MEDICATIONS  (STANDING):  busPIRone 15 milliGRAM(s) Oral three times a day  folic acid 1 milliGRAM(s) Oral daily  multivitamin 1 Tablet(s) Oral daily  sertraline 100 milliGRAM(s) Oral daily  sodium chloride 0.9%. 1000 milliLiter(s) (100 mL/Hr) IV Continuous <Continuous>    MEDICATIONS  (PRN):  acetaminophen     Tablet .. 650 milliGRAM(s) Oral every 6 hours PRN Temp greater or equal to 38C (100.4F), Mild Pain (1 - 3)  aluminum hydroxide/magnesium hydroxide/simethicone Suspension 30 milliLiter(s) Oral every 4 hours PRN Dyspepsia  diazepam    Tablet 5 milliGRAM(s) Oral every 6 hours PRN Anxiety  melatonin 3 milliGRAM(s) Oral at bedtime PRN Insomnia  morphine  - Injectable 2 milliGRAM(s) IV Push every 4 hours PRN Moderate Pain (4 - 6)  morphine  - Injectable 4 milliGRAM(s) IV Push every 4 hours PRN Severe Pain (7 - 10)  ondansetron Injectable 4 milliGRAM(s) IV Push every 8 hours PRN Nausea and/or Vomiting      Care Discussed with Consultants/Other Providers [x] YES  [ ] NO    Vital Signs Last 24 Hrs  T(C): 36.6 (08 Jun 2025 06:00), Max: 36.7 (07 Jun 2025 14:39)  T(F): 97.9 (08 Jun 2025 06:00), Max: 98.1 (07 Jun 2025 14:39)  HR: 60 (08 Jun 2025 06:00) (59 - 81)  BP: 99/56 (08 Jun 2025 06:00) (99/56 - 132/58)  BP(mean): 74 (08 Jun 2025 06:00) (74 - 81)  RR: 16 (08 Jun 2025 06:00) (16 - 18)  SpO2: 99% (08 Jun 2025 06:00) (95% - 99%)    Parameters below as of 08 Jun 2025 06:00  Patient On (Oxygen Delivery Method): room air      I&O's Summary      PHYSICAL EXAM:  GENERAL: NAD, well-developed, comfortable  HEAD:  Atraumatic, Normocephalic  EYES: EOMI, PERRLA, conjunctiva and sclera clear  NECK: Supple, No JVD  CHEST/LUNG: Clear to auscultation bilaterally; No wheeze  HEART: Regular rate and rhythm; No murmurs, rubs, or gallops  ABDOMEN: Soft, +tenderness throughout, Nondistended; Bowel sounds present  NEURO: AAOx3, no focal weakness, 5/5 b/l extremity strength, b/l knee no arthritis, no effusion   EXTREMITIES:  2+ Peripheral Pulses, No clubbing, cyanosis, or edema  SKIN: No rashes or lesions

## 2025-06-08 NOTE — PROGRESS NOTE ADULT - ASSESSMENT
54F PMH JENNIFER, MDD, fibromyalgia, IBS, RNYGB (2005, [place]el [place], lost 180lbs, regained 60 after pregnancy) with multiple subsequent surgeries for SBOs presenting for evaluation of intractable abdominal pain a/f further eval and pain control       Plan:    # Abd pain:  - Frequently has abdominal pain and is followed closely by Dr. Lott who recently did an EGD and colonoscopy 2 weeks ago.  - Pain control  - CT A/P negative for acute findings  - FOBT negative, UA negative  - GI PCR pending  - Diet as tolerated  - GI eval pending (called)    # Anxiety Depression  - C/w current meds    # DVT ppx:  - IPC's    [facility]

## 2025-06-08 NOTE — H&P ADULT - PROBLEM SELECTOR PLAN 1
- Hx/o IBS, RNYGB (2005, [place]) with multiple subsequent surgeries for SBOs   - Frequently has abdominal pain and is followed closely by Dr. Lott who recently did an EGD and colonoscopy 2 weeks ago. Result unavailable, will need to obtain outside records   - Labs unremarkable , UA non infectious, FOBT neg  - CT A/P: no acute findings , no evidence of structural/surgical pathology on CT   - F/u GI PCR   - Pain control: Morphine 2mg / 4mg q4prn for Mod/ Sev pain   - Encourage PO intake, Ensure Max (ordered) per Surg rec   ? Adhesion  - Apprec surg input  - GI consult to be called in AM - Hx/o IBS, RNYGB (2005, [place]) with multiple subsequent surgeries for SBOs   - Frequently has abdominal pain and is followed closely by Dr. Lott who recently did an EGD and colonoscopy 2 weeks ago. Result unavailable, will need to obtain outside records   - Labs unremarkable , UA non infectious, FOBT neg  - CT A/P: no acute findings, no obstruction, no evidence of structural/surgical pathology on CT   - F/u GI PCR   - Pain control: Morphine 2mg / 4mg q4prn for Mod/ Sev pain   - Encourage PO intake, Ensure (ordered) per Surg rec   - Apprec surg input. Patient interested in eval for lysis of adhesions, will need to further discuss w/ surgery     - GI consult to be called in AM, possible repeat scope

## 2025-06-09 LAB
HCT VFR BLD CALC: 32.5 % — LOW (ref 34.5–45)
HGB BLD-MCNC: 10.2 G/DL — LOW (ref 11.5–15.5)
MCHC RBC-ENTMCNC: 27.2 PG — SIGNIFICANT CHANGE UP (ref 27–34)
MCHC RBC-ENTMCNC: 31.4 G/DL — LOW (ref 32–36)
MCV RBC AUTO: 86.7 FL — SIGNIFICANT CHANGE UP (ref 80–100)
NRBC BLD AUTO-RTO: 0 /100 WBCS — SIGNIFICANT CHANGE UP (ref 0–0)
PLATELET # BLD AUTO: 149 K/UL — LOW (ref 150–400)
RBC # BLD: 3.75 M/UL — LOW (ref 3.8–5.2)
RBC # FLD: 17.9 % — HIGH (ref 10.3–14.5)
WBC # BLD: 4.16 K/UL — SIGNIFICANT CHANGE UP (ref 3.8–10.5)
WBC # FLD AUTO: 4.16 K/UL — SIGNIFICANT CHANGE UP (ref 3.8–10.5)

## 2025-06-09 PROCEDURE — 74182 MRI ABDOMEN W/CONTRAST: CPT | Mod: 26

## 2025-06-09 PROCEDURE — 72197 MRI PELVIS W/O & W/DYE: CPT | Mod: 26

## 2025-06-09 RX ORDER — ACETAMINOPHEN 500 MG/5ML
1000 LIQUID (ML) ORAL ONCE
Refills: 0 | Status: COMPLETED | OUTPATIENT
Start: 2025-06-09 | End: 2025-06-09

## 2025-06-09 RX ADMIN — Medication 4 MILLIGRAM(S): at 15:11

## 2025-06-09 RX ADMIN — SERTRALINE 100 MILLIGRAM(S): 100 TABLET, FILM COATED ORAL at 11:59

## 2025-06-09 RX ADMIN — Medication 2 MILLIGRAM(S): at 13:28

## 2025-06-09 RX ADMIN — Medication 4 MILLIGRAM(S): at 23:33

## 2025-06-09 RX ADMIN — FOLIC ACID 1 MILLIGRAM(S): 1 TABLET ORAL at 11:59

## 2025-06-09 RX ADMIN — Medication 650 MILLIGRAM(S): at 01:08

## 2025-06-09 RX ADMIN — Medication 1000 MILLIGRAM(S): at 05:55

## 2025-06-09 RX ADMIN — Medication 4 MILLIGRAM(S): at 10:30

## 2025-06-09 RX ADMIN — Medication 4 MILLIGRAM(S): at 13:28

## 2025-06-09 RX ADMIN — Medication 2 MILLIGRAM(S): at 17:20

## 2025-06-09 RX ADMIN — BUSPIRONE HYDROCHLORIDE 15 MILLIGRAM(S): 15 TABLET ORAL at 05:25

## 2025-06-09 RX ADMIN — Medication 4 MILLIGRAM(S): at 11:00

## 2025-06-09 RX ADMIN — BUSPIRONE HYDROCHLORIDE 15 MILLIGRAM(S): 15 TABLET ORAL at 23:31

## 2025-06-09 RX ADMIN — Medication 400 MILLIGRAM(S): at 05:25

## 2025-06-09 RX ADMIN — Medication 1 TABLET(S): at 11:59

## 2025-06-09 RX ADMIN — DIAZEPAM 5 MILLIGRAM(S): 5 TABLET ORAL at 12:06

## 2025-06-09 RX ADMIN — Medication 4 MILLIGRAM(S): at 00:39

## 2025-06-09 RX ADMIN — Medication 30 MILLILITER(S): at 15:10

## 2025-06-09 RX ADMIN — Medication 3 MILLIGRAM(S): at 23:32

## 2025-06-09 RX ADMIN — Medication 650 MILLIGRAM(S): at 00:38

## 2025-06-09 RX ADMIN — Medication 2 MILLIGRAM(S): at 13:58

## 2025-06-09 RX ADMIN — BUSPIRONE HYDROCHLORIDE 15 MILLIGRAM(S): 15 TABLET ORAL at 15:10

## 2025-06-09 RX ADMIN — DIAZEPAM 5 MILLIGRAM(S): 5 TABLET ORAL at 18:30

## 2025-06-09 NOTE — PROGRESS NOTE ADULT - SUBJECTIVE AND OBJECTIVE BOX
INTERVAL HPI/OVERNIGHT EVENTS:    abd pain   dark stool    MEDICATIONS  (STANDING):  busPIRone 15 milliGRAM(s) Oral three times a day  folic acid 1 milliGRAM(s) Oral daily  multivitamin 1 Tablet(s) Oral daily  sertraline 100 milliGRAM(s) Oral daily  sodium chloride 0.9%. 1000 milliLiter(s) (100 mL/Hr) IV Continuous <Continuous>    MEDICATIONS  (PRN):  acetaminophen     Tablet .. 650 milliGRAM(s) Oral every 6 hours PRN Temp greater or equal to 38C (100.4F), Mild Pain (1 - 3)  aluminum hydroxide/magnesium hydroxide/simethicone Suspension 30 milliLiter(s) Oral every 4 hours PRN Dyspepsia  diazepam    Tablet 5 milliGRAM(s) Oral every 6 hours PRN Anxiety  melatonin 3 milliGRAM(s) Oral at bedtime PRN Insomnia  morphine  - Injectable 2 milliGRAM(s) IV Push every 4 hours PRN Moderate Pain (4 - 6)  morphine  - Injectable 4 milliGRAM(s) IV Push every 4 hours PRN Severe Pain (7 - 10)  ondansetron Injectable 4 milliGRAM(s) IV Push every 8 hours PRN Nausea and/or Vomiting      Allergies    aspirin (Other)  penicillin (Other)  sulfa drugs (Anaphylaxis)  NSAIDs (Other)    Intolerances    Tolerates cephalexin (Other)      Review of Systems:    General:  No wt loss, fevers, chills, night sweats, fatigue   Eyes:  Good vision, no reported pain  ENT:  No sore throat, pain, runny nose, dysphagia  CV:  No pain, palpitations, hypo/hypertension  Resp:  No dyspnea, cough, tachypnea, wheezing  GI:  No pain, No nausea, No vomiting, No diarrhea, No constipation, No weight loss, No fever, No pruritis, No rectal bleeding, No melena, No dysphagia  :  No pain, bleeding, incontinence, nocturia  Muscle:  No pain, weakness  Neuro:  No weakness, tingling, memory problems  Psych:  No fatigue, insomnia, mood problems, depression  Endocrine:  No polyuria, polydypsia, cold/heat intolerance  Heme:  No petechiae, ecchymosis, easy bruisability  Skin:  No rash, tattoos, scars, edema      Vital Signs Last 24 Hrs  T(C): 36.6 (09 Jun 2025 12:40), Max: 36.7 (08 Jun 2025 19:18)  T(F): 97.8 (09 Jun 2025 12:40), Max: 98.1 (08 Jun 2025 19:18)  HR: 61 (09 Jun 2025 12:40) (56 - 63)  BP: 106/66 (09 Jun 2025 12:40) (93/62 - 110/75)  BP(mean): --  RR: 18 (09 Jun 2025 12:40) (16 - 18)  SpO2: 95% (09 Jun 2025 12:40) (93% - 98%)    Parameters below as of 09 Jun 2025 12:40  Patient On (Oxygen Delivery Method): room air        PHYSICAL EXAM:    Constitutional: NAD  HEENT: EOMI, throat clear  Neck: No LAD, supple  Respiratory: CTA and P  Cardiovascular: S1 and S2, RRR, no M  Gastrointestinal: BS+, soft, NT/ND, neg HSM,  Extremities: No peripheral edema, neg clubbing, cyanosis  Vascular: 2+ peripheral pulses  Neurological: A/O   Psychiatric: Normal mood, normal affect  Skin: No rashes      LABS:                        10.2   4.16  )-----------( 149      ( 09 Jun 2025 07:10 )             32.5     06-08    139  |  108  |  14  ----------------------------<  78  3.7   |  19[L]  |  0.86    Ca    8.8      08 Jun 2025 05:56    TPro  6.7  /  Alb  4.1  /  TBili  0.4  /  DBili  x   /  AST  35  /  ALT  22  /  AlkPhos  100  06-07      Urinalysis Basic - ( 08 Jun 2025 05:56 )    Color: x / Appearance: x / SG: x / pH: x  Gluc: 78 mg/dL / Ketone: x  / Bili: x / Urobili: x   Blood: x / Protein: x / Nitrite: x   Leuk Esterase: x / RBC: x / WBC x   Sq Epi: x / Non Sq Epi: x / Bacteria: x        RADIOLOGY & ADDITIONAL TESTS:

## 2025-06-09 NOTE — PROGRESS NOTE ADULT - SUBJECTIVE AND OBJECTIVE BOX
SUBJECTIVE / OVERNIGHT EVENTS:      Patient seen and examined at bedside. Still experiencing abd discomfort and dark stool      --------------------------------------------------------------------------------------------  LABS:                        10.2   4.16  )-----------( 149      ( 09 Jun 2025 07:10 )             32.5     06-08    139  |  108  |  14  ----------------------------<  78  3.7   |  19[L]  |  0.86    Ca    8.8      08 Jun 2025 05:56    TPro  6.7  /  Alb  4.1  /  TBili  0.4  /  DBili  x   /  AST  35  /  ALT  22  /  AlkPhos  100  06-07      CAPILLARY BLOOD GLUCOSE            Urinalysis Basic - ( 08 Jun 2025 05:56 )    Color: x / Appearance: x / SG: x / pH: x  Gluc: 78 mg/dL / Ketone: x  / Bili: x / Urobili: x   Blood: x / Protein: x / Nitrite: x   Leuk Esterase: x / RBC: x / WBC x   Sq Epi: x / Non Sq Epi: x / Bacteria: x        RADIOLOGY & ADDITIONAL TESTS:     Imaging Personally Reviewed:  [x] YES  [ ] NO    Consultant(s) Notes Reviewed:  [x] YES  [ ] NO    MEDICATIONS  (STANDING):  busPIRone 15 milliGRAM(s) Oral three times a day  folic acid 1 milliGRAM(s) Oral daily  multivitamin 1 Tablet(s) Oral daily  sertraline 100 milliGRAM(s) Oral daily  sodium chloride 0.9%. 1000 milliLiter(s) (100 mL/Hr) IV Continuous <Continuous>    MEDICATIONS  (PRN):  acetaminophen     Tablet .. 650 milliGRAM(s) Oral every 6 hours PRN Temp greater or equal to 38C (100.4F), Mild Pain (1 - 3)  aluminum hydroxide/magnesium hydroxide/simethicone Suspension 30 milliLiter(s) Oral every 4 hours PRN Dyspepsia  diazepam    Tablet 5 milliGRAM(s) Oral every 6 hours PRN Anxiety  melatonin 3 milliGRAM(s) Oral at bedtime PRN Insomnia  morphine  - Injectable 2 milliGRAM(s) IV Push every 4 hours PRN Moderate Pain (4 - 6)  morphine  - Injectable 4 milliGRAM(s) IV Push every 4 hours PRN Severe Pain (7 - 10)  ondansetron Injectable 4 milliGRAM(s) IV Push every 8 hours PRN Nausea and/or Vomiting      Care Discussed with Consultants/Other Providers [x] YES  [ ] NO    Vital Signs Last 24 Hrs  T(C): 36.5 (09 Jun 2025 08:49), Max: 36.7 (08 Jun 2025 19:18)  T(F): 97.7 (09 Jun 2025 08:49), Max: 98.1 (08 Jun 2025 19:18)  HR: 62 (09 Jun 2025 08:49) (56 - 63)  BP: 103/69 (09 Jun 2025 08:49) (93/62 - 110/75)  BP(mean): --  RR: 18 (09 Jun 2025 08:49) (16 - 18)  SpO2: 95% (09 Jun 2025 08:49) (93% - 98%)    Parameters below as of 09 Jun 2025 08:49  Patient On (Oxygen Delivery Method): room air      I&O's Summary      PHYSICAL EXAM:  GENERAL: NAD, well-developed, comfortable  HEAD:  Atraumatic, Normocephalic  EYES: EOMI, PERRLA, conjunctiva and sclera clear  NECK: Supple, No JVD  CHEST/LUNG: Clear to auscultation bilaterally; No wheeze  HEART: Regular rate and rhythm; No murmurs, rubs, or gallops  ABDOMEN: Soft, +tenderness throughout, Nondistended; Bowel sounds present  NEURO: AAOx3, no focal weakness, 5/5 b/l extremity strength, b/l knee no arthritis, no effusion   EXTREMITIES:  2+ Peripheral Pulses, No clubbing, cyanosis, or edema  SKIN: No rashes or lesions

## 2025-06-09 NOTE — PROGRESS NOTE ADULT - ASSESSMENT
54 year old female with hx of gastric bypass presenting with acute on chronic abdominal pain    1. Abdominal pain. Multiple endoscopic evaluations negative. Suspect component of IBS-D and chronic pain  trial of xifaxan  MR enterography to r/o Crohn's disease - pending  appreciate surgery input  consider pain medicine eval     2. Anemia, likely secondary to hx of gastric bypass    3. Major depression/fibryomyalgia      I had a prolonged conversation with the patient regarding the hospital course, differential diagnosis, results of diagnostic tests this far, and therapeutic modalities available. Plan of care discussed with the patient after the evaluation. Patient expresses a clear understanding of the plan of care.  Sixty five minutes (or equivalent based on complexity) spent on the total encounter, of which more than fifty percent of the encounter was spent on counseling and/or coordinating care by the attending physician.     [facility]  Arnulfo Hall M.D.   [address] [place] [id number]  Office: [phone]

## 2025-06-09 NOTE — PROGRESS NOTE ADULT - ASSESSMENT
54F PMH JENNIFER, MDD, fibromyalgia, IBS, RNYGB (2005, [place], lost 180lbs, regained 60 after pregnancy) with multiple subsequent surgeries for SBOs presenting for evaluation of intractable abdominal pain a/f further eval and pain control       Plan:    # Abd pain:  - Frequently has abdominal pain and is followed closely by Dr. Lott who recently did an EGD and colonoscopy 2 weeks ago.  - Pain control  - CT A/P negative for acute findings  - MR enterography to r/o Crohn's disease pending  - FOBT negative, UA negative  - GI PCR pending  - Diet as tolerated  - Trial of xifaxan  - GI following    # Anxiety/ Depression/ Fibryomyalgia  - C/w current meds    # hx of gastric bypass:  - Bariatrics following    # DVT ppx:  - IPC's    [facility]

## 2025-06-10 LAB
ANION GAP SERPL CALC-SCNC: 11 MMOL/L — SIGNIFICANT CHANGE UP (ref 5–17)
BUN SERPL-MCNC: 12 MG/DL — SIGNIFICANT CHANGE UP (ref 7–23)
CALCIUM SERPL-MCNC: 9.4 MG/DL — SIGNIFICANT CHANGE UP (ref 8.4–10.5)
CHLORIDE SERPL-SCNC: 104 MMOL/L — SIGNIFICANT CHANGE UP (ref 96–108)
CO2 SERPL-SCNC: 23 MMOL/L — SIGNIFICANT CHANGE UP (ref 22–31)
CREAT SERPL-MCNC: 0.9 MG/DL — SIGNIFICANT CHANGE UP (ref 0.5–1.3)
EGFR: 77 ML/MIN/1.73M2 — SIGNIFICANT CHANGE UP
EGFR: 77 ML/MIN/1.73M2 — SIGNIFICANT CHANGE UP
GI PCR PANEL: SIGNIFICANT CHANGE UP
GLUCOSE SERPL-MCNC: 70 MG/DL — SIGNIFICANT CHANGE UP (ref 70–99)
HCT VFR BLD CALC: 38 % — SIGNIFICANT CHANGE UP (ref 34.5–45)
HGB BLD-MCNC: 11.8 G/DL — SIGNIFICANT CHANGE UP (ref 11.5–15.5)
MCHC RBC-ENTMCNC: 26.8 PG — LOW (ref 27–34)
MCHC RBC-ENTMCNC: 31.1 G/DL — LOW (ref 32–36)
MCV RBC AUTO: 86.2 FL — SIGNIFICANT CHANGE UP (ref 80–100)
NRBC BLD AUTO-RTO: 0 /100 WBCS — SIGNIFICANT CHANGE UP (ref 0–0)
PLATELET # BLD AUTO: 171 K/UL — SIGNIFICANT CHANGE UP (ref 150–400)
POTASSIUM SERPL-MCNC: 4.2 MMOL/L — SIGNIFICANT CHANGE UP (ref 3.5–5.3)
POTASSIUM SERPL-SCNC: 4.2 MMOL/L — SIGNIFICANT CHANGE UP (ref 3.5–5.3)
RBC # BLD: 4.41 M/UL — SIGNIFICANT CHANGE UP (ref 3.8–5.2)
RBC # FLD: 17.5 % — HIGH (ref 10.3–14.5)
SODIUM SERPL-SCNC: 138 MMOL/L — SIGNIFICANT CHANGE UP (ref 135–145)
WBC # BLD: 4.91 K/UL — SIGNIFICANT CHANGE UP (ref 3.8–10.5)
WBC # FLD AUTO: 4.91 K/UL — SIGNIFICANT CHANGE UP (ref 3.8–10.5)

## 2025-06-10 PROCEDURE — 99222 1ST HOSP IP/OBS MODERATE 55: CPT

## 2025-06-10 RX ORDER — TIZANIDINE 4 MG/1
2 TABLET ORAL EVERY 8 HOURS
Refills: 0 | Status: DISCONTINUED | OUTPATIENT
Start: 2025-06-10 | End: 2025-06-12

## 2025-06-10 RX ORDER — ACETAMINOPHEN 500 MG/5ML
1000 LIQUID (ML) ORAL ONCE
Refills: 0 | Status: COMPLETED | OUTPATIENT
Start: 2025-06-10 | End: 2025-06-10

## 2025-06-10 RX ORDER — HYDROMORPHONE/SOD CHLOR,ISO/PF 2 MG/10 ML
2 SYRINGE (ML) INJECTION EVERY 6 HOURS
Refills: 0 | Status: DISCONTINUED | OUTPATIENT
Start: 2025-06-10 | End: 2025-06-12

## 2025-06-10 RX ADMIN — Medication 1 TABLET(S): at 11:07

## 2025-06-10 RX ADMIN — Medication 4 MILLIGRAM(S): at 23:09

## 2025-06-10 RX ADMIN — Medication 4 MILLIGRAM(S): at 22:39

## 2025-06-10 RX ADMIN — Medication 4 MILLIGRAM(S): at 11:07

## 2025-06-10 RX ADMIN — Medication 4 MILLIGRAM(S): at 06:41

## 2025-06-10 RX ADMIN — Medication 400 MILLIGRAM(S): at 14:00

## 2025-06-10 RX ADMIN — Medication 2 MILLIGRAM(S): at 12:49

## 2025-06-10 RX ADMIN — Medication 4 MILLIGRAM(S): at 11:37

## 2025-06-10 RX ADMIN — Medication 2 MILLIGRAM(S): at 17:24

## 2025-06-10 RX ADMIN — Medication 4 MILLIGRAM(S): at 00:03

## 2025-06-10 RX ADMIN — Medication 2 MILLIGRAM(S): at 17:54

## 2025-06-10 RX ADMIN — Medication 4 MILLIGRAM(S): at 07:11

## 2025-06-10 RX ADMIN — BUSPIRONE HYDROCHLORIDE 15 MILLIGRAM(S): 15 TABLET ORAL at 06:41

## 2025-06-10 RX ADMIN — DIAZEPAM 5 MILLIGRAM(S): 5 TABLET ORAL at 06:41

## 2025-06-10 RX ADMIN — Medication 2 MILLIGRAM(S): at 12:19

## 2025-06-10 RX ADMIN — BUSPIRONE HYDROCHLORIDE 15 MILLIGRAM(S): 15 TABLET ORAL at 13:55

## 2025-06-10 RX ADMIN — FOLIC ACID 1 MILLIGRAM(S): 1 TABLET ORAL at 11:07

## 2025-06-10 RX ADMIN — DIAZEPAM 5 MILLIGRAM(S): 5 TABLET ORAL at 14:00

## 2025-06-10 RX ADMIN — Medication 4 MILLIGRAM(S): at 22:40

## 2025-06-10 RX ADMIN — TIZANIDINE 2 MILLIGRAM(S): 4 TABLET ORAL at 22:36

## 2025-06-10 RX ADMIN — SERTRALINE 100 MILLIGRAM(S): 100 TABLET, FILM COATED ORAL at 11:07

## 2025-06-10 RX ADMIN — BUSPIRONE HYDROCHLORIDE 15 MILLIGRAM(S): 15 TABLET ORAL at 22:36

## 2025-06-10 RX ADMIN — Medication 4 MILLIGRAM(S): at 15:38

## 2025-06-10 NOTE — PROGRESS NOTE ADULT - ASSESSMENT
54 year old female with hx of gastric bypass presenting with acute on chronic abdominal pain    1. Abdominal pain. Multiple endoscopic evaluations negative. Suspect component of IBS-D and chronic pain  trial of xifaxan  MR enterography to r/o Crohn's disease - pending results   appreciate surgery input  consider pain management eval     2. Anemia, likely secondary to hx of gastric bypass  - Multiple endoscopic evaluations negative, no role for repeat     3. Major depression/fibryomyalgia  consider psych eval    I had a prolonged conversation with the patient regarding the hospital course, differential diagnosis, results of diagnostic tests this far, and therapeutic modalities available. Plan of care discussed with the patient after the evaluation. Patient expresses a clear understanding of the plan of care.  Sixty five minutes (or equivalent based on complexity) spent on the total encounter, of which more than fifty percent of the encounter was spent on counseling and/or coordinating care by the attending physician.     [facility]  Arnulfo Hall M.D.   [address] [place] [address]  Office: [phone]

## 2025-06-10 NOTE — PROGRESS NOTE ADULT - ASSESSMENT
54F PMH JENNIFER, MDD, fibromyalgia, IBS, RNYGB (2005, [place], lost 180lbs, regained 60 after pregnancy) with multiple subsequent surgeries for SBOs presenting for evaluation of intractable abdominal pain a/f further eval and pain control       Plan:    # Abd pain: Unclear etiology  - Frequently has abdominal pain and is followed closely by Dr. Lott who recently did an EGD and colonoscopy 2 weeks ago.  - Pain control  - CT A/P negative for acute findings  - MR enterography to r/o Crohn's disease-> completed, pending results   - FOBT negative, UA negative  - GI PCR negative  - Diet as tolerated  - Trial of xifaxan  - GI following    # Anxiety/ Depression/ Fibryomyalgia  - C/w current meds  - Pain mgmt eval pending     # hx of gastric bypass:  - Bariatrics following    # DVT ppx:  - IPC's    [facility]  [phone]

## 2025-06-10 NOTE — CONSULT NOTE ADULT - ASSESSMENT
54F, well known to our service from previous admissions, PMH JENNIFER, MDD, fibromyalgia, IBS, RNYGB (2005, [place], lost 180lbs, regained 60 after pregnancy) with multiple subsequent surgeries for SBOs presenting for evaluation of abdominal pain. Patient reports for the last 2 years she has had episodes of frequent diarrhea and incontinence as well as bleeding hemorrhoids. She frequently has abdominal pain and is followed closely by Dr. Lott who recently did an EGD and colonoscopy 2 weeks ago for hemorrhoidal bleeding and recurrent abd pain/diarrhea. Records unavailable but she reports they noted colitis and testing was positive for norovirus.     Pt last seen in March 2023, April 2023 and May 2023 for low back pain radiating into the coccyx.  In April, demanded GENOVEVA inhouse, explained that this procedure is performed outpatient.  Then demanded lumbar surgical procedure, states she wants to be transferred to [facility] as "her doctors are there and they will do surgery".  Also informed me that she has been seen at [facility], [facility], and [facility], and all have told her no back surgery.  In July 2023 at [facility], she asked the pain team to place an implantable stimulator device while inpatient.  As with past admissions, inconsistencies in patient's recollections and presentation; pt hard to arouse from sleep; during interview patient's eyes would drift and close, but she would continue to speak.     Patient was previously placed on Complex Care.    Pt has refused neurontin 2/2 somnolence in past, and no effect with Cymbalta in the past.  Avoid NSAIDs and Extended Release opioids 2/2 GI surgery hx.   Avoid Tramadol 2/2 anti-depressant use (risk of serotonin syndrome).  States no relief with Oxy IR, morphine and tramadol in the past.    Current out- patient pain regimen: none  Out Patient Pain Management provider: none  [facility] Prescription Monitoring Program: Reference #[id number]    Pt now admitted with diffuse sharp and throbbing abdominal pain that has been occurring for the last two months.  Abdominal CT negative.  Current Pain Score: 6/10    Start PO dilaudid solution 2 mg Q 6 hours PRN.  Continue IV morphine 4 mg Q 4 hours PRN severe breakthrough pain only x 24 hours.  Start zanaflex 2 mg Q 8 hours.    Monitor for sedation and respiratory depression.  Bowel regimen PRN.   Incentive spirometer.  OOB/ PT per primary team.    Please prescribe a narcan rescue kit on discharge (naloxone 4 mg/ 0.1 ml nasal spray- 1 spray Q 2-3 minutes alternating between nostrils).   Can discharge with zanaflex as above change to PRN; and PO dilaudid 2 mg TID PRN x 3 days only.  Follow up with GI surgery after discharge.    Signing off.      Total time of encounter:  60 minutes      [facility]  [phone]

## 2025-06-10 NOTE — PROGRESS NOTE ADULT - SUBJECTIVE AND OBJECTIVE BOX
INTERVAL HPI/OVERNIGHT EVENTS:    offers unchanged gi complaints   gi pcr negative   h/h stable/normal range     MEDICATIONS  (STANDING):  busPIRone 15 milliGRAM(s) Oral three times a day  folic acid 1 milliGRAM(s) Oral daily  multivitamin 1 Tablet(s) Oral daily  sertraline 100 milliGRAM(s) Oral daily  sodium chloride 0.9%. 1000 milliLiter(s) (100 mL/Hr) IV Continuous <Continuous>    MEDICATIONS  (PRN):  acetaminophen     Tablet .. 650 milliGRAM(s) Oral every 6 hours PRN Temp greater or equal to 38C (100.4F), Mild Pain (1 - 3)  aluminum hydroxide/magnesium hydroxide/simethicone Suspension 30 milliLiter(s) Oral every 4 hours PRN Dyspepsia  diazepam    Tablet 5 milliGRAM(s) Oral every 6 hours PRN Anxiety  melatonin 3 milliGRAM(s) Oral at bedtime PRN Insomnia  morphine  - Injectable 2 milliGRAM(s) IV Push every 4 hours PRN Moderate Pain (4 - 6)  morphine  - Injectable 4 milliGRAM(s) IV Push every 4 hours PRN Severe Pain (7 - 10)  ondansetron Injectable 4 milliGRAM(s) IV Push every 8 hours PRN Nausea and/or Vomiting      Allergies    aspirin (Other)  penicillin (Other)  sulfa drugs (Anaphylaxis)  NSAIDs (Other)    Intolerances    Tolerates cephalexin (Other)      Review of Systems:    General:  No wt loss, fevers, chills, night sweats, fatigue   Eyes:  Good vision, no reported pain  ENT:  No sore throat, pain, runny nose, dysphagia  CV:  No pain, palpitations, hypo/hypertension  Resp:  No dyspnea, cough, tachypnea, wheezing  GI:  No pain, No nausea, No vomiting, No diarrhea, No constipation, No weight loss, No fever, No pruritis, No rectal bleeding, No melena, No dysphagia  :  No pain, bleeding, incontinence, nocturia  Muscle:  No pain, weakness  Neuro:  No weakness, tingling, memory problems  Psych:  No fatigue, insomnia, mood problems, depression  Endocrine:  No polyuria, polydypsia, cold/heat intolerance  Heme:  No petechiae, ecchymosis, easy bruisability  Skin:  No rash, tattoos, scars, edema      Vital Signs Last 24 Hrs  T(C): 37.2 (10 Marco 2025 11:49), Max: 37.2 (09 Jun 2025 16:10)  T(F): 98.9 (10 Marco 2025 11:49), Max: 98.9 (09 Jun 2025 16:10)  HR: 67 (10 Marco 2025 11:49) (64 - 69)  BP: 95/60 (10 Marco 2025 11:49) (94/64 - 106/59)  BP(mean): --  RR: 18 (10 Marco 2025 11:49) (18 - 19)  SpO2: 92% (10 Marco 2025 11:49) (92% - 99%)    Parameters below as of 10 Marco 2025 11:49  Patient On (Oxygen Delivery Method): room air        PHYSICAL EXAM:    Constitutional: NAD  HEENT: EOMI, throat clear  Neck: No LAD, supple  Respiratory: CTA and P  Cardiovascular: S1 and S2, RRR, no M  Gastrointestinal: BS+, soft, NT/ND, neg HSM,  Extremities: No peripheral edema, neg clubbing, cyanosis  Vascular: 2+ peripheral pulses  Neurological: A/O   Psychiatric: Normal mood, normal affect  Skin: No rashes      LABS:                        11.8   4.91  )-----------( 171      ( 10 Marco 2025 06:52 )             38.0     06-10    138  |  104  |  12  ----------------------------<  70  4.2   |  23  |  0.90    Ca    9.4      10 Marco 2025 06:54        Urinalysis Basic - ( 10 Marco 2025 06:54 )    Color: x / Appearance: x / SG: x / pH: x  Gluc: 70 mg/dL / Ketone: x  / Bili: x / Urobili: x   Blood: x / Protein: x / Nitrite: x   Leuk Esterase: x / RBC: x / WBC x   Sq Epi: x / Non Sq Epi: x / Bacteria: x        RADIOLOGY & ADDITIONAL TESTS:  ·

## 2025-06-10 NOTE — PROGRESS NOTE ADULT - SUBJECTIVE AND OBJECTIVE BOX
SUBJECTIVE / OVERNIGHT EVENTS:    Patient seen and examined at bedside. Resting in bed, + abd discomfort         --------------------------------------------------------------------------------------------  LABS:                        11.8   4.91  )-----------( 171      ( 10 Marco 2025 06:52 )             38.0     06-10    138  |  104  |  12  ----------------------------<  70  4.2   |  23  |  0.90    Ca    9.4      10 Marco 2025 06:54        CAPILLARY BLOOD GLUCOSE            Urinalysis Basic - ( 10 Marco 2025 06:54 )    Color: x / Appearance: x / SG: x / pH: x  Gluc: 70 mg/dL / Ketone: x  / Bili: x / Urobili: x   Blood: x / Protein: x / Nitrite: x   Leuk Esterase: x / RBC: x / WBC x   Sq Epi: x / Non Sq Epi: x / Bacteria: x        RADIOLOGY & ADDITIONAL TESTS:     Imaging Personally Reviewed:  [x] YES  [ ] NO    Consultant(s) Notes Reviewed:  [x] YES  [ ] NO    MEDICATIONS  (STANDING):  busPIRone 15 milliGRAM(s) Oral three times a day  folic acid 1 milliGRAM(s) Oral daily  multivitamin 1 Tablet(s) Oral daily  sertraline 100 milliGRAM(s) Oral daily  sodium chloride 0.9%. 1000 milliLiter(s) (100 mL/Hr) IV Continuous <Continuous>    MEDICATIONS  (PRN):  acetaminophen     Tablet .. 650 milliGRAM(s) Oral every 6 hours PRN Temp greater or equal to 38C (100.4F), Mild Pain (1 - 3)  aluminum hydroxide/magnesium hydroxide/simethicone Suspension 30 milliLiter(s) Oral every 4 hours PRN Dyspepsia  diazepam    Tablet 5 milliGRAM(s) Oral every 6 hours PRN Anxiety  melatonin 3 milliGRAM(s) Oral at bedtime PRN Insomnia  morphine  - Injectable 2 milliGRAM(s) IV Push every 4 hours PRN Moderate Pain (4 - 6)  morphine  - Injectable 4 milliGRAM(s) IV Push every 4 hours PRN Severe Pain (7 - 10)  ondansetron Injectable 4 milliGRAM(s) IV Push every 8 hours PRN Nausea and/or Vomiting      Care Discussed with Consultants/Other Providers [x] YES  [ ] NO    Vital Signs Last 24 Hrs  T(C): 36.7 (10 Marco 2025 04:32), Max: 37.2 (09 Jun 2025 16:10)  T(F): 98 (10 Marco 2025 04:32), Max: 98.9 (09 Jun 2025 16:10)  HR: 65 (10 Marco 2025 04:32) (61 - 69)  BP: 101/65 (10 Marco 2025 04:32) (94/64 - 106/66)  BP(mean): --  RR: 18 (10 Marco 2025 04:32) (18 - 19)  SpO2: 99% (10 Marco 2025 04:32) (95% - 99%)    Parameters below as of 10 Marco 2025 04:32  Patient On (Oxygen Delivery Method): room air      I&O's Summary    PHYSICAL EXAM:  GENERAL: NAD, well-developed, comfortable  HEAD:  Atraumatic, Normocephalic  EYES: EOMI, PERRLA, conjunctiva and sclera clear  NECK: Supple, No JVD  CHEST/LUNG: Clear to auscultation bilaterally; No wheeze  HEART: Regular rate and rhythm; No murmurs, rubs, or gallops  ABDOMEN: Soft, +tenderness throughout, Nondistended; Bowel sounds present  NEURO: AAOx3, no focal weakness, 5/5 b/l extremity strength, b/l knee no arthritis, no effusion   EXTREMITIES:  2+ Peripheral Pulses, No clubbing, cyanosis, or edema  SKIN: No rashes or lesions

## 2025-06-10 NOTE — CONSULT NOTE ADULT - SUBJECTIVE AND OBJECTIVE BOX
Chief Complaint:  Patient is a 54y old  Female who presents with a chief complaint of Abdomen pain (10 Marco 2025 12:57)    HPI:  54F, well known to our service from previous admissions, PMH JENNIFER, MDD, fibromyalgia, IBS, RNYGB (, [place], lost 180lbs, regained 60 after pregnancy) with multiple subsequent surgeries for SBOs presenting for evaluation of abdominal pain. Patient reports for the last 2 years she has had episodes of frequent diarrhea and incontinence as well as bleeding hemorrhoids. She frequently has abdominal pain and is followed closely by Dr. Lott who recently did an EGD and colonoscopy 2 weeks ago for hemorrhoidal bleeding and recurrent abd pain/diarrhea. Records unavailable but she reports they noted colitis and testing was positive for norovirus.     Pt last seen in 2023, 2023 and May 2023 for low back pain radiating into the coccyx.  In April, demanded GENOVEVA inhouse, explained that this procedure is performed outpatient.  Then demanded lumbar surgical procedure, states she wants to be transferred to [facility] as "her doctors are there and they will do surgery".  Also informed me that she has been seen at [facility], [facility], and [facility], and all have told her no back surgery.  In 2023 at [facility], she asked the pain team to place an implantable stimulator device while inpatient.  As with past admissions, inconsistencies in patient's recollections and presentation; pt hard to arouse from sleep; during interview patient's eyes would drift and close, but she would continue to speak.     Patient was previously placed on Complex Care.    Pt has refused neurontin 2/2 somnolence in past, and no effect with Cymbalta in the past.  Avoid NSAIDs and Extended Release opioids 2/2 GI surgery hx.   Avoid Tramadol 2/2 anti-depressant use (risk of serotonin syndrome).  States no relief with Oxy IR, morphine and tramadol in the past.    Pt now admitted with diffuse sharp and throbbing abdominal pain that has been occurring for the last two months.      Current Pain Score: 6/10    Current out- patient pain regimen: none    Out Patient Pain Management provider: none    [facility] Prescription Monitoring Program: Reference #[id number]      PAST MEDICAL & SURGICAL HISTORY:  Depression      IBS (irritable bowel syndrome)      Fibromyalgia      Anxiety      Chronic lower back pain      H/O gastric bypass      H/O abdominoplasty      H/O  section  X3 (,,)      Bowel obstruction        H/O tubal ligation  , s/p ovarian ablation        FAMILY HISTORY:  FH: diabetes mellitus (Father)    FH: rheumatoid arthritis (Father)      SOCIAL HISTORY:  Tobacco Use: denies  Alcohol Use: denies  Recreational Marijuana: denies  Illicit Drug Use: denies    Opioid Risk Tool (ORT-OUD) Score: low      Allergies    aspirin (Other)  penicillin (Other)  sulfa drugs (Anaphylaxis)  NSAIDs (Other)    Intolerances    Tolerates cephalexin (Other)      REVIEW OF SYSTEMS:  CONSTITUTIONAL: No fever, weight loss, falls; + fatigue  NEURO: No headaches, memory loss, loss of strength, tremors, dizziness or blurred vision  RESP: No shortness of breath, cough  CV: No chest pain, palpitations  GI: + abdominal pain; no nausea, vomiting, constipation; + loose stool  : No urinary incontinence/retention, dysuria  MSK: No joint pain; + low back pain; no upper or lower motor strength weakness, no saddle anesthesia   SKIN: No itching, burning, rashes  PSYCHIATRIC: + depression, + anxiety; no mood swings or difficulty sleeping      MEDICATIONS  (STANDING):  busPIRone 15 milliGRAM(s) Oral three times a day  folic acid 1 milliGRAM(s) Oral daily  multivitamin 1 Tablet(s) Oral daily  sertraline 100 milliGRAM(s) Oral daily  sodium chloride 0.9%. 1000 milliLiter(s) (100 mL/Hr) IV Continuous <Continuous>    MEDICATIONS  (PRN):  acetaminophen     Tablet .. 650 milliGRAM(s) Oral every 6 hours PRN Temp greater or equal to 38C (100.4F), Mild Pain (1 - 3)  aluminum hydroxide/magnesium hydroxide/simethicone Suspension 30 milliLiter(s) Oral every 4 hours PRN Dyspepsia  diazepam    Tablet 5 milliGRAM(s) Oral every 6 hours PRN Anxiety  melatonin 3 milliGRAM(s) Oral at bedtime PRN Insomnia  morphine  - Injectable 2 milliGRAM(s) IV Push every 4 hours PRN Moderate Pain (4 - 6)  morphine  - Injectable 4 milliGRAM(s) IV Push every 4 hours PRN Severe Pain (7 - 10)  ondansetron Injectable 4 milliGRAM(s) IV Push every 8 hours PRN Nausea and/or Vomiting      PHYSICAL EXAM  GENERAL: seen at bedside; NAD; well developed, well groomed; no signs of toxicity  HEENT: head atraumatic, normocephalic; anicteric; speech clear, circumstantial   NEURO: A + O X 3; good concentration; Cranial Nerves II- XII intact   GI: abdomen soft, non distended; + loose BMs; poor appetite but is tolerating regular diet  : voiding  EXTREMITIES/ PV: DUNN; 2+ peripheral pulses; no cyanosis, edema or clubbing  MUSCULOSKELETAL: motor strength 5/5 B/L LE; independent ambulator however feels too weak to get up and walk  SKIN: no rashes, lesions   PSYCH: affect broad; good eye contact; + depression/ anxiety  Vital Signs:  T(C): 37.2 (06-10-25 @ 11:49)  HR: 67 (06-10-25 @ 11:49)  BP: 95/60 (06-10-25 @ 11:49)  RR: 18 (06-10-25 @ 11:49)  SpO2: 92% (06-10-25 @ 11:49)    Pertinent labs/radiology:                        11.8   4.91  )-----------( 171      ( 10 Marco 2025 06:52 )             38.0   0610    138  |  104  |  12  ----------------------------<  70  4.2   |  23  |  0.90    Ca    9.4      10 Marco 2025 06:54    < from: CT Abdomen and Pelvis w/ Oral Cont and w/ IV Cont (25 @ 18:18) >  IMPRESSION:  Cause of abdominal pain not identified

## 2025-06-11 ENCOUNTER — TRANSCRIPTION ENCOUNTER (OUTPATIENT)
Age: 55
End: 2025-06-11

## 2025-06-11 RX ADMIN — TIZANIDINE 2 MILLIGRAM(S): 4 TABLET ORAL at 21:55

## 2025-06-11 RX ADMIN — Medication 650 MILLIGRAM(S): at 20:07

## 2025-06-11 RX ADMIN — BUSPIRONE HYDROCHLORIDE 15 MILLIGRAM(S): 15 TABLET ORAL at 21:55

## 2025-06-11 RX ADMIN — BUSPIRONE HYDROCHLORIDE 15 MILLIGRAM(S): 15 TABLET ORAL at 14:28

## 2025-06-11 RX ADMIN — Medication 4 MILLIGRAM(S): at 12:54

## 2025-06-11 RX ADMIN — Medication 2 MILLIGRAM(S): at 16:12

## 2025-06-11 RX ADMIN — TIZANIDINE 2 MILLIGRAM(S): 4 TABLET ORAL at 14:28

## 2025-06-11 RX ADMIN — Medication 2 MILLIGRAM(S): at 21:00

## 2025-06-11 RX ADMIN — Medication 4 MILLIGRAM(S): at 20:35

## 2025-06-11 RX ADMIN — Medication 4 MILLIGRAM(S): at 12:24

## 2025-06-11 RX ADMIN — TIZANIDINE 2 MILLIGRAM(S): 4 TABLET ORAL at 06:01

## 2025-06-11 RX ADMIN — Medication 1000 MILLILITER(S): at 23:35

## 2025-06-11 RX ADMIN — Medication 4 MILLIGRAM(S): at 06:01

## 2025-06-11 RX ADMIN — Medication 2 MILLIGRAM(S): at 15:42

## 2025-06-11 RX ADMIN — Medication 650 MILLIGRAM(S): at 21:00

## 2025-06-11 RX ADMIN — FOLIC ACID 1 MILLIGRAM(S): 1 TABLET ORAL at 12:24

## 2025-06-11 RX ADMIN — Medication 1 TABLET(S): at 12:24

## 2025-06-11 RX ADMIN — Medication 2 MILLIGRAM(S): at 20:35

## 2025-06-11 RX ADMIN — BUSPIRONE HYDROCHLORIDE 15 MILLIGRAM(S): 15 TABLET ORAL at 06:01

## 2025-06-11 RX ADMIN — DIAZEPAM 5 MILLIGRAM(S): 5 TABLET ORAL at 14:31

## 2025-06-11 RX ADMIN — SERTRALINE 100 MILLIGRAM(S): 100 TABLET, FILM COATED ORAL at 12:25

## 2025-06-11 RX ADMIN — Medication 4 MILLIGRAM(S): at 15:42

## 2025-06-11 NOTE — DISCHARGE NOTE PROVIDER - CARE PROVIDERS DIRECT ADDRESSES
,[email or web address],DirectAddress_Unknown [email or web address],DirectAddress_Unknown,DirectAddress_Unknown

## 2025-06-11 NOTE — DISCHARGE NOTE PROVIDER - NSDCCPCAREPLAN_GEN_ALL_CORE_FT
PRINCIPAL DISCHARGE DIAGNOSIS  Diagnosis: Abdominal pain  Assessment and Plan of Treatment: You were admitted for evaluation of intractable abdominal pain. Laboratory studies included a negative fecal occult blood test (FOBT) and urinalysis (UA). GI PCR was negative. CT of the abdomen and pelvis was negative for acute findings. An MR enterography performed on 6/9 showed no evidence of bowel obstruction or inflammation.  You were  treated with IV morphine and subsequently transitioned to oral hydromorphone. Tizanidine was initiated for muscle spasms.  Schedule an appointment with Gastroenterologist within 1 week of discharge- Dr. Lott.  Continue with a diet as tolerated. Report any foods that seem to exacerbate your pain.  Pain Management: Take prescribed pain medications as directed. Do not exceed the prescribed dosage. Discuss any concerns about pain management with your physician.  Symptom Monitoring: Monitor for any changes in your abdominal pain, such as increased intensity, frequency, or character. Report any worsening pain, fever, nausea, vomiting, or changes in bowel habits (constipation or diarrhea) to your physician immediately.      SECONDARY DISCHARGE DIAGNOSES  Diagnosis: Anxiety and depression  Assessment and Plan of Treatment: Continue taking your prescribed medications for JENNIFER and MDD as directed by your psychiatrist. Do not discontinue or adjust medications without consulting your psychiatrist.  Therapy: Engage in regular therapy sessions with your mental health provider.  Coping Strategies: Practice stress-reduction techniques such as deep breathing exercises, meditation.       PRINCIPAL DISCHARGE DIAGNOSIS  Diagnosis: Abdominal pain  Assessment and Plan of Treatment: You were admitted for evaluation of intractable abdominal pain. Laboratory studies included a negative fecal occult blood test (FOBT) and urinalysis (UA). GI PCR was negative. CT of the abdomen and pelvis was negative for acute findings. An MR enterography performed on 6/9 showed no evidence of bowel obstruction or inflammation.  Continue Tylenol as needed for pain  You were evaluated by GI Dr Arnulfo Hall in the hospital, you can follow up with Dr. Hall. His office will arrange a prior authorization for Rifaxamin, an antibiotic to treat intestinal dysbiosis/SIBO. Please follow up  Schedule an appointment with Gastroenterologist within 1 week of discharge- Dr. Lott.  Continue with a diet as tolerated. Report any foods that seem to exacerbate your pain.  Pain Management: Take prescribed pain medications as directed. Do not exceed the prescribed dosage. Discuss any concerns about pain management with your physician.  Symptom Monitoring: Monitor for any changes in your abdominal pain, such as increased intensity, frequency, or character. Report any worsening pain, fever, nausea, vomiting, or changes in bowel habits (constipation or diarrhea) to your physician immediately.      SECONDARY DISCHARGE DIAGNOSES  Diagnosis: Anxiety and depression  Assessment and Plan of Treatment: Continue taking your prescribed medications for JENNIFER and MDD as directed by your psychiatrist. Do not discontinue or adjust medications without consulting your psychiatrist.  Therapy: Engage in regular therapy sessions with your mental health provider.  Coping Strategies: Practice stress-reduction techniques such as deep breathing exercises, meditation.       PRINCIPAL DISCHARGE DIAGNOSIS  Diagnosis: Abdominal pain  Assessment and Plan of Treatment: You were admitted for evaluation of intractable abdominal pain. Laboratory studies included a negative fecal occult blood test (FOBT) and urinalysis (UA). GI PCR was negative. CT of the abdomen and pelvis was negative for acute findings. An MR enterography performed on 6/9 showed no evidence of bowel obstruction or inflammation.  Continue Tylenol as needed for pain  You were evaluated by GI Dr Arnulfo Hall in the hospital, you can follow up with Dr. Hall. His office will arrange a prior authorization for Rifaxamin, an antibiotic to treat intestinal dysbiosis/SIBO. Please follow up  Schedule an appointment with Gastroenterologist within 1 week of discharge- Dr. Lott.  Continue Midodrine three times a day as prescribed for blood pressure support. Follow up with your PCP for further blood pressure monitoring and medication adjustment  Continue with a diet as tolerated. Report any foods that seem to exacerbate your pain.  Pain Management: Take prescribed pain medications as directed. Do not exceed the prescribed dosage. Discuss any concerns about pain management with your physician.  Symptom Monitoring: Monitor for any changes in your abdominal pain, such as increased intensity, frequency, or character. Report any worsening pain, fever, nausea, vomiting, or changes in bowel habits (constipation or diarrhea) to your physician immediately.      SECONDARY DISCHARGE DIAGNOSES  Diagnosis: Anxiety and depression  Assessment and Plan of Treatment: Continue taking your prescribed medications for JENNIFER and MDD as directed by your psychiatrist. Do not discontinue or adjust medications without consulting your psychiatrist.  Therapy: Engage in regular therapy sessions with your mental health provider.  Coping Strategies: Practice stress-reduction techniques such as deep breathing exercises, meditation.

## 2025-06-11 NOTE — PROGRESS NOTE ADULT - ASSESSMENT
54F PMH JENNIFER, MDD, fibromyalgia, IBS, RNYGB (2005, [place], lost 180lbs, regained 60 after pregnancy) with multiple subsequent surgeries for SBOs presenting for evaluation of intractable abdominal pain a/f further eval and pain control       Plan:    # Abd pain: Unclear etiology  - Frequently has abdominal pain and is followed closely by Dr. Lott who recently did an EGD and colonoscopy 2 weeks ago.  - Pain control  - CT A/P negative for acute findings  - MR enterography 6/9 w/ No evidence of bowel obstruction or inflammation.  - FOBT negative, UA negative  - GI PCR negative  - Diet as tolerated  - Trial of xifaxan  - GI following    # Anxiety/ Depression/ Fibryomyalgia  - C/w current meds  - Pain mgmt regimen per Pain mgmt    # hx of gastric bypass:  - Bariatrics following    # DVT ppx:  - IPC's    DC planning    [employer]  [phone]

## 2025-06-11 NOTE — DISCHARGE NOTE PROVIDER - NSDCCPTREATMENT_GEN_ALL_CORE_FT
PRINCIPAL PROCEDURE  Procedure: MRI abdomen and pelvis with contrast  Findings and Treatment: PROCEDURE:  MRI of the abdomen and pelvis was performed as per Enterography protocol.  FINDINGS:  LOWER CHEST: Within normal limits.  LIVER: Within normal limits.  BILE DUCTS: Intrahepatic and extrahepatic biliary ductal dilatation to   the level of the ampulla without evidence of obstructive stone or lesion,   similar to prior CT abdomen and pelvis 6/7/2025. CBD measures up to 1.2   cm.  GALLBLADDER: Cholecystectomy.  SPLEEN: Splenomegaly.  PANCREAS: Fatty atrophy.  ADRENALS: Within normal limits.  KIDNEYS/URETERS: Renal cysts. Otherwise unremarkable.  BLADDER: Within normal limits.  REPRODUCTIVE ORGANS: Uterus and adnexa within normal limits.  BOWEL: Status post with Jaron-en-Y gastric bypass. No bowel obstruction.   No discrete evidence of bowel inflammation.  PERITONEUM: No ascites.  VESSELS: Within normal limits.  RETROPERITONEUM/LYMPH NODES: No lymphadenopathy.  ABDOMINAL WALL: Postsurgical changes. A subcutaneous right lower quadrant   T1 hyperintense collection measuring 2.2 x 0.8 cm which may represent   small hematoma, stable.  BONES: Degenerative changes.  IMPRESSION:  No evidence of bowel obstruction or inflammation.

## 2025-06-11 NOTE — PROGRESS NOTE ADULT - SUBJECTIVE AND OBJECTIVE BOX
INTERVAL HPI/OVERNIGHT EVENTS:    gi complaints unchanged     MEDICATIONS  (STANDING):  busPIRone 15 milliGRAM(s) Oral three times a day  folic acid 1 milliGRAM(s) Oral daily  multivitamin 1 Tablet(s) Oral daily  sertraline 100 milliGRAM(s) Oral daily  sodium chloride 0.9%. 1000 milliLiter(s) (100 mL/Hr) IV Continuous <Continuous>  tiZANidine 2 milliGRAM(s) Oral every 8 hours    MEDICATIONS  (PRN):  acetaminophen     Tablet .. 650 milliGRAM(s) Oral every 6 hours PRN Temp greater or equal to 38C (100.4F), Mild Pain (1 - 3)  aluminum hydroxide/magnesium hydroxide/simethicone Suspension 30 milliLiter(s) Oral every 4 hours PRN Dyspepsia  diazepam    Tablet 5 milliGRAM(s) Oral every 6 hours PRN Anxiety  HYDROmorphone   Solution 2 milliGRAM(s) Oral every 6 hours PRN Severe Pain (7 - 10)  melatonin 3 milliGRAM(s) Oral at bedtime PRN Insomnia  morphine  - Injectable 4 milliGRAM(s) IV Push every 4 hours PRN severe breakthrough pain  ondansetron Injectable 4 milliGRAM(s) IV Push every 8 hours PRN Nausea and/or Vomiting      Allergies    aspirin (Other)  penicillin (Other)  sulfa drugs (Anaphylaxis)  NSAIDs (Other)    Intolerances    Tolerates cephalexin (Other)      Review of Systems:    General:  No wt loss, fevers, chills, night sweats, fatigue   Eyes:  Good vision, no reported pain  ENT:  No sore throat, pain, runny nose, dysphagia  CV:  No pain, palpitations, hypo/hypertension  Resp:  No dyspnea, cough, tachypnea, wheezing  GI:  No pain, No nausea, No vomiting, No diarrhea, No constipation, No weight loss, No fever, No pruritis, No rectal bleeding, No melena, No dysphagia  :  No pain, bleeding, incontinence, nocturia  Muscle:  No pain, weakness  Neuro:  No weakness, tingling, memory problems  Psych:  No fatigue, insomnia, mood problems, depression  Endocrine:  No polyuria, polydypsia, cold/heat intolerance  Heme:  No petechiae, ecchymosis, easy bruisability  Skin:  No rash, tattoos, scars, edema      Vital Signs Last 24 Hrs  T(C): 36.8 (11 Jun 2025 11:36), Max: 36.8 (11 Jun 2025 04:45)  T(F): 98.3 (11 Jun 2025 11:36), Max: 98.3 (11 Jun 2025 04:45)  HR: 67 (11 Jun 2025 11:36) (60 - 69)  BP: 98/63 (11 Jun 2025 11:36) (86/57 - 105/69)  BP(mean): --  RR: 17 (11 Jun 2025 11:36) (17 - 17)  SpO2: 92% (11 Jun 2025 11:36) (92% - 94%)    Parameters below as of 11 Jun 2025 11:36  Patient On (Oxygen Delivery Method): room air        PHYSICAL EXAM:    Constitutional: NAD  HEENT: EOMI, throat clear  Neck: No LAD, supple  Respiratory: CTA and P  Cardiovascular: S1 and S2, RRR, no M  Gastrointestinal: BS+, soft, NT/ND, neg HSM,  Extremities: No peripheral edema, neg clubbing, cyanosis  Vascular: 2+ peripheral pulses  Neurological: A/O   Psychiatric: Normal mood, normal affect  Skin: No rashes      LABS:                        11.8   4.91  )-----------( 171      ( 10 Marco 2025 06:52 )             38.0     06-10    138  |  104  |  12  ----------------------------<  70  4.2   |  23  |  0.90    Ca    9.4      10 Marco 2025 06:54        Urinalysis Basic - ( 10 Marco 2025 06:54 )    Color: x / Appearance: x / SG: x / pH: x  Gluc: 70 mg/dL / Ketone: x  / Bili: x / Urobili: x   Blood: x / Protein: x / Nitrite: x   Leuk Esterase: x / RBC: x / WBC x   Sq Epi: x / Non Sq Epi: x / Bacteria: x        RADIOLOGY & ADDITIONAL TESTS:

## 2025-06-11 NOTE — DISCHARGE NOTE PROVIDER - CARE PROVIDER_API CALL
Lizeth Blake  Gastroenterology  [address], [address]  [place] [address]  Phone: [phone]  Fax: [phone]  Established Patient  Follow Up Time: 1 week    Trae Valencia  Internal Medicine  [address] [place] [address], [address]  [place] [address]  Phone: [phone]  Fax: [phone]  Established Patient  Follow Up Time: 1 week   Lizeth Blake  Gastroenterology  [address], [address]  [place] [address]  Phone: [phone]  Fax: [phone]  Established Patient  Follow Up Time: 1 week    Trae Valencia  Internal Medicine  [address] [place] [address], [address]  [place] [address]  Phone: [phone]  Fax: [phone]  Established Patient  Follow Up Time: 1 week    rAnulfo Hall  Gastroenterology  [address] [place] [id number]  Phone: [phone]  Fax: [phone]  Follow Up Time: 1 week

## 2025-06-11 NOTE — DISCHARGE NOTE PROVIDER - HOSPITAL COURSE
HPI:  54F PMH JENNIFER, MDD, fibromyalgia, IBS, RNYGB (2005, [place], lost 180lbs, regained 60 after pregnancy) with multiple subsequent surgeries for SBOs presenting for evaluation of abdominal pain. Patient reports for the last 2 years she has had episodes of frequent diarrhea and incontinence as well as bleeding hemorrhoids. She frequently has abdominal pain and is followed closely by Dr. Lott who recently did an EGD and colonoscopy 2 weeks ago for hemorrhoidal bleeding and recurrent abd pain/diarrhea. Records unavailable but she reports they noted colitis and testing was positive for norovirus.     Denies NSAID use. Denies tobacco use. States she is concerned that she has another obstruction; notes a lump to the right of her belly button that feels similar to scar tissue she has had in the past. Endorsing nausea poor appetite but no vomiting, fever.      (08 Jun 2025 05:00)    Hospital Course:  The patient was admitted for evaluation of intractable abdominal pain. Laboratory studies included a negative fecal occult blood test (FOBT) and urinalysis (UA). GI PCR was negative. CT of the abdomen and pelvis was negative for acute findings. An MR enterography performed on 6/9 showed no evidence of bowel obstruction or inflammation. The patient was treated with IV morphine and subsequently transitioned to oral hydromorphone. Tizanidine was initiated for muscle spasms. Given the patient's history of opioid dependence and potential for misuse, a naloxone rescue kit was prescribed on discharge.  Dispo/Discharge/Med Rec discussed with ________    Important Medication Changes and Reason:  Pain management  Dilaudid (hydromorphone) 2mg PO TID PRN for pain, for 3 days only  Zanaflex (tizanidine) 2mg PO Q8H PRN for muscle spasms  Naloxone nasal spray 4mg/0.1ml, one spray every 2-3 minutes alternating nostrils, PRN opioid overdose.    Active or Pending Issues Requiring Follow-up:  Follow up with GI, pcp within 1 week of discharge.     Advanced Directives:   [x] Full code  [ ] DNR  [ ] Hospice    Discharge Diagnoses:  Abdominal pain  Anxiety and depression  H/O abdominoplasty  H/O gastric bypass         HPI:  54F PMH JENNIFER, MDD, fibromyalgia, IBS, RNYGB (2005, [place], lost 180lbs, regained 60 after pregnancy) with multiple subsequent surgeries for SBOs presenting for evaluation of abdominal pain. Patient reports for the last 2 years she has had episodes of frequent diarrhea and incontinence as well as bleeding hemorrhoids. She frequently has abdominal pain and is followed closely by Dr. Lott who recently did an EGD and colonoscopy 2 weeks ago for hemorrhoidal bleeding and recurrent abd pain/diarrhea. Records unavailable but she reports they noted colitis and testing was positive for norovirus.     Denies NSAID use. Denies tobacco use. States she is concerned that she has another obstruction; notes a lump to the right of her belly button that feels similar to scar tissue she has had in the past. Endorsing nausea poor appetite but no vomiting, fever.      (08 Jun 2025 05:00)    Hospital Course:  The patient was admitted for evaluation of intractable abdominal pain. Laboratory studies included a negative fecal occult blood test (FOBT) and urinalysis (UA). GI PCR was negative. CT of the abdomen and pelvis was negative for acute findings. An MR enterography performed on 6/9 showed no evidence of bowel obstruction or inflammation. The patient was treated with IV morphine and subsequently transitioned to oral hydromorphone. Tizanidine was initiated for muscle spasms. Given the patient's history of opioid dependence and potential for misuse, a naloxone rescue kit was prescribed on discharge.  Dispo/Discharge/Med Rec discussed with attending Dr. Clinton.    Important Medication Changes and Reason:  Pain management  Tylenol for pain control, maalox as needed for dyspepsia, MVT+folic acid for supplementation    Active or Pending Issues Requiring Follow-up:  Follow up with GI, pcp within 1 week of discharge.     Advanced Directives:   [x] Full code  [ ] DNR  [ ] Hospice    Discharge Diagnoses:  Abdominal pain  Anxiety and depression  H/O abdominoplasty  H/O gastric bypass         HPI:  54F PMH JENNIFER, MDD, fibromyalgia, IBS, RNYGB (2005, [place], lost 180lbs, regained 60 after pregnancy) with multiple subsequent surgeries for SBOs presenting for evaluation of abdominal pain. Patient reports for the last 2 years she has had episodes of frequent diarrhea and incontinence as well as bleeding hemorrhoids. She frequently has abdominal pain and is followed closely by Dr. Lott who recently did an EGD and colonoscopy 2 weeks ago for hemorrhoidal bleeding and recurrent abd pain/diarrhea. Records unavailable but she reports they noted colitis and testing was positive for norovirus.     Denies NSAID use. Denies tobacco use. States she is concerned that she has another obstruction; notes a lump to the right of her belly button that feels similar to scar tissue she has had in the past. Endorsing nausea poor appetite but no vomiting, fever.      (08 Jun 2025 05:00)    Hospital Course:  The patient was admitted for evaluation of intractable abdominal pain. Laboratory studies included a negative fecal occult blood test (FOBT) and urinalysis (UA). GI PCR was negative. CT of the abdomen and pelvis was negative for acute findings. An MR enterography performed on 6/9 showed no evidence of bowel obstruction or inflammation. The patient was treated with IV morphine and subsequently transitioned to oral hydromorphone. Tizanidine was initiated for muscle spasms. Given the patient's history of opioid dependence and potential for misuse, a naloxone rescue kit was prescribed on discharge.  Dispo/Discharge/Med Rec discussed with attending Dr. Clinton.    Important Medication Changes and Reason:  Tylenol for pain control, maalox as needed for dyspepsia, MVT+folic acid for supplementation, Rifaximin for SIBO/abdominal pain, Midodrine for hypotension    Active or Pending Issues Requiring Follow-up:  Follow up with GI, pcp within 1 week of discharge.     Advanced Directives:   [x] Full code  [ ] DNR  [ ] Hospice    Discharge Diagnoses:  Abdominal pain  Anxiety and depression  H/O abdominoplasty  H/O gastric bypass

## 2025-06-11 NOTE — DISCHARGE NOTE PROVIDER - NSDCFUSCHEDAPPT_GEN_ALL_CORE_FT
Forrest Lang  [facility] Physician [facility]  [facility] [address] [facility]  Scheduled Appointment: 07/18/2025

## 2025-06-11 NOTE — PROGRESS NOTE ADULT - SUBJECTIVE AND OBJECTIVE BOX
SUBJECTIVE / OVERNIGHT EVENTS:    Patient seen and examined at bedside. + abd discomfort         --------------------------------------------------------------------------------------------  LABS:                        11.8   4.91  )-----------( 171      ( 10 Marco 2025 06:52 )             38.0     06-10    138  |  104  |  12  ----------------------------<  70  4.2   |  23  |  0.90    Ca    9.4      10 Marco 2025 06:54        CAPILLARY BLOOD GLUCOSE            Urinalysis Basic - ( 10 Marco 2025 06:54 )    Color: x / Appearance: x / SG: x / pH: x  Gluc: 70 mg/dL / Ketone: x  / Bili: x / Urobili: x   Blood: x / Protein: x / Nitrite: x   Leuk Esterase: x / RBC: x / WBC x   Sq Epi: x / Non Sq Epi: x / Bacteria: x        RADIOLOGY & ADDITIONAL TESTS: < from: MR Pelvis w/ Oral Cont and w/wo IV Cont (06.09.25 @ 22:48) >  IMPRESSION:  No evidence of bowel obstruction or inflammation.    < end of copied text >      Imaging Personally Reviewed:  [x] YES  [ ] NO    Consultant(s) Notes Reviewed:  [x] YES  [ ] NO    MEDICATIONS  (STANDING):  busPIRone 15 milliGRAM(s) Oral three times a day  folic acid 1 milliGRAM(s) Oral daily  multivitamin 1 Tablet(s) Oral daily  sertraline 100 milliGRAM(s) Oral daily  sodium chloride 0.9%. 1000 milliLiter(s) (100 mL/Hr) IV Continuous <Continuous>  tiZANidine 2 milliGRAM(s) Oral every 8 hours    MEDICATIONS  (PRN):  acetaminophen     Tablet .. 650 milliGRAM(s) Oral every 6 hours PRN Temp greater or equal to 38C (100.4F), Mild Pain (1 - 3)  aluminum hydroxide/magnesium hydroxide/simethicone Suspension 30 milliLiter(s) Oral every 4 hours PRN Dyspepsia  diazepam    Tablet 5 milliGRAM(s) Oral every 6 hours PRN Anxiety  HYDROmorphone   Solution 2 milliGRAM(s) Oral every 6 hours PRN Severe Pain (7 - 10)  melatonin 3 milliGRAM(s) Oral at bedtime PRN Insomnia  morphine  - Injectable 4 milliGRAM(s) IV Push every 4 hours PRN severe breakthrough pain  ondansetron Injectable 4 milliGRAM(s) IV Push every 8 hours PRN Nausea and/or Vomiting      Care Discussed with Consultants/Other Providers [x] YES  [ ] NO    Vital Signs Last 24 Hrs  T(C): 36.8 (11 Jun 2025 04:45), Max: 37.2 (10 Marco 2025 11:49)  T(F): 98.3 (11 Jun 2025 04:45), Max: 98.9 (10 Marco 2025 11:49)  HR: 69 (11 Jun 2025 04:45) (60 - 69)  BP: 105/69 (11 Jun 2025 04:45) (86/57 - 105/69)  BP(mean): --  RR: 17 (11 Jun 2025 04:45) (17 - 18)  SpO2: 94% (11 Jun 2025 04:45) (92% - 94%)    Parameters below as of 11 Jun 2025 04:45  Patient On (Oxygen Delivery Method): room air      I&O's Summary    PHYSICAL EXAM:  GENERAL: NAD, well-developed, comfortable  HEAD:  Atraumatic, Normocephalic  EYES: EOMI, PERRLA, conjunctiva and sclera clear  NECK: Supple, No JVD  CHEST/LUNG: Clear to auscultation bilaterally; No wheeze  HEART: Regular rate and rhythm; No murmurs, rubs, or gallops  ABDOMEN: Soft, +tenderness throughout, Nondistended; Bowel sounds present  NEURO: AAOx3, no focal weakness, 5/5 b/l extremity strength, b/l knee no arthritis, no effusion   EXTREMITIES:  2+ Peripheral Pulses, No clubbing, cyanosis, or edema  SKIN: No rashes or lesions

## 2025-06-11 NOTE — PROGRESS NOTE ADULT - ASSESSMENT
54 year old female with hx of gastric bypass presenting with acute on chronic abdominal pain    1. Abdominal pain  Multiple endoscopic evaluations negative. Suspect component of IBS-D and chronic pain  MRE reviewed, no acute pathology contributing to her discomfort   appreciate pain management eval   diet as tolerated   no gi objection to d/c planning. Pt can f/u with GI as outpatient     2. Anemia, likely secondary to hx of gastric bypass  - Multiple endoscopic evaluations negative, no role for repeat   -appreciate bariatric sx eval    3. Major depression/fibryomyalgia  consider psych eval            I had a prolonged conversation with the patient regarding the hospital course, differential diagnosis, results of diagnostic tests this far, and therapeutic modalities available. Plan of care discussed with the patient after the evaluation. Patient expresses a clear understanding of the plan of care.  Sixty five minutes (or equivalent based on complexity) spent on the total encounter, of which more than fifty percent of the encounter was spent on counseling and/or coordinating care by the attending physician.     [facility]  Arnulfo Hall M.D.   [address] [place] [address]  Office: [phone]

## 2025-06-11 NOTE — DISCHARGE NOTE PROVIDER - PROVIDER TOKENS
PROVIDER:[TOKEN:[[id number]],FOLLOWUP:[1 week],ESTABLISHEDPATIENT:[T]],PROVIDER:[TOKEN:[[id number]],FOLLOWUP:[1 week],ESTABLISHEDPATIENT:[T]] PROVIDER:[TOKEN:[[id number]],FOLLOWUP:[1 week],ESTABLISHEDPATIENT:[T]],PROVIDER:[TOKEN:[[id number]],FOLLOWUP:[1 week],ESTABLISHEDPATIENT:[T]],PROVIDER:[TOKEN:[[id number]],FOLLOWUP:[1 week]]

## 2025-06-11 NOTE — DISCHARGE NOTE PROVIDER - NSDCMRMEDTOKEN_GEN_ALL_CORE_FT
busPIRone 15 mg oral tablet: 1 tab(s) orally 4 times a day  diazePAM 5 mg oral tablet: 1 tab(s) orally every 6 hours, As Needed -Anxiety - for anxiety MDD:4 tabs daily  sertraline 100 mg oral tablet: 1 tab(s) orally once a day   acetaminophen 325 mg oral tablet: 2 tab(s) orally every 6 hours As needed Temp greater or equal to 38C (100.4F), Mild Pain (1 - 3)  aluminum hydroxide-magnesium hydroxide 200 mg-200 mg/5 mL oral suspension: 30 milliliter(s) orally every 4 hours As needed Dyspepsia  busPIRone 15 mg oral tablet: 1 tab(s) orally 4 times a day  diazePAM 5 mg oral tablet: 1 tab(s) orally every 6 hours, As Needed -Anxiety - for anxiety MDD:4 tabs daily  folic acid 1 mg oral tablet: 1 tab(s) orally once a day  Multiple Vitamins oral tablet: 1 tab(s) orally once a day  sertraline 100 mg oral tablet: 1 tab(s) orally once a day   acetaminophen 325 mg oral tablet: 2 tab(s) orally every 6 hours As needed Temp greater or equal to 38C (100.4F), Mild Pain (1 - 3)  aluminum hydroxide-magnesium hydroxide 200 mg-200 mg/5 mL oral suspension: 30 milliliter(s) orally every 4 hours As needed Dyspepsia  busPIRone 15 mg oral tablet: 1 tab(s) orally 4 times a day  diazePAM 5 mg oral tablet: 1 tab(s) orally every 6 hours, As Needed -Anxiety - for anxiety MDD:4 tabs daily  folic acid 1 mg oral tablet: 1 tab(s) orally once a day  midodrine 5 mg oral tablet: 1 tab(s) orally 3 times a day  Multiple Vitamins oral tablet: 1 tab(s) orally once a day  rifAXIMin 550 mg oral tablet: 1 tab(s) orally 3 times a day  sertraline 100 mg oral tablet: 1 tab(s) orally once a day

## 2025-06-11 NOTE — DISCHARGE NOTE PROVIDER - NSDCFUADDAPPT_GEN_ALL_CORE_FT
APPTS ARE READY TO BE MADE: [x] YES    Best Family or Patient Contact (if needed):    Additional Information about above appointments (if needed):    1: gastroenterologist - Dr. Ramos  2: PCP  3:     Other comments or requests:    APPTS ARE READY TO BE MADE: [x] YES    Best Family or Patient Contact (if needed):    Additional Information about above appointments (if needed):    1: gastroenterologist - Dr. Ramos  2: PCP  3:     Other comments or requests:     Met with patient face to face and provided the patient with provider referral information, however patient prefers to schedule the appointments on their own.     Please call Inpatient Referral Program at [phone] if you need scheduling assistance.       APPTS ARE READY TO BE MADE: [x] YES    Best Family or Patient Contact (if needed):    Additional Information about above appointments (if needed):    1: gastroenterologist - Dr. Ramos or establish care with Dr. Hall  2: PCP  3:     Other comments or requests:     Met with patient face to face and provided the patient with provider referral information, however patient prefers to schedule the appointments on their own.     Please call Inpatient Referral Program at [phone] if you need scheduling assistance.

## 2025-06-12 LAB
ALBUMIN SERPL ELPH-MCNC: 3.6 G/DL — SIGNIFICANT CHANGE UP (ref 3.3–5)
ALP SERPL-CCNC: 90 U/L — SIGNIFICANT CHANGE UP (ref 40–120)
ALT FLD-CCNC: 17 U/L — SIGNIFICANT CHANGE UP (ref 10–45)
ANION GAP SERPL CALC-SCNC: 10 MMOL/L — SIGNIFICANT CHANGE UP (ref 5–17)
APTT BLD: 28.9 SEC — SIGNIFICANT CHANGE UP (ref 26.1–36.8)
AST SERPL-CCNC: 29 U/L — SIGNIFICANT CHANGE UP (ref 10–40)
BILIRUB SERPL-MCNC: 0.3 MG/DL — SIGNIFICANT CHANGE UP (ref 0.2–1.2)
BUN SERPL-MCNC: 13 MG/DL — SIGNIFICANT CHANGE UP (ref 7–23)
CALCIUM SERPL-MCNC: 8.9 MG/DL — SIGNIFICANT CHANGE UP (ref 8.4–10.5)
CHLORIDE SERPL-SCNC: 108 MMOL/L — SIGNIFICANT CHANGE UP (ref 96–108)
CO2 SERPL-SCNC: 20 MMOL/L — LOW (ref 22–31)
CREAT SERPL-MCNC: 0.85 MG/DL — SIGNIFICANT CHANGE UP (ref 0.5–1.3)
EGFR: 82 ML/MIN/1.73M2 — SIGNIFICANT CHANGE UP
EGFR: 82 ML/MIN/1.73M2 — SIGNIFICANT CHANGE UP
GLUCOSE SERPL-MCNC: 105 MG/DL — HIGH (ref 70–99)
HCT VFR BLD CALC: 33.4 % — LOW (ref 34.5–45)
HGB BLD-MCNC: 10.5 G/DL — LOW (ref 11.5–15.5)
INR BLD: 0.99 RATIO — SIGNIFICANT CHANGE UP (ref 0.85–1.16)
LACTATE SERPL-SCNC: 1 MMOL/L — SIGNIFICANT CHANGE UP (ref 0.5–2)
MCHC RBC-ENTMCNC: 27.2 PG — SIGNIFICANT CHANGE UP (ref 27–34)
MCHC RBC-ENTMCNC: 31.4 G/DL — LOW (ref 32–36)
MCV RBC AUTO: 86.5 FL — SIGNIFICANT CHANGE UP (ref 80–100)
NRBC BLD AUTO-RTO: 0 /100 WBCS — SIGNIFICANT CHANGE UP (ref 0–0)
PLATELET # BLD AUTO: 144 K/UL — LOW (ref 150–400)
POTASSIUM SERPL-MCNC: 3.8 MMOL/L — SIGNIFICANT CHANGE UP (ref 3.5–5.3)
POTASSIUM SERPL-SCNC: 3.8 MMOL/L — SIGNIFICANT CHANGE UP (ref 3.5–5.3)
PROT SERPL-MCNC: 5.9 G/DL — LOW (ref 6–8.3)
PROTHROM AB SERPL-ACNC: 11.4 SEC — SIGNIFICANT CHANGE UP (ref 9.9–13.4)
RBC # BLD: 3.86 M/UL — SIGNIFICANT CHANGE UP (ref 3.8–5.2)
RBC # FLD: 17.4 % — HIGH (ref 10.3–14.5)
SODIUM SERPL-SCNC: 138 MMOL/L — SIGNIFICANT CHANGE UP (ref 135–145)
WBC # BLD: 5.27 K/UL — SIGNIFICANT CHANGE UP (ref 3.8–10.5)
WBC # FLD AUTO: 5.27 K/UL — SIGNIFICANT CHANGE UP (ref 3.8–10.5)

## 2025-06-12 RX ORDER — LIDOCAINE HYDROCHLORIDE 20 MG/ML
1 JELLY TOPICAL ONCE
Refills: 0 | Status: COMPLETED | OUTPATIENT
Start: 2025-06-12 | End: 2025-06-12

## 2025-06-12 RX ORDER — B1/B2/B3/B5/B6/B12/VIT C/FOLIC 500-0.5 MG
1 TABLET ORAL
Qty: 0 | Refills: 0 | DISCHARGE
Start: 2025-06-12

## 2025-06-12 RX ORDER — FOLIC ACID 1 MG/1
1 TABLET ORAL
Qty: 0 | Refills: 0 | DISCHARGE
Start: 2025-06-12

## 2025-06-12 RX ORDER — MELATONIN 5 MG
3 TABLET ORAL ONCE
Refills: 0 | Status: COMPLETED | OUTPATIENT
Start: 2025-06-12 | End: 2025-06-12

## 2025-06-12 RX ORDER — MIDODRINE HYDROCHLORIDE 5 MG/1
5 TABLET ORAL THREE TIMES A DAY
Refills: 0 | Status: DISCONTINUED | OUTPATIENT
Start: 2025-06-12 | End: 2025-06-13

## 2025-06-12 RX ORDER — MAGNESIUM, ALUMINUM HYDROXIDE 200-200 MG
30 TABLET,CHEWABLE ORAL
Qty: 0 | Refills: 0 | DISCHARGE
Start: 2025-06-12

## 2025-06-12 RX ORDER — ACETAMINOPHEN 500 MG/5ML
2 LIQUID (ML) ORAL
Qty: 0 | Refills: 0 | DISCHARGE
Start: 2025-06-12

## 2025-06-12 RX ADMIN — Medication 3 MILLIGRAM(S): at 21:31

## 2025-06-12 RX ADMIN — DIAZEPAM 5 MILLIGRAM(S): 5 TABLET ORAL at 12:45

## 2025-06-12 RX ADMIN — Medication 650 MILLIGRAM(S): at 05:29

## 2025-06-12 RX ADMIN — BUSPIRONE HYDROCHLORIDE 15 MILLIGRAM(S): 15 TABLET ORAL at 12:47

## 2025-06-12 RX ADMIN — Medication 100 MILLILITER(S): at 12:47

## 2025-06-12 RX ADMIN — Medication 1 TABLET(S): at 12:40

## 2025-06-12 RX ADMIN — BUSPIRONE HYDROCHLORIDE 15 MILLIGRAM(S): 15 TABLET ORAL at 21:31

## 2025-06-12 RX ADMIN — TIZANIDINE 2 MILLIGRAM(S): 4 TABLET ORAL at 05:31

## 2025-06-12 RX ADMIN — Medication 650 MILLIGRAM(S): at 12:39

## 2025-06-12 RX ADMIN — LIDOCAINE HYDROCHLORIDE 1 PATCH: 20 JELLY TOPICAL at 19:24

## 2025-06-12 RX ADMIN — Medication 650 MILLIGRAM(S): at 18:28

## 2025-06-12 RX ADMIN — Medication 2 MILLIGRAM(S): at 03:03

## 2025-06-12 RX ADMIN — SERTRALINE 100 MILLIGRAM(S): 100 TABLET, FILM COATED ORAL at 12:40

## 2025-06-12 RX ADMIN — BUSPIRONE HYDROCHLORIDE 15 MILLIGRAM(S): 15 TABLET ORAL at 05:30

## 2025-06-12 RX ADMIN — LIDOCAINE HYDROCHLORIDE 1 PATCH: 20 JELLY TOPICAL at 12:39

## 2025-06-12 RX ADMIN — Medication 4 MILLIGRAM(S): at 19:48

## 2025-06-12 RX ADMIN — Medication 3 MILLIGRAM(S): at 23:20

## 2025-06-12 RX ADMIN — LIDOCAINE HYDROCHLORIDE 1 PATCH: 20 JELLY TOPICAL at 23:21

## 2025-06-12 RX ADMIN — MIDODRINE HYDROCHLORIDE 5 MILLIGRAM(S): 5 TABLET ORAL at 18:29

## 2025-06-12 RX ADMIN — DIAZEPAM 5 MILLIGRAM(S): 5 TABLET ORAL at 21:31

## 2025-06-12 RX ADMIN — FOLIC ACID 1 MILLIGRAM(S): 1 TABLET ORAL at 12:40

## 2025-06-12 NOTE — PROGRESS NOTE ADULT - ASSESSMENT
54F PMH JENNIFER, MDD, fibromyalgia, IBS, RNYGB (2005, [place], lost 180lbs, regained 60 after pregnancy) with multiple subsequent surgeries for SBOs presenting for evaluation of intractable abdominal pain a/f further eval and pain control       Plan:    # Abd pain: Unclear etiology/ Likely IBS  - Frequently has abdominal pain and is followed closely by Dr. Lott who recently did an EGD and colonoscopy 2 weeks ago.  - Pain control  - CT A/P negative for acute findings  - MR enterography 6/9 w/ No evidence of bowel obstruction or inflammation.  - FOBT negative, UA negative  - GI PCR negative  - Diet as tolerated  - Trial of xifaxan  - GI following->  f/u with GI as outpatient     # Hypotension:  - Improved. Continue to monitor    # Anxiety/ Depression/ Fibryomyalgia  - C/w current meds  - Pain mgmt regimen per Pain mgmt    # hx of gastric bypass:  - Bariatrics following    # DVT ppx:  - IPC's    DC planning    [facility]  [phone]     54F PMH JENNIFER, MDD, fibromyalgia, IBS, RNYGB (2005, [place], lost 180lbs, regained 60 after pregnancy) with multiple subsequent surgeries for SBOs presenting for evaluation of intractable abdominal pain a/f further eval and pain control       Plan:    # Abd pain: Unclear etiology/ Likely IBS  - Frequently has abdominal pain and is followed closely by Dr. Lott who recently did an EGD and colonoscopy 2 weeks ago.  - Pain control  - CT A/P negative for acute findings  - MR enterography 6/9 w/ No evidence of bowel obstruction or inflammation.  - FOBT negative, UA negative  - GI PCR negative  - Diet as tolerated  - Trial of xifaxan  - GI following->  f/u with GI as outpatient     # Hypotension:  - Check orthos  - Trend BP's    # Anxiety/ Depression/ Fibryomyalgia  - C/w current meds  - Pain mgmt regimen per Pain mgmt    # hx of gastric bypass:  - Bariatrics following    # DVT ppx:  - IPC's    DC planning    [facility]  [phone]     54F PMH JENNIFER, MDD, fibromyalgia, IBS, RNYGB (2005, [place], lost 180lbs, regained 60 after pregnancy) with multiple subsequent surgeries for SBOs presenting for evaluation of intractable abdominal pain a/f further eval and pain control       Plan:    # Abd pain: Unclear etiology/ Likely IBS  - Frequently has abdominal pain and is followed closely by Dr. Lott who recently did an EGD and colonoscopy 2 weeks ago.  - Pain control  - CT A/P negative for acute findings  - MR enterography 6/9 w/ No evidence of bowel obstruction or inflammation.  - FOBT negative, UA negative  - GI PCR negative  - Diet as tolerated  - Trial of xifaxan  - GI following->  f/u with GI as outpatient     # Hypotension:  - Check orthos  - Trend BP's  - Cards eval pending (Called)    # Anxiety/ Depression/ Fibryomyalgia  - C/w current meds  - Pain mgmt regimen per Pain mgmt    # hx of gastric bypass:  - Bariatrics following    # DVT ppx:  - IPC's    DC planning    [facility]  [phone]

## 2025-06-12 NOTE — CHART NOTE - NSCHARTNOTEFT_GEN_A_CORE
Medicine PA Note     VI PETERSON  MRN-[id number]  Allergies    aspirin (Other)  penicillin (Other)  sulfa drugs (Anaphylaxis)  NSAIDs (Other)    Intolerances    Tolerates cephalexin (Other)       Notified by RN, Pt hypotensive to 70's/50's with associated dizziness. Pt seen and evaluated at bedside. She reports abdominal pain that fluctuates in severity, however currently manageable with PRN dilaudid. Pt also reports lightheadedness that started tonight with the associated hypotension. She reports her last BM was on Monday and states she has been tolerating her diet. Pt otherwise denies headache, chest pain, sob, n/v/d, melena, BRBPR.    Vital Signs Last 24 Hrs  T(C): 36.7 (06-11-25 @ 23:20), Max: 36.8 (06-11-25 @ 04:45)  T(F): 98.1 (06-11-25 @ 23:20), Max: 98.3 (06-11-25 @ 04:45)  HR: 61 (06-11-25 @ 23:20) (61 - 69)  BP: 96/64 (06-12-25 @ 00:40) (79/55 - 105/69)  BP(mean): --  RR: 18 (06-11-25 @ 23:20) (17 - 18)  SpO2: 94% (06-11-25 @ 23:20) (92% - 95%)                        10.5   5.27  )-----------( 144      ( 12 Jun 2025 01:07 )             33.4     06-12    138  |  108  |  13  ----------------------------<  105[H]  3.8   |  20[L]  |  0.85    Ca    8.9      12 Jun 2025 01:07    TPro  5.9[L]  /  Alb  3.6  /  TBili  0.3  /  DBili  x   /  AST  29  /  ALT  17  /  AlkPhos  90  06-12    PT/INR - ( 12 Jun 2025 01:07 )   PT: 11.4 sec;   INR: 0.99 ratio         PTT - ( 12 Jun 2025 01:07 )  PTT:28.9 sec      PHYSICAL EXAM:  GENERAL: NAD, well-developed  CHEST/LUNG: Clear to auscultation bilaterally; No wheezes, rhonchi or rales appreciated  HEART: Regular rate and rhythm; No murmurs, rubs, or gallops  ABDOMEN: Soft, Tender to deep palpation of RLQ, small, small palpable hematoma noted in RLQ (consistent with previous CT) Nondistended; Bowel sounds present. No rebound, or guarding noted.  EXTREMITIES:  2+ Peripheral Pulses, No clubbing, cyanosis, or edema        Assessment/Plan: HPI:  54F PMH JENNIFER, MDD, fibromyalgia, IBS, RNYGB (2005, [place], lost 180lbs, regained 60 after pregnancy) with multiple subsequent surgeries for SBOs presenting for evaluation of abdominal pain. Patient reports for the last 2 years she has had episodes of frequent diarrhea and incontinence as well as bleeding hemorrhoids. She frequently has abdominal pain and is followed closely by Dr. Lott who recently did an EGD and colonoscopy 2 weeks ago for hemorrhoidal bleeding and recurrent abd pain/diarrhea. Records unavailable but she reports they noted colitis and testing was positive for norovirus.     Hypotension likely 2/2 poor PO intake vs recent dilaudid/tizanidine use  >VS hemodynamically stable aside from BP-70s/50s  >1 Liter IVF bolus  >CBC, CMP, lactate STAT  >Reassess BP after bolus  >Consider imaging if persistent hypotension, new fever or if significant change in lab values  > Will endorse to AM team, attending to follow    Christy Urbano PA-C,   Department of Medicine

## 2025-06-12 NOTE — PROGRESS NOTE ADULT - ASSESSMENT
54 year old female with hx of gastric bypass presenting with acute on chronic abdominal pain    1. Abdominal pain  Multiple endoscopic evaluations negative. Suspect component of IBS-D and chronic pain  MRI reviewed, no acute pathology contributing to her discomfort   appreciate pain management eval   Xifaxan x 14d course started for ?SIBO   no gi objection to d/c planning. Pt can f/u with GI as outpatient     2. Anemia, likely secondary to hx of gastric bypass  -Multiple endoscopic evaluations negative, no role for repeat   -MRI w/subq rlq collection, likely small hematoma; non-contributory  -appreciate bariatric sx eval    3. Major depression/fibryomyalgia  consider psych eval            I had a prolonged conversation with the patient regarding the hospital course, differential diagnosis, results of diagnostic tests this far, and therapeutic modalities available. Plan of care discussed with the patient after the evaluation. Patient expresses a clear understanding of the plan of care.  Sixty five minutes (or equivalent based on complexity) spent on the total encounter, of which more than fifty percent of the encounter was spent on counseling and/or coordinating care by the attending physician.     [facility]  Arnulfo Hall M.D.   [address] [place] [address]  Office: [phone]

## 2025-06-12 NOTE — PROGRESS NOTE ADULT - SUBJECTIVE AND OBJECTIVE BOX
SUBJECTIVE / OVERNIGHT EVENTS:      Patient seen and examined at bedside. Hypotension noted overnight      --------------------------------------------------------------------------------------------  LABS:                        10.5   5.27  )-----------( 144      ( 12 Jun 2025 01:07 )             33.4     06-12    138  |  108  |  13  ----------------------------<  105[H]  3.8   |  20[L]  |  0.85    Ca    8.9      12 Jun 2025 01:07    TPro  5.9[L]  /  Alb  3.6  /  TBili  0.3  /  DBili  x   /  AST  29  /  ALT  17  /  AlkPhos  90  06-12    PT/INR - ( 12 Jun 2025 01:07 )   PT: 11.4 sec;   INR: 0.99 ratio         PTT - ( 12 Jun 2025 01:07 )  PTT:28.9 sec  CAPILLARY BLOOD GLUCOSE            Urinalysis Basic - ( 12 Jun 2025 01:07 )    Color: x / Appearance: x / SG: x / pH: x  Gluc: 105 mg/dL / Ketone: x  / Bili: x / Urobili: x   Blood: x / Protein: x / Nitrite: x   Leuk Esterase: x / RBC: x / WBC x   Sq Epi: x / Non Sq Epi: x / Bacteria: x        RADIOLOGY & ADDITIONAL TESTS:    Imaging Personally Reviewed:  [x] YES  [ ] NO    Consultant(s) Notes Reviewed:  [x] YES  [ ] NO    MEDICATIONS  (STANDING):  busPIRone 15 milliGRAM(s) Oral three times a day  folic acid 1 milliGRAM(s) Oral daily  multivitamin 1 Tablet(s) Oral daily  sertraline 100 milliGRAM(s) Oral daily  sodium chloride 0.9%. 1000 milliLiter(s) (100 mL/Hr) IV Continuous <Continuous>  tiZANidine 2 milliGRAM(s) Oral every 8 hours    MEDICATIONS  (PRN):  acetaminophen     Tablet .. 650 milliGRAM(s) Oral every 6 hours PRN Temp greater or equal to 38C (100.4F), Mild Pain (1 - 3)  aluminum hydroxide/magnesium hydroxide/simethicone Suspension 30 milliLiter(s) Oral every 4 hours PRN Dyspepsia  diazepam    Tablet 5 milliGRAM(s) Oral every 6 hours PRN Anxiety  HYDROmorphone   Solution 2 milliGRAM(s) Oral every 6 hours PRN Severe Pain (7 - 10)  melatonin 3 milliGRAM(s) Oral at bedtime PRN Insomnia  ondansetron Injectable 4 milliGRAM(s) IV Push every 8 hours PRN Nausea and/or Vomiting      Care Discussed with Consultants/Other Providers [x] YES  [ ] NO    Vital Signs Last 24 Hrs  T(C): 36.4 (12 Jun 2025 05:06), Max: 36.8 (11 Jun 2025 11:36)  T(F): 97.5 (12 Jun 2025 05:06), Max: 98.3 (11 Jun 2025 11:36)  HR: 68 (12 Jun 2025 05:06) (61 - 68)  BP: 100/64 (12 Jun 2025 05:06) (79/55 - 100/66)  BP(mean): --  RR: 18 (12 Jun 2025 05:06) (17 - 18)  SpO2: 95% (12 Jun 2025 05:06) (92% - 95%)    Parameters below as of 12 Jun 2025 05:06  Patient On (Oxygen Delivery Method): room air      I&O's Summary    11 Jun 2025 07:01  -  12 Jun 2025 07:00  --------------------------------------------------------  IN: 300 mL / OUT: 0 mL / NET: 300 mL        PHYSICAL EXAM:  GENERAL: NAD, well-developed, comfortable  HEAD:  Atraumatic, Normocephalic  EYES: EOMI, PERRLA, conjunctiva and sclera clear  NECK: Supple, No JVD  CHEST/LUNG: Clear to auscultation bilaterally; No wheeze  HEART: Regular rate and rhythm; No murmurs, rubs, or gallops  ABDOMEN: Soft, +tenderness throughout, Nondistended; Bowel sounds present  NEURO: AAOx3, no focal weakness, 5/5 b/l extremity strength, b/l knee no arthritis, no effusion   EXTREMITIES:  2+ Peripheral Pulses, No clubbing, cyanosis, or edema  SKIN: No rashes or lesions

## 2025-06-12 NOTE — PROGRESS NOTE ADULT - SUBJECTIVE AND OBJECTIVE BOX
INTERVAL HPI/OVERNIGHT EVENTS:    events noted   no acute gi events     MEDICATIONS  (STANDING):  busPIRone 15 milliGRAM(s) Oral three times a day  folic acid 1 milliGRAM(s) Oral daily  lidocaine   4% Patch 1 Patch Transdermal once  multivitamin 1 Tablet(s) Oral daily  rifAXIMin 550 milliGRAM(s) Oral three times a day  sertraline 100 milliGRAM(s) Oral daily  sodium chloride 0.9%. 1000 milliLiter(s) (100 mL/Hr) IV Continuous <Continuous>  sodium chloride 0.9%. 1000 milliLiter(s) (100 mL/Hr) IV Continuous <Continuous>    MEDICATIONS  (PRN):  acetaminophen     Tablet .. 650 milliGRAM(s) Oral every 6 hours PRN Temp greater or equal to 38C (100.4F), Mild Pain (1 - 3)  aluminum hydroxide/magnesium hydroxide/simethicone Suspension 30 milliLiter(s) Oral every 4 hours PRN Dyspepsia  diazepam    Tablet 5 milliGRAM(s) Oral every 6 hours PRN Anxiety  melatonin 3 milliGRAM(s) Oral at bedtime PRN Insomnia  ondansetron Injectable 4 milliGRAM(s) IV Push every 8 hours PRN Nausea and/or Vomiting      Allergies    aspirin (Other)  penicillin (Other)  sulfa drugs (Anaphylaxis)  NSAIDs (Other)    Intolerances    Tolerates cephalexin (Other)      Review of Systems:    General:  No wt loss, fevers, chills, night sweats, fatigue   Eyes:  Good vision, no reported pain  ENT:  No sore throat, pain, runny nose, dysphagia  CV:  No pain, palpitations, hypo/hypertension  Resp:  No dyspnea, cough, tachypnea, wheezing  GI:  No pain, No nausea, No vomiting, No diarrhea, No constipation, No weight loss, No fever, No pruritis, No rectal bleeding, No melena, No dysphagia  :  No pain, bleeding, incontinence, nocturia  Muscle:  No pain, weakness  Neuro:  No weakness, tingling, memory problems  Psych:  No fatigue, insomnia, mood problems, depression  Endocrine:  No polyuria, polydypsia, cold/heat intolerance  Heme:  No petechiae, ecchymosis, easy bruisability  Skin:  No rash, tattoos, scars, edema      Vital Signs Last 24 Hrs  T(C): 36.4 (12 Jun 2025 10:35), Max: 36.7 (11 Jun 2025 23:20)  T(F): 97.5 (12 Jun 2025 10:35), Max: 98.1 (11 Jun 2025 23:20)  HR: 68 (12 Jun 2025 05:06) (61 - 68)  BP: 100/64 (12 Jun 2025 05:06) (79/55 - 100/66)  BP(mean): --  RR: 18 (12 Jun 2025 05:06) (18 - 18)  SpO2: 95% (12 Jun 2025 05:06) (94% - 95%)    Parameters below as of 12 Jun 2025 05:06  Patient On (Oxygen Delivery Method): room air        PHYSICAL EXAM:    Constitutional: NAD  HEENT: EOMI, throat clear  Neck: No LAD, supple  Respiratory: CTA and P  Cardiovascular: S1 and S2, RRR, no M  Gastrointestinal: BS+, soft, NT/ND, neg HSM,  Extremities: No peripheral edema, neg clubbing, cyanosis  Vascular: 2+ peripheral pulses  Neurological: A/O   Psychiatric: Normal mood, normal affect  Skin: No rashes      LABS:                        10.5   5.27  )-----------( 144      ( 12 Jun 2025 01:07 )             33.4     06-12    138  |  108  |  13  ----------------------------<  105[H]  3.8   |  20[L]  |  0.85    Ca    8.9      12 Jun 2025 01:07    TPro  5.9[L]  /  Alb  3.6  /  TBili  0.3  /  DBili  x   /  AST  29  /  ALT  17  /  AlkPhos  90  06-12    PT/INR - ( 12 Jun 2025 01:07 )   PT: 11.4 sec;   INR: 0.99 ratio         PTT - ( 12 Jun 2025 01:07 )  PTT:28.9 sec  Urinalysis Basic - ( 12 Jun 2025 01:07 )    Color: x / Appearance: x / SG: x / pH: x  Gluc: 105 mg/dL / Ketone: x  / Bili: x / Urobili: x   Blood: x / Protein: x / Nitrite: x   Leuk Esterase: x / RBC: x / WBC x   Sq Epi: x / Non Sq Epi: x / Bacteria: x        RADIOLOGY & ADDITIONAL TESTS:

## 2025-06-13 ENCOUNTER — TRANSCRIPTION ENCOUNTER (OUTPATIENT)
Age: 55
End: 2025-06-13

## 2025-06-13 VITALS
SYSTOLIC BLOOD PRESSURE: 110 MMHG | DIASTOLIC BLOOD PRESSURE: 71 MMHG | TEMPERATURE: 98 F | RESPIRATION RATE: 18 BRPM | HEART RATE: 73 BPM | OXYGEN SATURATION: 98 %

## 2025-06-13 DIAGNOSIS — I95.1 ORTHOSTATIC HYPOTENSION: ICD-10-CM

## 2025-06-13 LAB
ANION GAP SERPL CALC-SCNC: 16 MMOL/L — SIGNIFICANT CHANGE UP (ref 5–17)
BUN SERPL-MCNC: 12 MG/DL — SIGNIFICANT CHANGE UP (ref 7–23)
CALCIUM SERPL-MCNC: 9.4 MG/DL — SIGNIFICANT CHANGE UP (ref 8.4–10.5)
CHLORIDE SERPL-SCNC: 108 MMOL/L — SIGNIFICANT CHANGE UP (ref 96–108)
CO2 SERPL-SCNC: 19 MMOL/L — LOW (ref 22–31)
CREAT SERPL-MCNC: 0.78 MG/DL — SIGNIFICANT CHANGE UP (ref 0.5–1.3)
EGFR: 91 ML/MIN/1.73M2 — SIGNIFICANT CHANGE UP
EGFR: 91 ML/MIN/1.73M2 — SIGNIFICANT CHANGE UP
GLUCOSE SERPL-MCNC: 89 MG/DL — SIGNIFICANT CHANGE UP (ref 70–99)
HCT VFR BLD CALC: 36 % — SIGNIFICANT CHANGE UP (ref 34.5–45)
HGB BLD-MCNC: 11.1 G/DL — LOW (ref 11.5–15.5)
MCHC RBC-ENTMCNC: 26.5 PG — LOW (ref 27–34)
MCHC RBC-ENTMCNC: 30.8 G/DL — LOW (ref 32–36)
MCV RBC AUTO: 85.9 FL — SIGNIFICANT CHANGE UP (ref 80–100)
NRBC BLD AUTO-RTO: 0 /100 WBCS — SIGNIFICANT CHANGE UP (ref 0–0)
PLATELET # BLD AUTO: 157 K/UL — SIGNIFICANT CHANGE UP (ref 150–400)
POTASSIUM SERPL-MCNC: 3.8 MMOL/L — SIGNIFICANT CHANGE UP (ref 3.5–5.3)
POTASSIUM SERPL-SCNC: 3.8 MMOL/L — SIGNIFICANT CHANGE UP (ref 3.5–5.3)
RBC # BLD: 4.19 M/UL — SIGNIFICANT CHANGE UP (ref 3.8–5.2)
RBC # FLD: 17.7 % — HIGH (ref 10.3–14.5)
SODIUM SERPL-SCNC: 143 MMOL/L — SIGNIFICANT CHANGE UP (ref 135–145)
WBC # BLD: 4.39 K/UL — SIGNIFICANT CHANGE UP (ref 3.8–10.5)
WBC # FLD AUTO: 4.39 K/UL — SIGNIFICANT CHANGE UP (ref 3.8–10.5)

## 2025-06-13 PROCEDURE — 85025 COMPLETE CBC W/AUTO DIFF WBC: CPT

## 2025-06-13 PROCEDURE — 85730 THROMBOPLASTIN TIME PARTIAL: CPT

## 2025-06-13 PROCEDURE — 84132 ASSAY OF SERUM POTASSIUM: CPT

## 2025-06-13 PROCEDURE — 81003 URINALYSIS AUTO W/O SCOPE: CPT

## 2025-06-13 PROCEDURE — 96375 TX/PRO/DX INJ NEW DRUG ADDON: CPT

## 2025-06-13 PROCEDURE — 85610 PROTHROMBIN TIME: CPT

## 2025-06-13 PROCEDURE — 82947 ASSAY GLUCOSE BLOOD QUANT: CPT

## 2025-06-13 PROCEDURE — 84702 CHORIONIC GONADOTROPIN TEST: CPT

## 2025-06-13 PROCEDURE — 82803 BLOOD GASES ANY COMBINATION: CPT

## 2025-06-13 PROCEDURE — 82435 ASSAY OF BLOOD CHLORIDE: CPT

## 2025-06-13 PROCEDURE — 83690 ASSAY OF LIPASE: CPT

## 2025-06-13 PROCEDURE — 74182 MRI ABDOMEN W/CONTRAST: CPT

## 2025-06-13 PROCEDURE — 72197 MRI PELVIS W/O & W/DYE: CPT

## 2025-06-13 PROCEDURE — 84295 ASSAY OF SERUM SODIUM: CPT

## 2025-06-13 PROCEDURE — 99285 EMERGENCY DEPT VISIT HI MDM: CPT | Mod: 25

## 2025-06-13 PROCEDURE — 96374 THER/PROPH/DIAG INJ IV PUSH: CPT

## 2025-06-13 PROCEDURE — A9585: CPT

## 2025-06-13 PROCEDURE — 87507 IADNA-DNA/RNA PROBE TQ 12-25: CPT

## 2025-06-13 PROCEDURE — 74177 CT ABD & PELVIS W/CONTRAST: CPT

## 2025-06-13 PROCEDURE — 85018 HEMOGLOBIN: CPT

## 2025-06-13 PROCEDURE — 96376 TX/PRO/DX INJ SAME DRUG ADON: CPT

## 2025-06-13 PROCEDURE — 82272 OCCULT BLD FECES 1-3 TESTS: CPT

## 2025-06-13 PROCEDURE — 82330 ASSAY OF CALCIUM: CPT

## 2025-06-13 PROCEDURE — 85014 HEMATOCRIT: CPT

## 2025-06-13 PROCEDURE — 85027 COMPLETE CBC AUTOMATED: CPT

## 2025-06-13 PROCEDURE — 80053 COMPREHEN METABOLIC PANEL: CPT

## 2025-06-13 PROCEDURE — 80048 BASIC METABOLIC PNL TOTAL CA: CPT

## 2025-06-13 PROCEDURE — 83605 ASSAY OF LACTIC ACID: CPT

## 2025-06-13 RX ORDER — MIDODRINE HYDROCHLORIDE 5 MG/1
1 TABLET ORAL
Qty: 63 | Refills: 1
Start: 2025-06-13 | End: 2025-07-24

## 2025-06-13 RX ORDER — FOLIC ACID 1 MG/1
1 TABLET ORAL
Qty: 30 | Refills: 0
Start: 2025-06-13 | End: 2025-07-12

## 2025-06-13 RX ORDER — RIFAXIMIN 550 MG/1
1 TABLET ORAL
Qty: 39 | Refills: 0
Start: 2025-06-13 | End: 2025-06-25

## 2025-06-13 RX ADMIN — BUSPIRONE HYDROCHLORIDE 15 MILLIGRAM(S): 15 TABLET ORAL at 14:47

## 2025-06-13 RX ADMIN — MIDODRINE HYDROCHLORIDE 5 MILLIGRAM(S): 5 TABLET ORAL at 12:44

## 2025-06-13 RX ADMIN — Medication 650 MILLIGRAM(S): at 15:06

## 2025-06-13 RX ADMIN — FOLIC ACID 1 MILLIGRAM(S): 1 TABLET ORAL at 12:44

## 2025-06-13 RX ADMIN — Medication 650 MILLIGRAM(S): at 06:16

## 2025-06-13 RX ADMIN — BUSPIRONE HYDROCHLORIDE 15 MILLIGRAM(S): 15 TABLET ORAL at 06:17

## 2025-06-13 RX ADMIN — Medication 650 MILLIGRAM(S): at 00:29

## 2025-06-13 RX ADMIN — MIDODRINE HYDROCHLORIDE 5 MILLIGRAM(S): 5 TABLET ORAL at 06:17

## 2025-06-13 RX ADMIN — Medication 650 MILLIGRAM(S): at 00:59

## 2025-06-13 RX ADMIN — SERTRALINE 100 MILLIGRAM(S): 100 TABLET, FILM COATED ORAL at 12:44

## 2025-06-13 RX ADMIN — Medication 1 TABLET(S): at 12:44

## 2025-06-13 NOTE — PROGRESS NOTE ADULT - PROVIDER SPECIALTY LIST ADULT
Cardiology
Gastroenterology
Gastroenterology
Internal Medicine
Gastroenterology
Gastroenterology
Internal Medicine
Gastroenterology
Internal Medicine

## 2025-06-13 NOTE — DISCHARGE NOTE NURSING/CASE MANAGEMENT/SOCIAL WORK - FINANCIAL ASSISTANCE
[facility] provides services at a reduced cost to those who are determined to be eligible through [facility]’s financial assistance program. Information regarding [facility]’s financial assistance program can be found by going to [email or web address] or by calling [phone].

## 2025-06-13 NOTE — DISCHARGE NOTE NURSING/CASE MANAGEMENT/SOCIAL WORK - NSDCFUADDAPPT_GEN_ALL_CORE_FT
APPTS ARE READY TO BE MADE: [x] YES    Best Family or Patient Contact (if needed):    Additional Information about above appointments (if needed):    1: gastroenterologist - Dr. Ramos or establish care with Dr. Hall  2: PCP  3:     Other comments or requests:     Met with patient face to face and provided the patient with provider referral information, however patient prefers to schedule the appointments on their own.     Please call Inpatient Referral Program at [phone] if you need scheduling assistance.

## 2025-06-13 NOTE — PROGRESS NOTE ADULT - ASSESSMENT
54 year old female with hx of gastric bypass presenting with acute on chronic abdominal pain    1. Abdominal pain  Multiple endoscopic evaluations negative. Suspect component of IBS-D and chronic pain  MRI reviewed, no acute pathology contributing to her discomfort   appreciate pain management eval   Xifaxan x 14d course started for ?SIBO , I arranged for outpt prescription  no gi objection to d/c planning. Pt can f/u with GI as outpatient     2. Anemia, likely secondary to hx of gastric bypass  -Multiple endoscopic evaluations negative, no role for repeat   -MRI w/subq rlq collection, likely small hematoma; non-contributory  -appreciate bariatric sx eval    3. Major depression/fibryomyalgia  consider psych eval            I had a prolonged conversation with the patient regarding the hospital course, differential diagnosis, results of diagnostic tests this far, and therapeutic modalities available. Plan of care discussed with the patient after the evaluation. Patient expresses a clear understanding of the plan of care.  Sixty five minutes (or equivalent based on complexity) spent on the total encounter, of which more than fifty percent of the encounter was spent on counseling and/or coordinating care by the attending physician.     [facility]  Arnulfo Hall M.D.   [address] [place] [id number]  Office: [phone]

## 2025-06-13 NOTE — PROGRESS NOTE ADULT - SUBJECTIVE AND OBJECTIVE BOX
SUBJECTIVE / OVERNIGHT EVENTS:    Patient seen and examined at bedside. Tired this am        --------------------------------------------------------------------------------------------  LABS:                        11.1   4.39  )-----------( 157      ( 13 Jun 2025 07:12 )             36.0     06-13    143  |  108  |  12  ----------------------------<  89  3.8   |  19[L]  |  0.78    Ca    9.4      13 Jun 2025 07:14    TPro  5.9[L]  /  Alb  3.6  /  TBili  0.3  /  DBili  x   /  AST  29  /  ALT  17  /  AlkPhos  90  06-12    PT/INR - ( 12 Jun 2025 01:07 )   PT: 11.4 sec;   INR: 0.99 ratio         PTT - ( 12 Jun 2025 01:07 )  PTT:28.9 sec  CAPILLARY BLOOD GLUCOSE            Urinalysis Basic - ( 13 Jun 2025 07:14 )    Color: x / Appearance: x / SG: x / pH: x  Gluc: 89 mg/dL / Ketone: x  / Bili: x / Urobili: x   Blood: x / Protein: x / Nitrite: x   Leuk Esterase: x / RBC: x / WBC x   Sq Epi: x / Non Sq Epi: x / Bacteria: x        RADIOLOGY & ADDITIONAL TESTS:    Imaging Personally Reviewed:  [x] YES  [ ] NO    Consultant(s) Notes Reviewed:  [x] YES  [ ] NO    MEDICATIONS  (STANDING):  busPIRone 15 milliGRAM(s) Oral three times a day  folic acid 1 milliGRAM(s) Oral daily  midodrine. 5 milliGRAM(s) Oral three times a day  multivitamin 1 Tablet(s) Oral daily  rifAXIMin 550 milliGRAM(s) Oral three times a day  sertraline 100 milliGRAM(s) Oral daily  sodium chloride 0.9%. 1000 milliLiter(s) (100 mL/Hr) IV Continuous <Continuous>  sodium chloride 0.9%. 1000 milliLiter(s) (100 mL/Hr) IV Continuous <Continuous>    MEDICATIONS  (PRN):  acetaminophen     Tablet .. 650 milliGRAM(s) Oral every 6 hours PRN Temp greater or equal to 38C (100.4F), Mild Pain (1 - 3)  aluminum hydroxide/magnesium hydroxide/simethicone Suspension 30 milliLiter(s) Oral every 4 hours PRN Dyspepsia  diazepam    Tablet 5 milliGRAM(s) Oral every 6 hours PRN Anxiety  melatonin 3 milliGRAM(s) Oral at bedtime PRN Insomnia  ondansetron Injectable 4 milliGRAM(s) IV Push every 8 hours PRN Nausea and/or Vomiting      Care Discussed with Consultants/Other Providers [x] YES  [ ] NO    Vital Signs Last 24 Hrs  T(C): 36.4 (13 Jun 2025 04:55), Max: 36.5 (12 Jun 2025 21:05)  T(F): 97.5 (13 Jun 2025 04:55), Max: 97.7 (12 Jun 2025 21:05)  HR: 54 (13 Jun 2025 04:55) (54 - 54)  BP: 105/72 (13 Jun 2025 04:55) (104/66 - 108/72)  BP(mean): --  RR: 18 (13 Jun 2025 04:55) (18 - 18)  SpO2: 97% (13 Jun 2025 04:55) (96% - 97%)    Parameters below as of 13 Jun 2025 04:55  Patient On (Oxygen Delivery Method): room air      I&O's Summary        PHYSICAL EXAM:  GENERAL: NAD, well-developed, comfortable  HEAD:  Atraumatic, Normocephalic  EYES: EOMI, PERRLA, conjunctiva and sclera clear  NECK: Supple, No JVD  CHEST/LUNG: Clear to auscultation bilaterally; No wheeze  HEART: Regular rate and rhythm; No murmurs, rubs, or gallops  ABDOMEN: Soft, +tenderness throughout, Nondistended; Bowel sounds present  NEURO: AAOx3, no focal weakness, 5/5 b/l extremity strength, b/l knee no arthritis, no effusion   EXTREMITIES:  2+ Peripheral Pulses, No clubbing, cyanosis, or edema  SKIN: No rashes or lesions

## 2025-06-13 NOTE — PROGRESS NOTE ADULT - REASON FOR ADMISSION
Abdomen pain

## 2025-06-13 NOTE — DISCHARGE NOTE NURSING/CASE MANAGEMENT/SOCIAL WORK - PATIENT PORTAL LINK FT
You can access the FollowMyHealth Patient Portal offered by [facility] by registering at the following website: [email or web address]. By joining [facility]’s FollowMyHealth portal, you will also be able to view your health information using other applications (apps) compatible with our system.

## 2025-06-13 NOTE — PROGRESS NOTE ADULT - ASSESSMENT
54F PMH JENNIFER, MDD, fibromyalgia, IBS, RNYGB (2005, [place], lost 180lbs, regained 60 after pregnancy) with multiple subsequent surgeries for SBOs presenting for evaluation of intractable abdominal pain a/f further eval and pain control       Plan:    # Abd pain: Unclear etiology/ Likely IBS  - Frequently has abdominal pain and is followed closely by Dr. Lott who recently did an EGD and colonoscopy 2 weeks ago.  - Pain control prn  - Diet as tolerated  - Trial of xifaxan  - GI following->  f/u with GI as outpatient     # Hypotension:  - Trend BP's  - Cards following-> - trial Midodrine 5mg PO TID     # Anxiety/ Depression/ Fibryomyalgia  - C/w current meds  - Seen by Pain mgmt    # hx of gastric bypass:  - Bariatrics following    # DVT ppx:  - IPC's    DC planning    [employer]  [phone]

## 2025-06-13 NOTE — PROGRESS NOTE ADULT - SUBJECTIVE AND OBJECTIVE BOX
DATE OF SERVICE: 06-13-25 @ 08:25    Patient is a 54y old  Female who presents with a chief complaint of Abdomen pain (12 Jun 2025 15:58)      INTERVAL HISTORY: feels okay but still reporting abdominal pain    REVIEW OF SYSTEMS:  CONSTITUTIONAL: No weakness  EYES/ENT: No visual changes;  No throat pain   NECK: No pain or stiffness  RESPIRATORY: No cough, wheezing; No shortness of breath  CARDIOVASCULAR: No chest pain or palpitations  GASTROINTESTINAL: + abdominal  pain. No nausea, vomiting, or hematemesis  GENITOURINARY: No dysuria, frequency or hematuria  NEUROLOGICAL: No stroke like symptoms  SKIN: No rashes    	  MEDICATIONS:  midodrine. 5 milliGRAM(s) Oral three times a day        PHYSICAL EXAM:  T(C): 36.4 (06-13-25 @ 04:55), Max: 36.5 (06-12-25 @ 21:05)  HR: 54 (06-13-25 @ 04:55) (54 - 54)  BP: 105/72 (06-13-25 @ 04:55) (104/66 - 108/72)  RR: 18 (06-13-25 @ 04:55) (18 - 18)  SpO2: 97% (06-13-25 @ 04:55) (96% - 97%)  Wt(kg): --  I&O's Summary        Appearance: In no distress	  HEENT:    PERRL, EOMI	  Cardiovascular:  S1 S2, No JVD  Respiratory: Lungs clear to auscultation	  Gastrointestinal:  Soft, Non-tender, + BS	  Vascularature:  No edema of LE  Psychiatric: Appropriate affect   Neuro: no acute focal deficits                               11.1   4.39  )-----------( 157      ( 13 Jun 2025 07:12 )             36.0     06-13    143  |  108  |  12  ----------------------------<  89  3.8   |  19[L]  |  0.78    Ca    9.4      13 Jun 2025 07:14    TPro  5.9[L]  /  Alb  3.6  /  TBili  0.3  /  DBili  x   /  AST  29  /  ALT  17  /  AlkPhos  90  06-12        Labs personally reviewed      ASSESSMENT/PLAN: 	      54F PMH JENNIFER, MDD, fibromyalgia, IBS, RNYGB (2005, [place], lost 180lbs, regained 60 after pregnancy) with multiple subsequent surgeries for SBOs presenting for evaluation of intractable abdominal pain a/f further eval and pain control.    Problem/Plan 1: Hypotension  - Patient with baseline soft BP  - Possibly 2/2 GI losess and decreased appetite  - Orthos borderline but wnl  - c/w NS at 100mL/hr x 12 hours  - Vagus stimulation 2/2 abdominal discomfort and pain regimen may also be contributing.  - Will trial Midodrine 5mg PO TID   - 6/13 BP mildly improving    Problem/Plan 2: Abd pain: Unclear etiology/ Likely IBS  - Frequently has abdominal pain and is followed closely by Dr. Patricio who recently did an EGD and colonoscopy 2 weeks ago.  - Pain control  - CT A/P negative for acute findings  - MR enterography 6/9 w/ No evidence of bowel obstruction or inflammation.  - FOBT negative, UA negative  - GI PCR negative  - Diet as tolerated  - Trial of xifaxan  - GI following->  f/u with GI as outpatient     Problem/Plan 3:  Anxiety/ Depression/ Fibryomyalgia  - C/w current meds  - Pain mgmt regimen per Pain mgmt    Problem/Plan 4: DVT ppx:  - IPC's        POLA Muñoz DO [facility]  Cardiovascular Medicine  [address], [address]  Available through call or text on [employer]  Office: [phone]     DATE OF SERVICE: 06-13-25 @ 08:25    Patient is a 54y old  Female who presents with a chief complaint of Abdomen pain (12 Jun 2025 15:58)      INTERVAL HISTORY: feels okay but still reporting abdominal pain    REVIEW OF SYSTEMS:  CONSTITUTIONAL: No weakness  EYES/ENT: No visual changes;  No throat pain   NECK: No pain or stiffness  RESPIRATORY: No cough, wheezing; No shortness of breath  CARDIOVASCULAR: No chest pain or palpitations  GASTROINTESTINAL: + abdominal  pain. No nausea, vomiting, or hematemesis  GENITOURINARY: No dysuria, frequency or hematuria  NEUROLOGICAL: No stroke like symptoms  SKIN: No rashes    	  MEDICATIONS:  midodrine. 5 milliGRAM(s) Oral three times a day        PHYSICAL EXAM:  T(C): 36.4 (06-13-25 @ 04:55), Max: 36.5 (06-12-25 @ 21:05)  HR: 54 (06-13-25 @ 04:55) (54 - 54)  BP: 105/72 (06-13-25 @ 04:55) (104/66 - 108/72)  RR: 18 (06-13-25 @ 04:55) (18 - 18)  SpO2: 97% (06-13-25 @ 04:55) (96% - 97%)  Wt(kg): --  I&O's Summary        Appearance: In no distress	  HEENT:    PERRL, EOMI	  Cardiovascular:  S1 S2, No JVD  Respiratory: Lungs clear to auscultation	  Gastrointestinal:  Soft, Non-tender, + BS	  Vascularature:  No edema of LE  Psychiatric: Appropriate affect   Neuro: no acute focal deficits                               11.1   4.39  )-----------( 157      ( 13 Jun 2025 07:12 )             36.0     06-13    143  |  108  |  12  ----------------------------<  89  3.8   |  19[L]  |  0.78    Ca    9.4      13 Jun 2025 07:14    TPro  5.9[L]  /  Alb  3.6  /  TBili  0.3  /  DBili  x   /  AST  29  /  ALT  17  /  AlkPhos  90  06-12        Labs personally reviewed      ASSESSMENT/PLAN: 	    54F PMH JENNIFER, MDD, fibromyalgia, IBS, RNYGB (2005, [place], lost 180lbs, regained 60 after pregnancy) with multiple subsequent surgeries for SBOs presenting for evaluation of intractable abdominal pain a/f further eval and pain control.    Problem/Plan 1: Hypotension  - Patient with baseline soft BP  - Possibly 2/2 GI losess and decreased appetite  - Orthos borderline but wnl  - c/w NS at 100mL/hr x 12 hours  - Vagus stimulation 2/2 abdominal discomfort and pain regimen may also be contributing.  - Will trial Midodrine 5mg PO TID   - 6/13 BP mildly improving    Problem/Plan 2: Abd pain: Unclear etiology/ Likely IBS  - Frequently has abdominal pain and is followed closely by Dr. Patricio who recently did an EGD and colonoscopy 2 weeks ago.  - Pain control  - CT A/P negative for acute findings  - MR enterography 6/9 w/ No evidence of bowel obstruction or inflammation.  - FOBT negative, UA negative  - GI PCR negative  - Diet as tolerated  - Trial of xifaxan  - GI following->  f/u with GI as outpatient     Problem/Plan 3:  Anxiety/ Depression/ Fibryomyalgia  - C/w current meds  - Pain mgmt regimen per Pain mgmt    Problem/Plan 4: DVT ppx:  - IPC's        POLA Muñoz DO [facility]  Cardiovascular Medicine  [address], [address]  Available through call or text on [employer]  Office: [phone]

## 2025-06-13 NOTE — DISCHARGE NOTE NURSING/CASE MANAGEMENT/SOCIAL WORK - NSDCPEFALRISK_GEN_ALL_CORE
For information on Fall & Injury Prevention, visit: [email or web address] OR  [email or web address] OR  https://www.cdc.gov/steadi/patient.html

## 2025-06-17 NOTE — PATIENT PROFILE ADULT - SURGICAL SITE INCISION
NEUROSURGERY - NEW PREVISIT PLANNING    Referring Provider: Michael Packer PA-C in GENERIC EXTERNAL DATA DEPARTMENT   OVN 6/10/2025   Reason For Visit: Spinal stenosis in cervical region [M48.02]   DIRECT REFERRAL: REFERRAL TO DR. LOPEZ       IMAGING STATUS/LOCATION DATE/TYPE   MRI EXTERNAL/PACS 5/22/2025  MR CERVICAL, THORACIC SPINE    5/21/2025  MR CERVICAL SPINE   CT NA    XRAY INTERNAL/IMAGING  6/17/2025  XR CERVICAL 4 VIEWS   NOTES     Other specialist OVN: NA    EMG NA    INJECTION NA    PHYSICAL THERAPY NA    SURGERY NA      C2C ON FILE YR LAST UPDATE 5YR & OLDER NEEDS UPDATE   YES  []  NO  [x] NONE ACTION TAKEN: NEEDS C2C COMPLETED AT APPT      
no

## 2025-06-19 NOTE — ED ADULT NURSE NOTE - HIV OFFER
Celiac markers are normal.  Complete remainder of work-up to evaluate symptoms.  Please call with any questions,    Ortiz Austin MD   
Yaron,    No worrisome findings on the labs.  Awaiting results of the Celiac markers.  Please call with any questions,    Ortiz Austin MD   
Opt out

## 2025-07-11 ENCOUNTER — APPOINTMENT (OUTPATIENT)
Dept: PAIN MANAGEMENT | Facility: CLINIC | Age: 55
End: 2025-07-11

## 2025-07-14 ENCOUNTER — APPOINTMENT (OUTPATIENT)
Dept: PAIN MANAGEMENT | Facility: CLINIC | Age: 55
End: 2025-07-14

## 2025-07-18 ENCOUNTER — APPOINTMENT (OUTPATIENT)
Dept: ORTHOPEDIC SURGERY | Facility: CLINIC | Age: 55
End: 2025-07-18

## 2025-09-19 ENCOUNTER — APPOINTMENT (OUTPATIENT)
Dept: OTOLARYNGOLOGY | Facility: CLINIC | Age: 55
End: 2025-09-19